# Patient Record
Sex: MALE | Race: WHITE | NOT HISPANIC OR LATINO | Employment: OTHER | URBAN - METROPOLITAN AREA
[De-identification: names, ages, dates, MRNs, and addresses within clinical notes are randomized per-mention and may not be internally consistent; named-entity substitution may affect disease eponyms.]

---

## 2018-05-07 ENCOUNTER — HOSPITAL ENCOUNTER (OUTPATIENT)
Dept: GENERAL RADIOLOGY | Facility: HOSPITAL | Age: 83
Discharge: HOME | End: 2018-05-07
Attending: INTERNAL MEDICINE | Admitting: INTERNAL MEDICINE

## 2018-05-07 ENCOUNTER — TRANSCRIBE ORDERS (OUTPATIENT)
Dept: ADMINISTRATIVE | Facility: HOSPITAL | Age: 83
End: 2018-05-07

## 2018-05-07 DIAGNOSIS — M79.605 LEFT LEG PAIN: ICD-10-CM

## 2018-05-07 DIAGNOSIS — M79.605 LEFT LEG PAIN: Primary | ICD-10-CM

## 2018-05-07 PROCEDURE — 73590 X-RAY EXAM OF LOWER LEG: CPT

## 2018-05-07 PROCEDURE — 73552 X-RAY EXAM OF FEMUR 2/>: CPT

## 2018-05-18 ENCOUNTER — TRANSCRIBE ORDERS (OUTPATIENT)
Dept: ADMINISTRATIVE | Facility: HOSPITAL | Age: 83
End: 2018-05-18

## 2018-05-18 DIAGNOSIS — M79.605 LEFT LEG PAIN: Primary | ICD-10-CM

## 2018-05-25 ENCOUNTER — HOSPITAL ENCOUNTER (OUTPATIENT)
Dept: NUCLEAR MEDICINE | Facility: HOSPITAL | Age: 83
Discharge: HOME | End: 2018-05-25
Attending: INTERNAL MEDICINE

## 2018-05-25 DIAGNOSIS — M79.605 LEFT LEG PAIN: ICD-10-CM

## 2018-05-25 PROCEDURE — 78306 BONE IMAGING WHOLE BODY: CPT

## 2018-05-25 PROCEDURE — A9503 TC99M MEDRONATE: HCPCS | Performed by: INTERNAL MEDICINE

## 2018-05-25 PROCEDURE — 0 TECHNETIUM MEDRONATE KIT: Performed by: INTERNAL MEDICINE

## 2018-05-25 RX ORDER — TC 99M MEDRONATE 20 MG/10ML
27 INJECTION, POWDER, LYOPHILIZED, FOR SOLUTION INTRAVENOUS
Status: COMPLETED | OUTPATIENT
Start: 2018-05-25 | End: 2018-05-25

## 2018-05-25 RX ADMIN — Medication 27 MILLICURIE: at 10:46

## 2018-05-29 ENCOUNTER — APPOINTMENT (OUTPATIENT)
Dept: NUCLEAR MEDICINE | Facility: HOSPITAL | Age: 83
End: 2018-05-29
Attending: INTERNAL MEDICINE

## 2018-05-31 ENCOUNTER — TRANSCRIBE ORDERS (OUTPATIENT)
Dept: ADMINISTRATIVE | Facility: HOSPITAL | Age: 83
End: 2018-05-31

## 2018-05-31 DIAGNOSIS — R94.8 ABNORMAL BONE SCAN OF LUMBAR SPINE: Primary | ICD-10-CM

## 2018-06-06 ENCOUNTER — HOSPITAL ENCOUNTER (OUTPATIENT)
Dept: CT IMAGING | Facility: HOSPITAL | Age: 83
Discharge: HOME | End: 2018-06-06
Attending: INTERNAL MEDICINE | Admitting: INTERNAL MEDICINE

## 2018-06-06 DIAGNOSIS — R94.8 ABNORMAL BONE SCAN OF LUMBAR SPINE: ICD-10-CM

## 2018-06-06 PROCEDURE — 72131 CT LUMBAR SPINE W/O DYE: CPT

## 2018-06-12 ENCOUNTER — APPOINTMENT (OUTPATIENT)
Dept: CT IMAGING | Facility: HOSPITAL | Age: 83
End: 2018-06-12

## 2018-06-12 ENCOUNTER — APPOINTMENT (OUTPATIENT)
Dept: GENERAL RADIOLOGY | Facility: HOSPITAL | Age: 83
End: 2018-06-12

## 2018-06-12 ENCOUNTER — HOSPITAL ENCOUNTER (INPATIENT)
Facility: HOSPITAL | Age: 83
LOS: 13 days | Discharge: NURSING, REHAB OR LONG-TERM CARE | End: 2018-06-25
Attending: EMERGENCY MEDICINE | Admitting: SURGERY

## 2018-06-12 DIAGNOSIS — C79.51 METASTATIC CANCER TO SPINE (HCC): ICD-10-CM

## 2018-06-12 DIAGNOSIS — Z74.09 IMPAIRED FUNCTIONAL MOBILITY, BALANCE, GAIT, AND ENDURANCE: ICD-10-CM

## 2018-06-12 DIAGNOSIS — Z85.46 HISTORY OF PROSTATE CANCER: ICD-10-CM

## 2018-06-12 DIAGNOSIS — A41.9 SEPSIS, DUE TO UNSPECIFIED ORGANISM: Primary | ICD-10-CM

## 2018-06-12 DIAGNOSIS — J18.9 PNEUMONIA OF LEFT LOWER LOBE DUE TO INFECTIOUS ORGANISM: ICD-10-CM

## 2018-06-12 DIAGNOSIS — Z74.09 IMPAIRED MOBILITY AND ADLS: ICD-10-CM

## 2018-06-12 DIAGNOSIS — K80.81 BILIARY CALCULUS OF OTHER SITE WITH OBSTRUCTION: ICD-10-CM

## 2018-06-12 DIAGNOSIS — R74.8 ELEVATED LIVER ENZYMES: ICD-10-CM

## 2018-06-12 DIAGNOSIS — R77.8 ELEVATED TROPONIN: ICD-10-CM

## 2018-06-12 DIAGNOSIS — E80.6 HYPERBILIRUBINEMIA: ICD-10-CM

## 2018-06-12 DIAGNOSIS — Z78.9 IMPAIRED MOBILITY AND ADLS: ICD-10-CM

## 2018-06-12 LAB
ALBUMIN SERPL-MCNC: 3.91 G/DL (ref 3.2–4.8)
ALBUMIN/GLOB SERPL: 1.8 G/DL (ref 1.5–2.5)
ALP SERPL-CCNC: 188 U/L (ref 25–100)
ALT SERPL W P-5'-P-CCNC: 210 U/L (ref 7–40)
ANION GAP SERPL CALCULATED.3IONS-SCNC: 11 MMOL/L (ref 3–11)
AST SERPL-CCNC: 315 U/L (ref 0–33)
BASOPHILS # BLD AUTO: 0.01 10*3/MM3 (ref 0–0.2)
BASOPHILS NFR BLD AUTO: 0.1 % (ref 0–1)
BILIRUB SERPL-MCNC: 3.2 MG/DL (ref 0.3–1.2)
BUN BLD-MCNC: 24 MG/DL (ref 9–23)
BUN/CREAT SERPL: 16.8 (ref 7–25)
CALCIUM SPEC-SCNC: 9.3 MG/DL (ref 8.7–10.4)
CHLORIDE SERPL-SCNC: 101 MMOL/L (ref 99–109)
CO2 SERPL-SCNC: 25 MMOL/L (ref 20–31)
CREAT BLD-MCNC: 1.43 MG/DL (ref 0.6–1.3)
D-LACTATE SERPL-SCNC: 3.1 MMOL/L (ref 0.5–2)
DEPRECATED RDW RBC AUTO: 46 FL (ref 37–54)
EOSINOPHIL # BLD AUTO: 0 10*3/MM3 (ref 0–0.3)
EOSINOPHIL NFR BLD AUTO: 0 % (ref 0–3)
ERYTHROCYTE [DISTWIDTH] IN BLOOD BY AUTOMATED COUNT: 13.9 % (ref 11.3–14.5)
GFR SERPL CREATININE-BSD FRML MDRD: 46 ML/MIN/1.73
GLOBULIN UR ELPH-MCNC: 2.2 GM/DL
GLUCOSE BLD-MCNC: 143 MG/DL (ref 70–100)
HCT VFR BLD AUTO: 39.5 % (ref 38.9–50.9)
HGB BLD-MCNC: 13.1 G/DL (ref 13.1–17.5)
HOLD SPECIMEN: NORMAL
HOLD SPECIMEN: NORMAL
IMM GRANULOCYTES # BLD: 0.04 10*3/MM3 (ref 0–0.03)
IMM GRANULOCYTES NFR BLD: 0.3 % (ref 0–0.6)
LYMPHOCYTES # BLD AUTO: 0.39 10*3/MM3 (ref 0.6–4.8)
LYMPHOCYTES NFR BLD AUTO: 3 % (ref 24–44)
MAGNESIUM SERPL-MCNC: 2 MG/DL (ref 1.3–2.7)
MCH RBC QN AUTO: 29.9 PG (ref 27–31)
MCHC RBC AUTO-ENTMCNC: 33.2 G/DL (ref 32–36)
MCV RBC AUTO: 90.2 FL (ref 80–99)
MONOCYTES # BLD AUTO: 0.73 10*3/MM3 (ref 0–1)
MONOCYTES NFR BLD AUTO: 5.6 % (ref 0–12)
NEUTROPHILS # BLD AUTO: 11.76 10*3/MM3 (ref 1.5–8.3)
NEUTROPHILS NFR BLD AUTO: 91 % (ref 41–71)
PLATELET # BLD AUTO: 168 10*3/MM3 (ref 150–450)
PMV BLD AUTO: 10.1 FL (ref 6–12)
POTASSIUM BLD-SCNC: 3.3 MMOL/L (ref 3.5–5.5)
PROT SERPL-MCNC: 6.1 G/DL (ref 5.7–8.2)
RBC # BLD AUTO: 4.38 10*6/MM3 (ref 4.2–5.76)
SODIUM BLD-SCNC: 137 MMOL/L (ref 132–146)
TROPONIN I SERPL-MCNC: 0.26 NG/ML (ref 0–0.07)
WBC NRBC COR # BLD: 12.93 10*3/MM3 (ref 3.5–10.8)
WHOLE BLOOD HOLD SPECIMEN: NORMAL
WHOLE BLOOD HOLD SPECIMEN: NORMAL

## 2018-06-12 PROCEDURE — 83735 ASSAY OF MAGNESIUM: CPT

## 2018-06-12 PROCEDURE — 83605 ASSAY OF LACTIC ACID: CPT | Performed by: NURSE PRACTITIONER

## 2018-06-12 PROCEDURE — 71045 X-RAY EXAM CHEST 1 VIEW: CPT

## 2018-06-12 PROCEDURE — 87040 BLOOD CULTURE FOR BACTERIA: CPT | Performed by: NURSE PRACTITIONER

## 2018-06-12 PROCEDURE — 87186 SC STD MICRODIL/AGAR DIL: CPT | Performed by: NURSE PRACTITIONER

## 2018-06-12 PROCEDURE — 80053 COMPREHEN METABOLIC PANEL: CPT

## 2018-06-12 PROCEDURE — 81003 URINALYSIS AUTO W/O SCOPE: CPT

## 2018-06-12 PROCEDURE — 93005 ELECTROCARDIOGRAM TRACING: CPT

## 2018-06-12 PROCEDURE — 85025 COMPLETE CBC W/AUTO DIFF WBC: CPT

## 2018-06-12 PROCEDURE — 74176 CT ABD & PELVIS W/O CONTRAST: CPT

## 2018-06-12 PROCEDURE — 99285 EMERGENCY DEPT VISIT HI MDM: CPT

## 2018-06-12 PROCEDURE — 83880 ASSAY OF NATRIURETIC PEPTIDE: CPT | Performed by: NURSE PRACTITIONER

## 2018-06-12 PROCEDURE — 84484 ASSAY OF TROPONIN QUANT: CPT

## 2018-06-12 PROCEDURE — 83690 ASSAY OF LIPASE: CPT | Performed by: NURSE PRACTITIONER

## 2018-06-12 PROCEDURE — 87150 DNA/RNA AMPLIFIED PROBE: CPT | Performed by: NURSE PRACTITIONER

## 2018-06-12 RX ORDER — SODIUM CHLORIDE 9 MG/ML
100 INJECTION, SOLUTION INTRAVENOUS CONTINUOUS
Status: DISCONTINUED | OUTPATIENT
Start: 2018-06-13 | End: 2018-06-15

## 2018-06-12 RX ORDER — ASPIRIN 81 MG/1
81 TABLET ORAL DAILY
COMMUNITY
End: 2018-07-16

## 2018-06-12 RX ORDER — SODIUM CHLORIDE 0.9 % (FLUSH) 0.9 %
10 SYRINGE (ML) INJECTION AS NEEDED
Status: DISCONTINUED | OUTPATIENT
Start: 2018-06-12 | End: 2018-06-25 | Stop reason: HOSPADM

## 2018-06-12 RX ORDER — TERAZOSIN 5 MG/1
5 CAPSULE ORAL NIGHTLY
COMMUNITY
End: 2018-07-16 | Stop reason: SDUPTHER

## 2018-06-12 RX ORDER — IBUPROFEN 200 MG
200 TABLET ORAL EVERY 6 HOURS PRN
COMMUNITY
End: 2018-12-29

## 2018-06-12 RX ORDER — HYDROCODONE BITARTRATE AND ACETAMINOPHEN 7.5; 325 MG/1; MG/1
TABLET ORAL
Status: DISPENSED
Start: 2018-06-12 | End: 2018-06-13

## 2018-06-12 RX ORDER — FUROSEMIDE 20 MG/1
20 TABLET ORAL DAILY
COMMUNITY
End: 2018-06-25 | Stop reason: HOSPADM

## 2018-06-12 RX ORDER — HYDROCODONE BITARTRATE AND ACETAMINOPHEN 7.5; 325 MG/1; MG/1
1 TABLET ORAL ONCE
Status: COMPLETED | OUTPATIENT
Start: 2018-06-12 | End: 2018-06-12

## 2018-06-12 RX ADMIN — SODIUM CHLORIDE 500 ML: 9 INJECTION, SOLUTION INTRAVENOUS at 22:40

## 2018-06-12 RX ADMIN — HYDROCODONE BITARTRATE AND ACETAMINOPHEN 1 TABLET: 7.5; 325 TABLET ORAL at 23:07

## 2018-06-13 ENCOUNTER — APPOINTMENT (OUTPATIENT)
Dept: CARDIOLOGY | Facility: HOSPITAL | Age: 83
End: 2018-06-13
Attending: INTERNAL MEDICINE

## 2018-06-13 ENCOUNTER — APPOINTMENT (OUTPATIENT)
Dept: ULTRASOUND IMAGING | Facility: HOSPITAL | Age: 83
End: 2018-06-13

## 2018-06-13 PROBLEM — R91.8 PULMONARY INFILTRATE: Status: ACTIVE | Noted: 2018-06-13

## 2018-06-13 PROBLEM — G47.33 OSA (OBSTRUCTIVE SLEEP APNEA): Status: ACTIVE | Noted: 2018-06-13

## 2018-06-13 PROBLEM — R74.8 ELEVATED LIVER ENZYMES: Status: ACTIVE | Noted: 2018-06-13

## 2018-06-13 PROBLEM — R77.8 ELEVATED TROPONIN: Status: ACTIVE | Noted: 2018-06-13

## 2018-06-13 PROBLEM — I21.4 NSTEMI (NON-ST ELEVATED MYOCARDIAL INFARCTION) (HCC): Status: ACTIVE | Noted: 2018-06-13

## 2018-06-13 PROBLEM — K80.20 CHOLELITHIASES: Status: ACTIVE | Noted: 2018-06-13

## 2018-06-13 PROBLEM — C79.51 METASTATIC CANCER TO SPINE (HCC): Status: ACTIVE | Noted: 2018-06-13

## 2018-06-13 PROBLEM — C61 PROSTATE CANCER (HCC): Status: ACTIVE | Noted: 2018-06-13

## 2018-06-13 PROBLEM — N28.9 ACUTE RENAL INSUFFICIENCY: Status: ACTIVE | Noted: 2018-06-13

## 2018-06-13 PROBLEM — K80.70 CALCULUS OF GALLBLADDER AND BILE DUCT: Status: ACTIVE | Noted: 2018-06-13

## 2018-06-13 PROBLEM — K81.9 CHOLECYSTITIS: Status: ACTIVE | Noted: 2018-06-13

## 2018-06-13 PROBLEM — H54.40 BLINDNESS OF RIGHT EYE: Status: ACTIVE | Noted: 2018-06-13

## 2018-06-13 LAB
ALBUMIN SERPL-MCNC: 3.65 G/DL (ref 3.2–4.8)
ALBUMIN/GLOB SERPL: 1.6 G/DL (ref 1.5–2.5)
ALP SERPL-CCNC: 180 U/L (ref 25–100)
ALT SERPL W P-5'-P-CCNC: 296 U/L (ref 7–40)
ANION GAP SERPL CALCULATED.3IONS-SCNC: 7 MMOL/L (ref 3–11)
APTT PPP: 27.4 SECONDS (ref 24–31)
AST SERPL-CCNC: 346 U/L (ref 0–33)
BACTERIA BLD CULT: ABNORMAL
BASOPHILS # BLD AUTO: 0.01 10*3/MM3 (ref 0–0.2)
BASOPHILS NFR BLD AUTO: 0 % (ref 0–1)
BH CV ECHO MEAS - AO MAX PG (FULL): 1.4 MMHG
BH CV ECHO MEAS - AO MAX PG: 5 MMHG
BH CV ECHO MEAS - AO ROOT AREA (BSA CORRECTED): 1.6
BH CV ECHO MEAS - AO ROOT AREA: 7.4 CM^2
BH CV ECHO MEAS - AO ROOT DIAM: 3.1 CM
BH CV ECHO MEAS - AO V2 MAX: 112 CM/SEC
BH CV ECHO MEAS - AVA(V,A): 2.7 CM^2
BH CV ECHO MEAS - AVA(V,D): 2.7 CM^2
BH CV ECHO MEAS - BSA(HAYCOCK): 2 M^2
BH CV ECHO MEAS - BSA: 2 M^2
BH CV ECHO MEAS - BZI_BMI: 29.8 KILOGRAMS/M^2
BH CV ECHO MEAS - BZI_METRIC_HEIGHT: 170.2 CM
BH CV ECHO MEAS - BZI_METRIC_WEIGHT: 86.2 KG
BH CV ECHO MEAS - CONTRAST EF (2CH): 53.9 ML/M^2
BH CV ECHO MEAS - CONTRAST EF 4CH: 50.5 ML/M^2
BH CV ECHO MEAS - EDV(CUBED): 70.2 ML
BH CV ECHO MEAS - EDV(MOD-SP2): 76 ML
BH CV ECHO MEAS - EDV(MOD-SP4): 93 ML
BH CV ECHO MEAS - EDV(TEICH): 75.3 ML
BH CV ECHO MEAS - EF(CUBED): 52.1 %
BH CV ECHO MEAS - EF(MOD-BP): 50 %
BH CV ECHO MEAS - EF(MOD-SP2): 53.9 %
BH CV ECHO MEAS - EF(MOD-SP4): 50.5 %
BH CV ECHO MEAS - EF(TEICH): 44.5 %
BH CV ECHO MEAS - ESV(CUBED): 33.6 ML
BH CV ECHO MEAS - ESV(MOD-SP2): 35 ML
BH CV ECHO MEAS - ESV(MOD-SP4): 46 ML
BH CV ECHO MEAS - ESV(TEICH): 41.8 ML
BH CV ECHO MEAS - FS: 21.8 %
BH CV ECHO MEAS - IVS/LVPW: 1
BH CV ECHO MEAS - IVSD: 0.98 CM
BH CV ECHO MEAS - LA DIMENSION: 3.2 CM
BH CV ECHO MEAS - LA/AO: 1
BH CV ECHO MEAS - LAT PEAK E' VEL: 6.5 CM/SEC
BH CV ECHO MEAS - LV DIASTOLIC VOL/BSA (35-75): 47 ML/M^2
BH CV ECHO MEAS - LV MASS(C)D: 127.9 GRAMS
BH CV ECHO MEAS - LV MASS(C)DI: 64.7 GRAMS/M^2
BH CV ECHO MEAS - LV MAX PG: 3.6 MMHG
BH CV ECHO MEAS - LV MEAN PG: 1.8 MMHG
BH CV ECHO MEAS - LV SYSTOLIC VOL/BSA (12-30): 23.3 ML/M^2
BH CV ECHO MEAS - LV V1 MAX: 95.3 CM/SEC
BH CV ECHO MEAS - LV V1 MEAN: 61.7 CM/SEC
BH CV ECHO MEAS - LV V1 VTI: 18.9 CM
BH CV ECHO MEAS - LVIDD: 4.1 CM
BH CV ECHO MEAS - LVIDS: 3.2 CM
BH CV ECHO MEAS - LVLD AP2: 6.8 CM
BH CV ECHO MEAS - LVLD AP4: 7.5 CM
BH CV ECHO MEAS - LVLS AP2: 6.2 CM
BH CV ECHO MEAS - LVLS AP4: 6.7 CM
BH CV ECHO MEAS - LVOT AREA (M): 3.1 CM^2
BH CV ECHO MEAS - LVOT AREA: 3.2 CM^2
BH CV ECHO MEAS - LVOT DIAM: 2 CM
BH CV ECHO MEAS - LVPWD: 0.96 CM
BH CV ECHO MEAS - MED PEAK E' VEL: 5.5 CM/SEC
BH CV ECHO MEAS - MV A MAX VEL: 79 CM/SEC
BH CV ECHO MEAS - MV DEC TIME: 0.18 SEC
BH CV ECHO MEAS - MV E MAX VEL: 84.4 CM/SEC
BH CV ECHO MEAS - MV E/A: 1.1
BH CV ECHO MEAS - MV MAX PG: 3.2 MMHG
BH CV ECHO MEAS - MV MEAN PG: 1.7 MMHG
BH CV ECHO MEAS - MV V2 MAX: 88.9 CM/SEC
BH CV ECHO MEAS - MV V2 MEAN: 61.6 CM/SEC
BH CV ECHO MEAS - MV V2 VTI: 28 CM
BH CV ECHO MEAS - MVA(VTI): 2.2 CM^2
BH CV ECHO MEAS - SI(CUBED): 18.5 ML/M^2
BH CV ECHO MEAS - SI(LVOT): 30.7 ML/M^2
BH CV ECHO MEAS - SI(MOD-SP2): 20.7 ML/M^2
BH CV ECHO MEAS - SI(MOD-SP4): 23.8 ML/M^2
BH CV ECHO MEAS - SI(TEICH): 16.9 ML/M^2
BH CV ECHO MEAS - SV(CUBED): 36.6 ML
BH CV ECHO MEAS - SV(LVOT): 60.8 ML
BH CV ECHO MEAS - SV(MOD-SP2): 41 ML
BH CV ECHO MEAS - SV(MOD-SP4): 47 ML
BH CV ECHO MEAS - SV(TEICH): 33.5 ML
BH CV ECHO MEAS - TAPSE (>1.6): 1.8 CM2
BH CV ECHO MEASUREMENTS AVERAGE E/E' RATIO: 14.07
BH CV VAS BP LEFT ARM: NORMAL MMHG
BH CV XLRA - RV BASE: 3.2 CM
BH CV XLRA - RV LENGTH: 7.5 CM
BH CV XLRA - RV MID: 2.6 CM
BH CV XLRA - TDI S': 12.7 CM/SEC
BILIRUB SERPL-MCNC: 3 MG/DL (ref 0.3–1.2)
BILIRUB UR QL STRIP: ABNORMAL
BNP SERPL-MCNC: 87 PG/ML (ref 0–100)
BUN BLD-MCNC: 23 MG/DL (ref 9–23)
BUN/CREAT SERPL: 16.8 (ref 7–25)
CA-I SERPL ISE-MCNC: 1.17 MMOL/L (ref 1.12–1.32)
CALCIUM SPEC-SCNC: 8.5 MG/DL (ref 8.7–10.4)
CHLORIDE SERPL-SCNC: 103 MMOL/L (ref 99–109)
CK MB SERPL-CCNC: 5.63 NG/ML (ref 0–5)
CK SERPL-CCNC: 67 U/L (ref 26–174)
CLARITY UR: CLEAR
CO2 SERPL-SCNC: 24 MMOL/L (ref 20–31)
COLOR UR: YELLOW
CORTIS SERPL-MCNC: 36.7 MCG/DL
CREAT BLD-MCNC: 1.37 MG/DL (ref 0.6–1.3)
CRP SERPL-MCNC: 4.29 MG/DL (ref 0–1)
D-LACTATE SERPL-SCNC: 2.3 MMOL/L (ref 0.5–2)
DEPRECATED RDW RBC AUTO: 47.8 FL (ref 37–54)
EOSINOPHIL # BLD AUTO: 0.01 10*3/MM3 (ref 0–0.3)
EOSINOPHIL NFR BLD AUTO: 0 % (ref 0–3)
ERYTHROCYTE [DISTWIDTH] IN BLOOD BY AUTOMATED COUNT: 14.2 % (ref 11.3–14.5)
GFR SERPL CREATININE-BSD FRML MDRD: 49 ML/MIN/1.73
GLOBULIN UR ELPH-MCNC: 2.4 GM/DL
GLUCOSE BLD-MCNC: 134 MG/DL (ref 70–100)
GLUCOSE BLDC GLUCOMTR-MCNC: 100 MG/DL (ref 70–130)
GLUCOSE BLDC GLUCOMTR-MCNC: 110 MG/DL (ref 70–130)
GLUCOSE BLDC GLUCOMTR-MCNC: 117 MG/DL (ref 70–130)
GLUCOSE BLDC GLUCOMTR-MCNC: 87 MG/DL (ref 70–130)
GLUCOSE UR STRIP-MCNC: NEGATIVE MG/DL
HAV IGM SERPL QL IA: NORMAL
HBV CORE IGM SERPL QL IA: NORMAL
HBV SURFACE AG SERPL QL IA: NORMAL
HCT VFR BLD AUTO: 36.5 % (ref 38.9–50.9)
HCV AB SER DONR QL: NORMAL
HGB BLD-MCNC: 12 G/DL (ref 13.1–17.5)
HGB UR QL STRIP.AUTO: NEGATIVE
HOLD SPECIMEN: NORMAL
IMM GRANULOCYTES # BLD: 0.12 10*3/MM3 (ref 0–0.03)
IMM GRANULOCYTES NFR BLD: 0.6 % (ref 0–0.6)
INR PPP: 1.11 (ref 0.91–1.09)
KETONES UR QL STRIP: ABNORMAL
LEFT ATRIUM VOLUME INDEX: 35 ML/M2
LEFT ATRIUM VOLUME: 70 CM3
LEUKOCYTE ESTERASE UR QL STRIP.AUTO: NEGATIVE
LIPASE SERPL-CCNC: 23 U/L (ref 6–51)
LV EF 2D ECHO EST: 55 %
LYMPHOCYTES # BLD AUTO: 1.02 10*3/MM3 (ref 0.6–4.8)
LYMPHOCYTES NFR BLD AUTO: 5.1 % (ref 24–44)
MAGNESIUM SERPL-MCNC: 2.1 MG/DL (ref 1.3–2.7)
MAXIMAL PREDICTED HEART RATE: 130 BPM
MCH RBC QN AUTO: 29.9 PG (ref 27–31)
MCHC RBC AUTO-ENTMCNC: 32.9 G/DL (ref 32–36)
MCV RBC AUTO: 90.8 FL (ref 80–99)
MONOCYTES # BLD AUTO: 1.4 10*3/MM3 (ref 0–1)
MONOCYTES NFR BLD AUTO: 7 % (ref 0–12)
NEUTROPHILS # BLD AUTO: 17.56 10*3/MM3 (ref 1.5–8.3)
NEUTROPHILS NFR BLD AUTO: 87.3 % (ref 41–71)
NITRITE UR QL STRIP: NEGATIVE
PH UR STRIP.AUTO: 5.5 [PH] (ref 5–8)
PHOSPHATE SERPL-MCNC: 3.6 MG/DL (ref 2.4–5.1)
PLATELET # BLD AUTO: 153 10*3/MM3 (ref 150–450)
PMV BLD AUTO: 10.1 FL (ref 6–12)
POTASSIUM BLD-SCNC: 4.2 MMOL/L (ref 3.5–5.5)
PROCALCITONIN SERPL-MCNC: 45.71 NG/ML
PROT SERPL-MCNC: 6 G/DL (ref 5.7–8.2)
PROT UR QL STRIP: NEGATIVE
PROTHROMBIN TIME: 11.7 SECONDS (ref 9.6–11.5)
RBC # BLD AUTO: 4.02 10*6/MM3 (ref 4.2–5.76)
SODIUM BLD-SCNC: 134 MMOL/L (ref 132–146)
SP GR UR STRIP: 1.01 (ref 1–1.03)
STRESS TARGET HR: 111 BPM
TROPONIN I SERPL-MCNC: 0.45 NG/ML (ref 0–0.07)
TROPONIN I SERPL-MCNC: 1.11 NG/ML
TROPONIN I SERPL-MCNC: 1.49 NG/ML
TROPONIN I SERPL-MCNC: 1.6 NG/ML
TSH SERPL DL<=0.05 MIU/L-ACNC: 0.64 MIU/ML (ref 0.35–5.35)
UROBILINOGEN UR QL STRIP: ABNORMAL
WBC NRBC COR # BLD: 20.12 10*3/MM3 (ref 3.5–10.8)

## 2018-06-13 PROCEDURE — 93005 ELECTROCARDIOGRAM TRACING: CPT | Performed by: NURSE PRACTITIONER

## 2018-06-13 PROCEDURE — 86140 C-REACTIVE PROTEIN: CPT | Performed by: NURSE PRACTITIONER

## 2018-06-13 PROCEDURE — 82330 ASSAY OF CALCIUM: CPT | Performed by: NURSE PRACTITIONER

## 2018-06-13 PROCEDURE — 99291 CRITICAL CARE FIRST HOUR: CPT | Performed by: INTERNAL MEDICINE

## 2018-06-13 PROCEDURE — 93005 ELECTROCARDIOGRAM TRACING: CPT | Performed by: EMERGENCY MEDICINE

## 2018-06-13 PROCEDURE — 63710000001 INSULIN LISPRO (HUMAN) PER 5 UNITS: Performed by: INTERNAL MEDICINE

## 2018-06-13 PROCEDURE — 84484 ASSAY OF TROPONIN QUANT: CPT | Performed by: NURSE PRACTITIONER

## 2018-06-13 PROCEDURE — 84145 PROCALCITONIN (PCT): CPT | Performed by: NURSE PRACTITIONER

## 2018-06-13 PROCEDURE — 80053 COMPREHEN METABOLIC PANEL: CPT | Performed by: NURSE PRACTITIONER

## 2018-06-13 PROCEDURE — 25010000002 PIPERACILLIN SOD-TAZOBACTAM PER 1 G: Performed by: INTERNAL MEDICINE

## 2018-06-13 PROCEDURE — 97162 PT EVAL MOD COMPLEX 30 MIN: CPT

## 2018-06-13 PROCEDURE — 83735 ASSAY OF MAGNESIUM: CPT | Performed by: NURSE PRACTITIONER

## 2018-06-13 PROCEDURE — 25010000002 CEFTRIAXONE PER 250 MG

## 2018-06-13 PROCEDURE — 84484 ASSAY OF TROPONIN QUANT: CPT | Performed by: INTERNAL MEDICINE

## 2018-06-13 PROCEDURE — 99221 1ST HOSP IP/OBS SF/LOW 40: CPT | Performed by: PHYSICIAN ASSISTANT

## 2018-06-13 PROCEDURE — 85730 THROMBOPLASTIN TIME PARTIAL: CPT | Performed by: NURSE PRACTITIONER

## 2018-06-13 PROCEDURE — 84100 ASSAY OF PHOSPHORUS: CPT | Performed by: NURSE PRACTITIONER

## 2018-06-13 PROCEDURE — 82962 GLUCOSE BLOOD TEST: CPT

## 2018-06-13 PROCEDURE — 25010000002 AZITHROMYCIN: Performed by: NURSE PRACTITIONER

## 2018-06-13 PROCEDURE — 85025 COMPLETE CBC W/AUTO DIFF WBC: CPT | Performed by: NURSE PRACTITIONER

## 2018-06-13 PROCEDURE — 93306 TTE W/DOPPLER COMPLETE: CPT | Performed by: INTERNAL MEDICINE

## 2018-06-13 PROCEDURE — 82553 CREATINE MB FRACTION: CPT | Performed by: NURSE PRACTITIONER

## 2018-06-13 PROCEDURE — 93306 TTE W/DOPPLER COMPLETE: CPT

## 2018-06-13 PROCEDURE — 93010 ELECTROCARDIOGRAM REPORT: CPT | Performed by: INTERNAL MEDICINE

## 2018-06-13 PROCEDURE — 25010000002 HEPARIN (PORCINE) PER 1000 UNITS: Performed by: NURSE PRACTITIONER

## 2018-06-13 PROCEDURE — 84484 ASSAY OF TROPONIN QUANT: CPT

## 2018-06-13 PROCEDURE — 25010000002 PIPERACILLIN SOD-TAZOBACTAM PER 1 G: Performed by: NURSE PRACTITIONER

## 2018-06-13 PROCEDURE — 82550 ASSAY OF CK (CPK): CPT | Performed by: NURSE PRACTITIONER

## 2018-06-13 PROCEDURE — 99222 1ST HOSP IP/OBS MODERATE 55: CPT | Performed by: INTERNAL MEDICINE

## 2018-06-13 PROCEDURE — 76705 ECHO EXAM OF ABDOMEN: CPT

## 2018-06-13 PROCEDURE — 83605 ASSAY OF LACTIC ACID: CPT | Performed by: NURSE PRACTITIONER

## 2018-06-13 PROCEDURE — 82533 TOTAL CORTISOL: CPT | Performed by: NURSE PRACTITIONER

## 2018-06-13 PROCEDURE — 84443 ASSAY THYROID STIM HORMONE: CPT | Performed by: NURSE PRACTITIONER

## 2018-06-13 PROCEDURE — 80074 ACUTE HEPATITIS PANEL: CPT | Performed by: PHYSICIAN ASSISTANT

## 2018-06-13 PROCEDURE — 25010000002 SULFUR HEXAFLUORIDE MICROSPH 60.7-25 MG RECONSTITUTED SUSPENSION: Performed by: INTERNAL MEDICINE

## 2018-06-13 PROCEDURE — 25010000002 VANCOMYCIN 10 G RECONSTITUTED SOLUTION

## 2018-06-13 PROCEDURE — 85610 PROTHROMBIN TIME: CPT | Performed by: NURSE PRACTITIONER

## 2018-06-13 RX ORDER — CEFTRIAXONE SODIUM 1 G/50ML
1 INJECTION, SOLUTION INTRAVENOUS ONCE
Status: COMPLETED | OUTPATIENT
Start: 2018-06-13 | End: 2018-06-13

## 2018-06-13 RX ORDER — MULTIPLE VITAMINS W/ MINERALS TAB 9MG-400MCG
1 TAB ORAL DAILY
Status: DISCONTINUED | OUTPATIENT
Start: 2018-06-14 | End: 2018-06-25 | Stop reason: HOSPADM

## 2018-06-13 RX ORDER — NICOTINE POLACRILEX 4 MG
15 LOZENGE BUCCAL
Status: DISCONTINUED | OUTPATIENT
Start: 2018-06-13 | End: 2018-06-14

## 2018-06-13 RX ORDER — CEFTRIAXONE SODIUM 1 G/50ML
INJECTION, SOLUTION INTRAVENOUS
Status: COMPLETED
Start: 2018-06-13 | End: 2018-06-13

## 2018-06-13 RX ORDER — NICOTINE POLACRILEX 4 MG
15 LOZENGE BUCCAL
Status: DISCONTINUED | OUTPATIENT
Start: 2018-06-13 | End: 2018-06-15

## 2018-06-13 RX ORDER — DEXTROSE MONOHYDRATE 25 G/50ML
25 INJECTION, SOLUTION INTRAVENOUS
Status: DISCONTINUED | OUTPATIENT
Start: 2018-06-13 | End: 2018-06-14

## 2018-06-13 RX ORDER — DEXTROSE MONOHYDRATE 25 G/50ML
25 INJECTION, SOLUTION INTRAVENOUS
Status: DISCONTINUED | OUTPATIENT
Start: 2018-06-13 | End: 2018-06-15

## 2018-06-13 RX ORDER — ASPIRIN 81 MG/1
81 TABLET ORAL DAILY
Status: DISCONTINUED | OUTPATIENT
Start: 2018-06-13 | End: 2018-06-25 | Stop reason: HOSPADM

## 2018-06-13 RX ORDER — MAGNESIUM SULFATE HEPTAHYDRATE 40 MG/ML
4 INJECTION, SOLUTION INTRAVENOUS AS NEEDED
Status: DISCONTINUED | OUTPATIENT
Start: 2018-06-13 | End: 2018-06-22

## 2018-06-13 RX ORDER — ONDANSETRON 2 MG/ML
4 INJECTION INTRAMUSCULAR; INTRAVENOUS EVERY 6 HOURS PRN
Status: DISCONTINUED | OUTPATIENT
Start: 2018-06-13 | End: 2018-06-25 | Stop reason: HOSPADM

## 2018-06-13 RX ORDER — HEPARIN SODIUM 5000 [USP'U]/ML
5000 INJECTION, SOLUTION INTRAVENOUS; SUBCUTANEOUS EVERY 12 HOURS SCHEDULED
Status: DISCONTINUED | OUTPATIENT
Start: 2018-06-13 | End: 2018-06-25 | Stop reason: HOSPADM

## 2018-06-13 RX ORDER — MAGNESIUM SULFATE 1 G/100ML
1 INJECTION INTRAVENOUS AS NEEDED
Status: DISCONTINUED | OUTPATIENT
Start: 2018-06-13 | End: 2018-06-22

## 2018-06-13 RX ORDER — MAGNESIUM SULFATE HEPTAHYDRATE 40 MG/ML
2 INJECTION, SOLUTION INTRAVENOUS AS NEEDED
Status: DISCONTINUED | OUTPATIENT
Start: 2018-06-13 | End: 2018-06-22

## 2018-06-13 RX ORDER — PANTOPRAZOLE SODIUM 40 MG/10ML
40 INJECTION, POWDER, LYOPHILIZED, FOR SOLUTION INTRAVENOUS
Status: DISCONTINUED | OUTPATIENT
Start: 2018-06-13 | End: 2018-06-13

## 2018-06-13 RX ORDER — SENNA AND DOCUSATE SODIUM 50; 8.6 MG/1; MG/1
2 TABLET, FILM COATED ORAL 2 TIMES DAILY
Status: DISCONTINUED | OUTPATIENT
Start: 2018-06-13 | End: 2018-06-25 | Stop reason: HOSPADM

## 2018-06-13 RX ORDER — PANTOPRAZOLE SODIUM 40 MG/1
40 TABLET, DELAYED RELEASE ORAL
Status: DISCONTINUED | OUTPATIENT
Start: 2018-06-14 | End: 2018-06-15

## 2018-06-13 RX ORDER — MULTIPLE VITAMINS W/ MINERALS TAB 9MG-400MCG
1 TAB ORAL DAILY
Status: DISCONTINUED | OUTPATIENT
Start: 2018-06-13 | End: 2018-06-13

## 2018-06-13 RX ORDER — SODIUM CHLORIDE 0.9 % (FLUSH) 0.9 %
1-10 SYRINGE (ML) INJECTION AS NEEDED
Status: DISCONTINUED | OUTPATIENT
Start: 2018-06-13 | End: 2018-06-25 | Stop reason: HOSPADM

## 2018-06-13 RX ORDER — OXYCODONE HYDROCHLORIDE 5 MG/1
5 TABLET ORAL EVERY 8 HOURS PRN
Status: DISCONTINUED | OUTPATIENT
Start: 2018-06-13 | End: 2018-06-18

## 2018-06-13 RX ADMIN — HEPARIN SODIUM 5000 UNITS: 5000 INJECTION, SOLUTION INTRAVENOUS; SUBCUTANEOUS at 20:38

## 2018-06-13 RX ADMIN — SODIUM CHLORIDE 100 ML/HR: 9 INJECTION, SOLUTION INTRAVENOUS at 11:21

## 2018-06-13 RX ADMIN — SODIUM CHLORIDE 1000 ML: 9 INJECTION, SOLUTION INTRAVENOUS at 03:55

## 2018-06-13 RX ADMIN — SULFUR HEXAFLUORIDE 2 ML: KIT at 11:10

## 2018-06-13 RX ADMIN — SODIUM CHLORIDE 500 ML: 9 INJECTION, SOLUTION INTRAVENOUS at 00:53

## 2018-06-13 RX ADMIN — CEFTRIAXONE SODIUM 1 G: 1 INJECTION, SOLUTION INTRAVENOUS at 00:48

## 2018-06-13 RX ADMIN — PANTOPRAZOLE SODIUM 40 MG: 40 INJECTION, POWDER, FOR SOLUTION INTRAVENOUS at 05:22

## 2018-06-13 RX ADMIN — OXYCODONE HYDROCHLORIDE 5 MG: 5 TABLET ORAL at 21:26

## 2018-06-13 RX ADMIN — TAZOBACTAM SODIUM AND PIPERACILLIN SODIUM 3.38 G: 375; 3 INJECTION, SOLUTION INTRAVENOUS at 13:17

## 2018-06-13 RX ADMIN — TAZOBACTAM SODIUM AND PIPERACILLIN SODIUM 3.38 G: 375; 3 INJECTION, SOLUTION INTRAVENOUS at 05:24

## 2018-06-13 RX ADMIN — AZITHROMYCIN MONOHYDRATE 500 MG: 500 INJECTION, POWDER, LYOPHILIZED, FOR SOLUTION INTRAVENOUS at 01:39

## 2018-06-13 RX ADMIN — VANCOMYCIN HYDROCHLORIDE 1750 MG: 10 INJECTION, POWDER, LYOPHILIZED, FOR SOLUTION INTRAVENOUS at 05:23

## 2018-06-13 RX ADMIN — SENNOSIDES AND DOCUSATE SODIUM 2 TABLET: 8.6; 5 TABLET ORAL at 20:38

## 2018-06-13 RX ADMIN — SODIUM CHLORIDE 250 ML/HR: 9 INJECTION, SOLUTION INTRAVENOUS at 01:39

## 2018-06-13 RX ADMIN — HEPARIN SODIUM 5000 UNITS: 5000 INJECTION, SOLUTION INTRAVENOUS; SUBCUTANEOUS at 08:18

## 2018-06-13 RX ADMIN — OXYCODONE HYDROCHLORIDE 5 MG: 5 TABLET ORAL at 13:17

## 2018-06-13 RX ADMIN — METRONIDAZOLE 500 MG: 500 INJECTION, SOLUTION INTRAVENOUS at 02:58

## 2018-06-13 RX ADMIN — TAZOBACTAM SODIUM AND PIPERACILLIN SODIUM 4.5 G: 500; 4 INJECTION, SOLUTION INTRAVENOUS at 20:38

## 2018-06-13 RX ADMIN — ASPIRIN 81 MG: 81 TABLET, COATED ORAL at 08:18

## 2018-06-13 NOTE — CONSULTS
[facility] [place] [facility] [place]    Referring Provider:  Charli Duran MD   PCP: Charli Mccormack MD    Reason for Consultation: Abnormal LFTs     Chief complaint: Generalized weakness     History of present illness:    Harris Shane is a 89 y.o. 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  He presented to the ED with a chief complaint of generalized weakness and fatigue.  He was hypotensive (BP 84/68).  Labs revealed leukocytosis and elevated LFTs.  He denies abdominal pain, nausea or vomiting.  He complains of chronic back pain and left lower extremity pain.  He also has chronic constipation and takes hydrocodone for back pain.    CT abdomen/pelvis shows cholelithiasis at gallbladder neck.  Ultrasound today does not reveal stones but shows wall thickening and normal common bile duct (5 mm).      Allergies:  Contrast dye    Scheduled Meds:    aspirin 81 mg Oral Daily   heparin (porcine) 5,000 Units Subcutaneous Q12H   insulin lispro 0-7 Units Subcutaneous Q6H   [START ON 6/14/2018] multivitamin with minerals 1 tablet Oral Daily   pantoprazole 40 mg Intravenous Q AM   piperacillin-tazobactam 3.375 g Intravenous Q8H        Infusions:    sodium chloride 100 mL/hr Last Rate: 100 mL/hr (06/13/18 1121)       PRN Meds:  calcium gluconate IVPB **AND** calcium gluconate IVPB **AND** Calcium, Ionized  •  dextrose  •  dextrose  •  dextrose  •  dextrose  •  glucagon (human recombinant)  •  magnesium sulfate **OR** magnesium sulfate in D5W 1g/100mL (PREMIX) **OR** magnesium sulfate  •  ondansetron  •  oxyCODONE  •  potassium chloride  •  potassium phosphate infusion greater than 15 mMoles **OR** potassium phosphate infusion greater than 15 mMoles **OR** potassium phosphate **OR** sodium phosphate IVPB **OR** sodium phosphate IVPB **OR** sodium phosphate IVPB  •  sodium chloride  •  sodium chloride    Home Meds:  Prescriptions Prior to Admission   Medication Sig Dispense  Refill Last Dose   • aspirin 81 MG EC tablet Take 81 mg by mouth Daily.      • Cholecalciferol (VITAMIN D3) 5000 units capsule capsule Take 5,000 Units by mouth Daily.      • furosemide (LASIX) 20 MG tablet Take 20 mg by mouth Daily.      • HYDROCODONE-ACETAMINOPHEN PO Take 5-325 mg by mouth 2 (Two) Times a Day As Needed (for leg pain).      • ibuprofen (ADVIL,MOTRIN) 200 MG tablet Take 200 mg by mouth Every 6 (Six) Hours As Needed for Mild Pain .      • Multiple Vitamins-Minerals (CENTRUM SILVER PO) Take  by mouth.      • terazosin (HYTRIN) 5 MG capsule Take 5 mg by mouth Every Night.          ROS: Review of Systems   Constitutional: Positive for activity change. Negative for chills, fever and unexpected weight change.   HENT: Negative for trouble swallowing and voice change.    Eyes: Negative.    Respiratory: Positive for cough and shortness of breath.    Cardiovascular: Negative.    Gastrointestinal: Positive for constipation. Negative for abdominal pain, diarrhea, nausea and vomiting.   Genitourinary: Negative.    Musculoskeletal: Positive for arthralgias and back pain.   Skin: Negative.    Neurological: Positive for weakness.   Hematological: Negative.    Psychiatric/Behavioral: Negative.        PAST MED HX:  Past Medical History:   Diagnosis Date   • Blindness of right eye 6/13/2018   • Cancer     PROSTATE   • DVT (deep venous thrombosis) 1990's   • Metastatic cancer to spine, L4-L5 6/13/2018   • Myocardial infarction 1970's   • WILLIAM (obstructive sleep apnea) 6/13/2018   • UTI (urinary tract infection)        PAST SURG HX:  Past Surgical History:   Procedure Laterality Date   • EYE SURGERY      RIGHT       FAM HX:  Family History   Problem Relation Age of Onset   • Breast cancer Mother    • Prostate cancer Father    • Coronary artery disease Father    • No Known Problems Sister    • Stroke Brother    • Coronary artery disease Brother        SOC HX:  Social History     Social History   • Marital status:   "    Spouse name: N/A   • Number of children: 3   • Years of education: N/A     Occupational History   • Not on file.     Social History Main Topics   • Smoking status: Former Smoker     Types: Pipe   • Smokeless tobacco: Never Used      Comment: QUIT IN THE 70'S   • Alcohol use No   • Drug use: No   • Sexual activity: Defer     Other Topics Concern   • Not on file     Social History Narrative    Patient is  and lives with his spouse and one of his daughters.       PHYSICAL EXAM  /78   Pulse 59   Temp 97.6 °F (36.4 °C) (Axillary)   Resp 20   Ht 170.2 cm (67.01\")   Wt 86.2 kg (190 lb)   SpO2 97%   BMI 29.75 kg/m²   Wt Readings from Last 3 Encounters:   06/13/18 86.2 kg (190 lb)   ,body mass index is 29.75 kg/m².  Physical Exam   Constitutional: He is oriented to person, place, and time. No distress.   Chronically ill appearing   HENT:   Head: Normocephalic and atraumatic.   Eyes: No scleral icterus.   Neck: Normal range of motion.   Cardiovascular: Normal rate and regular rhythm.    Pulmonary/Chest: Effort normal and breath sounds normal. No respiratory distress.   Abdominal: Soft. Bowel sounds are normal. He exhibits no distension. There is no tenderness.   Musculoskeletal: He exhibits no edema.   Neurological: He is alert and oriented to person, place, and time.   Skin: Skin is warm and dry. He is not diaphoretic.   Psychiatric: He has a normal mood and affect. His behavior is normal.       Results Review:   I reviewed the patient's new clinical results.    Lab Results   Component Value Date    WBC 20.12 (H) 06/13/2018    HGB 12.0 (L) 06/13/2018    HGB 13.1 06/12/2018    HGB 13.9 12/17/2014    HCT 36.5 (L) 06/13/2018    MCV 90.8 06/13/2018     06/13/2018       Lab Results   Component Value Date    INR 1.11 (H) 06/13/2018    INR 1.05 12/17/2014       Lab Results   Component Value Date    GLUCOSE 134 (H) 06/13/2018    BUN 23 06/13/2018    CREATININE 1.37 (H) 06/13/2018    EGFRIFNONA 49 (L) " 06/13/2018    BCR 16.8 06/13/2018    CO2 24.0 06/13/2018    CALCIUM 8.5 (L) 06/13/2018    ALBUMIN 3.65 06/13/2018    ALKPHOS 180 (H) 06/13/2018    BILITOT 3.0 (H) 06/13/2018     (H) 06/13/2018     (H) 06/13/2018     Lipase 23   Procalcitonin 45.7   CRP 4.29   CT abdomen/pelvis  - cholelithiasis at gallbladder neck. Mild Peripancreatic edema at head of gland.  Prostate enlargement.  L4/L5 osteoblastic metastases   Ultrasound- thickening of gallbladder wall.  No ductal dilation     ASSESSMENTS/PLANS    1. Sepsis  2. Elevated LFTs, possible shock liver   3. Cholelithiasis on CT   4. Prostate cancer with metastases to L4/L5     Ultrasound and CT Abdomen/pelvis reviewed.  No ductal dilation (CBD measures 5 mm).  Elevated LFTs possibly secondary to shock liver in the setting of sepsis and hypotension.  Blood cultures are pending.  Ultrasound interestingly does not reveal a stone today and patient denies any nausea, vomiting nor abdominal pain.    >>> Continue supportive care and IV antibiotics  >>> Repeat LFTs in the am.  Will follow and consider further imaging with MRCP if appropriate or surgical consultation  >>> Check hepatitis panel   >>> Will order GI soft/bland diet     I discussed the patients findings and my recommendations with patient    PAXTON Schwab   Patient does not clinically appear to have cholecystitis.  Patient denies any abdominal pain or nausea or vomting.  Daughter in room and agreeable for medical management only at this time but told her that would consider surgery consult if liver tests do not improve.  06/13/18  2:08 PM

## 2018-06-13 NOTE — PLAN OF CARE
Problem: Patient Care Overview  Goal: Plan of Care Review  Outcome: Ongoing (interventions implemented as appropriate)   06/13/18 0531   Coping/Psychosocial   Plan of Care Reviewed With patient;daughter   OTHER   Outcome Summary Pt admin 0440 with dx of sepsis. Pt stable. Received 2500 ml bolus, and on multiple antibiotics. Will continue to monitor..      Goal: Individualization and Mutuality  Outcome: Ongoing (interventions implemented as appropriate)      Problem: Fall Risk (Adult)  Goal: Identify Related Risk Factors and Signs and Symptoms  Outcome: Ongoing (interventions implemented as appropriate)   06/13/18 0531   Fall Risk (Adult)   Related Risk Factors (Fall Risk) age-related changes;fatigue/slow reaction;history of falls;gait/mobility problems;impaired vision;environment unfamiliar   Signs and Symptoms (Fall Risk) presence of risk factors     Goal: Absence of Fall  Outcome: Ongoing (interventions implemented as appropriate)   06/13/18 0531   Fall Risk (Adult)   Absence of Fall making progress toward outcome       Problem: Sepsis/Septic Shock (Adult)  Goal: Signs and Symptoms of Listed Potential Problems Will be Absent, Minimized or Managed (Sepsis/Septic Shock)  Outcome: Ongoing (interventions implemented as appropriate)   06/13/18 0531   Goal/Outcome Evaluation   Problems Assessed (Sepsis) all   Problems Present (Sepsis) none       Problem: Pneumonia (Adult)  Goal: Signs and Symptoms of Listed Potential Problems Will be Absent, Minimized or Managed (Pneumonia)  Outcome: Ongoing (interventions implemented as appropriate)   06/13/18 0531   Goal/Outcome Evaluation   Problems Assessed (Pneumonia) all   Problems Present (Pneumonia) none

## 2018-06-13 NOTE — PROGRESS NOTES
Clinical Nutrition   Reason For Visit: MDR, Identified at risk by screening criteria    Patient Name: Harris Shane  YOB: 1927  MRN: [id number]  Date of Encounter: 06/13/18 12:35 PM  Admission date: 6/12/2018      Nutrition Assessment     Hospital Problem List  Principal Problem:    Sepsis  Active Problems:    Elevated liver enzymes    Prostate cancer (Mets to L4-L5)    Pulmonary infiltrate, LLLobe    Cholelithiases of GB neck per CT A/P    Acute renal insufficiency, CRE 1.43, unknown baseline    Blindness of right eye    WILLIAM (obstructive sleep apnea) remote, intolerant of CPAP    R/O Cholecystitis    NSTEMI (non-ST elevated myocardial infarction) (R/O Type 2)          PMH: He  has a past medical history of Blindness of right eye (6/13/2018); Cancer; DVT (deep venous thrombosis) (1990's); 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   PSxH: He  has a past surgical history that includes Eye surgery.         Reported/Observed/Food/Nutrition Related History     Pt resting in bed, very pleasant, no complaints  of abdominal pain,  hungry      Anthropometrics   Height: 67in  Weight: 190lb  BMI: 29.8  BMI classification: Overweight: 25.0-29.9kg/m2            GI: gallstones evident on US    SKIN:  wnl      Labs reviewed   Labs reviewed: Yes      Results from last 7 days  Lab Units 06/13/18  0509 06/12/18  2152   SODIUM mmol/L 134 137   POTASSIUM mmol/L 4.2 3.3*   CHLORIDE mmol/L 103 101   CO2 mmol/L 24.0 25.0   BUN mg/dL 23 24*   CREATININE mg/dL 1.37* 1.43*   GLUCOSE mg/dL 134* 143*   CALCIUM mg/dL 8.5* 9.3       Results from last 7 days  Lab Units 06/13/18  0509 06/12/18  2152   MAGNESIUM mg/dL 2.1 2.0   PHOSPHORUS mg/dL 3.6  --        Results from last 7 days  Lab Units 06/13/18  0509 06/12/18  2152   WBC 10*3/mm3 20.12* 12.93*   CRP mg/dL 4.29*  --        Medications reviewed   Medications  reviewed: Yes  Pertinent:abx, insulin, MVI, protonix, NS@100ml/hr      Intake/Ouptut 24 hrs (7:00AM - 6:59 AM)     Intake & Output (last day)       06/12 0701 - 06/13 0700 06/13 0701 - 06/14 0700    IV Piggyback 1050     Total Intake(mL/kg) 1050 (12.2)     Urine (mL/kg/hr)  110 (0.2)    Total Output   110    Net +1050 -110                  Current Nutrition Prescription   PO:       Nutrition Diagnosis     Problem No Nutrition Diagnosis at Present   Etiology    Signs/Symptoms          Intervention     Goal:   General: Nutrition support treatment    Intervention: Follow treatment progress, Await begin PO      Monitoring/Evaluation:       Monitoring/Evaluation: Per protocol     Await GI consult    Lilibeth Aldana RD  Time Spent: 30min

## 2018-06-13 NOTE — PLAN OF CARE
Problem: Patient Care Overview  Goal: Plan of Care Review  Outcome: Ongoing (interventions implemented as appropriate)   06/13/18 1022   Coping/Psychosocial   Plan of Care Reviewed With patient   OTHER   Outcome Summary PT initial evaluation completed for pt presenting with BLE weakness and difficulty ambulating. Pt required Tawnya 1+1 to ambulate 50ft with RW. Pt's decreased independence warrants PT skilled care. Recommend D/C home with assistance and home health PT services.

## 2018-06-13 NOTE — PROGRESS NOTES
Discharge Planning Assessment  [facility]     Patient Name: Harris Shane  MRN: [id number]  Today's Date: 6/13/2018    Admit Date: 6/12/2018          Discharge Needs Assessment     Row Name 06/13/18 1247       Living Environment    Lives With spouse    Name(s) of Who Lives With Patient Spouse    Current Living Arrangements home/apartment/condo    Primary Care Provided by self    Provides Primary Care For no one    Family Caregiver if Needed none    Quality of Family Relationships supportive    Able to Return to Prior Arrangements yes    Living Arrangement Comments Resides in private residence with spouse.  Daughters involved       Resource/Environmental Concerns    Resource/Environmental Concerns none    Transportation Concerns car, none       Transition Planning    Patient/Family Anticipates Transition to home with family    Patient/Family Anticipated Services at Transition home health care    Transportation Anticipated family or friend will provide       Discharge Needs Assessment    Readmission Within the Last 30 Days no previous admission in last 30 days    Concerns to be Addressed discharge planning;home safety    Concerns Comments Assess for home needs prior to discharge    Equipment Currently Used at Home walker, rolling;wheelchair;shower chair    Anticipated Changes Related to Illness inability to care for self    Equipment Needed After Discharge other (see comments)    Outpatient/Agency/Support Group Needs other (see comments)    Discharge Facility/Level of Care Needs other (see comments)    Offered/Gave Vendor List no    Current Discharge Risk physical impairment    Discharge Coordination/Progress Planning to return home at discharge; will need assessment for home needs when medically appropriate            Discharge Plan     Row Name 06/13/18 1255       Plan    Plan Comments Currently in ICU.  Case Management will follow for all needs/discharge planning when medically appropriate.        Destination      No service coordination in this encounter.      Durable Medical Equipment     No service coordination in this encounter.      Dialysis/Infusion     No service coordination in this encounter.      Home Medical Care     No service coordination in this encounter.      Social Care     No service coordination in this encounter.                Demographic Summary     Row Name 06/13/18 1247       General Information    Admission Type inpatient    Arrived From home;emergency department    Referral Source interdisciplinary rounds    Reason for Consult discharge planning    Preferred Language English     Used During This Interaction no            Functional Status    No documentation.           Psychosocial    No documentation.           Abuse/Neglect    No documentation.           Legal    No documentation.           Substance Abuse    No documentation.           Patient Forms    No documentation.         MONSERRAT Delong

## 2018-06-13 NOTE — ED PROVIDER NOTES
Subjective   Mr. Harris Shane is a 89 year old male with a hx of CAD who presents to the ED with c/o lower extremity weakness for the past day. Patient's family notes patient is able to ambulate at baseline with the assistance of walker. However, for the past day, patient has been unable to get out of bed secondary to diffuse weakness, particularly in his bilateral lower extremities. He was unable to walk to supper this evening, which concerned his children and prompted them to bring him to the emergency room. The patient recalls transient chest aches this morning, which have resolved completely this evening. Also endorses mild shortness of breath with exertion today. In the emergency room, patient is able to move both extremities when prompted, however does admit he feels weak. Patient's family feels that this is related to a urinary tract infection, as they have presented in the past with similar sx. 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 He has had pain in his legs for several months secondary to his lumbar cancer, but has been ambulatory. He is slated to start new radiation therapy tomorrow. Patient's family denies a hx of septicemia.         History provided by:  Patient and relative  Weakness - Generalized   Severity:  Severe  Onset quality:  Sudden  Duration:  1 day  Timing:  Constant  Progression:  Unchanged  Chronicity:  New  Relieved by:  Nothing  Worsened by:  Nothing  Associated symptoms: chest pain, difficulty walking, myalgias and shortness of breath    Associated symptoms: no abdominal pain, no cough, no dysuria, no falls, no fever, no frequency, no nausea, no urgency and no vomiting    Chest pain:     Quality: aching      Progression:  Resolved  Shortness of breath:     Frequency: with exertion.  Risk factors: coronary artery disease     Risk factors: no congestive heart failure and no diabetes    Risk factors comment:  Hx of cancer      Review of Systems   Constitutional: Negative for chills and fever.   HENT: Negative for congestion, rhinorrhea and sore throat.    Respiratory: Positive for shortness of breath. Negative for cough.    Cardiovascular: Positive for chest pain.   Gastrointestinal: Negative for abdominal pain, nausea and vomiting.   Genitourinary: Negative.  Negative for decreased urine volume, difficulty urinating, dysuria, enuresis, frequency, hematuria and urgency.   Musculoskeletal: Positive for myalgias. Negative for falls.   Neurological: Positive for weakness.   All other systems reviewed and are negative.      Past Medical History:   Diagnosis Date   • Cancer     PROSTATE   • DVT (deep venous thrombosis)    • Myocardial infarction    • UTI (urinary tract infection)        Allergies   Allergen Reactions   • Contrast Dye Rash and Other (See Comments)     RASH AND SYNCOPE       Past Surgical History:   Procedure Laterality Date   • EYE SURGERY      RIGHT       History reviewed. No pertinent family history.    Social History     Social History   • Marital status:      Social History Main Topics   • Smoking status: Former Smoker     Types: Pipe      Comment: QUIT IN THE 70'S   • Alcohol use No   • Drug use: Unknown     Other Topics Concern   • Not on file         Objective   Physical Exam   Constitutional: He is oriented to person, place, and time. He appears well-developed and well-nourished. No distress.   HENT:   Head: Normocephalic and atraumatic.   Eyes: Conjunctivae are normal. No scleral icterus.   Neck: Normal range of motion. Neck supple.   Cardiovascular: Normal rate, regular rhythm, normal heart sounds and intact distal pulses.  Exam reveals no gallop and no friction rub.    No murmur heard.  Mild pedal edema, 1+ bilaterally.   Pulmonary/Chest: Effort normal and breath sounds normal. No respiratory distress. He  "has no wheezes. He has no rales.   Abdominal: Soft. Bowel sounds are normal. There is no tenderness. There is no guarding.   Musculoskeletal: Normal range of motion. He exhibits edema.   Neurological: He is alert and oriented to person, place, and time.   Skin: Skin is warm and dry. Bruising noted. He is not diaphoretic.   Bruising on the abdomen consistent with subcutaneous injections from new cancer medications he is receiving.    Psychiatric: He has a normal mood and affect. His behavior is normal.   Nursing note and vitals reviewed.      Procedures         ED Course  ED Course as of Jun 13 0312   Tue Jun 12, 2018   2215 WBC: (!) 12.93 [ML]   2252 Troponin I: (!) 0.26 [ML]   2252 BUN: (!) 24 [ML]   2252 Creatinine: (!) 1.43 [ML]   2252 Potassium: (!) 3.3 [ML]   2252 ALT (SGPT): (!) 210 [ML]   2252 AST (SGOT): (!) 315 [ML]   2252 Alkaline Phosphatase: (!) 188 [ML]   2252 Total Bilirubin: (!) 3.2 [ML]   2330 Lactate, Venous: (!!) 3.1 [ML]   Wed Jun 13, 2018   0239 Troponin I: (!) 0.45 [ML]   0255 First round of antibiotics ordered in response to abnormal pulmonary findings.  Later findings more suggestive of GI infectious process and thus, zosyn will be added to course of care.    [ML]   0301 I have conferred with Bobby Anders and Amisha.   In spite of fluid resuscitation, the patient's systolic blood pressures have not responded to greater than 100.  He is alert and responsive and without complaint, pleasant.  Second troponin is elevated and Dr. Duran, Intensivist concurs with ICU admission.  Patient and family understand and concur.  I have conferred with the daughter who affirms that the family wants \"everything done up to putting him on a ventilator long term.\"       [ML]      ED Course User Index  [ML] CHELITA Meade       Recent Results (from the past 24 hour(s))   Comprehensive Metabolic Panel    Collection Time: 06/12/18  9:52 PM   Result Value Ref Range    Glucose 143 (H) 70 - 100 mg/dL    BUN 24 " (H) 9 - 23 mg/dL    Creatinine 1.43 (H) 0.60 - 1.30 mg/dL    Sodium 137 132 - 146 mmol/L    Potassium 3.3 (L) 3.5 - 5.5 mmol/L    Chloride 101 99 - 109 mmol/L    CO2 25.0 20.0 - 31.0 mmol/L    Calcium 9.3 8.7 - 10.4 mg/dL    Total Protein 6.1 5.7 - 8.2 g/dL    Albumin 3.91 3.20 - 4.80 g/dL    ALT (SGPT) 210 (H) 7 - 40 U/L    AST (SGOT) 315 (H) 0 - 33 U/L    Alkaline Phosphatase 188 (H) 25 - 100 U/L    Total Bilirubin 3.2 (H) 0.3 - 1.2 mg/dL    eGFR Non African Amer 46 (L) >60 mL/min/1.73    Globulin 2.2 gm/dL    A/G Ratio 1.8 1.5 - 2.5 g/dL    BUN/Creatinine Ratio 16.8 7.0 - 25.0    Anion Gap 11.0 3.0 - 11.0 mmol/L   Magnesium    Collection Time: 06/12/18  9:52 PM   Result Value Ref Range    Magnesium 2.0 1.3 - 2.7 mg/dL   Light Blue Top    Collection Time: 06/12/18  9:52 PM   Result Value Ref Range    Extra Tube hold for add-on    Green Top (Gel)    Collection Time: 06/12/18  9:52 PM   Result Value Ref Range    Extra Tube Hold for add-ons.    Lavender Top    Collection Time: 06/12/18  9:52 PM   Result Value Ref Range    Extra Tube hold for add-on    Gold Top - SST    Collection Time: 06/12/18  9:52 PM   Result Value Ref Range    Extra Tube Hold for add-ons.    CBC Auto Differential    Collection Time: 06/12/18  9:52 PM   Result Value Ref Range    WBC 12.93 (H) 3.50 - 10.80 10*3/mm3    RBC 4.38 4.20 - 5.76 10*6/mm3    Hemoglobin 13.1 13.1 - 17.5 g/dL    Hematocrit 39.5 38.9 - 50.9 %    MCV 90.2 80.0 - 99.0 fL    MCH 29.9 27.0 - 31.0 pg    MCHC 33.2 32.0 - 36.0 g/dL    RDW 13.9 11.3 - 14.5 %    RDW-SD 46.0 37.0 - 54.0 fl    MPV 10.1 6.0 - 12.0 fL    Platelets 168 150 - 450 10*3/mm3    Neutrophil % 91.0 (H) 41.0 - 71.0 %    Lymphocyte % 3.0 (L) 24.0 - 44.0 %    Monocyte % 5.6 0.0 - 12.0 %    Eosinophil % 0.0 0.0 - 3.0 %    Basophil % 0.1 0.0 - 1.0 %    Immature Grans % 0.3 0.0 - 0.6 %    Neutrophils, Absolute 11.76 (H) 1.50 - 8.30 10*3/mm3    Lymphocytes, Absolute 0.39 (L) 0.60 - 4.80 10*3/mm3    Monocytes, Absolute  0.73 0.00 - 1.00 10*3/mm3    Eosinophils, Absolute 0.00 0.00 - 0.30 10*3/mm3    Basophils, Absolute 0.01 0.00 - 0.20 10*3/mm3    Immature Grans, Absolute 0.04 (H) 0.00 - 0.03 10*3/mm3   BNP    Collection Time: 06/12/18  9:52 PM   Result Value Ref Range    BNP 87.0 0.0 - 100.0 pg/mL   Lipase    Collection Time: 06/12/18  9:52 PM   Result Value Ref Range    Lipase 23 6 - 51 U/L   Lactic Acid, Plasma    Collection Time: 06/12/18  9:53 PM   Result Value Ref Range    Lactate 3.1 (C) 0.5 - 2.0 mmol/L   Lactic Acid, Reflex Timer (This will reflex a repeat order 3-3:15 hours after ordered.)    Collection Time: 06/12/18  9:53 PM   Result Value Ref Range    Extra Tube Hold for add-ons.    POC Troponin, Rapid    Collection Time: 06/12/18  9:59 PM   Result Value Ref Range    Troponin I 0.26 (H) 0.00 - 0.07 ng/mL   Urinalysis With / Culture If Indicated - Urine, Clean Catch    Collection Time: 06/12/18 11:36 PM   Result Value Ref Range    Color, UA Yellow Yellow, Straw    Appearance, UA Clear Clear    pH, UA 5.5 5.0 - 8.0    Specific Gravity, UA 1.015 1.005 - 1.030    Glucose, UA Negative Negative    Ketones, UA Trace (A) Negative    Bilirubin, UA Small (1+) (A) Negative    Blood, UA Negative Negative    Protein, UA Negative Negative    Leuk Esterase, UA Negative Negative    Nitrite, UA Negative Negative    Urobilinogen, UA 2.0 E.U./dL (A) 0.2 - 1.0 E.U./dL   POC Troponin, Rapid    Collection Time: 06/13/18  1:23 AM   Result Value Ref Range    Troponin I 0.45 (H) 0.00 - 0.07 ng/mL     Note: In addition to lab results from this visit, the labs listed above may include labs taken at another facility or during a different encounter within the last 24 hours. Please correlate lab times with ED admission and discharge times for further clarification of the services performed during this visit.    CT Abdomen Pelvis Without Contrast   Final Result   1.  Prostate enlargement.   2.  Cholelithiasis at GB neck.   3.  Nonspecific bowel gas  pattern without dilatation or obstruction.   4.  Colonic diverticulosis without diverticulitis.   5.  Suspicious for L4, L5 osteoblastic metastases.   6.  LLL atelectasis/infiltrate.   7.  Chronic large hiatal hernia with organorotation of stomach.   8.  CAD.   9.  Chronic right renal nodularity unchanged.   10.  Mild peripancreatic edema at head of gland, consider serum lipase.   11.  Additional findings, as above.      THIS DOCUMENT HAS BEEN ELECTRONICALLY SIGNED BY DU BAUMAN MD      XR Chest 1 View   Final Result   1.  Mild cardiomegaly.   2.  Left basilar atelectasis/infiltrate.      THIS DOCUMENT HAS BEEN ELECTRONICALLY SIGNED BY DU BAUMAN MD        Vitals:    06/13/18 0137 06/13/18 0200 06/13/18 0215 06/13/18 0230   BP:  104/63 101/64 97/61   Pulse: 76 74 74 70   Resp:       Temp:       TempSrc:       SpO2: 96% 95% 94% 95%   Weight:       Height:         Medications   sodium chloride 0.9 % flush 10 mL (not administered)   sodium chloride 0.9 % infusion (500 mL/hr Intravenous Rate/Dose Change 6/13/18 0053)   sodium chloride 0.9 % bolus 500 mL (not administered)   metroNIDAZOLE (FLAGYL) IVPB 500 mg (not administered)   sodium chloride 0.9 % bolus 2,586 mL (not administered)   sodium chloride 0.9 % bolus 500 mL (0 mL Intravenous Stopped 6/12/18 2334)   HYDROcodone-acetaminophen (NORCO) 7.5-325 MG per tablet 1 tablet (1 tablet Oral Given 6/12/18 2307)   AZITHROMYCIN 500 MG/250 ML 0.9% NS IVPB (MBP) (500 mg Intravenous New Bag 6/13/18 0139)   cefTRIAXone (ROCEPHIN) IVPB 1 g (0 g Intravenous Stopped 6/13/18 0132)     ECG/EMG Results (last 24 hours)     Procedure Component Value Units Date/Time    ECG 12 Lead [[id number]] Collected:  06/12/18 2059     Updated:  06/12/18 2057                      MDM    Final diagnoses:   Sepsis, due to unspecified organism   Elevated troponin   Elevated liver enzymes   Hyperbilirubinemia   History of prostate cancer   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  Information recorded by the scribe was done at my direction and has been verified and validated by me.     Nguyễn Villarreal  06/12/18 [id number]       Nguyễn Villarreal  06/12/18 0662       Blaire Narayan, CHELITA  06/13/18 1169

## 2018-06-13 NOTE — PLAN OF CARE
Problem: Patient Care Overview  Goal: Plan of Care Review  Outcome: Ongoing (interventions implemented as appropriate)   06/13/18 1738   Coping/Psychosocial   Plan of Care Reviewed With patient;family   OTHER   Outcome Summary PT will remain in ICU until 6/14 when intensivist will transfer to [facility]. Gastroenterology consulted today and seen by Dr Villareal. PT had an Echo and abdominal utlrasound today. Elevated troponins and positive blood culture; Intensivist aware. Gi soft/bland diet ordered per Dr Villareal. PT/OT also consulted.    Plan of Care Review   Progress improving       Problem: Fall Risk (Adult)  Goal: Identify Related Risk Factors and Signs and Symptoms   06/13/18 1738   Fall Risk (Adult)   Related Risk Factors (Fall Risk) age-related changes;bladder function altered;fatigue/slow reaction;gait/mobility problems;history of falls;homeostatic imbalance;impaired vision;environment unfamiliar   Signs and Symptoms (Fall Risk) presence of risk factors     Goal: Absence of Fall  Outcome: Ongoing (interventions implemented as appropriate)      Problem: Sepsis/Septic Shock (Adult)  Goal: Signs and Symptoms of Listed Potential Problems Will be Absent, Minimized or Managed (Sepsis/Septic Shock)  Outcome: Ongoing (interventions implemented as appropriate)   06/13/18 1738   Goal/Outcome Evaluation   Problems Assessed (Sepsis) all   Problems Present (Sepsis) none       Problem: Pneumonia (Adult)  Goal: Signs and Symptoms of Listed Potential Problems Will be Absent, Minimized or Managed (Pneumonia)   06/13/18 1738   Goal/Outcome Evaluation   Problems Assessed (Pneumonia) all   Problems Present (Pneumonia) none

## 2018-06-13 NOTE — PROGRESS NOTES
Pharmacokinetic Consult - Vancomycin Dosing  Harris Shane is a 89 y.o. male who has been consulted for vancomycin dosing for intra-abdominal infection and possible sepsis  (goal trough 15-20 mcg/mL).    Current Antimicrobial Therapy  Zosyn 4.5 grams IV q8h (extended infusion)  Vancomycin - pharmacy dosing per levels    Allergies  Contrast dye    Relevant clinical data and objective history reviewed:  Creatinine   Date Value Ref Range Status   06/12/2018 1.43 (H) 0.60 - 1.30 mg/dL Final     Estimated Creatinine Clearance: 36 mL/min (A) (by C-G formula based on SCr of 1.43 mg/dL (H)).  No intake/output data recorded.  Patient weight: 86.2 kg (190 lb)    Asessment/Plan  Will initiate dose at 1750 mg IV once       followed by  Vancomycin Random level 6/14 with morning labs    Pharmacy will order additional vancomycin doses based on vancomycin random level result.    Hernandez Ricketts [facility]  6/13/2018  5:10 AM

## 2018-06-13 NOTE — THERAPY EVALUATION
Acute Care - Physical Therapy Initial Evaluation  [facility]     Patient Name: Harris Shane  : 9/15/1927  MRN: [id number]  Today's Date: 2018   Onset of Illness/Injury or Date of Surgery: 18  Date of Referral to PT: 18  Referring Physician: DO Luiz      Admit Date: 2018    Visit Dx:     ICD-10-CM ICD-9-CM   1. Sepsis, due to unspecified organism A41.9 038.9     995.91   2. Elevated troponin R74.8 790.6   3. Elevated liver enzymes R74.8 790.5   4. Hyperbilirubinemia E80.6 782.4   5. History of prostate cancer Z85.46 V10.46   6. Metastatic cancer to spine C79.51 198.5   7. Pneumonia of left lower lobe due to infectious organism J18.1 486   8. Biliary calculus of other site with obstruction K80.81 [id number]   9. Impaired functional mobility, balance, gait, and endurance Z74.09 V49.89     Patient Active Problem List   Diagnosis   • Sepsis   • Elevated liver enzymes   • Prostate cancer (Mets to L4-L5)   • Pulmonary infiltrate, LLLobe   • Cholelithiases of GB neck per CT A/P   • Acute renal insufficiency, CRE 1.43, unknown baseline   • Blindness of right eye   • WILLIAM (obstructive sleep apnea) remote, intolerant of CPAP   • R/O Cholecystitis   • NSTEMI (non-ST elevated myocardial infarction) (R/O Type 2)     Past Medical History:   Diagnosis Date   • Blindness of right eye 2018   • Cancer     PROSTATE   • DVT (deep venous thrombosis) 's   • Metastatic cancer to spine, L4-L5 2018   • Myocardial infarction 's   • WILLIAM (obstructive sleep apnea) 2018   • UTI (urinary tract infection)      Past Surgical History:   Procedure Laterality Date   • EYE SURGERY      RIGHT        PT ASSESSMENT (last 12 hours)      Physical Therapy Evaluation     Row Name 18 1022          PT Evaluation Time/Intention    Subjective Information no complaints  -KR     Document Type evaluation  -KR     Mode of Treatment physical therapy  -KR     Patient Effort good  -KR     Symptoms Noted  During/After Treatment fatigue  -KR     Row Name 06/13/18 1022          General Information    Patient Profile Reviewed? yes  -KR     Onset of Illness/Injury or Date of Surgery 06/12/18  -KR     Referring Physician Case, DO  -KR     Patient Observations alert;cooperative;agree to therapy  -KR     Prior Level of Function independent:;all household mobility;gait;transfer;ADL's;dressing;bathing  -KR     Equipment Currently Used at Home walker, rolling;wheelchair;shower chair  -KR     Pertinent History of Current Functional Problem Pt presented to [facility] ED with c/o bilateral LE weakness. At baseline pt is able to ambulate with walker, but for past day has been unable to get out of bed. Pt recently diagnosed with spine malignancy at L5. CT of abdomen and pelvis revealed cholelithiasis, prostate enlargement, and colonic diverticulosis. Radiology also noted L4/L5 osteoblastic metastases and a chronic large hiatal hernia.  -KR     Existing Precautions/Restrictions fall;oxygen therapy device and L/min  -KR     Risks Reviewed patient and family:;LOB;dizziness;increased discomfort;change in vital signs  -KR     Benefits Reviewed patient and family:;improve function;increase independence;increase strength;increase balance  -KR     Barriers to Rehab medically complex  -KR     Row Name 06/13/18 1022          Relationship/Environment    Lives With spouse  -KR     Row Name 06/13/18 1022          Resource/Environmental Concerns    Current Living Arrangements home/apartment/condo  -KR     Resource/Environmental Concerns none  -KR     Row Name 06/13/18 1022          Home Main Entrance    Number of Stairs, Main Entrance three  -KR     Stair Railings, Main Entrance railings on both sides of stairs  -KR     Row Name 06/13/18 1022          Cognitive Assessment/Interventions    Additional Documentation Cognitive Assessment/Intervention (Group)  -KR     Row Name 06/13/18 1022          Cognitive Assessment/Intervention- PT/OT    Affect/Mental  Status (Cognitive) WFL  -KR     Orientation Status (Cognition) oriented x 4  -KR     Follows Commands (Cognition) follows one step commands;over 90% accuracy;repetition of directions required;verbal cues/prompting required  -KR     Cognitive Function (Cognitive) safety deficit  -KR     Safety Deficit (Cognitive) mild deficit;ability to follow commands;awareness of need for assistance;insight into deficits/self awareness;safety precautions awareness  -KR     Personal Safety Interventions fall prevention program maintained;gait belt;nonskid shoes/slippers when out of bed  -KR     Row Name 06/13/18 1022          Safety Issues, Functional Mobility    Impairments Affecting Function (Mobility) balance;coordination;endurance/activity tolerance;strength  -KR     Row Name 06/13/18 1022          Bed Mobility Assessment/Treatment    Bed Mobility Assessment/Treatment supine-sit;sit-supine  -KR     Supine-Sit [place] (Bed Mobility) maximum assist (25% patient effort);2 person assist;verbal cues  -KR     Sit-Supine [place] (Bed Mobility) maximum assist (25% patient effort);2 person assist;verbal cues  -KR     Assistive Device (Bed Mobility) head of bed elevated  -KR     Comment (Bed Mobility) VC's for sequencing.   -KR     Row Name 06/13/18 1022          Transfer Assessment/Treatment    Transfer Assessment/Treatment sit-stand transfer;stand-sit transfer  -KR     Comment (Transfers) VC's for sequencing and hand placement.   -KR     Sit-Stand [place] (Transfers) minimum assist (75% patient effort);2 person assist;verbal cues  -KR     Stand-Sit [place] (Transfers) minimum assist (75% patient effort);2 person assist;verbal cues  -KR     Row Name 06/13/18 1022          Sit-Stand Transfer    Assistive Device (Sit-Stand Transfers) walker, front-wheeled  -KR     Row Name 06/13/18 1022          Stand-Sit Transfer    Assistive Device (Stand-Sit Transfers) walker, front-wheeled  -KR     Row Name 06/13/18 1022           Gait/Stairs Assessment/Training    Gait/Stairs Assessment/Training gait/ambulation assistive device  -KR     Bland Level (Gait) minimum assist (75% patient effort);1 person assist;1 person to manage equipment;verbal cues  -KR     Assistive Device (Gait) walker, front-wheeled  -KR     Distance in Feet (Gait) 50  -KR     Pattern (Gait) step-through  -KR     Deviations/Abnormal Patterns (Gait) veronique decreased;other (see comments)   decreased step length  -KR     Bilateral Gait Deviations forward flexed posture;heel strike decreased  -KR     Comment (Gait/Stairs) Pt demonstrated slow veronique with decreased step length. Pt required increased cueing for upright posture and proper management of RW. Pt had tendency to keep RW too far in front. Pt's mobility limited by fatigue.   -KR     Row Name 06/13/18 1022          General ROM    GENERAL ROM COMMENTS BLE WFL   -KR     Row Name 06/13/18 1022          MMT (Manual Muscle Testing)    Additional Documentation General Assessment (Manual Muscle Testing) (Group)  -KR     Row Name 06/13/18 1022          General Assessment (Manual Muscle Testing)    General Manual Muscle Testing (MMT) Assessment lower extremity strength deficits identified  -KR     Comment, General Manual Muscle Testing (MMT) Assessment BLE grossly 4/5  -KR     Row Name 06/13/18 1022          Motor Assessment/Intervention    Additional Documentation Balance (Group)  -KR     Row Name 06/13/18 1022          Balance    Balance static sitting balance;static standing balance  -KR     Row Name 06/13/18 1022          Static Sitting Balance    Level of Bland (Unsupported Sitting, Static Balance) minimal assist, 75% patient effort   progressed to CGA  -KR     Sitting Position (Unsupported Sitting, Static Balance) sitting on edge of bed  -KR     Row Name 06/13/18 1022          Static Standing Balance    Level of Bland (Supported Standing, Static Balance) minimal assist, 75% patient effort  -KR      Assistive Device Utilized (Supported Standing, Static Balance) rolling walker  -KR     Row Name 06/13/18 1022          Sensory Assessment/Intervention    Sensory General Assessment light touch sensation deficits identified  -VIN     Row Name 06/13/18 1022          Light Touch Sensation Assessment    Left Lower Extremity: Light Touch Sensation Assessment mild impairment, 75% or more correct responses  -KR     Right Lower Extremity: Light Touch Sensation Assessment mild impairment, 75% or more correct responses  -KR     Row Name 06/13/18 1022          Pain Assessment    Additional Documentation Pain Scale: Numbers Pre/Post-Treatment (Group)  -KR     Row Name 06/13/18 1022          Pain Scale: Numbers Pre/Post-Treatment    Pain Scale: Numbers, Pretreatment 0/10 - no pain  -KR     Pain Scale: Numbers, Post-Treatment 0/10 - no pain  -KR     Row Name 06/13/18 1022          Physical Therapy Clinical Impression    Date of Referral to PT 06/13/18  -KR     PT Diagnosis (PT Clinical Impression) impaired functional mobility  -KR     Patient/Family Goals Statement (PT Clinical Impression) return to PLOF  -KR     Criteria for Skilled Interventions Met (PT Clinical Impression) yes;treatment indicated  -KR     Rehab Potential (PT Clinical Summary) good, to achieve stated therapy goals  -KR     Care Plan Review (PT) evaluation/treatment results reviewed;risks/benefits reviewed;patient/other agree to care plan  -KR     Care Plan Review, Other Participant (PT Clinical Impression) son  -VIN     Row Name 06/13/18 1022          Vital Signs    Pre Systolic BP Rehab 122  -KR     Pre Treatment Diastolic BP 70  -KR     Post Systolic BP Rehab 120  -KR     Post Treatment Diastolic BP 74  -KR     Pretreatment Heart Rate (beats/min) 71  -KR     Posttreatment Heart Rate (beats/min) 70  -KR     Pre SpO2 (%) 95  -KR     O2 Delivery Pre Treatment supplemental O2  -KR     Post SpO2 (%) 95  -KR     O2 Delivery Post Treatment supplemental O2  -KR     Pre  Patient Position Supine  -KR     Intra Patient Position Standing  -KR     Post Patient Position Supine  -KR     Row Name 06/13/18 1022          Physical Therapy Goals    Bed Mobility Goal Selection (PT) bed mobility, PT goal 1  -KR     Transfer Goal Selection (PT) transfer, PT goal 1  -KR     Gait Training Goal Selection (PT) gait training, PT goal 1  -KR     Stairs Goal Selection (PT) stairs, PT goal 1  -KR     Additional Documentation Stairs Goal Selection (PT) (Row)  -KR     Row Name 06/13/18 1022          Bed Mobility Goal 1 (PT)    Activity/Assistive Device (Bed Mobility Goal 1, PT) sit to supine;supine to sit  -KR     Bollinger Level/Cues Needed (Bed Mobility Goal 1, PT) independent  -KR     Time Frame (Bed Mobility Goal 1, PT) 2 weeks  -KR     Progress/Outcomes (Bed Mobility Goal 1, PT) goal ongoing  -KR     Row Name 06/13/18 1022          Transfer Goal 1 (PT)    Activity/Assistive Device (Transfer Goal 1, PT) sit-to-stand/stand-to-sit;bed-to-chair/chair-to-bed;walker, rolling  -KR     Bollinger Level/Cues Needed (Transfer Goal 1, PT) conditional independence  -KR     Time Frame (Transfer Goal 1, PT) 2 weeks  -KR     Progress/Outcome (Transfer Goal 1, PT) goal ongoing  -KR     Row Name 06/13/18 1022          Gait Training Goal 1 (PT)    Activity/Assistive Device (Gait Training Goal 1, PT) gait (walking locomotion);assistive device use;walker, rolling  -KR     Bollinger Level (Gait Training Goal 1, PT) supervision required  -KR     Distance (Gait Goal 1, PT) 150 feet  -KR     Time Frame (Gait Training Goal 1, PT) 2 weeks  -KR     Progress/Outcome (Gait Training Goal 1, PT) goal ongoing  -KR     Row Name 06/13/18 1022          Stairs Goal 1 (PT)    Activity/Assistive Device (Stairs Goal 1, PT) ascending stairs;descending stairs;using handrail, right;using handrail, left;step-to-step  -KR     Bollinger Level/Cues Needed (Stairs Goal 1, PT) contact guard assist  -KR     Number of Stairs (Stairs Goal  1, PT) 3  -KR     Time Frame (Stairs Goal 1, PT) 2 weeks  -KR     Progress/Outcome (Stairs Goal 1, PT) goal ongoing  -KR     Row Name 06/13/18 1022          Positioning and Restraints    Pre-Treatment Position in bed  -KR     Post Treatment Position bed  -KR     In Bed notified nsg;supine;call light within reach;encouraged to call for assist;with family/caregiver;with other staff;side rails up x2  -KR     Row Name 06/13/18 1022          Living Environment    Home Accessibility stairs to enter home  -KR       User Key  (r) = Recorded By, (t) = Taken By, (c) = Cosigned By    Initials Name Provider Type    VIN Molina, PT Physical Therapist          Physical Therapy Education     Title: PT OT SLP Therapies (Active)     Topic: Physical Therapy (Active)     Point: Mobility training (Active)    Learning Progress Summary     Learner Status Readiness Method Response Comment Documented by    Patient Active Acceptance E NR  KR 06/13/18 1200    Family Active Acceptance E NR  KR 06/13/18 1200          Point: Body mechanics (Active)    Learning Progress Summary     Learner Status Readiness Method Response Comment Documented by    Patient Active Acceptance E NR  KR 06/13/18 1200    Family Active Acceptance E NR  KR 06/13/18 1200          Point: Precautions (Active)    Learning Progress Summary     Learner Status Readiness Method Response Comment Documented by    Patient Active Acceptance E NR  KR 06/13/18 1200    Family Active Acceptance E NR  KR 06/13/18 1200                      User Key     Initials Effective Dates Name Provider Type Valerie    KR 04/03/18 -  Leanna Molina, PT Physical Therapist PT                PT Recommendation and Plan  Anticipated Discharge Disposition (PT): home with assist, home with home health  Planned Therapy Interventions (PT Eval): balance training, bed mobility training, gait training, stair training, strengthening, transfer training  Therapy Frequency (PT Clinical Impression):  daily  Outcome Summary/Treatment Plan (PT)  Anticipated Discharge Disposition (PT): home with assist, home with home health  Plan of Care Reviewed With: patient  Outcome Summary: PT initial evaluation completed for pt presenting with BLE weakness and difficulty ambulating. Pt required Tawnya 1+1 to ambulate 50ft with RW. Pt's decreased independence warrants PT skilled care. Recommend D/C home with assistance and home health PT services.           Outcome Measures     Row Name 06/13/18 1022             How much help from another person do you currently need...    Turning from your back to your side while in flat bed without using bedrails? 2  -KR      Moving from lying on back to sitting on the side of a flat bed without bedrails? 2  -KR      Moving to and from a bed to a chair (including a wheelchair)? 3  -KR      Standing up from a chair using your arms (e.g., wheelchair, bedside chair)? 3  -KR      Climbing 3-5 steps with a railing? 2  -KR      To walk in hospital room? 3  -KR      AM-PAC 6 Clicks Score 15  -KR         Functional Assessment    Outcome Measure Options AM-PAC 6 Clicks Basic Mobility (PT)  -KR        User Key  (r) = Recorded By, (t) = Taken By, (c) = Cosigned By    Initials Name Provider Type    VIN Molina PT Physical Therapist           Time Calculation:         PT Charges     Row Name 06/13/18 1022             Time Calculation    Start Time 1022  -KR      PT Received On 06/13/18  -KR      PT Goal Re-Cert Due Date 06/23/18  -KR        User Key  (r) = Recorded By, (t) = Taken By, (c) = Cosigned By    Initials Name Provider Type    VIN Molina PT Physical Therapist        Therapy Suggested Charges     Code   Minutes Charges    None           Therapy Charges for Today     Code Description Service Date Service Provider Modifiers Qty    [id number] HC PT EVAL MOD COMPLEXITY 4 6/13/2018 Leanna Molina PT GP 1          PT G-Codes  Outcome Measure Options: AM-PAC 6 Clicks Basic Mobility  (PT)      Hanny Molina, PT  6/13/2018

## 2018-06-13 NOTE — CONSULTS
[facility] Medicine Services  CONSULT NOTE      Patient Name: Harris Shane  : 9/15/1927  MRN: [id number]    Primary Care Physician: Charli Mccormack MD  Referring Provider: No ref. provider found    Subjective   Subjective     Reason for Consultation:  LAB ABNORMALITIES    HPI:  Harris Shane is a 89 y.o. male who presented to er w/ c/o weakness following breakfast; was at baseline yesterday.   Poor po intake today w/ abd pain this a.m.  Family presented w/ pt regarding concern for uti.  Pt has hx of prostate ca currently receiving tx w/ scheduled xrt tomorrow.  Was asked by er to admit pt for elevated troponin.        Review of Systems   Otherwise 10-system ROS reviewed and is negative except as mentioned in the HPI.      Past Medical History:   Diagnosis Date   • Blindness of right eye 2018   • Cancer     PROSTATE   • DVT (deep venous thrombosis)    • Metastatic cancer to spine, L4-L5 2018   • Myocardial infarction 's   • UTI (urinary tract infection)        Past Surgical History:   Procedure Laterality Date   • EYE SURGERY      RIGHT       Family History: family history includes Breast cancer in his mother; Coronary artery disease in his brother and father; No Known Problems in his sister; Prostate cancer in his father; Stroke in his brother.     Social History:  reports that he has quit smoking. His smoking use included Pipe. He has never used smokeless tobacco. He reports that he does not drink alcohol or use drugs.    Medications:    (Not in a hospital admission)    Allergies   Allergen Reactions   • Contrast Dye Rash and Other (See Comments)     RASH AND SYNCOPE       Objective   Objective     Vital Signs:   Temp:  [98.2 °F (36.8 °C)-99.7 °F (37.6 °C)] 98.2 °F (36.8 °C)  Heart Rate:  [] 71  Resp:  [24] 24  BP: ()/(47-84) 91/58     Physical Exam   not performed by me as this was phone consultation w/ review of chart to determine  disposition    Results Reviewed:  I have personally reviewed current lab, radiology, and data and agree.      Results from last 7 days  Lab Units 06/12/18  2152   WBC 10*3/mm3 12.93*   HEMOGLOBIN g/dL 13.1   HEMATOCRIT % 39.5   PLATELETS 10*3/mm3 168       Results from last 7 days  Lab Units 06/12/18  2152   SODIUM mmol/L 137   POTASSIUM mmol/L 3.3*   CHLORIDE mmol/L 101   CO2 mmol/L 25.0   BUN mg/dL 24*   CREATININE mg/dL 1.43*   GLUCOSE mg/dL 143*   CALCIUM mg/dL 9.3   ALT (SGPT) U/L 210*   AST (SGOT) U/L 315*     Estimated Creatinine Clearance: 36 mL/min (A) (by C-G formula based on SCr of 1.43 mg/dL (H)).  Brief Urine Lab Results  (Last result in the past 365 days)      Color   Clarity   Blood   Leuk Est   Nitrite   Protein   CREAT   Urine HCG        06/12/18 2336 Yellow Clear Negative Negative Negative Negative             BNP   Date Value Ref Range Status   06/12/2018 87.0 0.0 - 100.0 pg/mL Final     Comment:     Results may be falsely decreased if patient taking Biotin.     No results found for: PHART    Microbiology Results Abnormal     None          Imaging Results (last 24 hours)     Procedure Component Value Units Date/Time    CT Abdomen Pelvis Without Contrast [[id number]] Collected:  06/12/18 2331     Updated:  06/13/18 0039    Narrative:       EXAM:    CT Abdomen and Pelvis Without Intravenous Contrast    CLINICAL HISTORY:    89 years old, male; Sepsis, unspecified organism; Secondary malignant   neoplasm of bone; Other disorders of bilirubin metabolism; Heart failure,   unspecified; Abnormal levels of other serum enzymes; Abnormal levels of other   serum enzymes; Personal history of malignant neoplasm of prostate; Abnormal   findings; Abnormal lab test; Elevated liver enzymes; Patient HX: Mid/lower   chest included to attempt to evaluate entire hiatal hernia; Additional info:   Eelvated efts lactic acidosis    TECHNIQUE:    Axial computed tomography images of the abdomen and pelvis without    intravenous contrast.  All CT scans at this facility use one or more dose   reduction techniques, viz.: automated exposure control; ma/kV adjustment per   patient size (including targeted exams where dose is matched to indication;   i.e. head); or iterative reconstruction technique.    Coronal reformatted images were created and reviewed.    COMPARISON:    CT ABD PELVIS WO CONTRAST 2014-12-17 13:24    FINDINGS:    Lung bases:  LLL atelectasis/infiltrate.  Mild right basilar subsegmental   atelectasis and/or fibrosis.    Heart:  Minimal pericardial effusion.  Normal heart size.  Minimal   pericardial effusion.  CAD.     ABDOMEN:    Liver:  Evidence of old granulomatous disease of the liver.    Gallbladder and bile ducts:  Cholelithiasis at GB neck.  No ductal dilation.    Pancreas:  Minimal edema bordering head of the pancreas, consider correlation   with serum lipase to rule out nonspecific edema versus early pancreatitis.    Pancreatic atrophy.  No ductal dilation.    Spleen:  Evidence of old granulomatous disease of the spleen.  No   splenomegaly.    Adrenals:  Unremarkable.  No mass.    Kidneys and ureters:  No hydronephrosis or nephrolithiasis.  No obstructive   uropathy.  Chronic hypodense right renal nodules, one of which at its rim   calcifications, possibly cysts, similar to prior study, most likely benign.    Stomach and bowel:  Nonspecific bowel gas pattern without dilatation or   obstruction.  Colonic diverticulosis without overt diverticulitis.  Chronic   large hiatal hernia with organorotation of stomach, accounting for some of the   abnormal density seen in lower left chest on CXR today.  The contracted   segments of bowel restrict wall evaluation to rule out any abnormal thickening.     PELVIS:    Appendix:  Normal appendix.  No appendicitis.    Bladder: No calculi.  Enlarged prostate causes an indentation on the bladder   base.    Reproductive:  Prostate enlargement and lobulation, 6.3 cm  transverse   diameter indenting the bladder base.  Minimal periprostatic edema or   infiltrative changes into the fat.     ABDOMEN and PELVIS:    Intraperitoneal space:  No pneumoperitoneum.  No significant ascites.    Bones/joints:  Sclerosis within L4 and especially L5 vertebrae, suspicious   for potential osteoblastic metastases.  Thoracolumbar mild scoliosis.    Degenerative changes in the spine.  No acute fracture.  No dislocation.    Soft tissues:  Minimal umbilical hernia present, containing only Fat.    Vasculature:  Scattered atherosclerotic placques are seen along some vessels.    No aortic aneurysm.    Lymph nodes:  Possibly small perivesical lymph nodes.  No enlarged lymph   nodes visualized.      Impression:       1.  Prostate enlargement.  2.  Cholelithiasis at GB neck.  3.  Nonspecific bowel gas pattern without dilatation or obstruction.  4.  Colonic diverticulosis without diverticulitis.  5.  Suspicious for L4, L5 osteoblastic metastases.  6.  LLL atelectasis/infiltrate.  7.  Chronic large hiatal hernia with organorotation of stomach.  8.  CAD.  9.  Chronic right renal nodularity unchanged.  10.  Mild peripancreatic edema at head of gland, consider serum lipase.  11.  Additional findings, as above.    THIS DOCUMENT HAS BEEN ELECTRONICALLY SIGNED BY DU BAUMAN MD    XR Chest 1 View [[id number]] Collected:  06/12/18 2052     Updated:  06/13/18 0010    Narrative:       EXAM:    XR Chest, 1 View    CLINICAL HISTORY:    89 years old, male; Signs and symptoms; Other: Fatigue; Additional info:   Weak/dizzy/ams triage protocol    TECHNIQUE:    Frontal view of the chest.    COMPARISON:    CR - XR CHEST 1 VIEW PORTABLE 2014-12-17 10:51    FINDINGS:    Lungs:  Left basilar/lower lobe atelectasis/infiltrate, increased compared to   prior study.  A few scattered areas of suspected mild hyperinflation in the   lungs.    Pleural space:  Chronic mild bilateral lateral pleural thickening.  No   pneumothorax or  pleural effusion.    Heart:  Mild cardiomegaly.    Mediastinum:  Unremarkable.  No acute abnormality compared to prior study.    Bones/joints:  Scoliosis.  Degenerative changes of the spine.  Old healed   fracture mid left clavicle.  DJD of bilateral shoulders.  Narrowed interval   between humeral head and acromion bilaterally, suspect shoulder impingement   phenomenon.    Soft tissues:  Probable streak of air caught between left upper arm and the   lateral left chest wall, creating the streak-like lucent suspected artifact.      Impression:       1.  Mild cardiomegaly.  2.  Left basilar atelectasis/infiltrate.    THIS DOCUMENT HAS BEEN ELECTRONICALLY SIGNED BY DU BAUMAN MD             Assessment/Plan   Assessment / Plan     Hospital Problem List     * (Principal)Sepsis    Mildly elevated troponin    Elevated liver enzymes    Prostate cancer    Metastatic cancer to spine, L4-L5    Pulmonary infiltrate, LLLobe    Cholelithiases of GB neck per CT A/P    Acute renal insufficiency, CRE 1.43, unknown baseline    Blindness of right eye            Plan:  INITIALLY CALLED BY ER TO ADMIT PT FOR ELEVATED TROPONIN.  D/W PA IN ER MY CONCERN REGARDING ELEVATED LFTS/ALK PHOS/BILI W/ ELEV LACTIC ACID AND RECOMMENDED ABD CT.  CT W/ THE ABOVE FINDINGS W/ CONCERN FOR GB ETIOLOGY.  DURING ER COURSE, PT BECAME HYPOTENSIVE DESPITE NS INFUSION AND THE DECISION WAS MADE TO ADMIT PT TO ICU.      GREATER THAN 60 MINUTES SPENT BY ME IN THE CHART REVIEW AND DISCUSSION OF PLAN W/ ER PA AS WELL AS ER MD TO DISPOSITION PT.        Thank you for allowing  Medicine Service to provide consultative care for your patient, we will continue to follow while clinically appropriate.    Electronically signed by Lo Lion MD, 06/13/18, 4:13 AM.

## 2018-06-14 LAB
ALBUMIN SERPL-MCNC: 3.69 G/DL (ref 3.2–4.8)
ALBUMIN/GLOB SERPL: 1.5 G/DL (ref 1.5–2.5)
ALP SERPL-CCNC: 193 U/L (ref 25–100)
ALT SERPL W P-5'-P-CCNC: 225 U/L (ref 7–40)
ANION GAP SERPL CALCULATED.3IONS-SCNC: 10 MMOL/L (ref 3–11)
AST SERPL-CCNC: 152 U/L (ref 0–33)
BILIRUB SERPL-MCNC: 1.9 MG/DL (ref 0.3–1.2)
BUN BLD-MCNC: 23 MG/DL (ref 9–23)
BUN/CREAT SERPL: 19.3 (ref 7–25)
CALCIUM SPEC-SCNC: 8.7 MG/DL (ref 8.7–10.4)
CHLORIDE SERPL-SCNC: 104 MMOL/L (ref 99–109)
CO2 SERPL-SCNC: 22 MMOL/L (ref 20–31)
CREAT BLD-MCNC: 1.19 MG/DL (ref 0.6–1.3)
D-LACTATE SERPL-SCNC: 1.1 MMOL/L (ref 0.5–2)
DEPRECATED RDW RBC AUTO: 50.3 FL (ref 37–54)
ERYTHROCYTE [DISTWIDTH] IN BLOOD BY AUTOMATED COUNT: 14.8 % (ref 11.3–14.5)
GFR SERPL CREATININE-BSD FRML MDRD: 57 ML/MIN/1.73
GLOBULIN UR ELPH-MCNC: 2.4 GM/DL
GLUCOSE BLD-MCNC: 90 MG/DL (ref 70–100)
GLUCOSE BLDC GLUCOMTR-MCNC: 101 MG/DL (ref 70–130)
GLUCOSE BLDC GLUCOMTR-MCNC: 109 MG/DL (ref 70–130)
GLUCOSE BLDC GLUCOMTR-MCNC: 124 MG/DL (ref 70–130)
GLUCOSE BLDC GLUCOMTR-MCNC: 99 MG/DL (ref 70–130)
HCT VFR BLD AUTO: 37.7 % (ref 38.9–50.9)
HGB BLD-MCNC: 12.2 G/DL (ref 13.1–17.5)
MAGNESIUM SERPL-MCNC: 2.2 MG/DL (ref 1.3–2.7)
MCH RBC QN AUTO: 29.8 PG (ref 27–31)
MCHC RBC AUTO-ENTMCNC: 32.4 G/DL (ref 32–36)
MCV RBC AUTO: 92.2 FL (ref 80–99)
PHOSPHATE SERPL-MCNC: 2.8 MG/DL (ref 2.4–5.1)
PLATELET # BLD AUTO: 134 10*3/MM3 (ref 150–450)
PMV BLD AUTO: 11.3 FL (ref 6–12)
POTASSIUM BLD-SCNC: 4.1 MMOL/L (ref 3.5–5.5)
PROT SERPL-MCNC: 6.1 G/DL (ref 5.7–8.2)
RBC # BLD AUTO: 4.09 10*6/MM3 (ref 4.2–5.76)
SODIUM BLD-SCNC: 136 MMOL/L (ref 132–146)
WBC NRBC COR # BLD: 15.38 10*3/MM3 (ref 3.5–10.8)

## 2018-06-14 PROCEDURE — 99233 SBSQ HOSP IP/OBS HIGH 50: CPT | Performed by: INTERNAL MEDICINE

## 2018-06-14 PROCEDURE — 83735 ASSAY OF MAGNESIUM: CPT | Performed by: INTERNAL MEDICINE

## 2018-06-14 PROCEDURE — 80053 COMPREHEN METABOLIC PANEL: CPT | Performed by: INTERNAL MEDICINE

## 2018-06-14 PROCEDURE — 82962 GLUCOSE BLOOD TEST: CPT

## 2018-06-14 PROCEDURE — 97110 THERAPEUTIC EXERCISES: CPT

## 2018-06-14 PROCEDURE — 25010000002 PIPERACILLIN SOD-TAZOBACTAM PER 1 G: Performed by: INTERNAL MEDICINE

## 2018-06-14 PROCEDURE — 83605 ASSAY OF LACTIC ACID: CPT | Performed by: INTERNAL MEDICINE

## 2018-06-14 PROCEDURE — 84100 ASSAY OF PHOSPHORUS: CPT | Performed by: INTERNAL MEDICINE

## 2018-06-14 PROCEDURE — 85027 COMPLETE CBC AUTOMATED: CPT | Performed by: INTERNAL MEDICINE

## 2018-06-14 PROCEDURE — 25010000002 HEPARIN (PORCINE) PER 1000 UNITS: Performed by: NURSE PRACTITIONER

## 2018-06-14 PROCEDURE — 97116 GAIT TRAINING THERAPY: CPT

## 2018-06-14 PROCEDURE — 99232 SBSQ HOSP IP/OBS MODERATE 35: CPT | Performed by: INTERNAL MEDICINE

## 2018-06-14 RX ADMIN — SENNOSIDES AND DOCUSATE SODIUM 2 TABLET: 8.6; 5 TABLET ORAL at 08:57

## 2018-06-14 RX ADMIN — TAZOBACTAM SODIUM AND PIPERACILLIN SODIUM 4.5 G: 500; 4 INJECTION, SOLUTION INTRAVENOUS at 20:49

## 2018-06-14 RX ADMIN — MULTIPLE VITAMINS W/ MINERALS TAB 1 TABLET: TAB ORAL at 08:57

## 2018-06-14 RX ADMIN — HEPARIN SODIUM 5000 UNITS: 5000 INJECTION, SOLUTION INTRAVENOUS; SUBCUTANEOUS at 20:49

## 2018-06-14 RX ADMIN — TAZOBACTAM SODIUM AND PIPERACILLIN SODIUM 4.5 G: 500; 4 INJECTION, SOLUTION INTRAVENOUS at 04:20

## 2018-06-14 RX ADMIN — TAZOBACTAM SODIUM AND PIPERACILLIN SODIUM 4.5 G: 500; 4 INJECTION, SOLUTION INTRAVENOUS at 12:01

## 2018-06-14 RX ADMIN — HEPARIN SODIUM 5000 UNITS: 5000 INJECTION, SOLUTION INTRAVENOUS; SUBCUTANEOUS at 08:57

## 2018-06-14 RX ADMIN — PANTOPRAZOLE SODIUM 40 MG: 40 TABLET, DELAYED RELEASE ORAL at 06:50

## 2018-06-14 RX ADMIN — SENNOSIDES AND DOCUSATE SODIUM 2 TABLET: 8.6; 5 TABLET ORAL at 20:50

## 2018-06-14 RX ADMIN — ASPIRIN 81 MG: 81 TABLET, COATED ORAL at 08:57

## 2018-06-14 NOTE — PROGRESS NOTES
Clinical Nutrition     Multidisciplinary Rounds    Time: 20min  Patient Name: Harris Shane  Date of Encounter: 06/14/18 1:38 PM  MRN: [id number]  Admission date: 6/12/2018    Reason for visit: SHEILA. RD to continue to follow per protocol.     Pt resting in bed, reports tolerating solid food, no complaints    Per tech pt  Ate ~25% of breakfast, unsure of lunch    Current diet: Diet Regular; GI Soft/[employer]  Boost+ 3x/day    Intervention:  Follow treatment plan  Care plan reviewed  Menu adjusted  Supplement provided    Follow up:   Per protocol      Lilibeth Aldana RD  1:38 PM

## 2018-06-14 NOTE — PROGRESS NOTES
"[facility]     Chief Complaint:  Abnormal liver tests      Interval History: Patient tolerated po.  Has not had any abdominal pain.  NO nausea or vomiting;      Objective     Vital Signs Blood pressure 120/96, pulse 78, temperature 98.4 °F (36.9 °C), temperature source Axillary, resp. rate 22, height 170.2 cm (67.01\"), weight 86.2 kg (190 lb), SpO2 96 %.    Physical Exam:   General Appearance alert, appears stated age and cooperative  Head normocephalic, without obvious abnormality and atraumatic  Lungs clear to auscultation, respirations regular, respirations even and respirations unlabored  Heart regular rhythm & normal rate, normal S1, S2, no murmur, no sidra, no rub and no click  Chest Wall no abnormalities ovserved  Extremities moves extremities well, no edema, no cyanosis and no redness     Results Review:   I reviewed the patient's new clinical results.    LABS:      Lab Results (last 24 hours)     Procedure Component Value Units Date/Time    Blood Culture - Blood, [[id number]]  (Abnormal) Collected:  06/12/18 2152    Specimen:  Blood from Arm, Right Updated:  06/14/18 1004     Blood Culture Gram Negative Bacilli (A)     Isolated from Aerobic Bottle     Gram Stain Result Aerobic Bottle Gram negative bacilli    POC Glucose Once [[id number]]  (Normal) Collected:  06/14/18 0718    Specimen:  Blood Updated:  06/14/18 0720     Glucose 101 mg/dL     Narrative:       M[facility] [id number] : [id number] [facility]    Comprehensive Metabolic Panel [[id number]]  (Abnormal) Collected:  06/14/18 0351    Specimen:  Blood Updated:  06/14/18 0440     Glucose 90 mg/dL      BUN 23 mg/dL      Creatinine 1.19 mg/dL      Sodium 136 mmol/L      Potassium 4.1 mmol/L      Chloride 104 mmol/L      CO2 22.0 mmol/L      Calcium 8.7 mg/dL      Total Protein 6.1 g/dL      Albumin 3.69 g/dL      ALT (SGPT) 225 (H) U/L      AST (SGOT) 152 (H) U/L      Alkaline Phosphatase 193 (H) U/L      Total Bilirubin 1.9 (H) mg/dL      " eGFR Non African Amer 57 (L) mL/min/1.73      Globulin 2.4 gm/dL      A/G Ratio 1.5 g/dL      BUN/Creatinine Ratio 19.3     Anion Gap 10.0 mmol/L     Narrative:       [employer] Guidelines    Stage     Description        GFR  1         Normal or High     90+  2         Mild decrease      60-89  3         Moderate decrease  30-59  4         Severe decrease    15-29  5         Kidney failure     <15    Magnesium [[id number]]  (Normal) Collected:  06/14/18 0351    Specimen:  Blood Updated:  06/14/18 0440     Magnesium 2.2 mg/dL     Phosphorus [[id number]]  (Normal) Collected:  06/14/18 0351    Specimen:  Blood Updated:  06/14/18 0440     Phosphorus 2.8 mg/dL     Lactic Acid, Plasma [[id number]]  (Normal) Collected:  06/14/18 0351    Specimen:  Blood Updated:  06/14/18 0422     Lactate 1.1 mmol/L      Comment: Falsely depressed results may occur on samples drawn from patients receiving N-Acetylcysteine (NAC) or Metamizole.       CBC (No Diff) [[id number]]  (Abnormal) Collected:  06/14/18 0351    Specimen:  Blood Updated:  06/14/18 0414     WBC 15.38 (H) 10*3/mm3      RBC 4.09 (L) 10*6/mm3      Hemoglobin 12.2 (L) g/dL      Hematocrit 37.7 (L) %      MCV 92.2 fL      MCH 29.8 pg      MCHC 32.4 g/dL      RDW 14.8 (H) %      RDW-SD 50.3 fl      MPV 11.3 fL      Platelets 134 (L) 10*3/mm3     Blood Culture - Blood, [[id number]]  (Normal) Collected:  06/12/18 2152    Specimen:  Blood from Arm, Left Updated:  06/13/18 2208     Blood Culture No growth at 24 hours    POC Glucose Once [[id number]]  (Normal) Collected:  06/13/18 2024    Specimen:  Blood Updated:  06/13/18 2031     Glucose 110 mg/dL     Narrative:       Meter: [id number] : [id number] Lui Wisdom    Troponin [[id number]]  (Abnormal) Collected:  06/13/18 1740    Specimen:  Blood Updated:  06/13/18 1824     Troponin I 1.489 (C) ng/mL      Comment: Results may be falsely decreased if patient taking Biotin.       POC Glucose Once [[id number]]  (Normal)  Collected:  06/13/18 1712    Specimen:  Blood Updated:  06/13/18 1732     Glucose 87 mg/dL     Narrative:       Meter: [id number] : [id number] Kieran Grady    Hepatitis Panel, Acute [[id number]]  (Normal) Collected:  06/13/18 0509    Specimen:  Blood Updated:  06/13/18 1653     Hepatitis B Surface Ag Non-Reactive     Hep A IgM Non-Reactive     Comment: Results may be falsely decreased if patient taking Biotin.        Hep B C IgM Non-Reactive     Comment: Results may be falsely decreased if patient taking Biotin.        Hepatitis C Ab Non-Reactive    Blood Culture ID, PCR - Blood, [[id number]]  (Abnormal) Collected:  06/12/18 2152    Specimen:  Blood from Arm, Right Updated:  06/13/18 1421     BCID, PCR Escherichia coli. Identification by BCID PCR. (C)    POC Glucose Once [[id number]]  (Normal) Collected:  06/13/18 1245    Specimen:  Blood Updated:  06/13/18 1258     Glucose 100 mg/dL     Narrative:       Meter: [id number] : [id number] Kieran Grady    Troponin [[id number]]  (Abnormal) Collected:  06/13/18 1105    Specimen:  Blood Updated:  06/13/18 1153     Troponin I 1.596 (C) ng/mL      Comment: Results may be falsely decreased if patient taking Biotin.           RADIOLOGY:  Imaging Results (last 24 hours)     Procedure Component Value Units Date/Time    US Abdomen Limited [[id number]] Collected:  06/13/18 1202     Updated:  06/14/18 0951    Narrative:       EXAMINATION: US ABDOMEN, LIMITED-06/13/2018:     INDICATION: Evaluate ductal dilation/obstruction; A41.9-Sepsis,  unspecified organism; R74.8-Abnormal levels of other serum enzymes;  R74.8-Abnormal levels of other serum enzymes; E80.6-Other disorders of  bilirubin metabolism; Z85.46-Personal history of malignant neoplasm of  prostate; C79.51-Secondary malignant neoplasm of bone; J18.1-Lobar  pneumonia, unspecified organism; K80.81-Other cholelithiasis with  obstruction, evaluate right upper quadrant pain.     TECHNIQUE: Sonographic imaging was obtained  of the right upper quadrant  in both the sagittal and transverse planes.     COMPARISON: NONE.     FINDINGS: The pancreas is not well seen. The left lobe of the liver is  somewhat limited in its visualization due to overlying bowel gas. The  right lobe of the liver is grossly unremarkable. No underlying mass or  intrahepatic biliary ductal dilatation. There are echogenic stones seen  within the neck of the gallbladder on CT scan are not well seen on this  examination. There is a fold seen at the neck of the gallbladder. There  is a small amount of pericholecystic fluid. There is thickening  identified of the gallbladder wall at 7 mm. The common bile duct is  within normal limits and measuring 5 mm. The right kidney is normal in  size, configuration, and texture measuring in length from pole to pole  12.7 cm. Several renal cortical cysts, the largest measuring 2.6 x 4.5 x  4.3 cm. No solid mass identified. No hydronephrosis or nephrolithiasis.       Impression:       Gallstones seen on CT scan are not well identified on this  examination. There is thickening identified of the gallbladder wall with  no evidence of biliary ductal dilatation. Small amount of  pericholecystic fluid identified. Several right renal cortical cysts.     D:  06/13/2018  E:  06/13/2018     This report was finalized on 6/14/2018 9:48 AM by Dr. Chelo Francisco MD.               Current Facility-Administered Medications:   •  aspirin EC tablet 81 mg, 81 mg, Oral, Daily, Elsa Nettles, APRN, 81 mg at 06/14/18 0857  •  calcium gluconate 1 g in sodium chloride 0.9 % 100 mL IVPB, 1 g, Intravenous, PRN **AND** calcium gluconate 6 g in sodium chloride 0.9 % 500 mL IVPB, 6 g, Intravenous, PRN **AND** Calcium, Ionized, , , PRN, Charli Duran MD  •  dextrose (D50W) solution 25 g, 25 g, Intravenous, Q15 Min PRN, Charli Duran MD  •  dextrose (GLUTOSE) oral gel 15 g, 15 g, Oral, Q15 Min PRN, Charli Duran MD  •  glucagon  (human recombinant) (GLUCAGEN DIAGNOSTIC) injection 1 mg, 1 mg, Subcutaneous, PRN, Charli Duran MD  •  heparin (porcine) 5000 UNIT/ML injection 5,000 Units, 5,000 Units, Subcutaneous, Q12H, CHELITA Anthony, 5,000 Units at 06/14/18 0857  •  insulin lispro (humaLOG) injection 0-7 Units, 0-7 Units, Subcutaneous, 4x Daily With Meals & Nightly, Charli Duran MD  •  magnesium sulfate 4 gram infusion - Mg less than or equal to 1mg/dL, 4 g, Intravenous, PRN **OR** magnesium sulfate 3 gram infusion (1gm x 3) - Mg 1.1 - 1.5 mg/dL, 1 g, Intravenous, PRN **OR** Magnesium Sulfate 2 gram infusion- Mg 1.6 - 1.9 mg/dL, 2 g, Intravenous, PRN, Charli Duran MD  •  multivitamin with minerals 1 tablet, 1 tablet, Oral, Daily, CHELITA Anthony, 1 tablet at 06/14/18 0857  •  ondansetron (ZOFRAN) injection 4 mg, 4 mg, Intravenous, Q6H PRN, CHELITA Anthony  •  oxyCODONE (ROXICODONE) immediate release tablet 5 mg, 5 mg, Oral, Q8H PRN, CHELITA Anthony, 5 mg at 06/13/18 2126  •  pantoprazole (PROTONIX) EC tablet 40 mg, 40 mg, Oral, Q AM, Tamika V. Case, DO, 40 mg at 06/14/18 0650  •  piperacillin-tazobactam (ZOSYN) 4.5 g in iso-osmotic dextrose 100 mL IVPB (premix), 4.5 g, Intravenous, Q8H, Tamika V. Case, DO, 4.5 g at 06/14/18 0420  •  potassium chloride in NS 20 mEq/250 mL IVPB, 20 mEq, Intravenous, Q2H PRN, Charli Duran MD  •  potassium phosphate 45 mmol in sodium chloride 0.9 % 500 mL infusion, 45 mmol, Intravenous, PRN **OR** potassium phosphate 30 mmol in sodium chloride 0.9 % 250 mL infusion, 30 mmol, Intravenous, PRN **OR** potassium phosphate 15 mmol in sodium chloride 0.9 % 100 mL infusion, 15 mmol, Intravenous, PRN **OR** sodium phosphates 45 mmol in sodium chloride 0.9 % 500 mL IVPB, 45 mmol, Intravenous, PRN **OR** sodium phosphates 30 mmol in sodium chloride 0.9 % 250 mL IVPB, 30 mmol, Intravenous, PRN **OR** sodium phosphates 15 mmol in sodium chloride 0.9 % 250 mL IVPB, 15  "mmol, Intravenous, PRN, Charli Duran MD  •  sennosides-docusate sodium (SENOKOT-S) 8.6-50 MG tablet 2 tablet, 2 tablet, Oral, BID, Charli Duran MD, 2 tablet at 06/14/18 0857  •  sodium chloride 0.9 % flush 1-10 mL, 1-10 mL, Intravenous, PRN, Elsa Nettles APRN  •  sodium chloride 0.9 % flush 10 mL, 10 mL, Intravenous, PRN, Triage Protocol Emergency, MD  •  sodium chloride 0.9 % infusion, 100 mL/hr, Intravenous, Continuous, CHELITA Anthony, Last Rate: 100 mL/hr at 06/13/18 1121, 100 mL/hr at 06/13/18 1121    Assessment/Plan      1.  Abnormal liver tests but ultrasound did not reveal stones and normal CBDl; some \"thickening\"  2.  E.coli positive    Patient may have passed stone and may still have cholecystitis as source.  Discussed with Dr. Dee and agree that patient will need surgical referral.  Discussed with patient who is somewhat hesitant to pursue surgery and wants to speak with daughters.  Daughter and patient aware that he is high risk and has metastatic prostate cancer.  No need for emergency ERCP;  Could consider MRCP but again doubt choledocholiathisis.  Liver tests are slowly trending downward with supportive care but I am still concerned about biliary source for sepsis.    Dr. Dee will be consulting surgery.      Naa Villareal MD  06/14/18  10:17 AM                  "

## 2018-06-14 NOTE — PROGRESS NOTES
Continued Stay Note  [facility]     Patient Name: Harris Shane  MRN: [id number]  Today's Date: 6/14/2018    Admit Date: 6/12/2018          Discharge Plan     Row Name 06/14/18 1052       Plan    Plan Home    Plan Comments Remains in ICU with plan to transfer to floor bed today.  Case Management will continue to follow for all needs-patient hopeful to return home, may benefit home health for continued physical therapy.  Fely Pool, Ext. [phone]              Discharge Codes    No documentation.           MONSERRAT Delong

## 2018-06-14 NOTE — PROGRESS NOTES
INTENSIVIST   PROGRESS NOTE     Hospital:  LOS: 2 days     Mr. Harris Shane, 89 y.o. male is followed for a Chief Complaint of: Sepsis      Subjective   S   Mr. Shane is a 89yo M who presented to the [facility] ED with complaints of bilateral lower extremity weakness. 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 He was supposed to start radiation therapy on 6/13/18. In the ED, he was found to be hypotensive with an elevated lactate. His LFTs were elevated along with elevated bilirubin. He received 2L of normal saline but was felt to be hypotensive. He had 2 low blood pressure readings but unfortunately, his BP cuff was on his elbow at the time. Medicine did not feel comfortable admitting him to the floor and he was admitted to the ICU.     Interval History:  He has not required any pressor support and his SBP has not been below 115. He was found on imaging to have possible cholelithiasis at the gallbladder neck. A RUQ ultrasound was performed and did not reveal stones but showed wall thickening and a normal common bile duct. He is growing E. Coli in his blood cultures. He has been maintained on Zosyn. GI continues to follow.     No events overnight. He was able to work with physical therapy yesterday. Abdominal pain has resolved.        The patient's relevant past medical, surgical and social history were reviewed and updated in Epic as appropriate.      ROS:   Constitutional: Negative for fever.   Respiratory: Negative for dyspnea.   Cardiovascular: Negative for chest pain.   Gastrointestinal: Negative for  nausea, vomiting and diarrhea.     Objective   O     Vitals:  Temp  Min: 97.9 °F (36.6 °C)  Max: 98.7 °F (37.1 °C)  BP  Min: 99/70  Max: 143/76  Pulse  Min: 59  Max: 87  Resp  Min: 16  Max: 24  SpO2  Min: 89 %  Max: 98 % Flow (L/min)  Min: 2  Max: 2    Intake/Ouptut 24 hrs (7:00AM - 6:59 AM)  Intake & Output (last 3 days)       06/11 0701 - 06/12 0700 06/12 0701 - 06/13 0700  06/13 0701 - 06/14 0700 06/14 0701 - 06/15 0700    I.V. (mL/kg)   2114 (24.5)     IV Piggyback  1050 250     Total Intake(mL/kg)  1050 (12.2) 2364 (27.4)     Urine (mL/kg/hr)   895 (0.4)     Stool   0 (0)     Total Output     895      Net   +1050 +1469              Unmeasured Urine Occurrence   3 x     Unmeasured Stool Occurrence   2 x             Physical Examination  Telemetry:  Normal Sinus Rhythm.    Constitutional:  No acute distress.  Sitting up in a chair.    Cardiovascular: Normal rate, regular and rhythm. Normal heart sounds.  No murmurs, gallop or rub.   Respiratory: No respiratory distress. Normal respiratory effort.  Normal breath sounds  Clear to ascultation.    Abdominal:  Soft. No masses. Non-tender. No distension. No HSM.   Extremities: No digital cyanosis. No clubbing.  No peripheral edema.   Neurological:   Alert and Oriented to person, place, and time.             Results from last 7 days  Lab Units 06/14/18  0351 06/13/18  0509 06/12/18  2152   WBC 10*3/mm3 15.38* 20.12* 12.93*   HEMOGLOBIN g/dL 12.2* 12.0* 13.1   MCV fL 92.2 90.8 90.2   PLATELETS 10*3/mm3 134* 153 168       Results from last 7 days  Lab Units 06/14/18  0351 06/13/18  0509 06/12/18  2152   SODIUM mmol/L 136 134 137   POTASSIUM mmol/L 4.1 4.2 3.3*   CO2 mmol/L 22.0 24.0 25.0   CREATININE mg/dL 1.19 1.37* 1.43*   GLUCOSE mg/dL 90 134* 143*   MAGNESIUM mg/dL 2.2 2.1 2.0   PHOSPHORUS mg/dL 2.8 3.6  --      Estimated Creatinine Clearance: 43.2 mL/min (by C-G formula based on SCr of 1.19 mg/dL).    Results from last 7 days  Lab Units 06/14/18  0351 06/13/18  0509 06/12/18  2152   ALK PHOS U/L 193* 180* 188*   BILIRUBIN mg/dL 1.9* 3.0* 3.2*   ALT (SGPT) U/L 225* 296* 210*   AST (SGOT) U/L 152* 346* 315*             Images:  Imaging Results (last 24 hours)     Procedure Component Value Units Date/Time    US Abdomen Limited [[id number]] Collected:  06/13/18 1202     Updated:  06/13/18 1203    Narrative:       EXAMINATION: US ABDOMEN,  LIMITED-06/13/2018:     INDICATION: Evaluate ductal dilation/obstruction; A41.9-Sepsis,  unspecified organism; R74.8-Abnormal levels of other serum enzymes;  R74.8-Abnormal levels of other serum enzymes; E80.6-Other disorders of  bilirubin metabolism; Z85.46-Personal history of malignant neoplasm of  prostate; C79.51-Secondary malignant neoplasm of bone; J18.1-Lobar  pneumonia, unspecified organism; K80.81-Other cholelithiasis with  obstruction, evaluate right upper quadrant pain.     TECHNIQUE: Sonographic imaging was obtained of the right upper quadrant  in both the sagittal and transverse planes.     COMPARISON: NONE.     FINDINGS: The pancreas is not well seen. The left lobe of the liver is  somewhat limited in its visualization due to overlying bowel gas. The  right lobe of the liver is grossly unremarkable. No underlying mass or  intrahepatic biliary ductal dilatation. There are echogenic stones seen  within the neck of the gallbladder on CT scan are not well seen on this  examination. There is a fold seen at the neck of the gallbladder. There  is a small amount of pericholecystic fluid. There is thickening  identified of the gallbladder wall at 7 mm. The common bile duct is  within normal limits and measuring 5 mm. The right kidney is normal in  size, configuration, and texture measuring in length from pole to pole  12.7 cm. Several renal cortical cysts, the largest measuring 2.6 x 4.5 x  4.3 cm. No solid mass identified. No hydronephrosis or nephrolithiasis.       Impression:       Gallstones seen on CT scan are not well identified on this  examination. There is thickening identified of the gallbladder wall with  no evidence of biliary ductal dilatation. Small amount of  pericholecystic fluid identified. Several right renal cortical cysts.     D:  06/13/2018  E:  06/13/2018                  Results: Reviewed.  I reviewed the patient's new laboratory and imaging results.  I independently reviewed the  patient's new images.    Medications: Reviewed.    Assessment/Plan   A / P     Mr. Shane is a 89yo M who presented to the [facility] ED with complaints of bilateral lower extremity weakness. 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 He was supposed to start radiation therapy on 6/13/18. In the ED, he was found to be hypotensive with an elevated lactate. His LFTs were elevated along with elevated bilirubin. He received 2L of normal saline but was felt to be hypotensive.     He has not required any pressor support and his SBP has not been below 115. He was found on imaging to have possible cholelithiasis at the gallbladder neck. A RUQ ultrasound was performed and did not reveal stones but showed wall thickening and a normal common bile duct. He is growing E. Coli in his blood cultures. He has been maintained on Zosyn. GI continues to follow. He had elevated troponins with no wall motion abnormalities on Echo. His elevated troponin likely represented a type II NSTEMI in the setting of sepsis.       Nutrition:   Diet Regular; GI Soft/Cass  Advance Directives: Full Code    Hospital Problem List     * (Principal)Sepsis    Elevated liver enzymes    Prostate cancer (Mets to L4-L5)    Pulmonary infiltrate, LLLobe    Cholelithiases of GB neck per CT A/P    Acute renal insufficiency, CRE 1.43, unknown baseline    Blindness of right eye    WILLIAM (obstructive sleep apnea) remote, intolerant of CPAP    R/O Cholecystitis    NSTEMI (non-ST elevated myocardial infarction) (R/O Type 2)          Assessment / Plan:    1. Continue Zosyn while sensitivities pending.   2. GI following  3. LFTs improving. Continue to trend  4. D/c IVF now that he is eating.   5. Continue PT/OT  6. Okay to transfer to the floor when a bed is available.   7. AM labs    Plan of care and goals reviewed during interdisciplinary rounds.  I discussed the patient's findings and my recommendations with patient and nursing  staff    Time: was greater than 35 minutes.      Tamika Dee, DO    Intensive Care Medicine and Pulmonary Medicine

## 2018-06-14 NOTE — PLAN OF CARE
Problem: Patient Care Overview  Goal: Plan of Care Review  Outcome: Ongoing (interventions implemented as appropriate)   06/14/18 1757   Coping/Psychosocial   Plan of Care Reviewed With patient;daughter;spouse   OTHER   Outcome Summary PT has orders to tele. Despite RN's suggestion to talk to case management today about discharge status and patient's functional decline, family would now like to address this.        Problem: Fall Risk (Adult)  Goal: Identify Related Risk Factors and Signs and Symptoms  Outcome: Ongoing (interventions implemented as appropriate)   06/14/18 1757   Fall Risk (Adult)   Related Risk Factors (Fall Risk) age-related changes;bladder function altered;fatigue/slow reaction;gait/mobility problems;history of falls;impaired vision;sensory deficits;sleep pattern alteration;environment unfamiliar   Signs and Symptoms (Fall Risk) presence of risk factors     Goal: Absence of Fall  Outcome: Ongoing (interventions implemented as appropriate)      Problem: Sepsis/Septic Shock (Adult)  Goal: Signs and Symptoms of Listed Potential Problems Will be Absent, Minimized or Managed (Sepsis/Septic Shock)  Outcome: Ongoing (interventions implemented as appropriate)   06/14/18 1757   Goal/Outcome Evaluation   Problems Assessed (Sepsis) all   Problems Present (Sepsis) none       Problem: Pneumonia (Adult)  Goal: Signs and Symptoms of Listed Potential Problems Will be Absent, Minimized or Managed (Pneumonia)  Outcome: Ongoing (interventions implemented as appropriate)   06/14/18 1757   Goal/Outcome Evaluation   Problems Assessed (Pneumonia) all   Problems Present (Pneumonia) none       Problem: Skin Injury Risk (Adult)  Goal: Identify Related Risk Factors and Signs and Symptoms  Outcome: Ongoing (interventions implemented as appropriate)   06/14/18 1757   Skin Injury Risk (Adult)   Related Risk Factors (Skin Injury Risk) advanced age;critical care admission;edema;hospitalization prolonged;mechanical forces;mobility  impaired;moisture     Goal: Skin Health and Integrity  Outcome: Ongoing (interventions implemented as appropriate)

## 2018-06-14 NOTE — PLAN OF CARE
Problem: Patient Care Overview  Goal: Plan of Care Review  Outcome: Ongoing (interventions implemented as appropriate)   06/14/18 7476   Coping/Psychosocial   Plan of Care Reviewed With patient   OTHER   Outcome Summary Pt req'd more assistance to ambulate today; He req'd freq VC's for upright posture and management of RW; ambulating 40ft. Pt limited from generalized weakness, SOA, and mod safety deficit.    Plan of Care Review   Progress no change

## 2018-06-14 NOTE — THERAPY TREATMENT NOTE
Acute Care - Physical Therapy Treatment Note  [facility]     Patient Name: Harris Shane  : 9/15/1927  MRN: [id number]  Today's Date: 2018  Onset of Illness/Injury or Date of Surgery: 18  Date of Referral to PT: 18  Referring Physician: Luiz     Admit Date: 2018    Visit Dx:    ICD-10-CM ICD-9-CM   1. Sepsis, due to unspecified organism A41.9 038.9     995.91   2. Elevated troponin R74.8 790.6   3. Elevated liver enzymes R74.8 790.5   4. Hyperbilirubinemia E80.6 782.4   5. History of prostate cancer Z85.46 V10.46   6. Metastatic cancer to spine C79.51 198.5   7. Pneumonia of left lower lobe due to infectious organism J18.1 486   8. Biliary calculus of other site with obstruction K80.81 [id number]   9. Impaired functional mobility, balance, gait, and endurance Z74.09 V49.89     Patient Active Problem List   Diagnosis   • Sepsis   • Elevated liver enzymes   • Prostate cancer (Mets to L4-L5)   • Pulmonary infiltrate, LLLobe   • Cholelithiases of GB neck per CT A/P   • Acute renal insufficiency, CRE 1.43, unknown baseline   • Blindness of right eye   • WILLIAM (obstructive sleep apnea) remote, intolerant of CPAP   • R/O Cholecystitis   • NSTEMI (non-ST elevated myocardial infarction) (R/O Type 2)       Therapy Treatment          Rehabilitation Treatment Summary     Row Name 18 0922             Treatment Time/Intention    Discipline (P)  physical therapist  -CM      Document Type (P)  therapy note (daily note)  -CM      Subjective Information (P)  complains of;pain  -CM      Mode of Treatment (P)  physical therapy  -CM      Care Plan Review (P)  evaluation/treatment results reviewed;care plan/treatment goals reviewed;current/potential barriers reviewed;patient/other agree to care plan  -CM      Patient Effort (P)  good  -CM      Existing Precautions/Restrictions (P)  fall;oxygen therapy device and L/min  -CM      Recorded by [CM] Barbara Martinez, PT Student 18 1006      Row Name  06/14/18 0922             Vital Signs    Pre Systolic BP Rehab (P)  120  -CM      Pre Treatment Diastolic BP (P)  96  -CM      Post Systolic BP Rehab (P)  104  -CM      Post Treatment Diastolic BP (P)  85  -CM      Pretreatment Heart Rate (beats/min) (P)  76  -CM      Posttreatment Heart Rate (beats/min) (P)  79  -CM      Pre SpO2 (%) (P)  97  -CM      O2 Delivery Pre Treatment (P)  supplemental O2  -CM      O2 Delivery Intra Treatment (P)  supplemental O2  -CM      Post SpO2 (%) (P)  97  -CM      O2 Delivery Post Treatment (P)  supplemental O2  -CM      Pre Patient Position (P)  Supine  -CM      Intra Patient Position (P)  Standing  -CM      Post Patient Position (P)  Sitting  -CM      Rest Breaks  (P)  4   3 standing; 1 sitting   -CM      Recorded by [CM] Barbara Martinez, PT Student 06/14/18 1006      Row Name 06/14/18 0922             Cognitive Assessment/Intervention    Additional Documentation (P)  Cognitive Assessment/Intervention (Group)  -CM      Recorded by [CM] Barbara Martinez PT Student 06/14/18 1006      Row Name 06/14/18 0922             Cognitive Assessment/Intervention- PT/OT    Affect/Mental Status (Cognitive) (P)  WFL  -CM      Orientation Status (Cognition) (P)  oriented x 4  -CM      Follows Commands (Cognition) (P)  follows one step commands;75-90% accuracy;verbal cues/prompting required;physical/tactile prompts required  -CM      Cognitive Function (Cognitive) (P)  safety deficit  -CM      Safety Deficit (Cognitive) (P)  moderate deficit;safety precautions follow-through/compliance  -CM      Personal Safety Interventions (P)  fall prevention program maintained;gait belt;nonskid shoes/slippers when out of bed  -CM      Recorded by [CM] Barbara Martinez, PT Student 06/14/18 1012      Row Name 06/14/18 0922             Safety Issues, Functional Mobility    Safety Issues Affecting Function (Mobility) (P)  safety precautions follow-through/compliance  -CM      Impairments Affecting Function  (Mobility) (P)  balance;coordination;endurance/activity tolerance;strength  -CM      Recorded by [CM] Barbara Martinez, PT Student 06/14/18 1012      Row Name 06/14/18 0922             Bed Mobility Assessment/Treatment    Bed Mobility Assessment/Treatment (P)  supine-sit  -CM      Supine-Sit [place] (Bed Mobility) (P)  1 person assist;maximum assist (25% patient effort);verbal cues  -CM      Bed Mobility, Safety Issues (P)  decreased use of arms for pushing/pulling;impaired trunk control for bed mobility  -CM      Recorded by [CM] Barbara Martinez, PT Student 06/14/18 1012      Row Name 06/14/18 0922             Transfer Assessment/Treatment    Transfer Assessment/Treatment (P)  sit-stand transfer;stand-sit transfer  -CM      Sit-Stand [place] (Transfers) (P)  minimum assist (75% patient effort);1 person assist;verbal cues  -CM      Stand-Sit [place] (Transfers) (P)  minimum assist (75% patient effort);1 person assist;verbal cues  -CM      Recorded by [CM] Barbara Martinez, PT Student 06/14/18 1012      Row Name 06/14/18 0922             Sit-Stand Transfer    Assistive Device (Sit-Stand Transfers) (P)  walker, front-wheeled  -CM      Recorded by [CM] Barbara Martinez, PT Student 06/14/18 1012      Row Name 06/14/18 0922             Stand-Sit Transfer    Assistive Device (Stand-Sit Transfers) (P)  walker, front-wheeled  -CM      Recorded by [CM] Barbara Martinez, PT Student 06/14/18 1012      Row Name 06/14/18 0922             Gait/Stairs Assessment/Training    90692 - Gait Training Minutes  (P)  18  -CM      Gait/Stairs Assessment/Training (P)  gait/ambulation assistive device  -CM2      [place] Level (Gait) (P)  moderate assist (50% patient effort);2 person assist;1 person to manage equipment;verbal cues;other (see comments)   TC's for upright posture; manual pacing of walker   -CM2      Assistive Device (Gait) (P)  walker, front-wheeled  -CM2      Distance in Feet (Gait) (P)  40  -CM2       Pattern (Gait) (P)  step-through  -CM2      Deviations/Abnormal Patterns (Gait) (P)  gait speed decreased;veronique decreased;stride length decreased  -CM2      Comment (Gait/Stairs) (P)  Pt req's freq VC's to hold head upright, stand upright, and maintain proper management of RW  -CM2      Recorded by [CM] Barbara Martinez, PT Student 06/14/18 1014  [CM2] Barbara Martinez, PT Student 06/14/18 1012      Row Name 06/14/18 0922             Motor Skills Assessment/Interventions    Additional Documentation (P)  Therapeutic Exercise (Group);Therapeutic Exercise Interventions (Group)  -CM      Recorded by [CM] Barbara Martinez, PT Student 06/14/18 1013      Row Name 06/14/18 0922             Therapeutic Exercise    Therapeutic Exercise (P)  seated, lower extremities  -CM      Additional Documentation (P)  Therapeutic Exercise (Row)  -CM      89005 - PT Therapeutic Exercise Minutes (P)  8  -CM2      Recorded by [CM] Barbara Martinez, PT Student 06/14/18 1013  [CM2] Barbara Martinez, PT Student 06/14/18 1014      Row Name 06/14/18 0922             Lower Extremity Seated Therapeutic Exercise    Performed, Seated Lower Extremity (Therapeutic Exercise) (P)  hip flexion/extension;LAQ (long arc quad), knee extension;ankle dorsiflexion/plantarflexion  -CM      Exercise Type, Seated Lower Extremity (Therapeutic Exercise) (P)  AROM (active range of motion)  -CM      Sets/Reps Detail, Seated Lower Extremity (Therapeutic Exercise) (P)  1/10  -CM      Recorded by [CM] Barbara Martinez, PT Student 06/14/18 1014      Row Name 06/14/18 0922             Static Sitting Balance    Level of [place] (Unsupported Sitting, Static Balance) (P)  minimal assist, 75% patient effort  -CM      Sitting Position (Unsupported Sitting, Static Balance) (P)  sitting on edge of bed  -CM      Recorded by [CM] Barbara Martinez, PT Student 06/14/18 1014      Row Name 06/14/18 0922             Static Standing Balance    Level of [place] (Supported  Standing, Static Balance) (P)  minimal assist, 75% patient effort  -CM      Assistive Device Utilized (Supported Standing, Static Balance) (P)  rolling walker  -CM      Recorded by [CM] Barbara Martinez PT Student 06/14/18 1014      Row Name 06/14/18 0922             Positioning and Restraints    Pre-Treatment Position (P)  in bed  -CM      Post Treatment Position (P)  chair  -CM      Recorded by [CM] Barbara Martinez PT Student 06/14/18 1014      Row Name 06/14/18 0922             Pain Assessment    Additional Documentation (P)  Pain Scale: Numbers Pre/Post-Treatment (Group)  -CM      Recorded by [CM] aBrbara Martinez PT Student 06/14/18 1014      Row Name 06/14/18 0922             Pain Scale: Numbers Pre/Post-Treatment    Pain Scale: Numbers, Pretreatment (P)  6/10  -CM      Pain Scale: Numbers, Post-Treatment (P)  5/10  -CM      Pain Location - Side (P)  Left  -CM      Pain Location - Orientation (P)  lower  -CM      Pain Location (P)  extremity  -CM      Recorded by [CM] Barbara Martinez PT Student 06/14/18 1015      Row Name 06/14/18 0922             Coping    Observed Emotional State (P)  calm;quiet  -CM      Verbalized Emotional State (P)  acceptance  -CM      Recorded by [CM] Barbara Martinez PT Student 06/14/18 1015      Row Name 06/14/18 0922             Plan of Care Review    Plan of Care Reviewed With (P)  patient  -CM      Recorded by [CM] Barbara Martinez PT Student 06/14/18 1018      Row Name 06/14/18 0922             Outcome Summary/Treatment Plan (PT)    Daily Summary of Progress (PT) (P)  progress toward functional goals is good  -CM      Recorded by [CM] Barbara Martinez PT Student 06/14/18 1015        User Key  (r) = Recorded By, (t) = Taken By, (c) = Cosigned By    Initials Name Effective Dates Discipline    CM Barbara Martinez PT Student 06/07/18 -  PT                     Physical Therapy Education     Title: PT OT SLP Therapies (Active)     Topic: Physical Therapy (Active)     Point:  Mobility training (Active)    Learning Progress Summary     Learner Status Readiness Method Response Comment Documented by    Patient Active Acceptance E,D NR  CM 06/14/18 1016     Active Acceptance E NR  KR 06/13/18 1200    Family Active Acceptance E NR  KR 06/13/18 1200          Point: Home exercise program (Active)    Learning Progress Summary     Learner Status Readiness Method Response Comment Documented by    Patient Active Acceptance E,D NR  CM 06/14/18 1016          Point: Body mechanics (Active)    Learning Progress Summary     Learner Status Readiness Method Response Comment Documented by    Patient Active Acceptance E,D NR  CM 06/14/18 1016     Active Acceptance E NR  KR 06/13/18 1200    Family Active Acceptance E NR  KR 06/13/18 1200          Point: Precautions (Active)    Learning Progress Summary     Learner Status Readiness Method Response Comment Documented by    Patient Active Acceptance E,D NR   06/14/18 1016     Active Acceptance E NR  KR 06/13/18 1200    Family Active Acceptance E NR  KR 06/13/18 1200                      User Key     Initials Effective Dates Name Provider Type Discipline     04/03/18 -  Leanna Molina, PT Physical Therapist PT     06/07/18 -  Barbara Martinez, PT Student PT Student PT                    PT Recommendation and Plan     Outcome Summary/Treatment Plan (PT)  Daily Summary of Progress (PT): (P) progress toward functional goals is good  Plan of Care Reviewed With: (P) patient  Progress: (P) no change  Outcome Summary: (P) Pt req'd more assistance to ambulate today; He req'd freq VC's for upright posture and management of RW; ambulating 40ft. Pt limited from generalized weakness, SOA, and mod safety deficit.           Outcome Measures     Row Name 06/14/18 0922 06/13/18 1022          How much help from another person do you currently need...    Turning from your back to your side while in flat bed without using bedrails? (P)  2  -CM 2  -KR     Moving from lying on  back to sitting on the side of a flat bed without bedrails? (P)  2  -CM 2  -KR     Moving to and from a bed to a chair (including a wheelchair)? (P)  3  -CM 3  -KR     Standing up from a chair using your arms (e.g., wheelchair, bedside chair)? (P)  3  -CM 3  -KR     Climbing 3-5 steps with a railing? (P)  1  -CM 2  -KR     To walk in hospital room? (P)  2  -CM 3  -KR     AM-PAC 6 Clicks Score (P)  13  -CM 15  -KR        Functional Assessment    Outcome Measure Options (P)  AM-PAC 6 Clicks Basic Mobility (PT)  -CM AM-PAC 6 Clicks Basic Mobility (PT)  -KR       User Key  (r) = Recorded By, (t) = Taken By, (c) = Cosigned By    Initials Name Provider Type    VIN Molina, PT Physical Therapist    CM Barbara Martinez, PT Student PT Student           Time Calculation:         PT Charges     Row Name 06/14/18 0922             Time Calculation    Start Time (P)  0922  -CM      PT Received On (P)  06/14/18  -CM      PT Goal Re-Cert Due Date (P)  06/23/18  -CM         Time Calculation- PT    Total Timed Code Minutes- PT (P)  26 minute(s)  -CM         Timed Charges    02979 - PT Therapeutic Exercise Minutes (P)  8  -CM      90990 - Gait Training Minutes  (P)  18  -CM        User Key  (r) = Recorded By, (t) = Taken By, (c) = Cosigned By    Initials Name Provider Type    JOSE A Martinez, PT Student PT Student        Therapy Suggested Charges     Code   Minutes Charges    27420 (CPT®) Hc Pt Neuromusc Re Education Ea 15 Min      74722 (CPT®) Hc Pt Ther Proc Ea 15 Min 8 1    20931 (CPT®) Hc Gait Training Ea 15 Min 18 1    90882 (CPT®) Hc Pt Therapeutic Act Ea 15 Min      47873 (CPT®) Hc Pt Manual Therapy Ea 15 Min      80449 (CPT®) Hc Pt Iontophoresis Ea 15 Min      88424 (CPT®) Hc Pt Elec Stim Ea-Per 15 Min      45856 (CPT®) Hc Pt Ultrasound Ea 15 Min      42610 (CPT®) Hc Pt Self Care/Mgmt/Train Ea 15 Min      Total  26 2        Therapy Charges for Today     Code Description Service Date Service Provider Modifiers Qty     [id number]  PT THER PROC EA 15 MIN 6/14/2018 Barbara Martinez, PT Student GP 1    [id number] HC GAIT TRAINING EA 15 MIN 6/14/2018 Barbara Martinez, PT Student GP 1          PT G-Codes  Outcome Measure Options: (P) AM-PAC 6 Clicks Basic Mobility (PT)    Barbara Martinez, PT Student  6/14/2018

## 2018-06-14 NOTE — PLAN OF CARE
Problem: Patient Care Overview  Goal: Plan of Care Review  Outcome: Ongoing (interventions implemented as appropriate)   06/13/18 2023 06/14/18 0525   Coping/Psychosocial   Plan of Care Reviewed With patient;daughter --    OTHER   Outcome Summary --  Pt stable overnight on 2L/nc, no gtts. Zosyn continues. Pt incontinent at times, refuses to use call light and tries to get up independently, but otherwise cooperative and pleasant. Awaiting transfer orders.   Plan of Care Review   Progress --  improving     Goal: Individualization and Mutuality  Outcome: Ongoing (interventions implemented as appropriate)   06/14/18 0525   Individualization   Patient Specific Preferences Transfer out of ICU   Patient Specific Interventions Up to [facility] for urination       Problem: Fall Risk (Adult)  Goal: Identify Related Risk Factors and Signs and Symptoms  Outcome: Ongoing (interventions implemented as appropriate)   06/14/18 0525   Fall Risk (Adult)   Related Risk Factors (Fall Risk) age-related changes;bladder function altered;fatigue/slow reaction;gait/mobility problems;history of falls;impaired vision;environment unfamiliar   Signs and Symptoms (Fall Risk) presence of risk factors     Goal: Absence of Fall  Outcome: Ongoing (interventions implemented as appropriate)   06/14/18 0525   Fall Risk (Adult)   Absence of Fall making progress toward outcome       Problem: Sepsis/Septic Shock (Adult)  Goal: Signs and Symptoms of Listed Potential Problems Will be Absent, Minimized or Managed (Sepsis/Septic Shock)  Outcome: Ongoing (interventions implemented as appropriate)   06/14/18 0525   Goal/Outcome Evaluation   Problems Assessed (Sepsis) all   Problems Present (Sepsis) none       Problem: Pneumonia (Adult)  Goal: Signs and Symptoms of Listed Potential Problems Will be Absent, Minimized or Managed (Pneumonia)  Outcome: Ongoing (interventions implemented as appropriate)   06/14/18 0525   Goal/Outcome Evaluation   Problems Assessed  (Pneumonia) all   Problems Present (Pneumonia) none       Problem: Skin Injury Risk (Adult)  Goal: Identify Related Risk Factors and Signs and Symptoms  Outcome: Ongoing (interventions implemented as appropriate)   06/14/18 0525   Skin Injury Risk (Adult)   Related Risk Factors (Skin Injury Risk) advanced age;critical care admission;infection;mobility impaired;moisture;nutritional deficiencies     Goal: Skin Health and Integrity  Outcome: Ongoing (interventions implemented as appropriate)   06/14/18 0525   Skin Injury Risk (Adult)   Skin Health and Integrity making progress toward outcome

## 2018-06-15 PROBLEM — R78.81 BACTEREMIA DUE TO ESCHERICHIA COLI: Status: ACTIVE | Noted: 2018-06-15

## 2018-06-15 PROBLEM — B96.20 BACTEREMIA DUE TO ESCHERICHIA COLI: Status: ACTIVE | Noted: 2018-06-15

## 2018-06-15 LAB
ALBUMIN SERPL-MCNC: 3.29 G/DL (ref 3.2–4.8)
ALBUMIN/GLOB SERPL: 1.6 G/DL (ref 1.5–2.5)
ALP SERPL-CCNC: 232 U/L (ref 25–100)
ALT SERPL W P-5'-P-CCNC: 136 U/L (ref 7–40)
ANION GAP SERPL CALCULATED.3IONS-SCNC: 5 MMOL/L (ref 3–11)
AST SERPL-CCNC: 72 U/L (ref 0–33)
BACTERIA SPEC AEROBE CULT: ABNORMAL
BILIRUB SERPL-MCNC: 1.6 MG/DL (ref 0.3–1.2)
BUN BLD-MCNC: 24 MG/DL (ref 9–23)
BUN/CREAT SERPL: 23.3 (ref 7–25)
CALCIUM SPEC-SCNC: 8.1 MG/DL (ref 8.7–10.4)
CHLORIDE SERPL-SCNC: 104 MMOL/L (ref 99–109)
CO2 SERPL-SCNC: 26 MMOL/L (ref 20–31)
CREAT BLD-MCNC: 1.03 MG/DL (ref 0.6–1.3)
DEPRECATED RDW RBC AUTO: 48.2 FL (ref 37–54)
ERYTHROCYTE [DISTWIDTH] IN BLOOD BY AUTOMATED COUNT: 14.5 % (ref 11.3–14.5)
GFR SERPL CREATININE-BSD FRML MDRD: 68 ML/MIN/1.73
GLOBULIN UR ELPH-MCNC: 2 GM/DL
GLUCOSE BLD-MCNC: 99 MG/DL (ref 70–100)
GLUCOSE BLDC GLUCOMTR-MCNC: 101 MG/DL (ref 70–130)
GRAM STN SPEC: ABNORMAL
HCT VFR BLD AUTO: 35.5 % (ref 38.9–50.9)
HGB BLD-MCNC: 11.6 G/DL (ref 13.1–17.5)
ISOLATED FROM: ABNORMAL
MAGNESIUM SERPL-MCNC: 2.2 MG/DL (ref 1.3–2.7)
MCH RBC QN AUTO: 29.5 PG (ref 27–31)
MCHC RBC AUTO-ENTMCNC: 32.7 G/DL (ref 32–36)
MCV RBC AUTO: 90.3 FL (ref 80–99)
PHOSPHATE SERPL-MCNC: 2 MG/DL (ref 2.4–5.1)
PLATELET # BLD AUTO: 123 10*3/MM3 (ref 150–450)
PMV BLD AUTO: 11.4 FL (ref 6–12)
POTASSIUM BLD-SCNC: 3.8 MMOL/L (ref 3.5–5.5)
PROT SERPL-MCNC: 5.3 G/DL (ref 5.7–8.2)
RBC # BLD AUTO: 3.93 10*6/MM3 (ref 4.2–5.76)
SODIUM BLD-SCNC: 135 MMOL/L (ref 132–146)
WBC NRBC COR # BLD: 10.21 10*3/MM3 (ref 3.5–10.8)

## 2018-06-15 PROCEDURE — 99233 SBSQ HOSP IP/OBS HIGH 50: CPT | Performed by: INTERNAL MEDICINE

## 2018-06-15 PROCEDURE — 84100 ASSAY OF PHOSPHORUS: CPT | Performed by: INTERNAL MEDICINE

## 2018-06-15 PROCEDURE — 82962 GLUCOSE BLOOD TEST: CPT

## 2018-06-15 PROCEDURE — 83735 ASSAY OF MAGNESIUM: CPT | Performed by: INTERNAL MEDICINE

## 2018-06-15 PROCEDURE — 25010000002 FUROSEMIDE PER 20 MG: Performed by: INTERNAL MEDICINE

## 2018-06-15 PROCEDURE — 80053 COMPREHEN METABOLIC PANEL: CPT | Performed by: INTERNAL MEDICINE

## 2018-06-15 PROCEDURE — 25010000002 HEPARIN (PORCINE) PER 1000 UNITS: Performed by: NURSE PRACTITIONER

## 2018-06-15 PROCEDURE — 97165 OT EVAL LOW COMPLEX 30 MIN: CPT

## 2018-06-15 PROCEDURE — 25010000002 PIPERACILLIN SOD-TAZOBACTAM PER 1 G: Performed by: INTERNAL MEDICINE

## 2018-06-15 PROCEDURE — 99231 SBSQ HOSP IP/OBS SF/LOW 25: CPT | Performed by: PHYSICIAN ASSISTANT

## 2018-06-15 PROCEDURE — 85027 COMPLETE CBC AUTOMATED: CPT | Performed by: INTERNAL MEDICINE

## 2018-06-15 PROCEDURE — 97116 GAIT TRAINING THERAPY: CPT

## 2018-06-15 PROCEDURE — 25010000003 CEFTRIAXONE PER 250 MG: Performed by: INTERNAL MEDICINE

## 2018-06-15 RX ORDER — FUROSEMIDE 10 MG/ML
20 INJECTION INTRAMUSCULAR; INTRAVENOUS ONCE
Status: COMPLETED | OUTPATIENT
Start: 2018-06-15 | End: 2018-06-15

## 2018-06-15 RX ORDER — CEFTRIAXONE SODIUM 2 G/50ML
2 INJECTION, SOLUTION INTRAVENOUS EVERY 24 HOURS
Status: DISCONTINUED | OUTPATIENT
Start: 2018-06-15 | End: 2018-06-25 | Stop reason: HOSPADM

## 2018-06-15 RX ADMIN — FUROSEMIDE 20 MG: 10 INJECTION, SOLUTION INTRAMUSCULAR; INTRAVENOUS at 09:23

## 2018-06-15 RX ADMIN — HEPARIN SODIUM 5000 UNITS: 5000 INJECTION, SOLUTION INTRAVENOUS; SUBCUTANEOUS at 08:35

## 2018-06-15 RX ADMIN — SENNOSIDES AND DOCUSATE SODIUM 2 TABLET: 8.6; 5 TABLET ORAL at 08:35

## 2018-06-15 RX ADMIN — MULTIPLE VITAMINS W/ MINERALS TAB 1 TABLET: TAB ORAL at 08:36

## 2018-06-15 RX ADMIN — POTASSIUM & SODIUM PHOSPHATES POWDER PACK 280-160-250 MG 2 PACKET: 280-160-250 PACK at 11:55

## 2018-06-15 RX ADMIN — TAZOBACTAM SODIUM AND PIPERACILLIN SODIUM 4.5 G: 500; 4 INJECTION, SOLUTION INTRAVENOUS at 04:01

## 2018-06-15 RX ADMIN — HEPARIN SODIUM 5000 UNITS: 5000 INJECTION, SOLUTION INTRAVENOUS; SUBCUTANEOUS at 20:22

## 2018-06-15 RX ADMIN — CEFTRIAXONE SODIUM 2 G: 2 INJECTION, SOLUTION INTRAVENOUS at 11:55

## 2018-06-15 RX ADMIN — SENNOSIDES AND DOCUSATE SODIUM 2 TABLET: 8.6; 5 TABLET ORAL at 20:22

## 2018-06-15 RX ADMIN — PANTOPRAZOLE SODIUM 40 MG: 40 TABLET, DELAYED RELEASE ORAL at 06:11

## 2018-06-15 RX ADMIN — ASPIRIN 81 MG: 81 TABLET, COATED ORAL at 08:35

## 2018-06-15 NOTE — CONSULTS
General Surgery Consultation Note    Date of Service: 6/14/2018  Harris Shane  [id number]  9/15/1927      Referring Provider: Charli Duran MD    Location of Consult: ICU     Reason for Consultation: I was asked to evaluate this elderly gentleman who presented with a ascending cholangitis to consider the appropriate management of his cholelithiasis.      History of Present Illness:  I am seeing, Harris Shane, in consultation for Charli Duran MD regarding his cholelithiasis presenting in the setting of a ascending cholangitis.his patient presented earlier with sepsis and was found to have gram-negative bacteremia.  His laboratory values were consistent with cholangitis.  However he rapidly responded to resuscitation and broad-spectrum antibiotics and his imaging studies suggested that his common bile duct stone likely had passed.   Currently he is pain-free.  He is eating without significant difficulty.  He is been hemodynamically stable without fevers.  His liver function studies continue to improve.    Past Medical History:   Diagnosis Date   • Blindness of right eye 6/13/2018   • Cancer     PROSTATE   • DVT (deep venous thrombosis) 1990's   • Metastatic cancer to spine, L4-L5 6/13/2018   • Myocardial infarction 1970's   • WILLIAM (obstructive sleep apnea) 6/13/2018   • UTI (urinary tract infection)        Allergies   Allergen Reactions   • Contrast Dye Rash and Other (See Comments)     RASH AND SYNCOPE       No current facility-administered medications on file prior to encounter.      No current outpatient prescriptions on file prior to encounter.         Current Facility-Administered Medications:   •  aspirin EC tablet 81 mg, 81 mg, Oral, Daily, Elsa Nettles, APRN, 81 mg at 06/14/18 0857  •  calcium gluconate 1 g in sodium chloride 0.9 % 100 mL IVPB, 1 g, Intravenous, PRN **AND** calcium gluconate 6 g in sodium chloride 0.9 % 500 mL IVPB, 6 g, Intravenous, PRN **AND** Calcium, Ionized, ,  , PRN, Charli Duran MD  •  dextrose (D50W) solution 25 g, 25 g, Intravenous, Q15 Min PRN, Charli Duran MD  •  dextrose (GLUTOSE) oral gel 15 g, 15 g, Oral, Q15 Min PRN, Charli Duran MD  •  glucagon (human recombinant) (GLUCAGEN DIAGNOSTIC) injection 1 mg, 1 mg, Subcutaneous, PRN, Charli Duran MD  •  heparin (porcine) 5000 UNIT/ML injection 5,000 Units, 5,000 Units, Subcutaneous, Q12H, CHELITA Anthony, 5,000 Units at 06/14/18 2049  •  insulin lispro (humaLOG) injection 0-7 Units, 0-7 Units, Subcutaneous, 4x Daily With Meals & Nightly, Charli Duran MD  •  magnesium sulfate 4 gram infusion - Mg less than or equal to 1mg/dL, 4 g, Intravenous, PRN **OR** magnesium sulfate 3 gram infusion (1gm x 3) - Mg 1.1 - 1.5 mg/dL, 1 g, Intravenous, PRN **OR** Magnesium Sulfate 2 gram infusion- Mg 1.6 - 1.9 mg/dL, 2 g, Intravenous, PRN, Charli Duran MD  •  multivitamin with minerals 1 tablet, 1 tablet, Oral, Daily, CHELITA Anthony, 1 tablet at 06/14/18 0857  •  ondansetron (ZOFRAN) injection 4 mg, 4 mg, Intravenous, Q6H PRN, CHELITA Anthony  •  oxyCODONE (ROXICODONE) immediate release tablet 5 mg, 5 mg, Oral, Q8H PRN, CHELITA Anthony, 5 mg at 06/13/18 2126  •  pantoprazole (PROTONIX) EC tablet 40 mg, 40 mg, Oral, Q AM, Tamika V. Case, DO, 40 mg at 06/14/18 0650  •  piperacillin-tazobactam (ZOSYN) 4.5 g in iso-osmotic dextrose 100 mL IVPB (premix), 4.5 g, Intravenous, Q8H, Tamika V. Case, DO, 4.5 g at 06/14/18 2049  •  potassium chloride in NS 20 mEq/250 mL IVPB, 20 mEq, Intravenous, Q2H PRN, Charli Duran MD  •  potassium phosphate 45 mmol in sodium chloride 0.9 % 500 mL infusion, 45 mmol, Intravenous, PRN **OR** potassium phosphate 30 mmol in sodium chloride 0.9 % 250 mL infusion, 30 mmol, Intravenous, PRN **OR** potassium phosphate 15 mmol in sodium chloride 0.9 % 100 mL infusion, 15 mmol, Intravenous, PRN **OR** sodium phosphates 45 mmol in  "sodium chloride 0.9 % 500 mL IVPB, 45 mmol, Intravenous, PRN **OR** sodium phosphates 30 mmol in sodium chloride 0.9 % 250 mL IVPB, 30 mmol, Intravenous, PRN **OR** sodium phosphates 15 mmol in sodium chloride 0.9 % 250 mL IVPB, 15 mmol, Intravenous, PRN, Charli Duran MD  •  sennosides-docusate sodium (SENOKOT-S) 8.6-50 MG tablet 2 tablet, 2 tablet, Oral, BID, Charli Duran MD, 2 tablet at 06/14/18 2050  •  sodium chloride 0.9 % flush 1-10 mL, 1-10 mL, Intravenous, PRN, CHELITA Anthony  •  sodium chloride 0.9 % flush 10 mL, 10 mL, Intravenous, PRN, Triage Protocol Emergency, MD  •  sodium chloride 0.9 % infusion, 100 mL/hr, Intravenous, Continuous, CHELITA Anthony, Last Rate: 100 mL/hr at 06/13/18 1121, 100 mL/hr at 06/13/18 1121    Past Surgical History:   • EYE SURGERY    RIGHT       Family History   Problem Relation Age of Onset   • Breast cancer Mother    • Prostate cancer Father    • Coronary artery disease Father    • No Known Problems Sister    • Stroke Brother    • Coronary artery disease Brother      Social History     Social History   • Marital status:      Spouse name: N/A   • Number of children: 3   • Years of education: N/A     Occupational History   • Not on file.     Social History Main Topics   • Smoking status: Former Smoker     Types: Pipe   • Smokeless tobacco: Never Used      Comment: QUIT IN THE 70'S   • Alcohol use No   • Drug use: No   • Sexual activity: Defer     Other Topics Concern   • Not on file     Social History Narrative    Patient is  and lives with his spouse and one of his daughters.       Review of Systems:  Review of Systems  Otherwise the 12 point review of systems is negative.    /78   Pulse 76   Temp 97.8 °F (36.6 °C) (Axillary)   Resp 22   Ht 170.2 cm (67.01\")   Wt 86.2 kg (190 lb)   SpO2 95%   BMI 29.75 kg/m²   Body mass index is 29.75 kg/m².    General: Elderly gentleman in no acute distress  HEENT: PER, no icterus, " normal sclera  Cardiac: regular rhythm,  no audible rubs  Pulmonary:Breathing comfortably without retractions  Abdominal: He has a protuberant abdomen but is otherwise soft with no tenderness to palpation   Neurologic: awake, alert, no obvious focal deficits  Extremities: warm,mild edema   Skin:Normal color   CBC    Results from last 7 days  Lab Units 06/14/18  0351   WBC 10*3/mm3 15.38*   HEMOGLOBIN g/dL 12.2*   HEMATOCRIT % 37.7*   PLATELETS 10*3/mm3 134*       CMP    Results from last 7 days  Lab Units 06/14/18  0351   SODIUM mmol/L 136   POTASSIUM mmol/L 4.1   CHLORIDE mmol/L 104   CO2 mmol/L 22.0   BUN mg/dL 23   CREATININE mg/dL 1.19   CALCIUM mg/dL 8.7   BILIRUBIN mg/dL 1.9*   ALK PHOS U/L 193*   ALT (SGPT) U/L 225*   AST (SGOT) U/L 152*   GLUCOSE mg/dL 90       Radiology  Imaging Results (last 72 hours)     Procedure Component Value Units Date/Time    US Abdomen Limited [[id number]] Collected:  06/13/18 1202     Updated:  06/14/18 0951    Narrative:       EXAMINATION: US ABDOMEN, LIMITED-06/13/2018:     INDICATION: Evaluate ductal dilation/obstruction; A41.9-Sepsis,  unspecified organism; R74.8-Abnormal levels of other serum enzymes;  R74.8-Abnormal levels of other serum enzymes; E80.6-Other disorders of  bilirubin metabolism; Z85.46-Personal history of malignant neoplasm of  prostate; C79.51-Secondary malignant neoplasm of bone; J18.1-Lobar  pneumonia, unspecified organism; K80.81-Other cholelithiasis with  obstruction, evaluate right upper quadrant pain.     TECHNIQUE: Sonographic imaging was obtained of the right upper quadrant  in both the sagittal and transverse planes.     COMPARISON: NONE.     FINDINGS: The pancreas is not well seen. The left lobe of the liver is  somewhat limited in its visualization due to overlying bowel gas. The  right lobe of the liver is grossly unremarkable. No underlying mass or  intrahepatic biliary ductal dilatation. There are echogenic stones seen  within the neck of the  gallbladder on CT scan are not well seen on this  examination. There is a fold seen at the neck of the gallbladder. There  is a small amount of pericholecystic fluid. There is thickening  identified of the gallbladder wall at 7 mm. The common bile duct is  within normal limits and measuring 5 mm. The right kidney is normal in  size, configuration, and texture measuring in length from pole to pole  12.7 cm. Several renal cortical cysts, the largest measuring 2.6 x 4.5 x  4.3 cm. No solid mass identified. No hydronephrosis or nephrolithiasis.       Impression:       Gallstones seen on CT scan are not well identified on this  examination. There is thickening identified of the gallbladder wall with  no evidence of biliary ductal dilatation. Small amount of  pericholecystic fluid identified. Several right renal cortical cysts.     D:  06/13/2018  E:  06/13/2018     This report was finalized on 6/14/2018 9:48 AM by Dr. Chelo Francisco MD.       CT Abdomen Pelvis Without Contrast [[id number]] Collected:  06/12/18 2331     Updated:  06/13/18 0039    Narrative:       EXAM:    CT Abdomen and Pelvis Without Intravenous Contrast    CLINICAL HISTORY:    89 years old, male; Sepsis, unspecified organism; Secondary malignant   neoplasm of bone; Other disorders of bilirubin metabolism; Heart failure,   unspecified; Abnormal levels of other serum enzymes; Abnormal levels of other   serum enzymes; Personal history of malignant neoplasm of prostate; Abnormal   findings; Abnormal lab test; Elevated liver enzymes; Patient HX: Mid/lower   chest included to attempt to evaluate entire hiatal hernia; Additional info:   Eelvated efts lactic acidosis    TECHNIQUE:    Axial computed tomography images of the abdomen and pelvis without   intravenous contrast.  All CT scans at this facility use one or more dose   reduction techniques, viz.: automated exposure control; ma/kV adjustment per   patient size (including targeted exams where dose is  matched to indication;   i.e. head); or iterative reconstruction technique.    Coronal reformatted images were created and reviewed.    COMPARISON:    CT ABD PELVIS WO CONTRAST 2014-12-17 13:24    FINDINGS:    Lung bases:  LLL atelectasis/infiltrate.  Mild right basilar subsegmental   atelectasis and/or fibrosis.    Heart:  Minimal pericardial effusion.  Normal heart size.  Minimal   pericardial effusion.  CAD.     ABDOMEN:    Liver:  Evidence of old granulomatous disease of the liver.    Gallbladder and bile ducts:  Cholelithiasis at GB neck.  No ductal dilation.    Pancreas:  Minimal edema bordering head of the pancreas, consider correlation   with serum lipase to rule out nonspecific edema versus early pancreatitis.    Pancreatic atrophy.  No ductal dilation.    Spleen:  Evidence of old granulomatous disease of the spleen.  No   splenomegaly.    Adrenals:  Unremarkable.  No mass.    Kidneys and ureters:  No hydronephrosis or nephrolithiasis.  No obstructive   uropathy.  Chronic hypodense right renal nodules, one of which at its rim   calcifications, possibly cysts, similar to prior study, most likely benign.    Stomach and bowel:  Nonspecific bowel gas pattern without dilatation or   obstruction.  Colonic diverticulosis without overt diverticulitis.  Chronic   large hiatal hernia with organorotation of stomach, accounting for some of the   abnormal density seen in lower left chest on CXR today.  The contracted   segments of bowel restrict wall evaluation to rule out any abnormal thickening.     PELVIS:    Appendix:  Normal appendix.  No appendicitis.    Bladder: No calculi.  Enlarged prostate causes an indentation on the bladder   base.    Reproductive:  Prostate enlargement and lobulation, 6.3 cm transverse   diameter indenting the bladder base.  Minimal periprostatic edema or   infiltrative changes into the fat.     ABDOMEN and PELVIS:    Intraperitoneal space:  No pneumoperitoneum.  No significant ascites.     Bones/joints:  Sclerosis within L4 and especially L5 vertebrae, suspicious   for potential osteoblastic metastases.  Thoracolumbar mild scoliosis.    Degenerative changes in the spine.  No acute fracture.  No dislocation.    Soft tissues:  Minimal umbilical hernia present, containing only Fat.    Vasculature:  Scattered atherosclerotic placques are seen along some vessels.    No aortic aneurysm.    Lymph nodes:  Possibly small perivesical lymph nodes.  No enlarged lymph   nodes visualized.      Impression:       1.  Prostate enlargement.  2.  Cholelithiasis at GB neck.  3.  Nonspecific bowel gas pattern without dilatation or obstruction.  4.  Colonic diverticulosis without diverticulitis.  5.  Suspicious for L4, L5 osteoblastic metastases.  6.  LLL atelectasis/infiltrate.  7.  Chronic large hiatal hernia with organorotation of stomach.  8.  CAD.  9.  Chronic right renal nodularity unchanged.  10.  Mild peripancreatic edema at head of gland, consider serum lipase.  11.  Additional findings, as above.    THIS DOCUMENT HAS BEEN ELECTRONICALLY SIGNED BY DU BAUMAN MD    XR Chest 1 View [[id number]] Collected:  06/12/18 2052     Updated:  06/13/18 0010    Narrative:       EXAM:    XR Chest, 1 View    CLINICAL HISTORY:    89 years old, male; Signs and symptoms; Other: Fatigue; Additional info:   Weak/dizzy/ams triage protocol    TECHNIQUE:    Frontal view of the chest.    COMPARISON:    CR - XR CHEST 1 VIEW PORTABLE 2014-12-17 10:51    FINDINGS:    Lungs:  Left basilar/lower lobe atelectasis/infiltrate, increased compared to   prior study.  A few scattered areas of suspected mild hyperinflation in the   lungs.    Pleural space:  Chronic mild bilateral lateral pleural thickening.  No   pneumothorax or pleural effusion.    Heart:  Mild cardiomegaly.    Mediastinum:  Unremarkable.  No acute abnormality compared to prior study.    Bones/joints:  Scoliosis.  Degenerative changes of the spine.  Old healed   fracture mid  left clavicle.  DJD of bilateral shoulders.  Narrowed interval   between humeral head and acromion bilaterally, suspect shoulder impingement   phenomenon.    Soft tissues:  Probable streak of air caught between left upper arm and the   lateral left chest wall, creating the streak-like lucent suspected artifact.      Impression:       1.  Mild cardiomegaly.  2.  Left basilar atelectasis/infiltrate.    THIS DOCUMENT HAS BEEN ELECTRONICALLY SIGNED BY DU BAUMAN [facility] Problem List     * (Principal)Sepsis    Elevated liver enzymes    Prostate cancer (Mets to L4-L5)    Pulmonary infiltrate, LLLobe    Cholelithiases of GB neck per CT A/P    Acute renal insufficiency, CRE 1.43, unknown baseline    Blindness of right eye    WILLIAM (obstructive sleep apnea) remote, intolerant of CPAP    R/O Cholecystitis    NSTEMI (non-ST elevated myocardial infarction) (R/O Type 2)    Coronary artery disease is unchanged.  Continue current treatment regimen.  Cardiac status will be reassessed in 3 months.            Assessment:  Cholelithiasis with an initial presentation of ascending cholangitis now improved.  While there is no question that the standard of care in this situation would be laparoscopic cholecystectomy, this patient's age and extensive comorbidities make perioperative morbidity and mortality significant.  Other more currently he is relatively asymptomatic and appears to be clinically significantly improved.  An alternative in patients who are elderly and not otherwise good surgical candidates for this presentation is ERCP with sphincterotomy and observation.  This could be considered if it appears that he is going to have continued issues with common bile duct stones.  He currently wants to avoid surgery as well as all possible which I think is highly reasonable.    Plan:  Advance diet as tolerated  Low-fat diet recommendations  Continue conservative management for now  I will continue to follow until we are  comfortable his situation is completely resolved    Jones Mcintyre MD  06/14/18  9:05 PM

## 2018-06-15 NOTE — PLAN OF CARE
Problem: Patient Care Overview  Goal: Plan of Care Review  Outcome: Ongoing (interventions implemented as appropriate)   06/15/18 1342   Coping/Psychosocial   Plan of Care Reviewed With patient   OTHER   Outcome Summary Pt declined mobilty this pm. Completed bed assessment, pt needs assist with ADL's. Pt would benefit froom SNF upon discharge.

## 2018-06-15 NOTE — THERAPY TREATMENT NOTE
Acute Care - Physical Therapy Treatment Note  [facility]     Patient Name: Harris Shane  : 9/15/1927  MRN: [id number]  Today's Date: 6/15/2018  Onset of Illness/Injury or Date of Surgery: 18  Date of Referral to PT: 18  Referring Physician: Luiz,     Admit Date: 2018    Visit Dx:    ICD-10-CM ICD-9-CM   1. Sepsis, due to unspecified organism A41.9 038.9     995.91   2. Elevated troponin R74.8 790.6   3. Elevated liver enzymes R74.8 790.5   4. Hyperbilirubinemia E80.6 782.4   5. History of prostate cancer Z85.46 V10.46   6. Metastatic cancer to spine C79.51 198.5   7. Pneumonia of left lower lobe due to infectious organism J18.1 486   8. Biliary calculus of other site with obstruction K80.81 [id number]   9. Impaired functional mobility, balance, gait, and endurance Z74.09 V49.89     Patient Active Problem List   Diagnosis   • Sepsis   • Elevated liver enzymes   • Prostate cancer (Mets to L4-L5)   • Pulmonary infiltrate, LLLobe   • Cholelithiases of GB neck per CT A/P   • Acute renal insufficiency, CRE 1.43, unknown baseline   • Blindness of right eye   • WILLIAM (obstructive sleep apnea) remote, intolerant of CPAP   • R/O Cholecystitis   • NSTEMI (non-ST elevated myocardial infarction) (R/O Type 2)   • Bacteremia due to Escherichia coli       Therapy Treatment          Rehabilitation Treatment Summary     Row Name 06/15/18 0855             Treatment Time/Intention    Discipline (P)  physical therapist  -CM      Document Type (P)  therapy note (daily note)  -CM      Subjective Information (P)  no complaints  -CM      Mode of Treatment (P)  physical therapy  -CM      Care Plan Review (P)  evaluation/treatment results reviewed;care plan/treatment goals reviewed;current/potential barriers reviewed;patient/other agree to care plan  -CM      Patient Effort (P)  good  -CM      Existing Precautions/Restrictions (P)  fall;oxygen therapy device and L/min  -CM      Recorded by [CM] Barbara Martinez, PT  Student 06/15/18 1058      Row Name 06/15/18 0855             Vital Signs    Pre Systolic BP Rehab (P)  139  -CM      Pre Treatment Diastolic BP (P)  78  -CM      Post Systolic BP Rehab (P)  132  -CM      Post Treatment Diastolic BP (P)  91  -CM      Pretreatment Heart Rate (beats/min) (P)  77  -CM      Posttreatment Heart Rate (beats/min) (P)  77  -CM      Pre SpO2 (%) (P)  94  -CM      O2 Delivery Pre Treatment (P)  supplemental O2  -CM      Post SpO2 (%) (P)  96  -CM      O2 Delivery Post Treatment (P)  supplemental O2  -CM      Pre Patient Position (P)  Sitting  -CM      Intra Patient Position (P)  Standing  -CM      Post Patient Position (P)  Sitting  -CM      Rest Breaks  (P)  3   1 standing; 2 sitting   -CM      Recorded by [CM] Barbara Martinez PT Student 06/15/18 1058      Row Name 06/15/18 0855             Cognitive Assessment/Intervention    Additional Documentation (P)  Cognitive Assessment/Intervention (Group)  -CM      Recorded by [CM] Barbara Martinez PT Student 06/15/18 1058      Row Name 06/15/18 0855             Cognitive Assessment/Intervention- PT/OT    Affect/Mental Status (Cognitive) (P)  WFL  -CM      Orientation Status (Cognition) (P)  oriented x 4  -CM      Follows Commands (Cognition) (P)  follows one step commands;75-90% accuracy;verbal cues/prompting required;physical/tactile prompts required  -CM      Cognitive Function (Cognitive) (P)  safety deficit  -CM      Safety Deficit (Cognitive) (P)  mild deficit;ability to follow commands;safety precautions follow-through/compliance  -CM      Personal Safety Interventions (P)  fall prevention program maintained;gait belt;nonskid shoes/slippers when out of bed  -CM      Recorded by [CM] Barbara Martinez PT Student 06/15/18 1058      Row Name 06/15/18 0855             Safety Issues, Functional Mobility    Safety Issues Affecting Function (Mobility) (P)  insight into deficits/self awareness;safety precautions follow-through/compliance  -CM       Impairments Affecting Function (Mobility) (P)  balance;coordination;endurance/activity tolerance;strength  -CM      Recorded by [CM] Barbara Martinez, PT Student 06/15/18 1058      Row Name 06/15/18 0855             Bed Mobility Assessment/Treatment    Comment (Bed Mobility) (P)  UIC   -CM      Recorded by [CM] Barbara Martinez, PT Student 06/15/18 1058      Row Name 06/15/18 0855             Transfer Assessment/Treatment    Transfer Assessment/Treatment (P)  sit-stand transfer;stand-sit transfer  -CM      Sit-Stand [place] (Transfers) (P)  minimum assist (75% patient effort);1 person assist;verbal cues  -CM      Stand-Sit [place] (Transfers) (P)  minimum assist (75% patient effort);1 person assist;verbal cues  -CM      Recorded by [CM] Barbara Martinez, PT Student 06/15/18 1058      Row Name 06/15/18 0855             Sit-Stand Transfer    Assistive Device (Sit-Stand Transfers) (P)  walker, front-wheeled   VC's req'd for proper hand placement when standing  -CM      Recorded by [CM] Barbara Martinez, PT Student 06/15/18 1058      Row Name 06/15/18 0855             Stand-Sit Transfer    Assistive Device (Stand-Sit Transfers) (P)  walker, front-wheeled  -CM      Recorded by [CM] Barbara Martinez, PT Student 06/15/18 1058      Row Name 06/15/18 0855             Gait/Stairs Assessment/Training    68890 - Gait Training Minutes  (P)  25  -CM      Gait/Stairs Assessment/Training (P)  gait/ambulation assistive device  -CM      [place] Level (Gait) (P)  moderate assist (50% patient effort);2 person assist;1 person to manage equipment;verbal cues;other (see comments)  -CM2      Assistive Device (Gait) (P)  walker, front-wheeled  -CM      Distance in Feet (Gait) (P)  60  -CM      Pattern (Gait) (P)  step-to  -CM      Deviations/Abnormal Patterns (Gait) (P)  gait speed decreased;veronique decreased;stride length decreased  -CM      Bilateral Gait Deviations (P)  forward flexed posture  -CM      Comment  (Gait/Stairs) (P)  Pt req'd freq VC's for proper management of RW and upright posture;    Chair following behind g  -CM2      Recorded by [CM] Barbara Martinez PT Student 06/15/18 1058  [CM2] Barbara Martinez PT Student 06/15/18 1103      Row Name 06/15/18 0855             Motor Skills Assessment/Interventions    Additional Documentation (P)  Balance (Group);Therapeutic Exercise (Group)  -CM      Recorded by [CM] Barbara Martinez PT Student 06/15/18 1058      Row Name 06/15/18 0855             Therapeutic Exercise    12317 - PT Therapeutic Exercise Minutes (P)  5  -CM      Recorded by [CM] Barbara Martinez PT Student 06/15/18 1058      Row Name 06/15/18 0855             Lower Extremity Seated Therapeutic Exercise    Performed, Seated Lower Extremity (Therapeutic Exercise) (P)  ankle dorsiflexion/plantarflexion;LAQ (long arc quad), knee extension;hip abduction/adduction  -CM      Exercise Type, Seated Lower Extremity (Therapeutic Exercise) (P)  AROM (active range of motion)  -CM      Sets/Reps Detail, Seated Lower Extremity (Therapeutic Exercise) (P)  1/10  -CM      Comment, Seated Lower Extremity (Therapeutic Exercise) (P)  Reviee HEP w/ pt and insttuced him to perform them 1/10 3x per day   -CM      Recorded by [CM] Barbara Martinez, PT Student 06/15/18 1058      Row Name 06/15/18 0855             Balance    Balance (P)  static sitting balance;static standing balance  -CM      Recorded by [CM] Barbara Martinez PT Student 06/15/18 1058      Row Name 06/15/18 0855             Static Sitting Balance    Level of [place] (Unsupported Sitting, Static Balance) (P)  minimal assist, 75% patient effort  -CM      Sitting Position (Unsupported Sitting, Static Balance) (P)  sitting in chair  -CM      Time Able to Maintain Position (Unsupported Sitting, Static Balance) (P)  30 to 45 seconds  -CM      Recorded by [CM] Barbara Martinez PT Student 06/15/18 1101      Row Name 06/15/18 0855             Static Standing  Balance    Level of [place] (Supported Standing, Static Balance) (P)  minimal assist, 75% patient effort  -CM      Time Able to Maintain Position (Supported Standing, Static Balance) (P)  30 to 45 seconds  -CM      Assistive Device Utilized (Supported Standing, Static Balance) (P)  rolling walker  -CM      Recorded by [CM] Barbara Martinez, PT Student 06/15/18 1101      Row Name 06/15/18 0855             Positioning and Restraints    Pre-Treatment Position (P)  sitting in chair/recliner  -CM      Post Treatment Position (P)  chair  -CM      In Chair (P)  reclined;call light within reach;exit alarm on;with nsg;waffle cushion;legs elevated  -CM      Recorded by [CM] Barbara Martinez, PT Student 06/15/18 1101      Row Name 06/15/18 0855             Pain Assessment    Additional Documentation (P)  Pain Scale: Numbers Pre/Post-Treatment (Group)  -CM      Recorded by [CM] Barbara Martinez PT Student 06/15/18 1101      Row Name 06/15/18 0855             Pain Scale: Numbers Pre/Post-Treatment    Pain Scale: Numbers, Pretreatment (P)  0/10 - no pain  -CM      Pain Scale: Numbers, Post-Treatment (P)  0/10 - no pain  -CM      Recorded by [CM] Barbara Martinez, PT Student 06/15/18 1101      Row Name 06/15/18 0855             Coping    Observed Emotional State (P)  calm;quiet;cooperative  -CM      Verbalized Emotional State (P)  acceptance  -CM      Recorded by [CM] Barbara Martinez PT Student 06/15/18 1101      Row Name 06/15/18 0855             Outcome Summary/Treatment Plan (PT)    Daily Summary of Progress (PT) (P)  progress toward functional goals is good  -CM      Plan for Continued Treatment (PT) (P)  cont w/ POC   -CM      Anticipated Discharge Disposition (PT) (P)  skilled nursing facility  -CM      Recorded by [CM] Barbara Martinez PT Student 06/15/18 1101        User Key  (r) = Recorded By, (t) = Taken By, (c) = Cosigned By    Initials Name Effective Dates Discipline    CM Barbara Martinez, PT Student 06/07/18  -  PT                     Physical Therapy Education     Title: PT OT SLP Therapies (Active)     Topic: Physical Therapy (Active)     Point: Mobility training (Active)    Learning Progress Summary     Learner Status Readiness Method Response Comment Documented by    Patient Active Acceptance E,D NR   06/15/18 1101     Active Acceptance E,D NR  CM 06/14/18 1016     Active Acceptance E NR  KR 06/13/18 1200    Family Active Acceptance E NR  KR 06/13/18 1200          Point: Home exercise program (Active)    Learning Progress Summary     Learner Status Readiness Method Response Comment Documented by    Patient Active Acceptance E,D NR  CM 06/15/18 1101     Active Acceptance E,D NR  CM 06/14/18 1016          Point: Body mechanics (Active)    Learning Progress Summary     Learner Status Readiness Method Response Comment Documented by    Patient Active Acceptance E,D NR   06/15/18 1101     Active Acceptance E,D NR   06/14/18 1016     Active Acceptance E NR  KR 06/13/18 1200    Family Active Acceptance E NR  KR 06/13/18 1200          Point: Precautions (Active)    Learning Progress Summary     Learner Status Readiness Method Response Comment Documented by    Patient Active Acceptance E,D NR   06/15/18 1101     Active Acceptance E,D NR   06/14/18 1016     Active Acceptance E NR  KR 06/13/18 1200    Family Active Acceptance E NR  KR 06/13/18 1200                      User Key     Initials Effective Dates Name Provider Type Discipline     04/03/18 -  Leanna Molina, PT Physical Therapist PT     06/07/18 -  Barbara Martinez, PT Student PT Student PT                    PT Recommendation and Plan  Anticipated Discharge Disposition (PT): (P) skilled nursing facility  Outcome Summary/Treatment Plan (PT)  Daily Summary of Progress (PT): (P) progress toward functional goals is good  Plan for Continued Treatment (PT): (P) cont w/ POC   Anticipated Discharge Disposition (PT): (P) skilled nursing facility  Plan of Care  Reviewed With: patient  Progress: (P) improving  Outcome Summary: (P) Pt progressed to ambulating 60 ft using FWW, mod A, and 3 rest breaks; Pt still limited from fatigue, SOA, generalized weakness, safety awareness deficits; Pt woukld con't to benefit from skilled PT services; expected d/c disposition SNF due to current level of function          Outcome Measures     Row Name 06/15/18 0855 06/14/18 0922 06/13/18 1022       How much help from another person do you currently need...    Turning from your back to your side while in flat bed without using bedrails? (P)  2  -CM 2  -JETT (r) CM (t) JETT (c) 2  -KR    Moving from lying on back to sitting on the side of a flat bed without bedrails? (P)  2  -CM 2  -JETT (r) CM (t) JETT (c) 2  -KR    Moving to and from a bed to a chair (including a wheelchair)? (P)  3  -CM 3  -JETT (r) CM (t) JETT (c) 3  -KR    Standing up from a chair using your arms (e.g., wheelchair, bedside chair)? (P)  3  -CM 3  -JETT (r) CM (t) JETT (c) 3  -KR    Climbing 3-5 steps with a railing? (P)  1  -CM 1  -JETT (r) CM (t) JETT (c) 2  -KR    To walk in hospital room? (P)  2  -CM 2  -JETT (r) CM (t) JETT (c) 3  -KR    AM-PAC 6 Clicks Score (P)  13  -CM 13  -JETT (r) CM (t) 15  -KR       Functional Assessment    Outcome Measure Options (P)  AM-PAC 6 Clicks Basic Mobility (PT)  -CM AM-PAC 6 Clicks Basic Mobility (PT)  -JETT (r) CM (t) JETT (c) AM-PAC 6 Clicks Basic Mobility (PT)  -KR      User Key  (r) = Recorded By, (t) = Taken By, (c) = Cosigned By    Initials Name Provider Type    JETT Almanzar, PT Physical Therapist    KR Leanna Molina, PT Physical Therapist    CM Barbara Martinez, PT Student PT Student           Time Calculation:         PT Charges     Row Name 06/15/18 0855             Time Calculation    Start Time (P)  0855  -CM      PT Received On (P)  06/15/18  -CM      PT Goal Re-Cert Due Date (P)  06/23/18  -CM         Time Calculation- PT    Total Timed Code Minutes- PT (P)  30 minute(s)  -CM         Timed  Charges    86877 - PT Therapeutic Exercise Minutes (P)  5  -CM      72757 - Gait Training Minutes  (P)  25  -CM        User Key  (r) = Recorded By, (t) = Taken By, (c) = Cosigned By    Initials Name Provider Type    JOSE A Martinez, PT Student PT Student        Therapy Suggested Charges     Code   Minutes Charges    01205 (CPT®) Hc Pt Neuromusc Re Education Ea 15 Min      50357 (CPT®) Hc Pt Ther Proc Ea 15 Min 5     80082 (CPT®) Hc Gait Training Ea 15 Min 25 2    61299 (CPT®) Hc Pt Therapeutic Act Ea 15 Min      69719 (CPT®) Hc Pt Manual Therapy Ea 15 Min      00504 (CPT®) Hc Pt Iontophoresis Ea 15 Min      25976 (CPT®) Hc Pt Elec Stim Ea-Per 15 Min      35880 (CPT®) Hc Pt Ultrasound Ea 15 Min      71339 (CPT®) Hc Pt Self Care/Mgmt/Train Ea 15 Min      Total  30 2        Therapy Charges for Today     Code Description Service Date Service Provider Modifiers Qty    00626445648 HC PT THER PROC EA 15 MIN 6/14/2018 Barbara Martinez, PT Student GP 1    74660469333 HC GAIT TRAINING EA 15 MIN 6/14/2018 Barbara Martinez, PT Student GP 1    25966104304 HC GAIT TRAINING EA 15 MIN 6/15/2018 Barbara Martinez, PT Student GP 2          PT G-Codes  Outcome Measure Options: (P) AM-PAC 6 Clicks Basic Mobility (PT)    Barbara Martinez, PT Student  6/15/2018

## 2018-06-15 NOTE — PLAN OF CARE
Problem: Patient Care Overview  Goal: Plan of Care Review  Outcome: Ongoing (interventions implemented as appropriate)   06/15/18 7392   OTHER   Outcome Summary Pt progressed to ambulating 60 ft using FWW, mod A, and 3 rest breaks; Pt still limited from fatigue, SOA, generalized weakness, safety awareness deficits; Pt woukld con't to benefit from skilled PT services; expected d/c disposition SNF due to current level of function   Plan of Care Review   Progress improving

## 2018-06-15 NOTE — PROGRESS NOTES
"General Surgery Daily Progress Note    06/15/18    Harris Shane  [id number]  9/15/1927    * No surgery found *    Reason for Evaluation: cholangitis  Subjective:  No complaints of pain or nausea    Objective:  /81 (BP Location: Left arm, Patient Position: Lying)   Pulse 79   Temp 97.6 °F (36.4 °C) (Oral)   Resp 17   Ht 170.2 cm (67.01\")   Wt 86.2 kg (190 lb)   SpO2 95%   BMI 29.75 kg/m²       INTAKE/OUTPUT      Intake/Output Summary (Last 24 hours) at 06/15/18 1918  Last data filed at 06/15/18 1215   Gross per 24 hour   Intake                0 ml   Output             1375 ml   Net            -1375 ml       General Appearance: nad  Eyes: Anicteric  Neck: Trachea midline   Cardiovascular:  RRR  Lungs:  Bilateral respirations unlabored   Abdomen:  Soft, mild distention, no ttp  Extremities:  No cyanosis or edema   Skin:  Nl color  Neurologic: awake and conversant         Imaging Results (last 24 hours)     ** No results found for the last 24 hours. **          Labs:  @[facility]@      Assessment:  Cholangitis resolved.  Prohibitive operative risk, but asymptomatic    Plan:   Conservative management.  Please call for change in status    Jones Mcintyre MD - 6/15/2018, 7:18 PM        "

## 2018-06-15 NOTE — THERAPY TREATMENT NOTE
Acute Care - Occupational Therapy Treatment Note  [facility]     Patient Name: Harris Shane  : 9/15/1927  MRN: [id number]  Today's Date: 6/15/2018  Onset of Illness/Injury or Date of Surgery: 18  Date of Referral to OT: 18  Referring Physician: Luiz, DO    Admit Date: 2018       ICD-10-CM ICD-9-CM   1. Sepsis, due to unspecified organism A41.9 038.9     995.91   2. Elevated troponin R74.8 790.6   3. Elevated liver enzymes R74.8 790.5   4. Hyperbilirubinemia E80.6 782.4   5. History of prostate cancer Z85.46 V10.46   6. Metastatic cancer to spine C79.51 198.5   7. Pneumonia of left lower lobe due to infectious organism J18.1 486   8. Biliary calculus of other site with obstruction K80.81 [id number]   9. Impaired functional mobility, balance, gait, and endurance Z74.09 V49.89   10. Impaired mobility and ADLs Z74.09 799.89     Patient Active Problem List   Diagnosis   • Sepsis   • Elevated liver enzymes   • Prostate cancer (Mets to L4-L5)   • Pulmonary infiltrate, LLLobe   • Cholelithiases of GB neck per CT A/P   • Acute renal insufficiency, CRE 1.43, unknown baseline   • Blindness of right eye   • WILLIAM (obstructive sleep apnea) remote, intolerant of CPAP   • R/O Cholecystitis   • NSTEMI (non-ST elevated myocardial infarction) (R/O Type 2)   • Bacteremia due to Escherichia coli     Past Medical History:   Diagnosis Date   • Blindness of right eye 2018   • Cancer     PROSTATE   • DVT (deep venous thrombosis) 's   • Metastatic cancer to spine, L4-L5 2018   • Myocardial infarction 's   • WILLIAM (obstructive sleep apnea) 2018   • UTI (urinary tract infection)      Past Surgical History:   Procedure Laterality Date   • EYE SURGERY      RIGHT       Therapy Treatment          Rehabilitation Treatment Summary     Row Name 06/15/18 0855             Treatment Time/Intention    Discipline physical therapist  -JOSE A FRAUSTO,JETT2      Document Type therapy note (daily note)  -JOSE A FRAUSTO,JETT2       Subjective Information no complaints  -JETT,CM,JB2      Mode of Treatment physical therapy  -JETT,CM,JB2      Care Plan Review evaluation/treatment results reviewed;care plan/treatment goals reviewed;current/potential barriers reviewed;patient/other agree to care plan  -JETT,CM,JB2      Patient Effort good  -JETT,CM,JB2      Existing Precautions/Restrictions fall;oxygen therapy device and L/min  -JETT,CM,JB2      Recorded by [JETT,CM,JB2] Sol Almanzar, PT (r) Barbara Martinez, PT Student (t) Sol Almanzar, PT (c) 06/15/18 1123      Row Name 06/15/18 0855             Vital Signs    Pre Systolic BP Rehab 139  -JETT,CM,JB2      Pre Treatment Diastolic BP 78  -JETT,CM,JB2      Post Systolic BP Rehab 132  -JETT,CM,JB2      Post Treatment Diastolic BP 91  -JETT,CM,JB2      Pretreatment Heart Rate (beats/min) 77  -JETT,CM,JB2      Posttreatment Heart Rate (beats/min) 77  -JETT,CM,JB2      Pre SpO2 (%) 94  -JETT,CM,JB2      O2 Delivery Pre Treatment supplemental O2  -JETT,CM,JB2      Post SpO2 (%) 96  -JETT,CM,JB2      O2 Delivery Post Treatment supplemental O2  -JETT,CM,JB2      Pre Patient Position Sitting  -JETT,CM,JB2      Intra Patient Position Standing  -JETT,CM,JB2      Post Patient Position Sitting  -JETT,CM,JB2      Rest Breaks  3   1 standing; 2 sitting   -JETT,CM,JB2      Recorded by [JETT,CM,JB2] Sol Almanzar, PT (r) Barbara Martinez, PT Student (t) Sol Almanzar, PT (c) 06/15/18 1123      Row Name 06/15/18 0855             Cognitive Assessment/Intervention    Additional Documentation Cognitive Assessment/Intervention (Group)  -JETT,CM,JB2      Recorded by [JETT,CM,JB2] Sol Almanzar PT (r) Barbara Martinez, PT Student (t) Sol Almanzar PT (c) 06/15/18 1123      Row Name 06/15/18 0855             Cognitive Assessment/Intervention- PT/OT    Affect/Mental Status (Cognitive) WFL  -JETT,CM,JB2      Orientation Status (Cognition) oriented x 4  -JETT,CM,JB2      Follows Commands (Cognition) follows one step commands;75-90% accuracy;verbal  cues/prompting required;physical/tactile prompts required  -JETT,CM,JB2      Cognitive Function (Cognitive) safety deficit  -JETT,CM,JB2      Safety Deficit (Cognitive) mild deficit;ability to follow commands;safety precautions follow-through/compliance  -JETT,CM,JB2      Personal Safety Interventions fall prevention program maintained;gait belt;nonskid shoes/slippers when out of bed  -JETT,CM,JB2      Recorded by [JETT,CM,JB2] Sol Almanzar PT (r) Barbara Martinez, PT Student (t) Sol Almanzar, PT (c) 06/15/18 1123      Row Name 06/15/18 0855             Safety Issues, Functional Mobility    Safety Issues Affecting Function (Mobility) insight into deficits/self awareness;safety precautions follow-through/compliance  -JETT,CM,JB2      Impairments Affecting Function (Mobility) balance;coordination;endurance/activity tolerance;strength  -JETT,CM,JB2      Recorded by [JETT,CM,JB2] Sol Almanzar PT (r) Barbara Martinez, PT Student (t) Sol Almanzar PT (c) 06/15/18 1123      Row Name 06/15/18 0855             Bed Mobility Assessment/Treatment    Comment (Bed Mobility) UIC   -JETT,CM,JB2      Recorded by [JETT,CM,JB2] Sol Almanzar PT (r) Barbara Martinez, PT Student (t) Sol Almanzar PT (c) 06/15/18 1123      Row Name 06/15/18 0855             Transfer Assessment/Treatment    Transfer Assessment/Treatment sit-stand transfer;stand-sit transfer  -JETT,CM,JB2      Sit-Stand [place] (Transfers) minimum assist (75% patient effort);1 person assist;verbal cues  -JETT,CM,JB2      Stand-Sit [place] (Transfers) minimum assist (75% patient effort);1 person assist;verbal cues  -JETT,CM,JB2      Recorded by [JETT,CM,JB2] Sol Almanzar PT (r) Barbara Martinez, PT Student (t) Sol Almanzar PT (c) 06/15/18 1123      Row Name 06/15/18 0855             Sit-Stand Transfer    Assistive Device (Sit-Stand Transfers) walker, front-wheeled   VC's req'd for proper hand placement when standing  -JETT,JOSE A,JB2      Recorded by  [JETT,CM,JB2] Sol Almanzar PT (r) Barbara Martinez PT Student (t) Sol Almanzar PT (c) 06/15/18 1123      Row Name 06/15/18 0855             Stand-Sit Transfer    Assistive Device (Stand-Sit Transfers) walker, front-wheeled  -JETT,CM,JB2      Recorded by [JETT,CM,JB2] Sol Almanzar PT (r) Barbara Martinez PT Student (t) Sol Almanzar PT (c) 06/15/18 1123      Row Name 06/15/18 0855             Gait/Stairs Assessment/Training    64191 - Gait Training Minutes  25  -JETT,CM,JB2      Gait/Stairs Assessment/Training gait/ambulation assistive device  -JETT,CM,JB2      [place] Level (Gait) moderate assist (50% patient effort);2 person assist;1 person to manage equipment;verbal cues;other (see comments)  -JETT,CM,JB2      Assistive Device (Gait) walker, front-wheeled  -JETT,CM,JB2      Distance in Feet (Gait) 60  -JETT,CM,JB2      Pattern (Gait) step-to  -JETT,CM,JB2      Deviations/Abnormal Patterns (Gait) gait speed decreased;veronique decreased;stride length decreased  -JETT,CM,JB2      Bilateral Gait Deviations forward flexed posture  -JETT,CM,JB2      Comment (Gait/Stairs) Pt req'd freq VC's for proper management of RW and upright posture;    Chair following behind g  -JETT,CM,JB2      Recorded by [JETT,CM,JB2] Sol Almanzar PT (r) Barbara Martinez PT Student (t) Sol Almanzar PT (c) 06/15/18 1123      Row Name 06/15/18 0855             Motor Skills Assessment/Interventions    Additional Documentation Balance (Group);Therapeutic Exercise (Group)  -JETT,CM,JB2      Recorded by [JETT,CM,JB2] Sol Almanzar PT (r) Barbara Martinez PT Student (t) Sol Almanzar PT (c) 06/15/18 1123      Row Name 06/15/18 0855             Therapeutic Exercise    64977 - PT Therapeutic Exercise Minutes 5  -JETT,CM,JB2      Recorded by [JETT,JOSE A,JB2] Sol Almanzar PT (r) Barbara Martinez PT Student (t) Sol Almanzar PT (c) 06/15/18 1123      Row Name 06/15/18 0855             Lower Extremity Seated Therapeutic Exercise     Performed, Seated Lower Extremity (Therapeutic Exercise) ankle dorsiflexion/plantarflexion;LAQ (long arc quad), knee extension;hip abduction/adduction  -JETT,CM,JB2      Exercise Type, Seated Lower Extremity (Therapeutic Exercise) AROM (active range of motion)  -JETT,CM,JB2      Sets/Reps Detail, Seated Lower Extremity (Therapeutic Exercise) 1/10  -JETT,CM,JB2      Comment, Seated Lower Extremity (Therapeutic Exercise) Reviee HEP w/ pt and insttuced him to perform them 1/10 3x per day   -JETT,CM,JB2      Recorded by [JETT,CM,JB2] Sol Almanzar PT (r) Barbara Martinez, PT Student (t) Sol Almanzar PT (c) 06/15/18 1123      Row Name 06/15/18 0855             Balance    Balance static sitting balance;static standing balance  -JETT,CM,JB2      Recorded by [JETT,CM,JB2] Sol Almanzar PT (r) Barbara Martinez, PT Student (t) Sol Almanzar PT (c) 06/15/18 1123      Row Name 06/15/18 0855             Static Sitting Balance    Level of [place] (Unsupported Sitting, Static Balance) minimal assist, 75% patient effort  -JETT,CM,JB2      Sitting Position (Unsupported Sitting, Static Balance) sitting in chair  -JETT,CM,JB2      Time Able to Maintain Position (Unsupported Sitting, Static Balance) 30 to 45 seconds  -JETT,CM,JB2      Recorded by [JETT,CM,JB2] Sol Almanzar PT (r) Barbara Martinez, PT Student (t) Sol Almanzar PT (c) 06/15/18 1123      Row Name 06/15/18 0855             Static Standing Balance    Level of [place] (Supported Standing, Static Balance) minimal assist, 75% patient effort  -JETT,CM,JB2      Time Able to Maintain Position (Supported Standing, Static Balance) 30 to 45 seconds  -JETT,CM,JB2      Assistive Device Utilized (Supported Standing, Static Balance) rolling walker  -JETT,CM,JB2      Recorded by [JETT,CM,JB2] Sol Almanzar PT (r) Barbara Martinez, PT Student (t) Sol Almanzar PT (c) 06/15/18 1123      Row Name 06/15/18 0855             Positioning and Restraints    Pre-Treatment  Position sitting in chair/recliner  -JETT,CM,JB2      Post Treatment Position chair  -JETT,CM,JB2      In Chair reclined;call light within reach;exit alarm on;with nsg;waffle cushion;legs elevated  -JETT,CM,JB2      Recorded by [JETT,CM,JB2] Sol Almanzar, PT (r) Barbara Martinez PT Student (t) Sol Almanzar PT (c) 06/15/18 1123      Row Name 06/15/18 0855             Pain Assessment    Additional Documentation Pain Scale: Numbers Pre/Post-Treatment (Group)  -JETT,CM,JB2      Recorded by [JETT,CM,JB2] Sol Almanzar PT (r) Barbara Martinez PT Student (t) Sol Almanzar PT (c) 06/15/18 1123      Row Name 06/15/18 0855             Pain Scale: Numbers Pre/Post-Treatment    Pain Scale: Numbers, Pretreatment 0/10 - no pain  -JETT,CM,JB2      Pain Scale: Numbers, Post-Treatment 0/10 - no pain  -JETT,CM,JB2      Recorded by [JETT,CM,JB2] Sol Almanzar, PT (r) Barbara Martinez, PT Student (t) Sol Almanzar PT (c) 06/15/18 1123      Row Name 06/15/18 0855             Coping    Observed Emotional State calm;quiet;cooperative  -JETT,CM,JB2      Verbalized Emotional State acceptance  -JETT,CM,JB2      Recorded by [JETT,CM,JB2] Sol Almanzar, PT (r) Barbara Martinez PT Student (t) Sol Almanzar PT (c) 06/15/18 1123      Row Name 06/15/18 0855             Outcome Summary/Treatment Plan (PT)    Daily Summary of Progress (PT) progress toward functional goals is good  -JETT,CM,JB2      Plan for Continued Treatment (PT) cont w/ POC   -JETT,CM,JB2      Anticipated Discharge Disposition (PT) skilled nursing facility  -JETT,CM,JB2      Recorded by [JETT,CM,JB2] Sol Almanzar PT (r) Barbara Martinez PT Student (t) Sol Almanzar PT (c) 06/15/18 1123        User Key  (r) = Recorded By, (t) = Taken By, (c) = Cosigned By    Initials Name Effective Dates Discipline    JETT Sol Almanzar, PT 06/19/15 -  PT    CM Barbara Martinez, PT Student 06/07/18 -  PT                   OT Rehab Goals     Row Name 06/15/18 7009              Transfer Goal 1 (OT)    Activity/Assistive Device (Transfer Goal 1, OT) toilet;commode, bedside without drop arms;walker, rolling  -AN      [place] Level/Cues Needed (Transfer Goal 1, OT) verbal cues required;contact guard assist  -AN      Time Frame (Transfer Goal 1, OT) 10 days  -AN         Dressing Goal 1 (OT)    Activity/Assistive Device (Dressing Goal 1, OT) lower body dressing  -AN      [place]/Cues Needed (Dressing Goal 1, OT) minimum assist (75% or more patient effort)  -AN      Time Frame (Dressing Goal 1, OT) 10 days  -AN        User Key  (r) = Recorded By, (t) = Taken By, (c) = Cosigned By    Initials Name Provider Type Discipline    AN Crista Zimmerman OT Occupational Therapist OT        Occupational Therapy Education     Title: PT OT SLP Therapies (Active)     Topic: Occupational Therapy (Active)     Point: ADL training (Done)     Description: Instruct learner(s) on proper safety adaptation and remediation techniques during self care or transfers.   Instruct in proper use of assistive devices.   Learning Progress Summary     Learner Status Readiness Method Response Comment Documented by    Patient Done Acceptance E GENIE,NR Educated pt on OT role and benefits of tx. AN 06/15/18 1341                      User Key     Initials Effective Dates Name Provider Type Discipline    AN 06/22/15 -  Crista Zimmerman OT Occupational Therapist OT                OT Recommendation and Plan  Outcome Summary/Treatment Plan (OT)  Anticipated Discharge Disposition (OT): skilled nursing facility  Planned Therapy Interventions (OT Eval): activity tolerance training, adaptive equipment training, BADL retraining, functional balance retraining, occupation/activity based interventions, transfer/mobility retraining, ROM/therapeutic exercise  Therapy Frequency (OT Eval): daily  Plan of Care Review  Plan of Care Reviewed With: patient  Plan of Care Reviewed With: patient  Outcome Summary: Pt declined mobilty this pm.  Completed bed assessment, pt needs assist with ADL's. Pt would benefit [facility] SN[facility] upon discharge.         Outcome Measures     Row Name 06/15/18 0855 06/14/18 0922 06/13/18 1022       How much help from another person do you currently need...    Turning from your back to your side while in flat bed without using bedrails? 2  -JETT (r) CM (t) JETT (c) 2  -JETT (r) CM (t) JETT (c) 2  -KR    Moving from lying on back to sitting on the side of a flat bed without bedrails? 2  -JETT (r) CM (t) JETT (c) 2  -JETT (r) CM (t) JETT (c) 2  -KR    Moving to and from a bed to a chair (including a wheelchair)? 3  -JETT (r) CM (t) JETT (c) 3  -JETT (r) CM (t) JETT (c) 3  -KR    Standing up from a chair using your arms (e.g., wheelchair, bedside chair)? 3  -JETT (r) CM (t) JETT (c) 3  -JETT (r) CM (t) JETT (c) 3  -KR    Climbing 3-5 steps with a railing? 1  -JETT (r) CM (t) JETT (c) 1  -JETT (r) CM (t) JETT (c) 2  -KR    To walk in hospital room? 2  -JETT (r) CM (t) JETT (c) 2  -JETT (r) CM (t) JETT (c) 3  -KR    AM-PAC 6 Clicks Score 13  -JETT (r) CM (t) 13  -JETT (r) CM (t) 15  -KR       Functional Assessment    Outcome Measure Options AM-PAC 6 Clicks Basic Mobility (PT)  -JETT (r) CM (t) JETT (c) AM-PAC 6 Clicks Basic Mobility (PT)  -JETT (r) CM (t) JETT (c) AM-PAC 6 Clicks Basic Mobility (PT)  -KR      User Key  (r) = Recorded By, (t) = Taken By, (c) = Cosigned By    Initials Name Provider Type    JETT Sol Almanzar, PT Physical Therapist    KR Leanna Molina, PT Physical Therapist    CM Barbara Martinez, PT Student PT Student           Time Calculation:         Time Calculation- OT     Row Name 06/15/18 1343 06/15/18 0855          Time Calculation- OT    OT Start Time 1315  -AN  --     Total Timed Code Minutes- OT 0 minute(s)  -AN  --     OT Received On 06/15/18  -AN  --     OT Goal Re-Cert Due Date 06/25/18  -AN  --        Timed Charges    76832 - Gait Training Minutes   -- 25  -JETT (r) CM (t) JETT (c)       User Key  (r) = Recorded By, (t) = Taken By, (c) = Cosigned By    Initials Name  Provider Type    JETT Almanzar, PT Physical Therapist    RACHELL Zimmerman OT Occupational Therapist    JOSE A Martinez, PT Student PT Student           Therapy Suggested Charges     Code   Minutes Charges    None           Therapy Charges for Today     Code Description Service Date Service Provider Modifiers Qty    17044808618 HC OT EVAL LOW COMPLEXITY 4 6/15/2018 Crista Zimmerman OT GO 1               Crista Zimmerman OT  6/15/2018

## 2018-06-15 NOTE — PLAN OF CARE
Problem: Patient Care Overview  Goal: Plan of Care Review  Outcome: Ongoing (interventions implemented as appropriate)   06/14/18 2000 06/15/18 0600   Coping/Psychosocial   Plan of Care Reviewed With patient;daughter --    OTHER   Outcome Summary --  Pt stable overnight on no gtts. Zosyn continues. Up multiple times for BSC. Very, very unsteady on feet. Up with 2-3 assist. Consider case management consult for discharge concerns.   Plan of Care Review   Progress --  no change       Problem: Fall Risk (Adult)  Goal: Identify Related Risk Factors and Signs and Symptoms  Outcome: Outcome(s) achieved Date Met: 06/15/18   06/15/18 0732   Fall Risk (Adult)   Related Risk Factors (Fall Risk) age-related changes;bladder function altered;fatigue/slow reaction;fear of falling;gait/mobility problems;impaired vision;environment unfamiliar   Signs and Symptoms (Fall Risk) presence of risk factors     Goal: Absence of Fall  Outcome: Ongoing (interventions implemented as appropriate)   06/15/18 0732   Fall Risk (Adult)   Absence of Fall making progress toward outcome       Problem: Sepsis/Septic Shock (Adult)  Goal: Signs and Symptoms of Listed Potential Problems Will be Absent, Minimized or Managed (Sepsis/Septic Shock)  Outcome: Ongoing (interventions implemented as appropriate)   06/15/18 0732   Goal/Outcome Evaluation   Problems Assessed (Sepsis) all   Problems Present (Sepsis) none       Problem: Pneumonia (Adult)  Goal: Signs and Symptoms of Listed Potential Problems Will be Absent, Minimized or Managed (Pneumonia)  Outcome: Ongoing (interventions implemented as appropriate)   06/15/18 0732   Goal/Outcome Evaluation   Problems Assessed (Pneumonia) all   Problems Present (Pneumonia) none       Problem: Skin Injury Risk (Adult)  Goal: Identify Related Risk Factors and Signs and Symptoms  Outcome: Outcome(s) achieved Date Met: 06/15/18   06/15/18 0732   Skin Injury Risk (Adult)   Related Risk Factors (Skin Injury Risk) advanced  age;body weight extremes;critical care admission;infection;medical devices;mobility impaired;moisture;nutritional deficiencies     Goal: Skin Health and Integrity  Outcome: Ongoing (interventions implemented as appropriate)   06/15/18 0782   Skin Injury Risk (Adult)   Skin Health and Integrity making progress toward outcome

## 2018-06-15 NOTE — PROGRESS NOTES
INTENSIVIST   PROGRESS NOTE     Hospital:  LOS: 3 days     Mr. Harris Shane, 89 y.o. male is followed for a Chief Complaint of: Sepsis      Subjective   S   Mr. Shane is a 89yo M who presented to the [facility] ED with complaints of bilateral lower extremity weakness. 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 He was supposed to start radiation therapy on 6/13/18. In the ED, he was found to be hypotensive with an elevated lactate. His LFTs were elevated along with elevated bilirubin. He received 2L of normal saline but was felt to be hypotensive. He had 2 low blood pressure readings but unfortunately, his BP cuff was on his elbow at the time. Medicine did not feel comfortable admitting him to the floor and he was admitted to the ICU.     He has not required any pressor support and his SBP has not been below 115. He was found on imaging to have possible cholelithiasis at the gallbladder neck. A RUQ ultrasound was performed and did not reveal stones but showed wall thickening and a normal common bile duct. He is growing E. Coli in his blood cultures. He has been maintained on Zosyn. GI continues to follow.     Interval History:  No events overnight. He continues on 2L nasal cannula. He denies any shortness of breath. No abdominal pain. Eating breakfast.        The patient's relevant past medical, surgical and social history were reviewed and updated in Epic as appropriate.      ROS:   Constitutional: Negative for fever.   Respiratory: Negative for dyspnea.   Cardiovascular: Negative for chest pain.   Gastrointestinal: Negative for  nausea, vomiting and diarrhea.     Objective   O     Vitals:  Temp  Min: 97.8 °F (36.6 °C)  Max: 99.5 °F (37.5 °C)  BP  Min: 100/65  Max: 145/84  Pulse  Min: 70  Max: 85  Resp  Min: 20  Max: 24  SpO2  Min: 90 %  Max: 98 % Flow (L/min)  Min: 2  Max: 2    Intake/Ouptut 24 hrs (7:00AM - 6:59 AM)  Intake & Output (last 3 days)       06/12 0701 - 06/13 0700  06/13 0701 - 06/14 0700 06/14 0701 - 06/15 0700 06/15 0701 - 06/16 0700    I.V. (mL/kg)  2114 (24.5)      IV Piggyback 1050 250      Total Intake(mL/kg) 1050 (12.2) 2364 (27.4)      Urine (mL/kg/hr)  895 (0.4) 1685 (0.8) 150 (0.6)    Stool  0 (0) 0 (0)     Total Output   895 1685 150    Net +1050 +1469 -1685 -150            Unmeasured Urine Occurrence  3 x 2 x     Unmeasured Stool Occurrence  2 x 2 x             Physical Examination  Telemetry:  Normal Sinus Rhythm.    Constitutional:  No acute distress.  Sitting up eating breakfast.     Cardiovascular: Normal rate, regular and rhythm. Normal heart sounds.  No murmurs, gallop or rub.   Respiratory: No respiratory distress. Normal respiratory effort.  Normal breath sounds  Clear to ascultation.    Abdominal:  Soft. No masses. Non-tender. No distension. No HSM.   Extremities: No digital cyanosis. No clubbing.  Trace peripheral edema.   Neurological:   Alert and Oriented to person, place, and time.             Results from last 7 days  Lab Units 06/15/18  0337 06/14/18  0351 06/13/18  0509   WBC 10*3/mm3 10.21 15.38* 20.12*   HEMOGLOBIN g/dL 11.6* 12.2* 12.0*   MCV fL 90.3 92.2 90.8   PLATELETS 10*3/mm3 123* 134* 153       Results from last 7 days  Lab Units 06/15/18  0337 06/14/18  0351 06/13/18  0509   SODIUM mmol/L 135 136 134   POTASSIUM mmol/L 3.8 4.1 4.2   CO2 mmol/L 26.0 22.0 24.0   CREATININE mg/dL 1.03 1.19 1.37*   GLUCOSE mg/dL 99 90 134*   MAGNESIUM mg/dL 2.2 2.2 2.1   PHOSPHORUS mg/dL 2.0* 2.8 3.6     Estimated Creatinine Clearance: 50 mL/min (by C-G formula based on SCr of 1.03 mg/dL).    Results from last 7 days  Lab Units 06/15/18  0337 06/14/18  0351 06/13/18  0509   ALK PHOS U/L 232* 193* 180*   BILIRUBIN mg/dL 1.6* 1.9* 3.0*   ALT (SGPT) U/L 136* 225* 296*   AST (SGOT) U/L 72* 152* 346*             Images:  Imaging Results (last 24 hours)     Procedure Component Value Units Date/Time    US Abdomen Limited [[id number]] Collected:  06/13/18 1200      Updated:  06/14/18 0951    Narrative:       EXAMINATION: US ABDOMEN, LIMITED-06/13/2018:     INDICATION: Evaluate ductal dilation/obstruction; A41.9-Sepsis,  unspecified organism; R74.8-Abnormal levels of other serum enzymes;  R74.8-Abnormal levels of other serum enzymes; E80.6-Other disorders of  bilirubin metabolism; Z85.46-Personal history of malignant neoplasm of  prostate; C79.51-Secondary malignant neoplasm of bone; J18.1-Lobar  pneumonia, unspecified organism; K80.81-Other cholelithiasis with  obstruction, evaluate right upper quadrant pain.     TECHNIQUE: Sonographic imaging was obtained of the right upper quadrant  in both the sagittal and transverse planes.     COMPARISON: NONE.     FINDINGS: The pancreas is not well seen. The left lobe of the liver is  somewhat limited in its visualization due to overlying bowel gas. The  right lobe of the liver is grossly unremarkable. No underlying mass or  intrahepatic biliary ductal dilatation. There are echogenic stones seen  within the neck of the gallbladder on CT scan are not well seen on this  examination. There is a fold seen at the neck of the gallbladder. There  is a small amount of pericholecystic fluid. There is thickening  identified of the gallbladder wall at 7 mm. The common bile duct is  within normal limits and measuring 5 mm. The right kidney is normal in  size, configuration, and texture measuring in length from pole to pole  12.7 cm. Several renal cortical cysts, the largest measuring 2.6 x 4.5 x  4.3 cm. No solid mass identified. No hydronephrosis or nephrolithiasis.       Impression:       Gallstones seen on CT scan are not well identified on this  examination. There is thickening identified of the gallbladder wall with  no evidence of biliary ductal dilatation. Small amount of  pericholecystic fluid identified. Several right renal cortical cysts.     D:  06/13/2018  E:  06/13/2018     This report was finalized on 6/14/2018 9:48 AM by Dr. Whitney  MD Nolan.               Results: Reviewed.  I reviewed the patient's new laboratory and imaging results.  I independently reviewed the patient's new images.    Medications: Reviewed.    Assessment/Plan   A / P     Mr. Shane is a 89yo M who presented to the [facility] ED with complaints of bilateral lower extremity weakness. 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 He was supposed to start radiation therapy on 6/13/18. In the ED, he was found to be hypotensive with an elevated lactate. His LFTs were elevated along with elevated bilirubin. He received 2L of normal saline but was felt to be hypotensive.     He has not required any pressor support and his SBP has not been below 115. He was found on imaging to have possible cholelithiasis at the gallbladder neck. A RUQ ultrasound was performed and did not reveal stones but showed wall thickening and a normal common bile duct. He is growing E. Coli in his blood cultures. He has been maintained on Zosyn. GI continues to follow. He had elevated troponins with no wall motion abnormalities on Echo. His elevated troponin likely represented a type II NSTEMI in the setting of sepsis.     Surgery has evaluated the patient. Due to his age, multiple comorbidities and patient reluctance to undergo surgery, they are following a watchful waiting approach. If he has further episodes, ERCP w/ sphincterotomy was recommended.       Nutrition:   Diet Regular; GI Soft/Index  Advance Directives: Full Code    Hospital Problem List     * (Principal)Sepsis    Elevated liver enzymes    Prostate cancer (Mets to L4-L5)    Pulmonary infiltrate, LLLobe    Cholelithiases of GB neck per CT A/P    Acute renal insufficiency, CRE 1.43, unknown baseline    Blindness of right eye    WILLIAM (obstructive sleep apnea) remote, intolerant of CPAP    R/O Cholecystitis    NSTEMI (non-ST elevated myocardial infarction) (R/O Type 2)    Bacteremia due to Escherichia coli           Assessment / Plan:    1. Change Zosyn to Rocephin. Plan for 14 day course.   2. GI following  3. LFTs improving. Continue to trend  4. Lasix 20mg IV x 1   5. Continue PT/OT  6. D/c PPI  7. Okay to transfer to the floor when a bed is available.   8. AM labs    Plan of care and goals reviewed during interdisciplinary rounds.  I discussed the patient's findings and my recommendations with patient and nursing staff    Time: was greater than 35 minutes.      Tamika Dee, DO    Intensive Care Medicine and Pulmonary Medicine

## 2018-06-15 NOTE — PROGRESS NOTES
"GI Daily Progress Note  Subjective:    Chief Complaint:  Follow up sepsis     Patient resting and complains of weakness.  Ambulated 60ft with PT today.  Denies abdominal pain, nausea or vomiting.      Objective:    /81 (BP Location: Left arm, Patient Position: Lying)   Pulse 79   Temp 97.6 °F (36.4 °C) (Oral)   Resp 17   Ht 170.2 cm (67.01\")   Wt 86.2 kg (190 lb)   SpO2 95%   BMI 29.75 kg/m²     Physical Exam   Constitutional: He appears well-developed. No distress.   Cardiovascular: Normal rate and regular rhythm.    Pulmonary/Chest: Effort normal. No respiratory distress.   Abdominal: Soft. Bowel sounds are normal. He exhibits no distension. There is no tenderness.   Skin: Skin is warm and dry.       Lab  Lab Results   Component Value Date    WBC 10.21 06/15/2018    HGB 11.6 (L) 06/15/2018    HGB 12.2 (L) 06/14/2018    HGB 12.0 (L) 06/13/2018    MCV 90.3 06/15/2018     (L) 06/15/2018    INR 1.11 (H) 06/13/2018    INR 1.05 12/17/2014       Lab Results   Component Value Date    GLUCOSE 99 06/15/2018    BUN 24 (H) 06/15/2018    CREATININE 1.03 06/15/2018    EGFRIFNONA 68 06/15/2018    BCR 23.3 06/15/2018    CO2 26.0 06/15/2018    CALCIUM 8.1 (L) 06/15/2018    ALBUMIN 3.29 06/15/2018    ALKPHOS 232 (H) 06/15/2018    BILITOT 1.6 (H) 06/15/2018     (H) 06/15/2018    AST 72 (H) 06/15/2018       Assessment:    E.Coli bacteremia, cholangitis  Cholelithiasis  Cholestatic hepatitis, improving, possibly passed CBD stone  Sepsis    Plan:    >>> LFTs continue to trend down   >>> Continue medical management.  Continue current antibiotics     PAXTON Schwab  06/15/18  1:58 PM    "

## 2018-06-16 LAB
ALBUMIN SERPL-MCNC: 3.19 G/DL (ref 3.2–4.8)
ALBUMIN/GLOB SERPL: 1.6 G/DL (ref 1.5–2.5)
ALP SERPL-CCNC: 258 U/L (ref 25–100)
ALT SERPL W P-5'-P-CCNC: 94 U/L (ref 7–40)
ANION GAP SERPL CALCULATED.3IONS-SCNC: 7 MMOL/L (ref 3–11)
AST SERPL-CCNC: 46 U/L (ref 0–33)
BILIRUB SERPL-MCNC: 1 MG/DL (ref 0.3–1.2)
BUN BLD-MCNC: 21 MG/DL (ref 9–23)
BUN/CREAT SERPL: 23.9 (ref 7–25)
CALCIUM SPEC-SCNC: 8.2 MG/DL (ref 8.7–10.4)
CHLORIDE SERPL-SCNC: 102 MMOL/L (ref 99–109)
CO2 SERPL-SCNC: 26 MMOL/L (ref 20–31)
CREAT BLD-MCNC: 0.88 MG/DL (ref 0.6–1.3)
DEPRECATED RDW RBC AUTO: 47.5 FL (ref 37–54)
ERYTHROCYTE [DISTWIDTH] IN BLOOD BY AUTOMATED COUNT: 14.4 % (ref 11.3–14.5)
GFR SERPL CREATININE-BSD FRML MDRD: 81 ML/MIN/1.73
GLOBULIN UR ELPH-MCNC: 2 GM/DL
GLUCOSE BLD-MCNC: 84 MG/DL (ref 70–100)
HCT VFR BLD AUTO: 35 % (ref 38.9–50.9)
HGB BLD-MCNC: 11.7 G/DL (ref 13.1–17.5)
MAGNESIUM SERPL-MCNC: 2.1 MG/DL (ref 1.3–2.7)
MCH RBC QN AUTO: 29.8 PG (ref 27–31)
MCHC RBC AUTO-ENTMCNC: 33.4 G/DL (ref 32–36)
MCV RBC AUTO: 89.1 FL (ref 80–99)
PHOSPHATE SERPL-MCNC: 1.8 MG/DL (ref 2.4–5.1)
PLATELET # BLD AUTO: 143 10*3/MM3 (ref 150–450)
PMV BLD AUTO: 12 FL (ref 6–12)
POTASSIUM BLD-SCNC: 3.4 MMOL/L (ref 3.5–5.5)
PROT SERPL-MCNC: 5.2 G/DL (ref 5.7–8.2)
RBC # BLD AUTO: 3.93 10*6/MM3 (ref 4.2–5.76)
SODIUM BLD-SCNC: 135 MMOL/L (ref 132–146)
WBC NRBC COR # BLD: 9.35 10*3/MM3 (ref 3.5–10.8)

## 2018-06-16 PROCEDURE — 99232 SBSQ HOSP IP/OBS MODERATE 35: CPT | Performed by: INTERNAL MEDICINE

## 2018-06-16 PROCEDURE — 83735 ASSAY OF MAGNESIUM: CPT | Performed by: INTERNAL MEDICINE

## 2018-06-16 PROCEDURE — 85027 COMPLETE CBC AUTOMATED: CPT | Performed by: INTERNAL MEDICINE

## 2018-06-16 PROCEDURE — 84100 ASSAY OF PHOSPHORUS: CPT | Performed by: INTERNAL MEDICINE

## 2018-06-16 PROCEDURE — 80053 COMPREHEN METABOLIC PANEL: CPT | Performed by: INTERNAL MEDICINE

## 2018-06-16 PROCEDURE — 25010000003 CEFTRIAXONE PER 250 MG: Performed by: INTERNAL MEDICINE

## 2018-06-16 PROCEDURE — 25010000002 HEPARIN (PORCINE) PER 1000 UNITS: Performed by: NURSE PRACTITIONER

## 2018-06-16 PROCEDURE — 99233 SBSQ HOSP IP/OBS HIGH 50: CPT | Performed by: INTERNAL MEDICINE

## 2018-06-16 RX ADMIN — HEPARIN SODIUM 5000 UNITS: 5000 INJECTION, SOLUTION INTRAVENOUS; SUBCUTANEOUS at 20:03

## 2018-06-16 RX ADMIN — MULTIPLE VITAMINS W/ MINERALS TAB 1 TABLET: TAB ORAL at 08:56

## 2018-06-16 RX ADMIN — ASPIRIN 81 MG: 81 TABLET, COATED ORAL at 08:57

## 2018-06-16 RX ADMIN — OXYCODONE HYDROCHLORIDE 5 MG: 5 TABLET ORAL at 21:35

## 2018-06-16 RX ADMIN — CEFTRIAXONE SODIUM 2 G: 2 INJECTION, SOLUTION INTRAVENOUS at 11:46

## 2018-06-16 RX ADMIN — HEPARIN SODIUM 5000 UNITS: 5000 INJECTION, SOLUTION INTRAVENOUS; SUBCUTANEOUS at 08:57

## 2018-06-16 NOTE — PROGRESS NOTES
"GI Daily Progress Note  Subjective:    Chief Complaint:  F/U ascending cholangitis and E. Coli bacteremia.    The patient did not sleep well last night. He denies abdominal pain or nausea. Eating well.     Objective:    /76 (BP Location: Left arm, Patient Position: Lying)   Pulse 72   Temp 98.6 °F (37 °C) (Oral)   Resp 20   Ht 170.2 cm (67.01\")   Wt 84.8 kg (187 lb)   SpO2 95%   BMI 29.28 kg/m²     Physical Exam   Constitutional: He is oriented to person, place, and time. He appears well-developed and well-nourished. No distress.   HENT:   Head: Normocephalic.   Mouth/Throat: No oropharyngeal exudate.   Eyes: Conjunctivae are normal. No scleral icterus.   Neck: Normal range of motion.   Cardiovascular: Normal rate and regular rhythm.    Pulmonary/Chest: Effort normal and breath sounds normal. No respiratory distress. He has no wheezes. He has no rales.   Abdominal: Soft. Bowel sounds are normal. He exhibits no distension and no mass. There is no tenderness.   Genitourinary:   Genitourinary Comments: Deferred   Musculoskeletal: Normal range of motion. He exhibits no edema.   Neurological: He is alert and oriented to person, place, and time.   Skin: Skin is warm and dry. Capillary refill takes less than 2 seconds. No rash noted.   Psychiatric: He has a normal mood and affect. His behavior is normal.       Lab  Lab Results   Component Value Date    WBC 9.35 06/16/2018    HGB 11.7 (L) 06/16/2018    HGB 11.6 (L) 06/15/2018    HGB 12.2 (L) 06/14/2018    MCV 89.1 06/16/2018     (L) 06/16/2018    INR 1.11 (H) 06/13/2018    INR 1.05 12/17/2014       Lab Results   Component Value Date    GLUCOSE 84 06/16/2018    BUN 21 06/16/2018    CREATININE 0.88 06/16/2018    EGFRIFNONA 81 06/16/2018    BCR 23.9 06/16/2018    CO2 26.0 06/16/2018    CALCIUM 8.2 (L) 06/16/2018    ALBUMIN 3.19 (L) 06/16/2018    ALKPHOS 258 (H) 06/16/2018    BILITOT 1.0 06/16/2018    ALT 94 (H) 06/16/2018    AST 46 (H) 06/16/2018 "       Assessment:    Ascending cholangitis.  Bacteremia due to Escherichia coli.    LFT's improving and no evidence of biliary obstruction or stone on imaging studies. Suspect passed a stone at time of abdominal pain    Plan:    >> Continue antibiotics for 10 days. The E. coli is pan-sensitive. Switch to PO antibiotics at discharge.  >> Recommend probiotics at time of discharge to help prevent future episodes of cholangitis.    Mark I. Brunner, MD  06/16/18  11:31 AM

## 2018-06-16 NOTE — PLAN OF CARE
Problem: Patient Care Overview  Goal: Plan of Care Review  Outcome: Ongoing (interventions implemented as appropriate)    Goal: Individualization and Mutuality  Outcome: Ongoing (interventions implemented as appropriate)    Goal: Discharge Needs Assessment  Outcome: Ongoing (interventions implemented as appropriate)    Goal: Interprofessional Rounds/Family Conf  Outcome: Ongoing (interventions implemented as appropriate)      Problem: Fall Risk (Adult)  Goal: Absence of Fall  Outcome: Ongoing (interventions implemented as appropriate)      Problem: Sepsis/Septic Shock (Adult)  Goal: Signs and Symptoms of Listed Potential Problems Will be Absent, Minimized or Managed (Sepsis/Septic Shock)  Outcome: Ongoing (interventions implemented as appropriate)      Problem: Pneumonia (Adult)  Goal: Signs and Symptoms of Listed Potential Problems Will be Absent, Minimized or Managed (Pneumonia)  Outcome: Ongoing (interventions implemented as appropriate)      Problem: Skin Injury Risk (Adult)  Goal: Skin Health and Integrity  Outcome: Ongoing (interventions implemented as appropriate)

## 2018-06-16 NOTE — PROGRESS NOTES
[facility] Medicine Services  PROGRESS NOTE    Patient Name: Harris Shane  : 9/15/1927  MRN: [id number]    Date of Admission: 2018  Length of Stay: 4  Primary Care Physician: Charli Mccormack MD    Subjective   Subjective     CC:  Severe weakness    Subjective:  Resting in a chair in no acute distress.  Complains of some difficulty breathing but overall he tells me that his breathing is more or less at baseline.  He also complains of severe weakness particularly in the lower extremities that prevents him from ambulating independently.  Denies any fever or chills.  No chest pain or palpitation.    Review of Systems      Otherwise ROS is negative except as mentioned in the HPI.    Objective   Objective     Vital Signs:   Temp:  [97.6 °F (36.4 °C)-98.6 °F (37 °C)] 98.6 °F (37 °C)  Heart Rate:  [72-82] 72  Resp:  [18-20] 20  BP: (123-143)/(68-76) 143/76        Physical Exam:  Constitutional: No acute distress, awake, alert  Eyes: PERRLA, sclerae anicteric, no conjunctival injection  HENT: NCAT, mucous membranes moist  Neck: Supple, no thyromegaly, no lymphadenopathy, trachea midline  Respiratory: Clear to auscultation bilaterally, nonlabored respirations   Cardiovascular: RRR, no murmurs, rubs, or gallops, palpable pedal pulses bilaterally  Gastrointestinal: Positive bowel sounds, soft, nontender, nondistended  Musculoskeletal: No bilateral ankle edema, no clubbing or cyanosis to extremities  Psychiatric: Appropriate affect, cooperative  Neurologic: Awake, alert, follows commands.  Patient does have some weakness of lower extremities however he can lift her legs against gravity.  Patient does have difficulty with ambulating independently.  Skin: No rashes    Results Reviewed:  I have personally reviewed current lab, radiology, and data and agree.      Results from last 7 days  Lab Units 18  0508 06/15/18  0337 18  0351 18  0509   WBC 10*3/mm3 9.35 10.21 15.38*  20.12*   HEMOGLOBIN g/dL 11.7* 11.6* 12.2* 12.0*   HEMATOCRIT % 35.0* 35.5* 37.7* 36.5*   PLATELETS 10*3/mm3 143* 123* 134* 153   INR   --   --   --  1.11*       Results from last 7 days  Lab Units 06/16/18  0508 06/15/18  0337 06/14/18  0351 06/13/18  1740 06/13/18  1105 06/13/18  0509   SODIUM mmol/L 135 135 136  --   --  134   POTASSIUM mmol/L 3.4* 3.8 4.1  --   --  4.2   CHLORIDE mmol/L 102 104 104  --   --  103   CO2 mmol/L 26.0 26.0 22.0  --   --  24.0   BUN mg/dL 21 24* 23  --   --  23   CREATININE mg/dL 0.88 1.03 1.19  --   --  1.37*   GLUCOSE mg/dL 84 99 90  --   --  134*   CALCIUM mg/dL 8.2* 8.1* 8.7  --   --  8.5*   ALT (SGPT) U/L 94* 136* 225*  --   --  296*   AST (SGOT) U/L 46* 72* 152*  --   --  346*   TROPONIN I ng/mL  --   --   --  1.489* 1.596* 1.109*     Estimated Creatinine Clearance: 58.1 mL/min (by C-G formula based on SCr of 0.88 mg/dL).  No results found for: BNP  No results found for: PHART    Microbiology Results Abnormal     Procedure Component Value - Date/Time    Blood Culture - Blood, [[id number]]  (Normal) Collected:  06/12/18 2152    Lab Status:  Preliminary result Specimen:  Blood from Arm, Left Updated:  06/15/18 2200     Blood Culture No growth at 3 days    Blood Culture - Blood, [[id number]]  (Abnormal)  (Susceptibility) Collected:  06/12/18 2152    Lab Status:  Final result Specimen:  Blood from Arm, Right Updated:  06/15/18 0728     Blood Culture Escherichia coli (A)     Isolated from Aerobic Bottle     Gram Stain Result Aerobic Bottle Gram negative bacilli    Susceptibility      Escherichia coli     SISSY     Ampicillin <=8 ug/ml Susceptible     Ampicillin + Sulbactam <=8/4 ug/ml Susceptible     Aztreonam <=8 ug/ml Susceptible     Cefepime <=8 ug/ml Susceptible     Cefotaxime <=2 ug/ml Susceptible     Ceftriaxone <=8 ug/ml Susceptible     Cefuroxime sodium <=4 ug/ml Susceptible     Ertapenem <=1 ug/ml Susceptible     Gentamicin <=4 ug/ml Susceptible     Levofloxacin <=2 ug/ml  Susceptible     Meropenem <=1 ug/ml Susceptible     Piperacillin + Tazobactam <=16 ug/ml Susceptible     Tetracycline <=4 ug/ml Susceptible     Tobramycin <=4 ug/ml Susceptible     Trimethoprim + Sulfamethoxazole <=2/38 ug/ml Susceptible                    Blood Culture ID, PCR - Blood, [[id number]]  (Abnormal) Collected:  06/12/18 2152    Lab Status:  Final result Specimen:  Blood from Arm, Right Updated:  06/13/18 1421     BCID, PCR Escherichia coli. Identification by BCID PCR. (C)          Imaging Results (last 24 hours)     ** No results found for the last 24 hours. **        Results for orders placed during the hospital encounter of 06/12/18   Adult Transthoracic Echo Complete W/ Cont if Necessary Per Protocol    Narrative · Left ventricular systolic function is normal. Estimated EF = 55%.  · Left ventricular diastolic dysfunction (grade I) consistent with   impaired relaxation.          I have reviewed the medications.    Assessment/Plan   Assessment / Plan     Hospital Problem List     * (Principal)Sepsis    Elevated liver enzymes    Prostate cancer (Mets to L4-L5)    Pulmonary infiltrate, LLLobe    Cholelithiases of GB neck per CT A/P    Acute renal insufficiency, CRE 1.43, unknown baseline    Blindness of right eye    WILLIAM (obstructive sleep apnea) remote, intolerant of CPAP    R/O Cholecystitis    NSTEMI (non-ST elevated myocardial infarction) (R/O Type 2)    Bacteremia due to Escherichia coli             Brief Hospital Course to date:  Harris Shane is a 89 y.o. male with past medical history significant for prostate cancer with metastases  to L4 on L5, blindness of right eye, weakness.  Patient was admitted to ICU for sepsis.      Assessment & Plan:  *Sepsis markedly improved.    * Escherichia coli bacteremia.    * Elevated liver enzymes.    * Severe generalized weakness particularly lower extremities.  Patient has significant difficulty with ambulating independently.    * Metastatic prostate cancer.   Metastases to L4 on L5.  Patient was supposed to start radiation therapy for that.  We need to coordinate with radiation oncology for this.    * Blindness and ptosis of the right eye.    PLAN:  - cont current care  - need to coordinate radiation therapy with physical therapy.  - labs in am.    DVT Prophylaxis:      CODE STATUS: Full Code    Disposition: TBD      Electronically signed by Sukhwinder Howell MD, 06/16/18, 5:17 PM.

## 2018-06-16 NOTE — PLAN OF CARE
Problem: Patient Care Overview  Goal: Plan of Care Review   06/15/18 0855 06/15/18 1342 06/15/18 2200   Coping/Psychosocial   Plan of Care Reviewed With --  --  patient   OTHER   Outcome Summary --  Pt declined mobilty this pm. Completed bed assessment, pt needs assist with ADL's. Pt would benefit froom SNF upon discharge.  --    Plan of Care Review   Progress improving --  --      Goal: Individualization and Mutuality  Outcome: Ongoing (interventions implemented as appropriate)   06/14/18 0525   Individualization   Patient Specific Preferences Transfer out of ICU   Patient Specific Interventions Up to [facility] for urination     Goal: Discharge Needs Assessment  Outcome: Ongoing (interventions implemented as appropriate)   06/13/18 1247   Discharge Needs Assessment   Readmission Within the Last 30 Days no previous admission in last 30 days   Concerns to be Addressed discharge planning;home safety   Concerns Comments Assess for home needs prior to discharge   Patient/Family Anticipates Transition to home with family   Patient/Family Anticipated Services at Transition home health care   Transportation Concerns car, none   Transportation Anticipated family or friend will provide   Anticipated Changes Related to Illness inability to care for self   Equipment Needed After Discharge other (see comments)   Outpatient/Agency/Support Group Needs other (see comments)   Discharge Facility/Level of Care Needs other (see comments)   Offered/Gave Vendor List no   Current Discharge Risk physical impairment   Discharge Coordination/Progress Planning to return home at discharge; will need assessment for home needs when medically appropriate   Disability   Equipment Currently Used at Home walker, rolling;wheelchair;shower chair     Goal: Interprofessional Rounds/Family Conf  Outcome: Ongoing (interventions implemented as appropriate)   06/16/18 0426   Interdisciplinary Rounds/Family Conf   Participants nursing;physical therapy        Problem: Fall Risk (Adult)  Goal: Absence of Fall  Outcome: Ongoing (interventions implemented as appropriate)   06/16/18 0429   Fall Risk (Adult)   Absence of Fall making progress toward outcome       Problem: Sepsis/Septic Shock (Adult)  Goal: Signs and Symptoms of Listed Potential Problems Will be Absent, Minimized or Managed (Sepsis/Septic Shock)  Outcome: Ongoing (interventions implemented as appropriate)   06/16/18 0429   Goal/Outcome Evaluation   Problems Assessed (Sepsis) all   Problems Present (Sepsis) none       Problem: Pneumonia (Adult)  Goal: Signs and Symptoms of Listed Potential Problems Will be Absent, Minimized or Managed (Pneumonia)  Outcome: Ongoing (interventions implemented as appropriate)   06/16/18 0429   Goal/Outcome Evaluation   Problems Assessed (Pneumonia) all   Problems Present (Pneumonia) none       Problem: Skin Injury Risk (Adult)  Goal: Skin Health and Integrity  Outcome: Ongoing (interventions implemented as appropriate)   06/16/18 0429   Skin Injury Risk (Adult)   Skin Health and Integrity making progress toward outcome

## 2018-06-17 LAB — BACTERIA SPEC AEROBE CULT: NORMAL

## 2018-06-17 PROCEDURE — 99233 SBSQ HOSP IP/OBS HIGH 50: CPT | Performed by: INTERNAL MEDICINE

## 2018-06-17 PROCEDURE — 25010000003 CEFTRIAXONE PER 250 MG: Performed by: INTERNAL MEDICINE

## 2018-06-17 PROCEDURE — 25010000003 POTASSIUM CHLORIDE 20 MEQ/250ML SOLUTION: Performed by: INTERNAL MEDICINE

## 2018-06-17 PROCEDURE — 25010000002 HEPARIN (PORCINE) PER 1000 UNITS: Performed by: NURSE PRACTITIONER

## 2018-06-17 RX ORDER — OXYCODONE HYDROCHLORIDE 5 MG/1
5 TABLET ORAL ONCE
Status: COMPLETED | OUTPATIENT
Start: 2018-06-17 | End: 2018-06-17

## 2018-06-17 RX ORDER — POTASSIUM CHLORIDE 7.45 MG/ML
10 INJECTION INTRAVENOUS
Status: DISCONTINUED | OUTPATIENT
Start: 2018-06-17 | End: 2018-06-25 | Stop reason: HOSPADM

## 2018-06-17 RX ORDER — POTASSIUM CHLORIDE 1.5 G/1.77G
40 POWDER, FOR SOLUTION ORAL AS NEEDED
Status: DISCONTINUED | OUTPATIENT
Start: 2018-06-17 | End: 2018-06-25 | Stop reason: HOSPADM

## 2018-06-17 RX ORDER — POTASSIUM CHLORIDE 750 MG/1
40 CAPSULE, EXTENDED RELEASE ORAL AS NEEDED
Status: DISCONTINUED | OUTPATIENT
Start: 2018-06-17 | End: 2018-06-25 | Stop reason: HOSPADM

## 2018-06-17 RX ADMIN — OXYCODONE HYDROCHLORIDE 5 MG: 5 TABLET ORAL at 08:56

## 2018-06-17 RX ADMIN — ASPIRIN 81 MG: 81 TABLET, COATED ORAL at 08:33

## 2018-06-17 RX ADMIN — POTASSIUM CHLORIDE 40 MEQ: 750 CAPSULE, EXTENDED RELEASE ORAL at 11:44

## 2018-06-17 RX ADMIN — HEPARIN SODIUM 5000 UNITS: 5000 INJECTION, SOLUTION INTRAVENOUS; SUBCUTANEOUS at 08:32

## 2018-06-17 RX ADMIN — OXYCODONE HYDROCHLORIDE 5 MG: 5 TABLET ORAL at 20:47

## 2018-06-17 RX ADMIN — POTASSIUM CHLORIDE 40 MEQ: 750 CAPSULE, EXTENDED RELEASE ORAL at 17:06

## 2018-06-17 RX ADMIN — OXYCODONE HYDROCHLORIDE 5 MG: 5 TABLET ORAL at 22:37

## 2018-06-17 RX ADMIN — POTASSIUM CHLORIDE 20 MEQ: 149 INJECTION, SOLUTION, CONCENTRATE INTRAVENOUS at 10:55

## 2018-06-17 RX ADMIN — CEFTRIAXONE SODIUM 2 G: 2 INJECTION, SOLUTION INTRAVENOUS at 11:44

## 2018-06-17 RX ADMIN — MULTIPLE VITAMINS W/ MINERALS TAB 1 TABLET: TAB ORAL at 08:33

## 2018-06-17 RX ADMIN — HEPARIN SODIUM 5000 UNITS: 5000 INJECTION, SOLUTION INTRAVENOUS; SUBCUTANEOUS at 20:40

## 2018-06-17 NOTE — PLAN OF CARE
Problem: Patient Care Overview  Goal: Plan of Care Review  Outcome: Ongoing (interventions implemented as appropriate)   06/15/18 0855 06/15/18 1342 06/16/18 2000   Coping/Psychosocial   Plan of Care Reviewed With --  --  patient   OTHER   Outcome Summary --  Pt declined mobilty this pm. Completed bed assessment, pt needs assist with ADL's. Pt would benefit froom SNF upon discharge.  --    Plan of Care Review   Progress improving --  --      Goal: Individualization and Mutuality  Outcome: Ongoing (interventions implemented as appropriate)   06/14/18 0525   Individualization   Patient Specific Preferences Transfer out of ICU   Patient Specific Interventions Up to [facility] [place]     Goal: Discharge Needs Assessment  Outcome: Ongoing (interventions implemented as appropriate)   06/13/18 1247   Discharge Needs Assessment   Readmission Within the Last 30 Days no previous admission in last 30 days   Concerns to be Addressed discharge planning;home safety   Concerns Comments Assess for home needs prior to discharge   Patient/Family Anticipates Transition to home with family   Patient/Family Anticipated Services at Transition home health care   Transportation Concerns car, none   Transportation Anticipated family or friend will provide   Anticipated Changes Related to Illness inability to care for self   Equipment Needed After Discharge other (see comments)   Outpatient/Agency/Support Group Needs other (see comments)   Discharge Facility/Level of Care Needs other (see comments)   Offered/Gave Vendor List no   Current Discharge Risk physical impairment   Discharge Coordination/Progress Planning to return home at discharge; will need assessment for home needs when medically appropriate   Disability   Equipment Currently Used at Home walker, rolling;wheelchair;shower chair     Goal: Interprofessional Rounds/Family Conf  Outcome: Ongoing (interventions implemented as appropriate)   06/16/18 0429   Interdisciplinary  Rounds/Family Conf   Participants nursing;physical therapy       Problem: Fall Risk (Adult)  Goal: Absence of Fall  Outcome: Ongoing (interventions implemented as appropriate)   06/17/18 0313   Fall Risk (Adult)   Absence of Fall making progress toward outcome       Problem: Sepsis/Septic Shock (Adult)  Goal: Signs and Symptoms of Listed Potential Problems Will be Absent, Minimized or Managed (Sepsis/Septic Shock)  Outcome: Ongoing (interventions implemented as appropriate)   06/17/18 0313   Goal/Outcome Evaluation   Problems Assessed (Sepsis) all   Problems Present (Sepsis) none       Problem: Pneumonia (Adult)  Goal: Signs and Symptoms of Listed Potential Problems Will be Absent, Minimized or Managed (Pneumonia)  Outcome: Ongoing (interventions implemented as appropriate)   06/17/18 0313   Goal/Outcome Evaluation   Problems Assessed (Pneumonia) all   Problems Present (Pneumonia) none       Problem: Skin Injury Risk (Adult)  Goal: Skin Health and Integrity  Outcome: Ongoing (interventions implemented as appropriate)   06/17/18 0313   Skin Injury Risk (Adult)   Skin Health and Integrity making progress toward outcome

## 2018-06-17 NOTE — PROGRESS NOTES
[facility]  PROGRESS NOTE    Patient Name: Harris Shane  : 9/15/1927  MRN: [id number]    Date of Admission: 2018  Length of Stay: 5  Primary Care Physician: Charli Mccormack MD    Subjective   Subjective     CC:  Severe weakness    Subjective:  Resting in a chair in no acute distress.still is very weak. Also had a lose stool today. No F/C, no N/V or abdominal pain.  Review of Systems      Otherwise ROS is negative except as mentioned in the HPI.    Objective   Objective     Vital Signs:   Temp:  [98.1 °F (36.7 °C)] 98.1 °F (36.7 °C)  Heart Rate:  [74] 74  Resp:  [18] 18  BP: (147-151)/(87-92) 151/92        Physical Exam:  Constitutional: No acute distress, awake, alert  Eyes: PERRLA, sclerae anicteric, no conjunctival injection. Right eye ptosis and blindness.  HENT: NCAT, mucous membranes moist  Neck: Supple, no thyromegaly, no lymphadenopathy, trachea midline  Respiratory: Clear to auscultation bilaterally, nonlabored respirations   Cardiovascular: RRR, no murmurs, rubs, or gallops, palpable pedal pulses bilaterally  Gastrointestinal: Positive bowel sounds, soft, nontender, mildly distended  Musculoskeletal: No bilateral ankle edema, no clubbing or cyanosis to extremities  Psychiatric: Appropriate affect, cooperative  Neurologic: Awake, alert, follows commands.  Patient does have some weakness of lower extremities however he can lift her legs against gravity.  Patient does have difficulty with ambulating independently.  Skin: No rashes    Results Reviewed:  I have personally reviewed current lab, radiology, and data and agree.      Results from last 7 days  Lab Units 18  0508 06/15/18  0337 18  0351 18  0509   WBC 10*3/mm3 9.35 10.21 15.38* 20.12*   HEMOGLOBIN g/dL 11.7* 11.6* 12.2* 12.0*   HEMATOCRIT % 35.0* 35.5* 37.7* 36.5*   PLATELETS 10*3/mm3 143* 123* 134* 153   INR   --   --   --  1.11*       Results from last 7 days  Lab Units  06/16/18  0508 06/15/18  0337 06/14/18  0351 06/13/18  1740 06/13/18  1105 06/13/18  0509   SODIUM mmol/L 135 135 136  --   --  134   POTASSIUM mmol/L 3.4* 3.8 4.1  --   --  4.2   CHLORIDE mmol/L 102 104 104  --   --  103   CO2 mmol/L 26.0 26.0 22.0  --   --  24.0   BUN mg/dL 21 24* 23  --   --  23   CREATININE mg/dL 0.88 1.03 1.19  --   --  1.37*   GLUCOSE mg/dL 84 99 90  --   --  134*   CALCIUM mg/dL 8.2* 8.1* 8.7  --   --  8.5*   ALT (SGPT) U/L 94* 136* 225*  --   --  296*   AST (SGOT) U/L 46* 72* 152*  --   --  346*   TROPONIN I ng/mL  --   --   --  1.489* 1.596* 1.109*     Estimated Creatinine Clearance: 60.3 mL/min (by C-G formula based on SCr of 0.88 mg/dL).  No results found for: BNP  No results found for: PHART    Microbiology Results Abnormal     Procedure Component Value - Date/Time    Blood Culture - Blood, [[id number]]  (Normal) Collected:  06/12/18 2152    Lab Status:  Preliminary result Specimen:  Blood from Arm, Left Updated:  06/16/18 2200     Blood Culture No growth at 4 days    Blood Culture - Blood, [[id number]]  (Abnormal)  (Susceptibility) Collected:  06/12/18 2152    Lab Status:  Final result Specimen:  Blood from Arm, Right Updated:  06/15/18 0728     Blood Culture Escherichia coli (A)     Isolated from Aerobic Bottle     Gram Stain Result Aerobic Bottle Gram negative bacilli    Susceptibility      Escherichia coli     SISSY     Ampicillin <=8 ug/ml Susceptible     Ampicillin + Sulbactam <=8/4 ug/ml Susceptible     Aztreonam <=8 ug/ml Susceptible     Cefepime <=8 ug/ml Susceptible     Cefotaxime <=2 ug/ml Susceptible     Ceftriaxone <=8 ug/ml Susceptible     Cefuroxime sodium <=4 ug/ml Susceptible     Ertapenem <=1 ug/ml Susceptible     Gentamicin <=4 ug/ml Susceptible     Levofloxacin <=2 ug/ml Susceptible     Meropenem <=1 ug/ml Susceptible     Piperacillin + Tazobactam <=16 ug/ml Susceptible     Tetracycline <=4 ug/ml Susceptible     Tobramycin <=4 ug/ml Susceptible     Trimethoprim +  Sulfamethoxazole <=2/38 ug/ml Susceptible                    Blood Culture ID, PCR - Blood, [[id number]]  (Abnormal) Collected:  06/12/18 2152    Lab Status:  Final result Specimen:  Blood from Arm, Right Updated:  06/13/18 1421     BCID, PCR Escherichia coli. Identification by BCID PCR. (C)          Imaging Results (last 24 hours)     ** No results found for the last 24 hours. **        Results for orders placed during the hospital encounter of 06/12/18   Adult Transthoracic Echo Complete W/ Cont if Necessary Per Protocol    Narrative · Left ventricular systolic function is normal. Estimated EF = 55%.  · Left ventricular diastolic dysfunction (grade I) consistent with   impaired relaxation.          I have reviewed the medications.    Assessment/Plan   Assessment / Plan     Hospital Problem List     * (Principal)Sepsis    Elevated liver enzymes    Prostate cancer (Mets to L4-L5)    Pulmonary infiltrate, LLLobe    Cholelithiases of GB neck per CT A/P    Acute renal insufficiency, CRE 1.43, unknown baseline    Blindness of right eye    WILLIAM (obstructive sleep apnea) remote, intolerant of CPAP    R/O Cholecystitis    NSTEMI (non-ST elevated myocardial infarction) (R/O Type 2)    Bacteremia due to Escherichia coli             Brief Hospital Course to date:  Harris Shane is a 89 y.o. male with past medical history significant for prostate cancer with metastases  to L4 on L5, blindness of right eye, weakness.  Patient was admitted to ICU for sepsis.      Assessment & Plan:  *Sepsis markedly improved.    * Escherichia coli bacteremia.    * Elevated liver enzymes.    * Severe generalized weakness particularly lower extremities.  Patient has significant difficulty with ambulating independently.    * Metastatic prostate cancer.  Metastases to L4 on L5.  Patient was supposed to start radiation therapy for that.  We need to coordinate with radiation oncology for this.    * Blindness and ptosis of the right eye.    PLAN:  -  cont current care  - KUB  - need to coordinate radiation therapy with physical therapy.  - labs in am.    NOTE: Had a long conversation with the patient and her family at the bedside.  Explained the plan of care and answered all their questions.  Total face-to-face time spent 40 minutes.    DVT Prophylaxis:      CODE STATUS: Full Code    Disposition: TBD      Electronically signed by Sukhwinder Howell MD, 06/17/18, 5:47 PM.

## 2018-06-17 NOTE — PLAN OF CARE
Problem: Patient Care Overview  Goal: Plan of Care Review  Outcome: Ongoing (interventions implemented as appropriate)   06/15/18 0855 06/17/18 0856 06/17/18 1458   Coping/Psychosocial   Plan of Care Reviewed With --  patient --    OTHER   Outcome Summary --  --  Pt up to BSC with max 2 assist, unstable, c/o pain to left leg raditing from back, VSS   Plan of Care Review   Progress improving --  --      Goal: Individualization and Mutuality  Outcome: Ongoing (interventions implemented as appropriate)   06/14/18 0525   Individualization   Patient Specific Preferences Transfer out of ICU   Patient Specific Interventions Up to [facility] for urination     Goal: Discharge Needs Assessment  Outcome: Ongoing (interventions implemented as appropriate)   06/13/18 1247 06/17/18 1458   Discharge Needs Assessment   Readmission Within the Last 30 Days no previous admission in last 30 days --    Concerns to be Addressed discharge planning;home safety --    Concerns Comments Assess for home needs prior to discharge --    Patient/Family Anticipates Transition to home with family --    Patient/Family Anticipated Services at Transition home health care --    Transportation Concerns car, none --    Transportation Anticipated family or friend will provide --    Anticipated Changes Related to Illness inability to care for self --    Equipment Needed After Discharge other (see comments) --    Outpatient/Agency/Support Group Needs other (see comments) --    Discharge Facility/Level of Care Needs other (see comments) --    Offered/Gave Vendor List no --    Current Discharge Risk physical impairment --    Discharge Coordination/Progress --  daughter request CM to follow up for rehab   Disability   Equipment Currently Used at Home walker, rolling;wheelchair;shower chair --      Goal: Interprofessional Rounds/Family Conf  Outcome: Ongoing (interventions implemented as appropriate)   06/17/18 1458   Interdisciplinary Rounds/Family Conf   Participants  ;nursing;physician;physical therapy       Problem: Fall Risk (Adult)  Goal: Absence of Fall  Outcome: Ongoing (interventions implemented as appropriate)   06/17/18 1458   Fall Risk (Adult)   Absence of Fall making progress toward outcome       Problem: Sepsis/Septic Shock (Adult)  Goal: Signs and Symptoms of Listed Potential Problems Will be Absent, Minimized or Managed (Sepsis/Septic Shock)  Outcome: Ongoing (interventions implemented as appropriate)   06/17/18 0313   Goal/Outcome Evaluation   Problems Assessed (Sepsis) all   Problems Present (Sepsis) none       Problem: Pneumonia (Adult)  Goal: Signs and Symptoms of Listed Potential Problems Will be Absent, Minimized or Managed (Pneumonia)  Outcome: Ongoing (interventions implemented as appropriate)   06/17/18 0313   Goal/Outcome Evaluation   Problems Assessed (Pneumonia) all   Problems Present (Pneumonia) none       Problem: Skin Injury Risk (Adult)  Goal: Skin Health and Integrity  Outcome: Ongoing (interventions implemented as appropriate)   06/17/18 1458   Skin Injury Risk (Adult)   Skin Health and Integrity making progress toward outcome

## 2018-06-18 ENCOUNTER — APPOINTMENT (OUTPATIENT)
Dept: GENERAL RADIOLOGY | Facility: HOSPITAL | Age: 83
End: 2018-06-18

## 2018-06-18 ENCOUNTER — TELEPHONE (OUTPATIENT)
Dept: RADIATION ONCOLOGY | Facility: HOSPITAL | Age: 83
End: 2018-06-18

## 2018-06-18 LAB
ALBUMIN SERPL-MCNC: 3.52 G/DL (ref 3.2–4.8)
ALBUMIN/GLOB SERPL: 1.5 G/DL (ref 1.5–2.5)
ALP SERPL-CCNC: 263 U/L (ref 25–100)
ALT SERPL W P-5'-P-CCNC: 74 U/L (ref 7–40)
ANION GAP SERPL CALCULATED.3IONS-SCNC: 5 MMOL/L (ref 3–11)
AST SERPL-CCNC: 43 U/L (ref 0–33)
BASOPHILS # BLD AUTO: 0.07 10*3/MM3 (ref 0–0.2)
BASOPHILS NFR BLD AUTO: 0.6 % (ref 0–1)
BILIRUB SERPL-MCNC: 0.7 MG/DL (ref 0.3–1.2)
BUN BLD-MCNC: 16 MG/DL (ref 9–23)
BUN/CREAT SERPL: 19.5 (ref 7–25)
CALCIUM SPEC-SCNC: 9.1 MG/DL (ref 8.7–10.4)
CHLORIDE SERPL-SCNC: 101 MMOL/L (ref 99–109)
CO2 SERPL-SCNC: 28 MMOL/L (ref 20–31)
CREAT BLD-MCNC: 0.82 MG/DL (ref 0.6–1.3)
D-LACTATE SERPL-SCNC: 1 MMOL/L (ref 0.5–2)
DEPRECATED RDW RBC AUTO: 47.9 FL (ref 37–54)
EOSINOPHIL # BLD AUTO: 0.08 10*3/MM3 (ref 0–0.3)
EOSINOPHIL NFR BLD AUTO: 0.7 % (ref 0–3)
ERYTHROCYTE [DISTWIDTH] IN BLOOD BY AUTOMATED COUNT: 14.5 % (ref 11.3–14.5)
GFR SERPL CREATININE-BSD FRML MDRD: 88 ML/MIN/1.73
GLOBULIN UR ELPH-MCNC: 2.4 GM/DL
GLUCOSE BLD-MCNC: 92 MG/DL (ref 70–100)
HCT VFR BLD AUTO: 37.4 % (ref 38.9–50.9)
HGB BLD-MCNC: 12.4 G/DL (ref 13.1–17.5)
IMM GRANULOCYTES # BLD: 0.3 10*3/MM3 (ref 0–0.03)
IMM GRANULOCYTES NFR BLD: 2.8 % (ref 0–0.6)
LYMPHOCYTES # BLD AUTO: 1.31 10*3/MM3 (ref 0.6–4.8)
LYMPHOCYTES NFR BLD AUTO: 12.1 % (ref 24–44)
MAGNESIUM SERPL-MCNC: 2.3 MG/DL (ref 1.3–2.7)
MCH RBC QN AUTO: 29.9 PG (ref 27–31)
MCHC RBC AUTO-ENTMCNC: 33.2 G/DL (ref 32–36)
MCV RBC AUTO: 90.1 FL (ref 80–99)
MONOCYTES # BLD AUTO: 1.86 10*3/MM3 (ref 0–1)
MONOCYTES NFR BLD AUTO: 17.2 % (ref 0–12)
NEUTROPHILS # BLD AUTO: 7.19 10*3/MM3 (ref 1.5–8.3)
NEUTROPHILS NFR BLD AUTO: 66.6 % (ref 41–71)
PHOSPHATE SERPL-MCNC: 2.5 MG/DL (ref 2.4–5.1)
PLATELET # BLD AUTO: 184 10*3/MM3 (ref 150–450)
PMV BLD AUTO: 11.4 FL (ref 6–12)
POTASSIUM BLD-SCNC: 4.3 MMOL/L (ref 3.5–5.5)
PROT SERPL-MCNC: 5.9 G/DL (ref 5.7–8.2)
RBC # BLD AUTO: 4.15 10*6/MM3 (ref 4.2–5.76)
SODIUM BLD-SCNC: 134 MMOL/L (ref 132–146)
WBC NRBC COR # BLD: 10.81 10*3/MM3 (ref 3.5–10.8)

## 2018-06-18 PROCEDURE — 25010000002 HEPARIN (PORCINE) PER 1000 UNITS: Performed by: NURSE PRACTITIONER

## 2018-06-18 PROCEDURE — 74018 RADEX ABDOMEN 1 VIEW: CPT

## 2018-06-18 PROCEDURE — 97530 THERAPEUTIC ACTIVITIES: CPT

## 2018-06-18 PROCEDURE — 97110 THERAPEUTIC EXERCISES: CPT

## 2018-06-18 PROCEDURE — 97535 SELF CARE MNGMENT TRAINING: CPT

## 2018-06-18 PROCEDURE — 99233 SBSQ HOSP IP/OBS HIGH 50: CPT | Performed by: INTERNAL MEDICINE

## 2018-06-18 PROCEDURE — 80053 COMPREHEN METABOLIC PANEL: CPT | Performed by: INTERNAL MEDICINE

## 2018-06-18 PROCEDURE — 25010000003 CEFTRIAXONE PER 250 MG: Performed by: INTERNAL MEDICINE

## 2018-06-18 PROCEDURE — 83605 ASSAY OF LACTIC ACID: CPT | Performed by: INTERNAL MEDICINE

## 2018-06-18 PROCEDURE — 99231 SBSQ HOSP IP/OBS SF/LOW 25: CPT | Performed by: PHYSICIAN ASSISTANT

## 2018-06-18 PROCEDURE — 85025 COMPLETE CBC W/AUTO DIFF WBC: CPT | Performed by: INTERNAL MEDICINE

## 2018-06-18 PROCEDURE — 84100 ASSAY OF PHOSPHORUS: CPT | Performed by: INTERNAL MEDICINE

## 2018-06-18 PROCEDURE — 83735 ASSAY OF MAGNESIUM: CPT | Performed by: INTERNAL MEDICINE

## 2018-06-18 RX ORDER — OXYCODONE HYDROCHLORIDE 5 MG/1
5 TABLET ORAL EVERY 6 HOURS PRN
Status: DISCONTINUED | OUTPATIENT
Start: 2018-06-18 | End: 2018-06-25 | Stop reason: HOSPADM

## 2018-06-18 RX ORDER — FENTANYL 25 UG/H
1 PATCH TRANSDERMAL
Status: DISCONTINUED | OUTPATIENT
Start: 2018-06-18 | End: 2018-06-19

## 2018-06-18 RX ADMIN — HEPARIN SODIUM 5000 UNITS: 5000 INJECTION, SOLUTION INTRAVENOUS; SUBCUTANEOUS at 20:08

## 2018-06-18 RX ADMIN — ASPIRIN 81 MG: 81 TABLET, COATED ORAL at 08:46

## 2018-06-18 RX ADMIN — SENNOSIDES AND DOCUSATE SODIUM 2 TABLET: 8.6; 5 TABLET ORAL at 08:46

## 2018-06-18 RX ADMIN — FENTANYL 1 PATCH: 25 PATCH, EXTENDED RELEASE TRANSDERMAL at 09:11

## 2018-06-18 RX ADMIN — CEFTRIAXONE SODIUM 2 G: 2 INJECTION, SOLUTION INTRAVENOUS at 12:11

## 2018-06-18 RX ADMIN — OXYCODONE HYDROCHLORIDE 5 MG: 5 TABLET ORAL at 12:45

## 2018-06-18 RX ADMIN — MULTIPLE VITAMINS W/ MINERALS TAB 1 TABLET: TAB ORAL at 08:46

## 2018-06-18 RX ADMIN — OXYCODONE HYDROCHLORIDE 5 MG: 5 TABLET ORAL at 20:08

## 2018-06-18 RX ADMIN — HEPARIN SODIUM 5000 UNITS: 5000 INJECTION, SOLUTION INTRAVENOUS; SUBCUTANEOUS at 08:47

## 2018-06-18 NOTE — PLAN OF CARE
Problem: Patient Care Overview  Goal: Plan of Care Review  Outcome: Ongoing (interventions implemented as appropriate)   06/18/18 2094   Coping/Psychosocial   Plan of Care Reviewed With patient;daughter   OTHER   Outcome Summary Able to perform STS x 3 w/max 2A,w/o AD (Req.close guarding d/t knees buckling, back pain), commode transf, & ther exer x 10 w/freq rests d/t signif wheezing, elev BP & INCR. back pain(despite incr. pain meds & fentanyl patch);noted decr HGB (12.4); Will now have Rad Rx to shrink spine tumor    Plan of Care Review   Progress improving

## 2018-06-18 NOTE — PLAN OF CARE
Problem: Patient Care Overview  Goal: Plan of Care Review  Outcome: Ongoing (interventions implemented as appropriate)   06/18/18 0135   Coping/Psychosocial   Plan of Care Reviewed With patient   OTHER   Outcome Summary Pt dependent for transfers this date and unable to reach full standing position or assist with SPT. Pt would benefit from lift room. Pt requires maxAx2 for lyle care. Pt combed hair and brushed teeth with set up. Continue IP OT per POC.   Plan of Care Review   Progress improving

## 2018-06-18 NOTE — PROGRESS NOTES
"                  Clinical Nutrition     Nutrition Assessment  Reason for Visit:   Follow-up protocol      Patient Name: Harris Shane  YOB: 1927  MRN: [id number]  Date of Encounter: 06/18/18 9:49 AM  Admission date: 6/12/2018        Nutrition Assessment   Assessment       Hospital Problem List  Principal Problem:    Sepsis  Active Problems:    Elevated liver enzymes    Prostate cancer (Mets to L4-L5)    Pulmonary infiltrate, LLLobe    Cholelithiases of GB neck per CT A/P    Acute renal insufficiency, CRE 1.43, unknown baseline    Blindness of right eye    WILLIAM (obstructive sleep apnea) remote, intolerant of CPAP    R/O Cholecystitis    NSTEMI (non-ST elevated myocardial infarction) (R/O Type 2)    Bacteremia due to Escherichia coli      PMH: He  has a past medical history of Blindness of right eye (6/13/2018); Cancer; DVT (deep venous thrombosis) (1990's); 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   PSxH: He  has a past surgical history that includes Eye surgery.     Anthropometrics     Height: 170.2 cm (67.01\")  Last filed wt: Weight: 75.5 kg (166 lb 8 oz) (06/18/18 0500)  Weight Method: Bed scale    BMI: BMI (Calculated): 29.8  Overweight: 25.0-29.9kg/m2     Ideal Body Weight (IBW) (kg): 68.12      Labs reviewed       Results from last 7 days  Lab Units 06/18/18  0625 06/16/18  0508 06/15/18  0337   GLUCOSE mg/dL 92 84 99   BUN mg/dL 16 21 24*   CREATININE mg/dL 0.82 0.88 1.03   SODIUM mmol/L 134 135 135   CHLORIDE mmol/L 101 102 104   POTASSIUM mmol/L 4.3 3.4* 3.8   PHOSPHORUS mg/dL 2.5 1.8* 2.0*   MAGNESIUM mg/dL 2.3 2.1 2.2   ALT (SGPT) U/L 74* 94* 136*       Results from last 7 days  Lab Units 06/18/18  0625 06/16/18  0508 06/15/18  0337  06/13/18  0509   ALBUMIN g/dL 3.52 3.19* 3.29  < > 3.65   CRP mg/dL  --   --   --   --  4.29*   IONIZED CALCIUM mmol/L  --   --   --   --  " 1.17   < > = values in this interval not displayed.        Results from last 7 days  Lab Units 06/15/18  0716 06/14/18  2051 06/14/18  1546 06/14/18  1046 06/14/18  0718 06/13/18 2024   GLUCOSE mg/dL 101 124 99 109 101 110     No results found for: HGBA1C      Intake/Ouptut 24 hrs (7:00AM - 6:59 AM)     Intake & Output (last day)       06/17 0701 - 06/18 0700 06/18 0701 - 06/19 0700    P.O. 480 120    IV Piggyback 300     Total Intake(mL/kg) 780 (10.3) 120 (1.6)    Urine (mL/kg/hr) 1050 (0.6)     Stool 0 (0)     Total Output 1050      Net -270 +120          Unmeasured Urine Occurrence 7 x 2 x    Unmeasured Stool Occurrence 3 x             Current Nutrition Prescription     PO: Diet Regular; GI Soft/Rosebud    Active Supplement Orders      Dietary Nutrition Supplements Boost Plus TID    Intake:  Per charting pt consuming 75% of 6 meals (6/16/18-6/18/18)         Nutrition Diagnosis       Problem Nutrition appropriate for condition at this time   Etiology    Signs/Symptoms PO intake 75% of 6 meals       Monitoring/Evaluation:   Per protocol, PO intake, Supplement intake      Will Continue to follow per protocol      Gerda Porter  Time Spent: 15 minutes

## 2018-06-18 NOTE — THERAPY TREATMENT NOTE
Acute Care - Occupational Therapy Treatment Note  [facility]     Patient Name: Harris Shane  : 9/15/1927  MRN: [id number]  Today's Date: 2018  Onset of Illness/Injury or Date of Surgery: 18  Date of Referral to OT: 18  Referring Physician: Case, DO    Admit Date: 2018       ICD-10-CM ICD-9-CM   1. Sepsis, due to unspecified organism A41.9 038.9     995.91   2. Elevated troponin R74.8 790.6   3. Elevated liver enzymes R74.8 790.5   4. Hyperbilirubinemia E80.6 782.4   5. History of prostate cancer Z85.46 V10.46   6. Metastatic cancer to spine C79.51 198.5   7. Pneumonia of left lower lobe due to infectious organism J18.1 486   8. Biliary calculus of other site with obstruction K80.81 [id number]   9. Impaired functional mobility, balance, gait, and endurance Z74.09 V49.89   10. Impaired mobility and ADLs Z74.09 799.89     Patient Active Problem List   Diagnosis   • Sepsis   • Elevated liver enzymes   • Prostate cancer (Mets to L4-L5)   • Pulmonary infiltrate, LLLobe   • Cholelithiases of GB neck per CT A/P   • Acute renal insufficiency, CRE 1.43, unknown baseline   • Blindness of right eye   • WILLIAM (obstructive sleep apnea) remote, intolerant of CPAP   • R/O Cholecystitis   • NSTEMI (non-ST elevated myocardial infarction) (R/O Type 2)   • Bacteremia due to Escherichia coli     Past Medical History:   Diagnosis Date   • Blindness of right eye 2018   • Cancer     PROSTATE   • DVT (deep venous thrombosis) 's   • Metastatic cancer to spine, L4-L5 2018   • Myocardial infarction 's   • WILLIAM (obstructive sleep apnea) 2018   • UTI (urinary tract infection)      Past Surgical History:   Procedure Laterality Date   • EYE SURGERY      RIGHT       Therapy Treatment          Rehabilitation Treatment Summary     Row Name 18 1410             Treatment Time/Intention    Discipline occupational therapist  -      Document Type therapy note (daily note)  -HK      Subjective  Information complains of;weakness;fatigue  -HK      Mode of Treatment individual therapy;occupational therapy  -HK      Patient/Family Observations Pt received upright in chair.   -HK      Care Plan Review care plan/treatment goals reviewed;risks/benefits reviewed;patient/other agree to care plan  -HK      Patient Effort adequate  -HK      Existing Precautions/Restrictions fall  -HK      Recorded by [HK] Holli Oneal, OT 06/18/18 1551      Row Name 06/18/18 1410             Vital Signs    Pre Systolic BP Rehab --   RN cleared for tx  -HK      Pre Patient Position Sitting  -HK      Intra Patient Position Standing  -HK      Post Patient Position Sitting  -HK      Recorded by [HK] Holli Oneal OT 06/18/18 1551      Row Name 06/18/18 1410             Cognitive Assessment/Intervention    Additional Documentation Cognitive Assessment/Intervention (Group)  -HK      Recorded by [HK] Holli Oneal OT 06/18/18 1551      Row Name 06/18/18 1410             Cognitive Assessment/Intervention- PT/OT    Affect/Mental Status (Cognitive) WFL  -HK      Orientation Status (Cognition) oriented x 4  -HK      Follows Commands (Cognition) follows one step commands;50-74% accuracy  -HK      Cognitive Function (Cognitive) safety deficit  -HK      Safety Deficit (Cognitive) safety precautions follow-through/compliance;safety precautions awareness;ability to follow commands;awareness of need for assistance;insight into deficits/self awareness  -HK      Personal Safety Interventions fall prevention program maintained;gait belt;nonskid shoes/slippers when out of bed  -HK      Recorded by [HK] Holli Oneal, OT 06/18/18 1551      Row Name 06/18/18 1410             Bed Mobility Assessment/Treatment    Comment (Bed Mobility) pt received and left UIC  -HK      Recorded by [HK] Holli Oneal, OT 06/18/18 1551      Row Name 06/18/18 1410             Functional Mobility    Functional Mobility- Ind. Level not appropriate to assess  -HK      Recorded by  [HK] Holli Oneal, OT 06/18/18 1551      Row Name 06/18/18 1410             Transfer Assessment/Treatment    Transfer Assessment/Treatment toilet transfer;squat pivot transfer  -HK      Comment (Transfers) verbal cues for hand placement and sequencing. Pt unable to fully extend legs to reach full standing position.   -HK      [place] Level (Toilet Transfer) dependent (less than 25% patient effort);2 person assist;verbal cues  -HK      Assistive Device (Toilet Transfer) commode, bedside without drop arms  -HK      Recorded by [HK] Holli Oneal, OT 06/18/18 1551      Row Name 06/18/18 1410             Squat Pivot Transfer    Assistive Device (Squat Pivot Transfers) other (see comments)  -HK      Squat Pivot [place] (Transfers) dependent (less than 25% patient effort);2 person assist;verbal cues  -HK      Recorded by [HK] Holli Oneal, OT 06/18/18 1551      Row Name 06/18/18 1410             Toilet Transfer    Type (Toilet Transfer) squat pivot  -HK      Recorded by [HK] Holli Oneal, OT 06/18/18 1551      Row Name 06/18/18 1410             ADL Assessment/Intervention    54376 - OT Self Care/Mgmt Minutes 35  -HK      BADL Assessment/Intervention grooming;toileting  -HK      Recorded by [HK] Holli Oneal, OT 06/18/18 1551      Row Name 06/18/18 1410             Grooming Assessment/Training    [place] Level (Grooming) hair care, combing/brushing;oral care regimen;verbal cues;set up  -HK      Grooming Position unsupported sitting  -HK      Comment (Grooming) frequent cues to sit upright to engage core muscles. pt continually attempting to lean back.   -HK      Recorded by [HK] Holli Oneal, OT 06/18/18 1551      Row Name 06/18/18 1410             Toileting Assessment/Training    [place] Level (Toileting) toileting skills;maximum assist (25% patient effort);two person assist required;verbal cues  -HK      Assistive Devices (Toileting) commode, bedside without drop arms  -HK      Toileting Position  unsupported sitting  -HK      Comment (Toileting) Pt requires maxAx2 for lyle care and standing for pericare.   -HK      Recorded by [HK] Holli Oneal, OT 06/18/18 1551      Row Name 06/18/18 1410             BADL Safety/Performance    Impairments, BADL Safety/Performance balance;strength;motor control;endurance/activity tolerance  -HK      Recorded by [HK] Holli Oneal OT 06/18/18 1551      Row Name 06/18/18 1410             Therapeutic Exercise    25690 - OT Therapeutic Activity Minutes 10  -HK      Recorded by [HK] Holli Oneal, OT 06/18/18 1551      Row Name 06/18/18 1410             Balance    Balance static sitting balance  -HK      Recorded by [HK] Holli Oneal, OT 06/18/18 1551      Row Name 06/18/18 1410             Static Sitting Balance    Level of [place] (Unsupported Sitting, Static Balance) contact guard assist  -HK      Sitting Position (Unsupported Sitting, Static Balance) sitting in chair  -HK      Comment (Unsupported Sitting, Static Balance) frequent cues to maintain sitting balance  -HK      Recorded by [HK] Holli Oneal, OT 06/18/18 1551      Row Name 06/18/18 1410             Static Standing Balance    Level of [place] (Supported Standing, Static Balance) dependent  -HK      Time Able to Maintain Position (Supported Standing, Static Balance) less than 15 seconds  -HK      Recorded by [HK] Holli Oneal, OT 06/18/18 1551      Row Name 06/18/18 1410             Positioning and Restraints    Pre-Treatment Position sitting in chair/recliner  -HK      Post Treatment Position chair  -HK      In Chair notified nsg;sitting;call light within reach;encouraged to call for assist;with nsg  -HK      Recorded by [HK] Holli Oneal, OT 06/18/18 1551      Row Name 06/18/18 1410             Pain Assessment    Additional Documentation Pain Scale: Numbers Pre/Post-Treatment (Group)  -HK      Recorded by [HK] Holli Oneal OT 06/18/18 1551      Row Name 06/18/18 1410             Pain Scale: Numbers  Pre/Post-Treatment    Pain Scale: Numbers, Pretreatment 2/10  -HK      Pain Scale: Numbers, Post-Treatment 2/10  -HK      Recorded by [HK] Holli Oneal OT 06/18/18 1551      Row Name 06/18/18 1410             Plan of Care Review    Plan of Care Reviewed With patient  -HK      Recorded by [HK] Holli Oneal OT 06/18/18 1551      Row Name 06/18/18 1410             Outcome Summary/Treatment Plan (OT)    Daily Summary of Progress (OT) progress toward functional goals is gradual  -HK      Recorded by [HK] Holli Oneal OT 06/18/18 1551        User Key  (r) = Recorded By, (t) = Taken By, (c) = Cosigned By    Initials Name Effective Dates Discipline     Holli Oneal OT 03/07/18 -  OT               Occupational Therapy Education     Title: PT OT SLP Therapies (Active)     Topic: Occupational Therapy (Active)     Point: ADL training (Done)     Description: Instruct learner(s) on proper safety adaptation and remediation techniques during self care or transfers.   Instruct in proper use of assistive devices.   Learning Progress Summary     Learner Status Readiness Method Response Comment Documented by    Patient Done Acceptance E VU Pt educated on ADL retraining with toileting, safety precautions, and appropriate body mechanics  06/18/18 1551     Done Acceptance E GENIE,NR Educated pt on OT role and benefits of tx. AN 06/15/18 1341          Point: Precautions (Done)     Description: Instruct learner(s) on prescribed precautions during self-care and functional transfers.   Learning Progress Summary     Learner Status Readiness Method Response Comment Documented by    Patient Done Acceptance E VU Pt educated on ADL retraining with toileting, safety precautions, and appropriate body mechanics  06/18/18 1551          Point: Body mechanics (Done)     Description: Instruct learner(s) on proper positioning and spine alignment during self-care, functional mobility activities and/or exercises.   Learning Progress Summary      Learner Status Readiness Method Response Comment Documented by    Patient Done Acceptance E VU Pt educated on ADL retraining with toileting, safety precautions, and appropriate body mechanics  06/18/18 1551                      User Key     Initials Effective Dates Name Provider Type Discipline    AN 06/22/15 -  Crista Zimmerman, OT Occupational Therapist OT     03/07/18 -  Holli Oneal OT Occupational Therapist OT                OT Recommendation and Plan  Outcome Summary/Treatment Plan (OT)  Daily Summary of Progress (OT): progress toward functional goals is gradual  Daily Summary of Progress (OT): progress toward functional goals is gradual  Plan of Care Review  Plan of Care Reviewed With: patient  Plan of Care Reviewed With: patient  Outcome Summary: Pt dependent for transfers this date and unable to reach full standing position or assist with SPT. Pt would benefit from lift room. Pt requires maxAx2 for lyle care. Pt combed hair and brushed teeth with set up. Continue IP OT per POC.        Outcome Measures     Row Name 06/18/18 1410             How much help from another is currently needed...    Putting on and taking off regular lower body clothing? 2  -HK      Bathing (including washing, rinsing, and drying) 2  -HK      Toileting (which includes using toilet bed pan or urinal) 2  -HK      Putting on and taking off regular upper body clothing 2  -HK      Taking care of personal grooming (such as brushing teeth) 3  -HK      Eating meals 3  -HK      Score 14  -HK         Functional Assessment    Outcome Measure Options AM-PAC 6 Clicks Daily Activity (OT)  -HK        User Key  (r) = Recorded By, (t) = Taken By, (c) = Cosigned By    Initials Name Provider Type    HK Holli Oneal, OT Occupational Therapist           Time Calculation:         Time Calculation- OT     Row Name 06/18/18 1410             Time Calculation- OT    OT Received On 06/18/18  -      OT Goal Re-Cert Due Date 06/25/18  -         Timed  Charges    72433 - OT Therapeutic Activity Minutes 10  -HK      61523 - OT Self Care/Mgmt Minutes 35  -HK        User Key  (r) = Recorded By, (t) = Taken By, (c) = Cosigned By    Initials Name Provider Type     Holli Oneal OT Occupational Therapist           Therapy Suggested Charges     Code   Minutes Charges    61063 (CPT®) Hc Ot Neuromusc Re Education Ea 15 Min      58255 (CPT®) Hc Ot Ther Proc Ea 15 Min      42355 (CPT®) Hc Gait Training Ea 15 Min      82521 (CPT®) Hc Ot Therapeutic Act Ea 15 Min 10 1    88537 (CPT®) Hc Ot Manual Therapy Ea 15 Min      36494 (CPT®) Hc Ot Iontophoresis Ea 15 Min      48568 (CPT®) Hc Ot Elec Stim Ea-Per 15 Min      93293 (CPT®) Hc Ot Ultrasound Ea 15 Min      78586 (CPT®) Hc Ot Self Care/Mgmt/Train Ea 15 Min 35 2    Total  45 3        Therapy Charges for Today     Code Description Service Date Service Provider Modifiers Qty    62884751233 HC OT SELF CARE/MGMT/TRAIN EA 15 MIN 6/18/2018 Holli Oneal OT GO 2    91378248403 HC OT THERAPEUTIC ACT EA 15 MIN 6/18/2018 Holli Oneal OT GO 1               Holli Oneal OT  6/18/2018

## 2018-06-18 NOTE — PROGRESS NOTES
Continued Stay Note  [facility]     Patient Name: Harris Shane  MRN: [id number]  Today's Date: 6/18/2018    Admit Date: 6/12/2018          Discharge Plan     Row Name 06/18/18 1155       Plan    Plan SNF    Patient/Family in Agreement with Plan yes    Plan Comments I spoke with the pt and his daughter Loida in the room. They are interested in short term SNF rehab at [place]. They are aware radiation treatments as an outpt may make finding an accepting facility more difficult but they want him started on the rad tx asap. He was supposed to have them started at [facility] before he ended up in the hospital. States he was to have 5 treatments. They wish to start this while he is here if possible. [facility], [facility] or [facility]. If TX is completed while inpt here then [facility] is the preferred location. If he has to stay in [facility] then the other facilites are requested. [facility] are aware [facility].  [facility] and have called a referral to [place] at [facility] to renetta.              Discharge Codes    No documentation.       Expected Discharge Date and Time     Expected Discharge Date Expected Discharge Time    Jun 20, 2018             Stacia Graham RN

## 2018-06-18 NOTE — PROGRESS NOTES
Continued Stay Note  [facility]     Patient Name: Harris Shane  MRN: [id number]  Today's Date: 6/18/2018    Admit Date: 6/12/2018          Discharge Plan     Row Name 06/18/18 1528       Plan    Plan Comments The daughter has asked me to check [facility] as an option. I called the referal to Barbara at [facility] and they could not accept the pt if he goes to [facility] [place]. Will keep working on PLT.    Final Discharge Disposition Code 03 - skilled nursing facility (SNF)              Discharge Codes    No documentation.       Expected Discharge Date and Time     Expected Discharge Date Expected Discharge Time    Jun 20, 2018             Stacia Graham RN

## 2018-06-18 NOTE — PLAN OF CARE
Problem: Patient Care Overview  Goal: Plan of Care Review  Outcome: Ongoing (interventions implemented as appropriate)   06/15/18 0855 06/17/18 1458 06/17/18 2200   Coping/Psychosocial   Plan of Care Reviewed With --  --  patient   OTHER   Outcome Summary --  Pt up to BSC with max 2 assist, unstable, c/o pain to left leg raditing from back, VSS --    Plan of Care Review   Progress improving --  --      Goal: Individualization and Mutuality  Outcome: Ongoing (interventions implemented as appropriate)   06/14/18 0525   Individualization   Patient Specific Preferences Transfer out of ICU   Patient Specific Interventions Up to BSC for urination     Goal: Discharge Needs Assessment  Outcome: Ongoing (interventions implemented as appropriate)   06/13/18 1247 06/17/18 1458   Discharge Needs Assessment   Readmission Within the Last 30 Days no previous admission in last 30 days --    Concerns to be Addressed discharge planning;home safety --    Concerns Comments Assess for home needs prior to discharge --    Patient/Family Anticipates Transition to home with family --    Patient/Family Anticipated Services at Transition home health care --    Transportation Concerns car, none --    Transportation Anticipated family or friend will provide --    Anticipated Changes Related to Illness inability to care for self --    Equipment Needed After Discharge other (see comments) --    Outpatient/Agency/Support Group Needs other (see comments) --    Discharge Facility/Level of Care Needs other (see comments) --    Offered/Gave Vendor List no --    Current Discharge Risk physical impairment --    Discharge Coordination/Progress --  daughter request CM to follow up for rehab   Disability   Equipment Currently Used at Home walker, rolling;wheelchair;shower chair --      Goal: Interprofessional Rounds/Family Conf  Outcome: Ongoing (interventions implemented as appropriate)   06/17/18 1458   Interdisciplinary Rounds/Family Conf   Participants  ;nursing;physician;physical therapy       Problem: Fall Risk (Adult)  Goal: Absence of Fall  Outcome: Ongoing (interventions implemented as appropriate)   06/17/18 1458   Fall Risk (Adult)   Absence of Fall making progress toward outcome       Problem: Sepsis/Septic Shock (Adult)  Goal: Signs and Symptoms of Listed Potential Problems Will be Absent, Minimized or Managed (Sepsis/Septic Shock)  Outcome: Ongoing (interventions implemented as appropriate)   06/17/18 0313   Goal/Outcome Evaluation   Problems Assessed (Sepsis) all   Problems Present (Sepsis) none       Problem: Pneumonia (Adult)  Goal: Signs and Symptoms of Listed Potential Problems Will be Absent, Minimized or Managed (Pneumonia)  Outcome: Ongoing (interventions implemented as appropriate)   06/17/18 0313   Goal/Outcome Evaluation   Problems Assessed (Pneumonia) all   Problems Present (Pneumonia) none       Problem: Skin Injury Risk (Adult)  Goal: Skin Health and Integrity  Outcome: Ongoing (interventions implemented as appropriate)   06/17/18 1458   Skin Injury Risk (Adult)   Skin Health and Integrity making progress toward outcome

## 2018-06-18 NOTE — H&P
CONSULTATION NOTE      :                                                          9/15/1927  DATE OF CONSULTATION:                       2018   REQUESTING PHYSICIAN:                   No ref. provider found  REASON FOR CONSULTATION:            Cancer Staging  No matching staging information was found for the patient.    I am seeing Mr. Shane is an inpatient today at the request of the primary medicine team for evaluation of metastatic prostate cancer.     BRIEF HISTORY:  The patient is a very pleasant 89 y.o. 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  He is had a prolonged hospital stay as a result of this requiring intensive care unit admission, intervention, and IV antibiotics, and now that he has recovered, he is obviously significantly weak and somewhat debilitated due to this hospital stay, and he is being prepared for short-term rehabilitation had a skilled nursing facility.  His recovery has since been complicated by the fact that he has metastatic prostate cancer in his lumbar spine, specifically at L4-5, causing him low back pain as well as weakness in his right lower extremity.  Prior to admission, he had been evaluated by Nemo Pacheco, who is planning to perform 5 external beam radiation therapy treatments to his lumbar spine as an outpatient.  Considering that he is currently admitted and awaiting placement at rehabilitation facility, I am being asked to evaluate him today in the context of performing this radiation therapy so that he may recover and be placed at a skilled nursing facility.  At this time he continues to complain of low back pain as well as right lower extremity weakness which was present earlier, that he also complains of generalized fatigue and tiredness.  He denies having any new sites of bone pain.    His oncologic history includes a diagnosis of  metastatic prostate cancer to bone only in 2015 he has been managed with hormonal blockade since that time.    Allergies   Allergen Reactions   • Contrast Dye Rash and Other (See Comments)     RASH AND SYNCOPE       Social History     Social History   • Marital status:    • Number of children: 3     Social History Main Topics   • Smoking status: Former Smoker     Types: Pipe   • Smokeless tobacco: Never Used      Comment: QUIT IN THE 70'S   • Alcohol use No   • Drug use: No   • Sexual activity: Defer     Other Topics Concern   • Not on file     Social History Narrative    Patient is  and lives with his spouse and one of his daughters.       Past Medical History:   Diagnosis Date   • Blindness of right eye 6/13/2018   • Cancer     PROSTATE   • DVT (deep venous thrombosis) 1990's   • Metastatic cancer to spine, L4-L5 6/13/2018   • Myocardial infarction 1970's   • WILLIAM (obstructive sleep apnea) 6/13/2018   • UTI (urinary tract infection)        family history includes Breast cancer in his mother; Coronary artery disease in his brother and father; No Known Problems in his sister; Prostate cancer in his father; Stroke in his brother.     Past Surgical History:   Procedure Laterality Date   • EYE SURGERY      RIGHT        Review of Systems - Oncology        Objective   VITAL SIGNS:   Vitals:    06/17/18 0801 06/17/18 2000 06/18/18 0500 06/18/18 0705   BP: 151/92 158/92     BP Location: Left arm      Patient Position: Lying      Pulse: 74      Resp: 18 18 18   Temp: 98.1 °F (36.7 °C) 97.9 °F (36.6 °C)  98.5 °F (36.9 °C)   TempSrc: Oral Oral  Oral   SpO2: 97%      Weight:   75.5 kg (166 lb 8 oz)    Height:            No Data Recorded      Physical Exam   Constitutional: He is oriented to person, place, and time. He appears well-developed and well-nourished. No distress.   He appears younger than his stated age.   HENT:   Head: Normocephalic and atraumatic.   Mouth/Throat: Oropharynx is clear and moist.   Eyes:    He has an atrophic right globe, with right eye ptosis and tethering.  He is blind in the right eye, left eye tracks and responds to light normally.   Neck: Normal range of motion. Neck supple.   Cardiovascular: Normal rate and regular rhythm.  Exam reveals no friction rub.    No murmur heard.  Pulmonary/Chest: Effort normal and breath sounds normal. He has no wheezes.   Abdominal: Soft. Bowel sounds are normal. He exhibits no distension and no mass. There is no tenderness.   Musculoskeletal: Normal range of motion. He exhibits no edema.        Right shoulder: He exhibits decreased strength.   His strength is diffusely 3+, and in the right lower extremity he has 2+ strength to dorsiflexion and plantarflexion.   Lymphadenopathy:     He has no cervical adenopathy.   Neurological: He is alert and oriented to person, place, and time. No cranial nerve deficit. He displays a negative Romberg sign. He displays no Babinski's sign on the right side. He displays no Babinski's sign on the left side.   Reflex Scores:       Patellar reflexes are 3+ on the right side.       Achilles reflexes are 3+ on the right side.  Skin: Skin is warm and dry.   Psychiatric: He has a normal mood and affect. His behavior is normal. Judgment and thought content normal.   Nursing note and vitals reviewed.    IMAGING  I have personally reviewed her relevant imaging studies, as follows:  Ct Abdomen Pelvis Without Contrast    Result Date: 6/13/2018  EXAM:   CT Abdomen and Pelvis Without Intravenous Contrast  Bones/joints:  Sclerosis within L4 and especially L5 vertebrae, suspicious for potential osteoblastic metastases.  Thoracolumbar mild scoliosis.  Degenerative changes in the spine.  No acute fracture.  No dislocation.   Soft tissues:  Minimal umbilical hernia present, containing only Fat.   Vasculature:  Scattered atherosclerotic placques are seen along some vessels.  No aortic aneurysm.   Lymph nodes:  Possibly small perivesical lymph nodes.   No enlarged lymph nodes visualized.     Ct Lumbar Spine Wo Contrast    Result Date: 6/6/2018  EXAMINATION: CT LUMBAR SPINE WO CONTRAST-06/06/2018: markedly abnormal diffuse sclerotic change in the L5 vertebral body. This corresponds precisely with the abnormality noted on the bone scan of 05/25/2018.      The trabecular pattern is markedly abnormal at L5 with diffuse sclerosis. There are otherwise diffuse osteoarthritic changes. The L5 abnormality corresponds to the bone scan abnormality on 05/25/2018. The most likely diagnosis with this finding in a patient of this age would be metastatic carcinoma of the prostate gland.  D:  06/06/2018 E:  06/06/2018    This report was finalized on 6/6/2018 1:07 PM by Dr. Reginald Anguiano MD.      Nm Bone Scan Whole Body    Result Date: 5/29/2018  EXAMINATION: NM BONE SCAN WHOLE-BODY - 05/25/2018  INDICATION:  M79.605-Pain in left leg.  TECHNIQUE: Patient was injected with 27.0 mCi of Technetium MDP and scanned after a four-hour delay.  COMPARISON: None.  FINDINGS: There is thoracolumbar scoliosis with convexity to the right. There are small areas of somewhat eccentric activity in the thoracic spine consistent with arthritic change. Arthritic changes are also noted in the wrists, hands and knees. There is intensely increased activity in the region of the L5 vertebral body.      There is intensely increased activity involving the L5 vertebral body, greater on the left than on the right. This should be correlated with plain radiographs or CT imaging. Other findings are arthritic in nature.   DICTATED:     05/25/2018 EDITED/ls :     05/25/2018  This report was finalized on 5/29/2018 9:21 AM by Dr. Reginald Anguiano MD.       PATHOLOGY  There currently is no pathology available in the system.  We have contacted [facility] to receive these records.    The following portions of the patient's history were reviewed and updated as appropriate: allergies, current medications, past family  history, past medical history, past social history, past surgical history and problem list.    Assessment  Mr. Shane is a 89-year-old gentleman with a known diagnosis of metastatic prostate cancer most significant weight affecting his lumbar spine.  On CT scans as well as bone nuclear imaging he has evidence of a large lesion involving the L5 vertebral body which I think is contributing to both of the symptoms of pain and weakness.   He is certainly likely to benefit from a course of radiation therapy to the lumbar spine, and given his current situation where he will need to be place in the subacute rehabilitation facility, I agree that I think he would be in his best interest to move up these treatments and get them accomplished while he is in inpatient prior to transfer.  I have met and examined the patient as well as contacted his daughter, Loida Shane, who is his power of .  I discussed the logistics and rationale for treating him now and I think he would benefit from a single fraction of 8 Gray treating the lumbar spine.  She was agreeable, but requested that I contact his medical oncologist Dr. Maco Mejia to discuss treatment with him.  I attempted to call Dr. hobson afternoon at 4 PM, but he is unfortunately out-of-town on vacation.  I have not been able to obtain consent as of yet as there is no power of  on file here at [facility].  Mrs. Shane states that she is coming tomorrow morning to see her father again, so I will meet her at that time.  If we are able to complete the consents tomorrow, then I will likely have his treatment completed before Wednesday, June 20.    Thank you for allowing me to participate in the care of this individual.    Sincerely,       Donte Dolan MD

## 2018-06-18 NOTE — PROGRESS NOTES
[facility] Medicine Services  PROGRESS NOTE    Patient Name: Harris Shane  : 9/15/1927  MRN: [id number]    Date of Admission: 2018  Length of Stay: 6  Primary Care Physician: Charli Mccormack MD    Subjective   Subjective     CC:  Severe weakness    Subjective:  Resting in a chair in no acute distress.Tells me that he had a rough night last night because his pain was not controlled.  This morning his pain was okay.  still is very weak.  No F/C, no N/V or abdominal pain.  Review of Systems      Otherwise ROS is negative except as mentioned in the HPI.    Objective   Objective     Vital Signs:   Temp:  [97.9 °F (36.6 °C)-98.5 °F (36.9 °C)] 98.5 °F (36.9 °C)  Resp:  [18] 18  BP: (158)/(92) 158/92        Physical Exam:  Constitutional: No acute distress, awake, alert  Eyes: PERRLA, sclerae anicteric, no conjunctival injection. Right eye ptosis and blindness.  HENT: NCAT, mucous membranes moist  Neck: Supple, no thyromegaly, no lymphadenopathy, trachea midline  Respiratory: Clear to auscultation bilaterally, nonlabored respirations   Cardiovascular: RRR, no murmurs, rubs, or gallops, palpable pedal pulses bilaterally  Gastrointestinal: Positive bowel sounds, soft, nontender, mildly distended  Musculoskeletal: No bilateral ankle edema, no clubbing or cyanosis to extremities  Psychiatric: Appropriate affect, cooperative  Neurologic: Awake, alert, follows commands.  Patient does have some weakness of lower extremities however he can lift her legs against gravity.  Patient does have difficulty with ambulating independently.  Skin: No rashes    Results Reviewed:  I have personally reviewed current lab, radiology, and data and agree.      Results from last 7 days  Lab Units 18  0627 18  0508 06/15/18  0337  18  0509   WBC 10*3/mm3 10.81* 9.35 10.21  < > 20.12*   HEMOGLOBIN g/dL 12.4* 11.7* 11.6*  < > 12.0*   HEMATOCRIT % 37.4* 35.0* 35.5*  < > 36.5*   PLATELETS 10*3/mm3  184 143* 123*  < > 153   INR   --   --   --   --  1.11*   < > = values in this interval not displayed.    Results from last 7 days  Lab Units 06/18/18  0625 06/16/18  0508 06/15/18  0337  06/13/18  1740 06/13/18  1105 06/13/18  0509   SODIUM mmol/L 134 135 135  < >  --   --  134   POTASSIUM mmol/L 4.3 3.4* 3.8  < >  --   --  4.2   CHLORIDE mmol/L 101 102 104  < >  --   --  103   CO2 mmol/L 28.0 26.0 26.0  < >  --   --  24.0   BUN mg/dL 16 21 24*  < >  --   --  23   CREATININE mg/dL 0.82 0.88 1.03  < >  --   --  1.37*   GLUCOSE mg/dL 92 84 99  < >  --   --  134*   CALCIUM mg/dL 9.1 8.2* 8.1*  < >  --   --  8.5*   ALT (SGPT) U/L 74* 94* 136*  < >  --   --  296*   AST (SGOT) U/L 43* 46* 72*  < >  --   --  346*   TROPONIN I ng/mL  --   --   --   --  1.489* 1.596* 1.109*   < > = values in this interval not displayed.  Estimated Creatinine Clearance: 63.9 mL/min (by C-G formula based on SCr of 0.82 mg/dL).  No results found for: BNP  No results found for: PHART    Microbiology Results Abnormal     Procedure Component Value - Date/Time    Blood Culture - Blood, [[id number]]  (Normal) Collected:  06/12/18 2152    Lab Status:  Final result Specimen:  Blood from Arm, Left Updated:  06/17/18 2200     Blood Culture No growth at 5 days    Blood Culture - Blood, [[id number]]  (Abnormal)  (Susceptibility) Collected:  06/12/18 2152    Lab Status:  Final result Specimen:  Blood from Arm, Right Updated:  06/15/18 0728     Blood Culture Escherichia coli (A)     Isolated from Aerobic Bottle     Gram Stain Result Aerobic Bottle Gram negative bacilli    Susceptibility      Escherichia coli     SISSY     Ampicillin <=8 ug/ml Susceptible     Ampicillin + Sulbactam <=8/4 ug/ml Susceptible     Aztreonam <=8 ug/ml Susceptible     Cefepime <=8 ug/ml Susceptible     Cefotaxime <=2 ug/ml Susceptible     Ceftriaxone <=8 ug/ml Susceptible     Cefuroxime sodium <=4 ug/ml Susceptible     Ertapenem <=1 ug/ml Susceptible     Gentamicin <=4 ug/ml  Susceptible     Levofloxacin <=2 ug/ml Susceptible     Meropenem <=1 ug/ml Susceptible     Piperacillin + Tazobactam <=16 ug/ml Susceptible     Tetracycline <=4 ug/ml Susceptible     Tobramycin <=4 ug/ml Susceptible     Trimethoprim + Sulfamethoxazole <=2/38 ug/ml Susceptible                    Blood Culture ID, PCR - Blood, [[id number]]  (Abnormal) Collected:  06/12/18 2152    Lab Status:  Final result Specimen:  Blood from Arm, Right Updated:  06/13/18 1421     BCID, PCR Escherichia coli. Identification by BCID PCR. (C)          Imaging Results (last 24 hours)     Procedure Component Value Units Date/Time    XR Abdomen KUB [[id number]] Collected:  06/18/18 0845     Updated:  06/18/18 0927    Narrative:       EXAMINATION: XR ABDOMEN KUB-      INDICATION: Abdominal distention; A41.9-Sepsis, unspecified organism;  R74.8-Abnormal levels of other serum enzymes; E80.6-Other disorders of  bilirubin metabolism; Z85.46-Personal history of malignant neoplasm of  prostate; C79.51-Secondary malignant neoplasm of bone; J18.1-Lobar  pneumonia, unspecified organism; K80.81-Other cholelithiasis with  obstruction.     COMPARISON: 02/06/2015.     FINDINGS: Portable KUB reveals the bowel gas pattern that is  unremarkable. No evidence of obvious obstruction or gross free  intraperitoneal air.  No abnormal mass or calcification is seen within  the abdomen. The bony structures reveal extensive degenerative change is  seen within the spine. Minimal degenerative change is seen within both  hips. No air-fluid level to suggest  an obstruction.       Impression:       Nonspecific bowel gas pattern with no evidence of obvious  obstruction or gross free peritoneal air.     D:  06/18/2018  E:  06/18/2018     This report was finalized on 6/18/2018 9:25 AM by Dr. Chelo Francisco MD.           Results for orders placed during the hospital encounter of 06/12/18   Adult Transthoracic Echo Complete W/ Cont if Necessary Per Protocol    Narrative ·  Left ventricular systolic function is normal. Estimated EF = 55%.  · Left ventricular diastolic dysfunction (grade I) consistent with   impaired relaxation.          I have reviewed the medications.    Assessment/Plan   Assessment / Plan     Hospital Problem List     * (Principal)Sepsis    Elevated liver enzymes    Prostate cancer (Mets to L4-L5)    Pulmonary infiltrate, LLLobe    Cholelithiases of GB neck per CT A/P    Acute renal insufficiency, CRE 1.43, unknown baseline    Blindness of right eye    WILLIAM (obstructive sleep apnea) remote, intolerant of CPAP    R/O Cholecystitis    NSTEMI (non-ST elevated myocardial infarction) (R/O Type 2)    Bacteremia due to Escherichia coli             Brief Hospital Course to date:  Harris Shane is a 89 y.o. male with past medical history significant for prostate cancer with metastases  to L4 on L5, blindness of right eye, weakness.  Patient was admitted to ICU for sepsis.      Assessment & Plan:  *Sepsis markedly improved.    * Escherichia coli bacteremia.    * Elevated liver enzymes, improving.    * Back pain that radiates to lower extremities particularly on the left side.  This is mainly secondary to metastatic disease in L4 and L5.    * Severe generalized weakness particularly lower extremities.  Patient has significant difficulty with ambulating independently.  This weakness is partially secondary to metastatic disease to the spine.    * Metastatic prostate cancer.  Metastases to L4 on L5.  Patient was supposed to start radiation therapy for that.  We need to coordinate with radiation oncology for this.    * Blindness and ptosis of the right eye.    PLAN:  - increase pain medication for better pain control.  - Consult radiation oncology.    NOTE: Had a long conversation with the patient and his family.  The decision was made to consult radiation oncology at [facility].  I did put the order for the oncologist to be consulted.    DVT Prophylaxis:      CODE  STATUS: Full Code    Disposition: TBD      Electronically signed by Sukhwinder Howell MD, 06/18/18, 4:39 PM.

## 2018-06-18 NOTE — PROGRESS NOTES
"GI Daily Progress Note  Subjective:    Chief Complaint:  Weakness     Patient is eating well.  Denies abdominal pain, nausea or vomiting.  He complains of generalized fatigue and weakness.     Objective:    /92   Pulse 74   Temp 98.5 °F (36.9 °C) (Oral)   Resp 18   Ht 170.2 cm (67.01\")   Wt 75.5 kg (166 lb 8 oz)   SpO2 97%   BMI 26.07 kg/m²     Physical Exam   Constitutional: He is oriented to person, place, and time. He appears well-developed. No distress.   HENT:   Head: Normocephalic and atraumatic.   Cardiovascular: Normal rate and regular rhythm.    Pulmonary/Chest: Effort normal. No respiratory distress.   Abdominal: Soft. Bowel sounds are normal. He exhibits no distension. There is no tenderness.   Neurological: He is alert and oriented to person, place, and time.   Skin: Skin is warm and dry.   Psychiatric: His behavior is normal.       Lab  Lab Results   Component Value Date    WBC 10.81 (H) 06/18/2018    HGB 12.4 (L) 06/18/2018    HGB 11.7 (L) 06/16/2018    HGB 11.6 (L) 06/15/2018    MCV 90.1 06/18/2018     06/18/2018    INR 1.11 (H) 06/13/2018    INR 1.05 12/17/2014       Lab Results   Component Value Date    GLUCOSE 92 06/18/2018    BUN 16 06/18/2018    CREATININE 0.82 06/18/2018    EGFRIFNONA 88 06/18/2018    BCR 19.5 06/18/2018    CO2 28.0 06/18/2018    CALCIUM 9.1 06/18/2018    ALBUMIN 3.52 06/18/2018    ALKPHOS 263 (H) 06/18/2018    BILITOT 0.7 06/18/2018    ALT 74 (H) 06/18/2018    AST 43 (H) 06/18/2018       Assessment:    Ascending cholangitis.  Bacteremia due to Escherichia coli.     LFT's improving and no evidence of biliary obstruction or stone on imaging studies. Suspect passed a stone    Plan:    >>> Continue antibiotics.  Recommend 10 days total.  E. Coli is pansensitive and can switch to PO antibiotics at time of discharge  >>> Recommend daily [facility] at discharge to prevent future episodes of cholangitis     Will sign off. Please call for questions or " concerns.      PAXTON Schwab  06/18/18  10:07 AM

## 2018-06-18 NOTE — DISCHARGE PLACEMENT REQUEST
"Helena Shane (89 y.o. Male)   [facility]  From Stacia Graham [phone]    Date of Birth Social Security Number Address Home Phone MRN    09/15/1927  [address] [place] [id number] [phone] [id number]    Zoroastrian Marital Status          Pres[employer]        Admission Date Admission Type Admitting Provider Attending Provider Department, Room/Bed    6/12/18 Emergency Sukhwinder Howell MD [place], Masoud, MD [facility] 5G, S556/1    Discharge Date Discharge Disposition Discharge Destination                       Attending Provider:  Sukhwinder Howell MD    Allergies:  Contrast Dye    Isolation:  None   Infection:  None   Code Status:  FULL    Ht:  170.2 cm (67.01\")   Wt:  75.5 kg (166 lb 8 oz)    Admission Cmt:  None   Principal Problem:  Sepsis [A41.9]                 Active Insurance as of 6/12/2018     Primary Coverage     Payor Plan Insurance Group Employer/Plan Group    MEDICARE MEDICARE A & B      Payor Plan Address Payor Plan Phone Number Effective From Effective To    [address] [phone] 9/1/1992     [facility] [address]       Subscriber Name Subscriber Birth Date Member ID       HELENA SHANE 9/15/1927 [id number]           Secondary Coverage     Payor Plan Insurance Group Employer/Plan Group    [place] [id number]     Payor Plan Address Payor Plan Phone Number Effective From Effective To    [address]  1/1/1989     [facility] [id number]       Subscriber Name Subscriber Birth Date Member ID       HELENA SHANE 9/15/1927 [id number]                 Emergency Contacts      (Rel.) Home Phone Work Phone Mobile Phone    Loida Shane (Daughter) -- -- [phone]    Danyell Shane (Daughter) -- -- [phone]               History & Physical      Charli Duran MD at 6/13/2018  4:08 AM            CRITICAL CARE ADMISSION NOTE    Chief Complaint     Sepsis    History of Present Illness     Helena Shane is a 89 yo " "male who presented to [facility] ED last night at 2042 c/o bilateral lower extremity weakness.   At baseline the patient is able to ambulate with the assistance of walker, however, for the past day, patient has been unable to get out of bed secondary to diffuse weakness, particularly in his bilateral lower extremities. He was unable to walk to supper this evening, which concerned his children and prompted them to bring him to the emergency room. He does admit to some mild chest \"ache\" earlier in the morning but they resolved on their own.  He has also had SOB and some abdominal discomfort when he was struggling to get up and was unable.      He has a history of prostate cancer diagnoses three years ago and was treated with injections and radiation per Dr Mejia, oncologist at [facility]). He is currently on Bicalutamide. 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  He has had BLE pain related to this, R>L, for several months now.    In the ED he was found to be hypotensive with lactate 3.1 and WBC 12.93. Procalcitonin pending. His liver enzymes were mildly elevated ([id number], ) as well as his his creatine 1.43 and troponin 0.26> 0.45. CT abdomen and pelvis revealed cholelithiasis at GB neck, prostate enlargement, and colonic diverticulosis without diverticulitis.  Radiology report also noted L4, L5 osteoblastic metastases,  LLL atelectasis/infiltrate, and a chronic large hiatal hernia with organorotation of stomach. Mild peripancreatic edema at head of gland was also seen.    The patient received 2 liters of NS with marginal improvement in BP initially. Azithromycin, Rocephin, and Flagyl was given in the ED for probable sepsis related to pneumonia and intra abdominal infection.  Th e patient was initially going to be admitted per Hospitalist group, however there was concern for marginal BP and it was decided that the patient would need closer " "observation in the ICU.    Currently the patient is not requiring vasopressor support. Indicates the abdominal pain he has yesterday has improved. Denies chest pain.    Problem List, Surgical History, Family, Social History, and ROS     Patient Active Problem List   Diagnosis   • Sepsis   • Elevated liver enzymes   • Prostate cancer (Mets to L4-L5)   • Pulmonary infiltrate, LLLobe   • Cholelithiases of GB neck per CT A/P   • Acute renal insufficiency, CRE 1.43, unknown baseline   • Blindness of right eye   • WILLIAM (obstructive sleep apnea) remote, intolerant of CPAP   • R/O Cholecystitis   • NSTEMI (non-ST elevated myocardial infarction) (R/O Type 2)     Past Surgical History:   Procedure Laterality Date   • EYE SURGERY      RIGHT       Allergies   Allergen Reactions   • Contrast Dye Rash and Other (See Comments)     RASH AND SYNCOPE       MEDICATION LIST AND ALLERGIES REVIEWED.    Family History   Problem Relation Age of Onset   • Breast cancer Mother    • Prostate cancer Father    • Coronary artery disease Father    • No Known Problems Sister    • Stroke Brother    • Coronary artery disease Brother      Social History   Substance Use Topics   • Smoking status: Former Smoker     Types: Pipe   • Smokeless tobacco: Never Used      Comment: QUIT IN THE 70'S   • Alcohol use No     Social History     Social History Narrative    Patient is  and lives with his spouse and one of his daughters.     FAMILY AND SOCIAL HISTORY REVIEWED.    Review of Systems   Constitutional: Positive for fatigue. Negative for chills and fever.   Eyes:        Right eye blindness, chronic   Respiratory: Positive for shortness of breath and wheezing (upper airway, chronic). Negative for cough.    Cardiovascular: Positive for chest pain (chest \"aches\", now resolved) and leg swelling.   Gastrointestinal: Positive for abdominal pain and constipation. Negative for diarrhea, nausea and vomiting.   Endocrine: Negative.    Genitourinary: Positive " "for dysuria and frequency.   Musculoskeletal: Positive for arthralgias, back pain and gait problem.   Skin: Negative.    Allergic/Immunologic: Negative.    Neurological: Positive for weakness (generalized). Negative for speech difficulty and headaches.   Hematological: Negative.    Psychiatric/Behavioral: Negative.      ALL OTHER SYSTEMS REVIEWED AND ARE NEGATIVE.    Physical Exam and Clinical Information   /87   Pulse 64   Temp 97.7 °F (36.5 °C) (Oral)   Resp 18   Ht 170.2 cm (67\")   Wt 86.2 kg (190 lb)   SpO2 96%   BMI 29.76 kg/m²    Physical Exam:   GENERAL: Awake, no distress   HEENT: No adednopathy or thyromegaly   LUNGS: Basilar crackles, no wheezes   HEART: HRR without murmurs   ABDOMEN: Soft, non-tender. Few BS noted.   EXTREMITIES: Trace edema, no cyanosis   NEURO/PSYCH: Awake, alert, conversant, no focal motor defecit      Results from last 7 days  Lab Units 06/13/18  0509 06/12/18  2152   WBC 10*3/mm3 20.12* 12.93*   HEMOGLOBIN g/dL 12.0* 13.1   PLATELETS 10*3/mm3 153 168       Results from last 7 days  Lab Units 06/13/18  0509 06/12/18  2152   SODIUM mmol/L 134 137   POTASSIUM mmol/L 4.2 3.3*   CO2 mmol/L 24.0 25.0   BUN mg/dL 23 24*   CREATININE mg/dL 1.37* 1.43*   MAGNESIUM mg/dL 2.1 2.0   PHOSPHORUS mg/dL 3.6  --    GLUCOSE mg/dL 134* 143*     Estimated Creatinine Clearance: 37.6 mL/min (A) (by C-G formula based on SCr of 1.37 mg/dL (H)).          Lab Results   Component Value Date    LACTATE 2.3 (C) 06/13/2018        IMAGES:     CT ABDOMEN AND PELVIS:  IMPRESSION:  1.  Prostate enlargement.  2.  Cholelithiasis at GB neck.  3.  Nonspecific bowel gas pattern without dilatation or obstruction.  4.  Colonic diverticulosis without diverticulitis.  5.  Suspicious for L4, L5 osteoblastic metastases.  6.  LLL atelectasis/infiltrate.  7.  Chronic large hiatal hernia with organorotation of stomach.  8.  CAD.  9.  Chronic right renal nodularity unchanged.  10.  Mild peripancreatic edema at head of " gland, consider serum lipase.  11.  Additional findings, as above.     I reviewed the patient's results and images.     [facility] Problem List     * (Principal)Sepsis    Cholelithiases of GB neck per CT A/P    R/O Cholecystitis    Pulmonary infiltrate, LLLobe    Acute renal insufficiency, CRE 1.43, unknown baseline    NSTEMI (non-ST elevated myocardial infarction) (R/O Type 2)    Elevated liver enzymes    Prostate cancer (Mets to L4-L5)    Blindness of right eye    WILLIAM (obstructive sleep apnea) remote, intolerant of CPAP        Plan/Recommendations     ICU admission  Monitor BP closely  Continue gentle hydration  Pan cultures  ABX coverage  Heparin for DVT and PPI for GI prophylaxis  Follow labs in am, including serial troponin  ECHO in am  GI consultation  Abdominal ultrasound  Consider HIDA scan  Consider Cardiology consultation if troponin continues to rise; currently no evidence of STEMI    Critical Care time spent in direct patient care: 40 minutes (excluding procedure time, if applicable) including high complexity decision making to assess, manipulate, and support vital organ system failure in this individual who has impairment of one or more vital organ systems such that there is a high probability of imminent or life threatening deterioration in the patient’s condition.    CHELITA Coy  Pulmonary and Critical Care Medicine  06/13/18 6:21 AM     I have seen and examined patient, performing a face-to-face diagnostic evaluation with plan of care reviewed and developed with APRN and nursing staff. I have addended and modified the above history of present illness, physical examination, and assessment and plan to reflect my findings and impressions.    SYLVIA Duran MD  Pulmonary and Critical Care Medicine     CC: Charli Mccormack MD        Electronically signed by Charli Duran MD at 6/13/2018  6:25 AM       Hospital Medications (active)       Dose Frequency Start End    aspirin EC  "tablet 81 mg 81 mg Daily 6/13/2018     Sig - Route: Take 1 tablet by mouth Daily. - Oral    cefTRIAXone (ROCEPHIN) IVPB 2 g 2 g Every 24 Hours 6/15/2018 6/27/2018    Sig - Route: Infuse 50 mL into a venous catheter Daily. - Intravenous    fentaNYL (DURAGESIC) 25 MCG/HR patch 1 patch 1 patch Every 72 Hours 6/18/2018 6/30/2018    Sig - Route: Place 1 patch on the skin Every 72 (Seventy-Two) Hours. - Transdermal    heparin (porcine) 5000 UNIT/ML injection 5,000 Units 5,000 Units Every 12 Hours Scheduled 6/13/2018     Sig - Route: Inject 1 mL under the skin Every 12 (Twelve) Hours. - Subcutaneous    Magnesium Sulfate 2 gram infusion- Mg 1.6 - 1.9 mg/dL 2 g As Needed 6/13/2018     Sig - Route: Infuse 50 mL into a venous catheter As Needed (Mg 1.6 - 1.9 mg/dL). - Intravenous    Linked Group 1:  \"Or\" Linked Group Details        magnesium sulfate 3 gram infusion (1gm x 3) - Mg 1.1 - 1.5 mg/dL 1 g As Needed 6/13/2018     Sig - Route: Infuse 100 mL into a venous catheter As Needed (Mg 1.1 - 1.5 mg/dL). - Intravenous    Linked Group 1:  \"Or\" Linked Group Details        magnesium sulfate 4 gram infusion - Mg less than or equal to 1mg/dL 4 g As Needed 6/13/2018     Sig - Route: Infuse 100 mL into a venous catheter As Needed (Mg less than or equal to 1mg/dL). - Intravenous    Linked Group 1:  \"Or\" Linked Group Details        multivitamin with minerals 1 tablet 1 tablet Daily 6/14/2018     Sig - Route: Take 1 tablet by mouth Daily. - Oral    ondansetron (ZOFRAN) injection 4 mg 4 mg Every 6 Hours PRN 6/13/2018     Sig - Route: Infuse 2 mL into a venous catheter Every 6 (Six) Hours As Needed for Nausea or Vomiting. - Intravenous    oxyCODONE (ROXICODONE) immediate release tablet 5 mg 5 mg Once 6/17/2018 6/17/2018    Sig - Route: Take 1 tablet by mouth 1 (One) Time. - Oral    oxyCODONE (ROXICODONE) immediate release tablet 5 mg 5 mg Every 6 Hours PRN 6/18/2018     Sig - Route: Take 1 tablet by mouth Every 6 (Six) Hours As Needed for " "Moderate Pain . - Oral    potassium chloride (KLOR-CON) packet 40 mEq 40 mEq As Needed 6/17/2018     Sig - Route: Take 40 mEq by mouth As Needed (potassium replacement, see admin instructions). - Oral    Linked Group 2:  \"Or\" Linked Group Details        potassium chloride (MICRO-K) CR capsule 40 mEq 40 mEq As Needed 6/17/2018     Sig - Route: Take 4 capsules by mouth As Needed (potassium replacement.  see admin instructions). - Oral    Linked Group 2:  \"Or\" Linked Group Details        potassium chloride 10 mEq in 100 mL IVPB 10 mEq Every 1 Hour PRN 6/17/2018     Sig - Route: Infuse 100 mL into a venous catheter Every 1 (One) Hour As Needed (potassium protocol PERIPHERAL - see admin instructions). - Intravenous    Linked Group 2:  \"Or\" Linked Group Details        potassium chloride in NS 20 mEq/250 mL IVPB 20 mEq Every 2 Hours PRN 6/13/2018     Sig - Route: Infuse 250 mL into a venous catheter Every 2 (Two) Hours As Needed (See admin Instructions.). - Intravenous    sennosides-docusate sodium (SENOKOT-S) 8.6-50 MG tablet 2 tablet 2 tablet 2 Times Daily 6/13/2018     Sig - Route: Take 2 tablets by mouth 2 (Two) Times a Day. - Oral    sodium chloride 0.9 % flush 1-10 mL 1-10 mL As Needed 6/13/2018     Sig - Route: Infuse 1-10 mL into a venous catheter As Needed for Line Care. - Intravenous    sodium chloride 0.9 % flush 10 mL 10 mL As Needed 6/12/2018     Sig - Route: Infuse 10 mL into a venous catheter As Needed for Line Care. - Intravenous    Cosign for Ordering: Accepted by Tai Anders MD on 6/13/2018  2:09 AM    oxyCODONE (ROXICODONE) immediate release tablet 5 mg (Discontinued) 5 mg Every 8 Hours PRN 6/13/2018 6/18/2018    Sig - Route: Take 1 tablet by mouth Every 8 (Eight) Hours As Needed for Moderate Pain . - Oral             Physician Progress Notes (last 24 hours) (Notes from 6/17/2018 11:50 AM through 6/18/2018 11:50 AM)      PAXTON Schwab at 6/18/2018 10:07 AM          GI Daily Progress " "Note  Subjective:    Chief Complaint:  Weakness     Patient is eating well.  Denies abdominal pain, nausea or vomiting.  He complains of generalized fatigue and weakness.     Objective:    /92   Pulse 74   Temp 98.5 °F (36.9 °C) (Oral)   Resp 18   Ht 170.2 cm (67.01\")   Wt 75.5 kg (166 lb 8 oz)   SpO2 97%   BMI 26.07 kg/m²      Physical Exam   Constitutional: He is oriented to person, place, and time. He appears well-developed. No distress.   HENT:   Head: Normocephalic and atraumatic.   Cardiovascular: Normal rate and regular rhythm.    Pulmonary/Chest: Effort normal. No respiratory distress.   Abdominal: Soft. Bowel sounds are normal. He exhibits no distension. There is no tenderness.   Neurological: He is alert and oriented to person, place, and time.   Skin: Skin is warm and dry.   Psychiatric: His behavior is normal.       Lab  Lab Results   Component Value Date    WBC 10.81 (H) 06/18/2018    HGB 12.4 (L) 06/18/2018    HGB 11.7 (L) 06/16/2018    HGB 11.6 (L) 06/15/2018    MCV 90.1 06/18/2018     06/18/2018    INR 1.11 (H) 06/13/2018    INR 1.05 12/17/2014       Lab Results   Component Value Date    GLUCOSE 92 06/18/2018    BUN 16 06/18/2018    CREATININE 0.82 06/18/2018    EGFRIFNONA 88 06/18/2018    BCR 19.5 06/18/2018    CO2 28.0 06/18/2018    CALCIUM 9.1 06/18/2018    ALBUMIN 3.52 06/18/2018    ALKPHOS 263 (H) 06/18/2018    BILITOT 0.7 06/18/2018    ALT 74 (H) 06/18/2018    AST 43 (H) 06/18/2018       Assessment:    Ascending cholangitis.  Bacteremia due to Escherichia coli.     LFT's improving and no evidence of biliary obstruction or stone on imaging studies. Suspect passed a stone    Plan:    >>> Continue antibiotics.  Recommend 10 days total.  E. Coli is pansensitive and can switch to PO antibiotics at time of discharge  >>> Recommend daily [facility] at discharge to prevent future episodes of cholangitis     Will sign off. Please call for questions or concerns.  "     PAXTON Schwab  18  10:07 AM      Electronically signed by PAXTON Schwab at 2018 10:09 AM     Sukhwinder Howell MD at 2018  5:47 PM              [facility]  PROGRESS NOTE    Patient Name: Harris Shane  : 9/15/1927  MRN: [id number]    Date of Admission: 2018  Length of Stay: 5  Primary Care Physician: Charli Mccormack MD    Subjective   Subjective     CC:  Severe weakness    Subjective:  Resting in a chair in no acute distress.still is very weak. Also had a lose stool today. No F/C, no N/V or abdominal pain.  Review of Systems      Otherwise ROS is negative except as mentioned in the HPI.    Objective   Objective     Vital Signs:   Temp:  [98.1 °F (36.7 °C)] 98.1 °F (36.7 °C)  Heart Rate:  [74] 74  Resp:  [18] 18  BP: (147-151)/(87-92) 151/92        Physical Exam:  Constitutional: No acute distress, awake, alert  Eyes: PERRLA, sclerae anicteric, no conjunctival injection. Right eye ptosis and blindness.  HENT: NCAT, mucous membranes moist  Neck: Supple, no thyromegaly, no lymphadenopathy, trachea midline  Respiratory: Clear to auscultation bilaterally, nonlabored respirations   Cardiovascular: RRR, no murmurs, rubs, or gallops, palpable pedal pulses bilaterally  Gastrointestinal: Positive bowel sounds, soft, nontender, mildly distended  Musculoskeletal: No bilateral ankle edema, no clubbing or cyanosis to extremities  Psychiatric: Appropriate affect, cooperative  Neurologic: Awake, alert, follows commands.  Patient does have some weakness of lower extremities however he can lift her legs against gravity.  Patient does have difficulty with ambulating independently.  Skin: No rashes    Results Reviewed:  I have personally reviewed current lab, radiology, and data and agree.      Results from last 7 days  Lab Units 18  0508 06/15/18  0337 18  0351 18  0509   WBC 10*3/mm3 9.35 10.21 15.38* 20.12*   HEMOGLOBIN g/dL 11.7* 11.6*  12.2* 12.0*   HEMATOCRIT % 35.0* 35.5* 37.7* 36.5*   PLATELETS 10*3/mm3 143* 123* 134* 153   INR   --   --   --  1.11*       Results from last 7 days  Lab Units 06/16/18  0508 06/15/18  0337 06/14/18  0351 06/13/18  1740 06/13/18  1105 06/13/18  0509   SODIUM mmol/L 135 135 136  --   --  134   POTASSIUM mmol/L 3.4* 3.8 4.1  --   --  4.2   CHLORIDE mmol/L 102 104 104  --   --  103   CO2 mmol/L 26.0 26.0 22.0  --   --  24.0   BUN mg/dL 21 24* 23  --   --  23   CREATININE mg/dL 0.88 1.03 1.19  --   --  1.37*   GLUCOSE mg/dL 84 99 90  --   --  134*   CALCIUM mg/dL 8.2* 8.1* 8.7  --   --  8.5*   ALT (SGPT) U/L 94* 136* 225*  --   --  296*   AST (SGOT) U/L 46* 72* 152*  --   --  346*   TROPONIN I ng/mL  --   --   --  1.489* 1.596* 1.109*     Estimated Creatinine Clearance: 60.3 mL/min (by C-G formula based on SCr of 0.88 mg/dL).  No results found for: BNP  No results found for: PHART    Microbiology Results Abnormal     Procedure Component Value - Date/Time    Blood Culture - Blood, [[id number]]  (Normal) Collected:  06/12/18 2152    Lab Status:  Preliminary result Specimen:  Blood from Arm, Left Updated:  06/16/18 2200     Blood Culture No growth at 4 days    Blood Culture - Blood, [[id number]]  (Abnormal)  (Susceptibility) Collected:  06/12/18 2152    Lab Status:  Final result Specimen:  Blood from Arm, Right Updated:  06/15/18 0728     Blood Culture Escherichia coli (A)     Isolated from Aerobic Bottle     Gram Stain Result Aerobic Bottle Gram negative bacilli    Susceptibility      Escherichia coli     SISSY     Ampicillin <=8 ug/ml Susceptible     Ampicillin + Sulbactam <=8/4 ug/ml Susceptible     Aztreonam <=8 ug/ml Susceptible     Cefepime <=8 ug/ml Susceptible     Cefotaxime <=2 ug/ml Susceptible     Ceftriaxone <=8 ug/ml Susceptible     Cefuroxime sodium <=4 ug/ml Susceptible     Ertapenem <=1 ug/ml Susceptible     Gentamicin <=4 ug/ml Susceptible     Levofloxacin <=2 ug/ml Susceptible     Meropenem <=1 ug/ml  Susceptible     Piperacillin + Tazobactam <=16 ug/ml Susceptible     Tetracycline <=4 ug/ml Susceptible     Tobramycin <=4 ug/ml Susceptible     Trimethoprim + Sulfamethoxazole <=2/38 ug/ml Susceptible                    Blood Culture ID, PCR - Blood, [[id number]]  (Abnormal) Collected:  06/12/18 2152    Lab Status:  Final result Specimen:  Blood from Arm, Right Updated:  06/13/18 1421     BCID, PCR Escherichia coli. Identification by BCID PCR. (C)          Imaging Results (last 24 hours)     ** No results found for the last 24 hours. **        Results for orders placed during the hospital encounter of 06/12/18   Adult Transthoracic Echo Complete W/ Cont if Necessary Per Protocol    Narrative · Left ventricular systolic function is normal. Estimated EF = 55%.  · Left ventricular diastolic dysfunction (grade I) consistent with   impaired relaxation.          I have reviewed the medications.    Assessment/Plan   Assessment / Plan     Hospital Problem List     * (Principal)Sepsis    Elevated liver enzymes    Prostate cancer (Mets to L4-L5)    Pulmonary infiltrate, LLLobe    Cholelithiases of GB neck per CT A/P    Acute renal insufficiency, CRE 1.43, unknown baseline    Blindness of right eye    WILLIAM (obstructive sleep apnea) remote, intolerant of CPAP    R/O Cholecystitis    NSTEMI (non-ST elevated myocardial infarction) (R/O Type 2)    Bacteremia due to Escherichia coli             Brief Hospital Course to date:  Harris Shane is a 89 y.o. male with past medical history significant for prostate cancer with metastases  to L4 on L5, blindness of right eye, weakness.  Patient was admitted to ICU for sepsis.      Assessment & Plan:  *Sepsis markedly improved.    * Escherichia coli bacteremia.    * Elevated liver enzymes.    * Severe generalized weakness particularly lower extremities.  Patient has significant difficulty with ambulating independently.    * Metastatic prostate cancer.  Metastases to L4 on L5.  Patient was  supposed to start radiation therapy for that.  We need to coordinate with radiation oncology for this.    * Blindness and ptosis of the right eye.    PLAN:  - cont current care  - KUB  - need to coordinate radiation therapy with physical therapy.  - labs in am.    NOTE: Had a long conversation with the patient and her family at the bedside.  Explained the plan of care and answered all their questions.  Total face-to-face time spent 40 minutes.    DVT Prophylaxis:      CODE STATUS: Full Code    Disposition: TBD      Electronically signed by Sukhwinder Howell MD, 18, 5:47 PM.      Electronically signed by Sukhwinder Howell MD at 2018  5:51 PM       Physical Therapy Notes (last 72 hours) (Notes from 6/15/2018 11:51 AM through 2018 11:51 AM)     No notes of this type exist for this encounter.           Occupational Therapy Notes (last 72 hours) (Notes from 6/15/2018 11:51 AM through 2018 11:51 AM)      Crista Zimmerman OT at 6/15/2018  1:43 PM          Acute Care - Occupational Therapy Treatment Note  [facility]     Patient Name: Harris Shane  : 9/15/1927  MRN: [id number]  Today's Date: 6/15/2018  Onset of Illness/Injury or Date of Surgery: 18  Date of Referral to OT: 18  Referring Physician: DO Luiz    Admit Date: 2018       ICD-10-CM ICD-9-CM   1. Sepsis, due to unspecified organism A41.9 038.9     995.91   2. Elevated troponin R74.8 790.6   3. Elevated liver enzymes R74.8 790.5   4. Hyperbilirubinemia E80.6 782.4   5. History of prostate cancer Z85.46 V10.46   6. Metastatic cancer to spine C79.51 198.5   7. Pneumonia of left lower lobe due to infectious organism J18.1 486   8. Biliary calculus of other site with obstruction K80.81 [id number]   9. Impaired functional mobility, balance, gait, and endurance Z74.09 V49.89   10. Impaired mobility and ADLs Z74.09 799.89     Patient Active Problem List   Diagnosis   • Sepsis   • Elevated liver enzymes   • Prostate cancer (Mets to  L4-L5)   • Pulmonary infiltrate, LLLobe   • Cholelithiases of GB neck per CT A/P   • Acute renal insufficiency, CRE 1.43, unknown baseline   • Blindness of right eye   • WILLIAM (obstructive sleep apnea) remote, intolerant of CPAP   • R/O Cholecystitis   • NSTEMI (non-ST elevated myocardial infarction) (R/O Type 2)   • Bacteremia due to Escherichia coli     Past Medical History:   Diagnosis Date   • Blindness of right eye 6/13/2018   • Cancer     PROSTATE   • DVT (deep venous thrombosis) 1990's   • Metastatic cancer to spine, L4-L5 6/13/2018   • Myocardial infarction 1970's   • WILLIAM (obstructive sleep apnea) 6/13/2018   • UTI (urinary tract infection)      Past Surgical History:   Procedure Laterality Date   • EYE SURGERY      RIGHT       Therapy Treatment          Rehabilitation Treatment Summary     Row Name 06/15/18 0855             Treatment Time/Intention    Discipline physical therapist  -JETT,CM,JB2      Document Type therapy note (daily note)  -JETT,CM,JB2      Subjective Information no complaints  -JETT,CM,JB2      Mode of Treatment physical therapy  -JETT,CM,JB2      Care Plan Review evaluation/treatment results reviewed;care plan/treatment goals reviewed;current/potential barriers reviewed;patient/other agree to care plan  -JETT,CM,JB2      Patient Effort good  -JETT,CM,JB2      Existing Precautions/Restrictions fall;oxygen therapy device and L/min  -JETT,CM,JB2      Recorded by [JETT,CM,JB2] Sol Almanzar, PT (r) Barbara Martinez, PT Student (t) Sol Almanzar, PT (c) 06/15/18 1123      Row Name 06/15/18 0855             Vital Signs    Pre Systolic BP Rehab 139  -JETT,CM,JB2      Pre Treatment Diastolic BP 78  -JETT,CM,JB2      Post Systolic BP Rehab 132  -JETT,CM,JB2      Post Treatment Diastolic BP 91  -JETT,CM,JB2      Pretreatment Heart Rate (beats/min) 77  -JETT,CM,JB2      Posttreatment Heart Rate (beats/min) 77  -JETT,CM,JB2      Pre SpO2 (%) 94  -JETT,CM,JB2      O2 Delivery Pre Treatment supplemental O2  -JETT,CM,JB2       Post SpO2 (%) 96  -JETT,CM,JB2      O2 Delivery Post Treatment supplemental O2  -JETT,CM,JB2      Pre Patient Position Sitting  -JETT,CM,JB2      Intra Patient Position Standing  -JETT,CM,JB2      Post Patient Position Sitting  -JETT,CM,JB2      Rest Breaks  3   1 standing; 2 sitting   -JETT,CM,JB2      Recorded by [JETT,CM,JB2] Sol Almanzar, PT (r) Barbara Martinez, PT Student (t) Sol Almanzar PT (c) 06/15/18 1123      Row Name 06/15/18 0855             Cognitive Assessment/Intervention    Additional Documentation Cognitive Assessment/Intervention (Group)  -JETT,CM,JB2      Recorded by [JETT,CM,JB2] Sol Almanzar PT (r) Barbara Martinez, PT Student (t) Sol Almanzar PT (c) 06/15/18 1123      Row Name 06/15/18 0855             Cognitive Assessment/Intervention- PT/OT    Affect/Mental Status (Cognitive) WFL  -JETT,CM,JB2      Orientation Status (Cognition) oriented x 4  -JETT,CM,JB2      Follows Commands (Cognition) follows one step commands;75-90% accuracy;verbal cues/prompting required;physical/tactile prompts required  -JETT,CM,JB2      Cognitive Function (Cognitive) safety deficit  -JETT,CM,JB2      Safety Deficit (Cognitive) mild deficit;ability to follow commands;safety precautions follow-through/compliance  -JETT,CM,JB2      Personal Safety Interventions fall prevention program maintained;gait belt;nonskid shoes/slippers when out of bed  -JETT,CM,JB2      Recorded by [JETT,CM,JB2] Sol Almanzar PT (r) Barbara Martinez, PT Student (t) Sol Almanzar PT (c) 06/15/18 1123      Row Name 06/15/18 0855             Safety Issues, Functional Mobility    Safety Issues Affecting Function (Mobility) insight into deficits/self awareness;safety precautions follow-through/compliance  -JETT,CM,JB2      Impairments Affecting Function (Mobility) balance;coordination;endurance/activity tolerance;strength  -JETT,CM,JB2      Recorded by [JETT,CM,JB2] Sol Almanzar PT (r) Barbara Martinez PT Student (t) Sol Almanzar, PT (c)  06/15/18 1123      Row Name 06/15/18 0855             Bed Mobility Assessment/Treatment    Comment (Bed Mobility) [facility]   -JETT,CM,JB2      Recorded by [JETT,CM,JB2] Sol Almanzar PT (r) Barbara Martinez, PT Student (t) Sol Almanzar PT (c) 06/15/18 1123      Row Name 06/15/18 0855             Transfer Assessment/Treatment    Transfer Assessment/Treatment sit-stand transfer;stand-sit transfer  -JETT,CM,JB2      Sit-Stand [place] (Transfers) minimum assist (75% patient effort);1 person assist;verbal cues  -JETT,CM,JB2      Stand-Sit [place] (Transfers) minimum assist (75% patient effort);1 person assist;verbal cues  -JETT,CM,JB2      Recorded by [JETT,CM,JB2] Sol Almanzar, PT (r) Barbara Martinez, PT Student (t) Sol Almanzar, PT (c) 06/15/18 1123      Row Name 06/15/18 0855             Sit-Stand Transfer    Assistive Device (Sit-Stand Transfers) walker, front-wheeled   VC's req'd for proper hand placement when standing  -JETT,CM,JB2      Recorded by [JETT,CM,JB2] Sol Almanzar PT (r) Barbara Martinez, PT Student (t) Sol Almanzar PT (c) 06/15/18 1123      Row Name 06/15/18 0855             Stand-Sit Transfer    Assistive Device (Stand-Sit Transfers) walker, front-wheeled  -JETT,CM,JB2      Recorded by [JETT,CM,JB2] Sol Almanzar PT (r) Barbara Martinez, PT Student (t) Sol Almanzar PT (c) 06/15/18 1123      Row Name 06/15/18 0855             Gait/Stairs Assessment/Training    32551 - Gait Training Minutes  25  -JETT,CM,JB2      Gait/Stairs Assessment/Training gait/ambulation assistive device  -JETT,CM,JB2      [place] Level (Gait) moderate assist (50% patient effort);2 person assist;1 person to manage equipment;verbal cues;other (see comments)  -JETT,CM,JB2      Assistive Device (Gait) walker, front-wheeled  -JETT,CM,JB2      Distance in Feet (Gait) 60  -JETT,CM,JB2      Pattern (Gait) step-to  -JETT,CM,JB2      Deviations/Abnormal Patterns (Gait) gait speed decreased;veronique decreased;stride  length decreased  -JETT,CM,JB2      Bilateral Gait Deviations forward flexed posture  -JETT,CM,JB2      Comment (Gait/Stairs) Pt req'd freq VC's for proper management of RW and upright posture;    Chair following behind g  -JETT,CM,JB2      Recorded by [JETT,CM,JB2] Sol Almanzar PT (r) Barbara Martinez PT Student (t) Sol Almanzar PT (c) 06/15/18 1123      Row Name 06/15/18 0855             Motor Skills Assessment/Interventions    Additional Documentation Balance (Group);Therapeutic Exercise (Group)  -JETT,CM,JB2      Recorded by [JETT,CM,JB2] Sol Almanzar PT (r) Barbara Martinez PT Student (t) Sol Almanzar PT (c) 06/15/18 1123      Row Name 06/15/18 0855             Therapeutic Exercise    07454 - PT Therapeutic Exercise Minutes 5  -JETT,CM,JB2      Recorded by [JETT,CM,JB2] Sol Almanzar PT (r) Barbara Martinez PT Student (t) Sol Almanzar PT (c) 06/15/18 1123      Row Name 06/15/18 0855             Lower Extremity Seated Therapeutic Exercise    Performed, Seated Lower Extremity (Therapeutic Exercise) ankle dorsiflexion/plantarflexion;LAQ (long arc quad), knee extension;hip abduction/adduction  -JETT,CM,JB2      Exercise Type, Seated Lower Extremity (Therapeutic Exercise) AROM (active range of motion)  -JETT,CM,JB2      Sets/Reps Detail, Seated Lower Extremity (Therapeutic Exercise) 1/10  -JETT,CM,JB2      Comment, Seated Lower Extremity (Therapeutic Exercise) Reviee HEP w/ pt and insttuced him to perform them 1/10 3x per day   -JETT,CM,JB2      Recorded by [JETT,CM,JB2] Sol Almanzar PT (r) Barbara Martinez PT Student (t) Sol Almanzar PT (c) 06/15/18 1123      Row Name 06/15/18 0855             Balance    Balance static sitting balance;static standing balance  -JETT,CM,JB2      Recorded by [JETT,CM,JB2] Sol lAmanzar PT (r) Barbara Martinez, PT Student (t) Sol Almanzar, PT (c) 06/15/18 1123      Row Name 06/15/18 0855             Static Sitting Balance    Level of [place]  (Unsupported Sitting, Static Balance) minimal assist, 75% patient effort  -JETT,CM,JB2      Sitting Position (Unsupported Sitting, Static Balance) sitting in chair  -JETT,CM,JB2      Time Able to Maintain Position (Unsupported Sitting, Static Balance) 30 to 45 seconds  -JETT,CM,JB2      Recorded by [JETT,CM,JB2] Sol Almanzar PT (r) Barbara Martinez, PT Student (t) Sol Almanzar PT (c) 06/15/18 1123      Row Name 06/15/18 0855             Static Standing Balance    Level of [place] (Supported Standing, Static Balance) minimal assist, 75% patient effort  -JETT,CM,JB2      Time Able to Maintain Position (Supported Standing, Static Balance) 30 to 45 seconds  -JETT,CM,JB2      Assistive Device Utilized (Supported Standing, Static Balance) rolling walker  -JETT,CM,JB2      Recorded by [JETT,CM,JB2] Sol Almanzar PT (r) Barbara Martinez, PT Student (t) Sol Almanzar PT (c) 06/15/18 1123      Row Name 06/15/18 0855             Positioning and Restraints    Pre-Treatment Position sitting in chair/recliner  -JETT,CM,JB2      Post Treatment Position chair  -JETT,CM,JB2      In Chair reclined;call light within reach;exit alarm on;with nsg;waffle cushion;legs elevated  -JETT,CM,JB2      Recorded by [JETT,CM,JB2] Sol Almanzar PT (r) Barbara Martinez, PT Student (t) Sol Almanzar PT (c) 06/15/18 1123      Row Name 06/15/18 0855             Pain Assessment    Additional Documentation Pain Scale: Numbers Pre/Post-Treatment (Group)  -JETT,CM,JB2      Recorded by [JETT,CM,JB2] Sol Almanzar PT (r) Barbara Martinez PT Student (t) Sol Almanzar PT (c) 06/15/18 1123      Row Name 06/15/18 0855             Pain Scale: Numbers Pre/Post-Treatment    Pain Scale: Numbers, Pretreatment 0/10 - no pain  -JETT,CM,JB2      Pain Scale: Numbers, Post-Treatment 0/10 - no pain  -JETT,JOSE A,JB2      Recorded by [JETT,JOSE A,JB2] Sol Almanzar, DONN (r) Barbara Martinez PT Student (t) Sol Almanzar PT (c) 06/15/18 1123      Row Name  06/15/18 0855             Coping    Observed Emotional State calm;quiet;cooperative  -EJTT,CM,JB2      Verbalized Emotional State acceptance  -JETT,CM,JB2      Recorded by [JETT,CM,JB2] Sol Almanzar, PT (r) Barbara Martinez, PT Student (t) Sol Almanzar, PT (c) 06/15/18 1123      Row Name 06/15/18 0855             Outcome Summary/Treatment Plan (PT)    Daily Summary of Progress (PT) progress toward functional goals is good  -JETTCM,JB2      Plan for Continued Treatment (PT) cont w/ POC   -JETT,CM,JB2      Anticipated Discharge Disposition (PT) skilled nursing facility  -JETT,CM,JB2      Recorded by [JETT,CM,JB2] Sol Almanzar, PT (r) Barbara Martinez, PT Student (t) Sol Almanzar, PT (c) 06/15/18 1123        User Key  (r) = Recorded By, (t) = Taken By, (c) = Cosigned By    Initials Name Effective Dates Discipline    JETT Almanzar, PT 06/19/15 -  PT    JOSE A Martinez, PT Student 06/07/18 -  PT                   OT Rehab Goals     Row Name 06/15/18 1315             Transfer Goal 1 (OT)    Activity/Assistive Device (Transfer Goal 1, OT) toilet;commode, bedside without drop arms;walker, rolling  -AN      [place] Level/Cues Needed (Transfer Goal 1, OT) verbal cues required;contact guard assist  -AN      Time Frame (Transfer Goal 1, OT) 10 days  -AN         Dressing Goal 1 (OT)    Activity/Assistive Device (Dressing Goal 1, OT) lower body dressing  -AN      [place]/Cues Needed (Dressing Goal 1, OT) minimum assist (75% or more patient effort)  -AN      Time Frame (Dressing Goal 1, OT) 10 days  -AN        User Key  (r) = Recorded By, (t) = Taken By, (c) = Cosigned By    Initials Name Provider Type Discipline    AN Crista Zimmerman, OT Occupational Therapist OT        Occupational Therapy Education     Title: PT OT SLP Therapies (Active)     Topic: Occupational Therapy (Active)     Point: ADL training (Done)     Description: Instruct learner(s) on proper safety adaptation and remediation techniques  during self care or transfers.   Instruct in proper use of assistive devices.   Learning Progress Summary     Learner Status Readiness Method Response Comment Documented by    Patient Done Acceptance E GENIE,NR Educated pt on OT role and benefits of tx. AN 06/15/18 1341                      User Key     Initials Effective Dates Name Provider Type Discipline    AN 06/22/15 -  Crista Zimmerman, OT Occupational Therapist OT                OT Recommendation and Plan  Outcome Summary/Treatment Plan (OT)  Anticipated Discharge Disposition (OT): skilled nursing facility  Planned Therapy Interventions (OT Eval): activity tolerance training, adaptive equipment training, BADL retraining, functional balance retraining, occupation/activity based interventions, transfer/mobility retraining, ROM/therapeutic exercise  Therapy Frequency (OT Eval): daily  Plan of Care Review  Plan of Care Reviewed With: patient  Plan of Care Reviewed With: patient  Outcome Summary: Pt declined mobilty this pm. Completed bed assessment, pt needs assist with ADL's. Pt [facility].         Outcome Measures     Row Name 06/15/18 0855 06/14/18 0922 06/13/18 1022       How much help from another person do you currently need...    Turning from your back to your side while in flat bed without using bedrails? 2  -JETT (r) CM (t) JETT (c) 2  -JETT (r) CM (t) JETT (c) 2  -KR    Moving from lying on back to sitting on the side of a flat bed without bedrails? 2  -JETT (r) CM (t) JETT (c) 2  -JETT (r) CM (t) JETT (c) 2  -KR    Moving to and from a bed to a chair (including a wheelchair)? 3  -JETT (r) CM (t) JETT (c) 3  -JETT (r) CM (t) JETT (c) 3  -KR    Standing up from a chair using your arms (e.g., wheelchair, bedside chair)? 3  -JETT (r) CM (t) JETT (c) 3  -JETT (r) CM (t) JETT (c) 3  -KR    Climbing 3-5 steps with a railing? 1  -JETT (r) CM (t) JETT (c) 1  -JETT (r) CM (t) JETT (c) 2  -KR    To walk in hospital room? 2  -JETT (r) CM (t) JETT (c) 2  -JETT (r) CM (t) JETT (c) 3  -KR     AM-PAC 6 Clicks Score 13  -JETT (r) CM (t) 13  -JETT (r) CM (t) 15  -KR       Functional Assessment    Outcome Measure Options AM-PAC 6 Clicks Basic Mobility (PT)  -JETT (r) CM (t) JETT (c) AM-PAC 6 Clicks Basic Mobility (PT)  -JETT (r) CM (t) JETT (c) AM-PAC 6 Clicks Basic Mobility (PT)  -KR      User Key  (r) = Recorded By, (t) = Taken By, (c) = Cosigned By    Initials Name Provider Type    JETT Almanzar, PT Physical Therapist    VIN Molina, PT Physical Therapist    JOSE A Martinez, PT Student PT Student           Time Calculation:         Time Calculation- OT     Row Name 06/15/18 1343 06/15/18 0855          Time Calculation- OT    OT Start Time 1315  -AN  --     Total Timed Code Minutes- OT 0 minute(s)  -AN  --     OT Received On 06/15/18  -AN  --     OT Goal Re-Cert Due Date 06/25/18  -AN  --        Timed Charges    78536 - Gait Training Minutes   -- 25  -JETT (r) CM (t) JETT (c)       User Key  (r) = Recorded By, (t) = Taken By, (c) = Cosigned By    Initials Name Provider Type    JETT Almanzar, PT Physical Therapist    RACHELL Zimmerman, OT Occupational Therapist    JOSE A Martinez, PT Student PT Student           Therapy Suggested Charges     Code   Minutes Charges    None           Therapy Charges for Today     Code Description Service Date Service Provider Modifiers Qty    73243579203 HC OT EVAL LOW COMPLEXITY 4 6/15/2018 Crista Zimmerman OT GO 1               Crista Zimmerman OT  6/15/2018    Electronically signed by Crista Zimmerman OT at 6/15/2018  1:44 PM     Crista Zimmerman OT at 6/15/2018  1:42 PM          Problem: Patient Care Overview  Goal: Plan of Care Review  Outcome: Ongoing (interventions implemented as appropriate)   06/15/18 1342   Coping/Psychosocial   Plan of Care Reviewed With patient   OTHER   Outcome Summary Pt declined mobilty this pm. Completed bed assessment, pt needs assist with ADL's. Pt would [facility].            Electronically signed by Crista Zimmerman  OT at 6/15/2018  1:43 PM

## 2018-06-19 LAB
ALBUMIN SERPL-MCNC: 3.91 G/DL (ref 3.2–4.8)
ALP SERPL-CCNC: 262 U/L (ref 25–100)
ALT SERPL W P-5'-P-CCNC: 72 U/L (ref 7–40)
AST SERPL-CCNC: 48 U/L (ref 0–33)
BASOPHILS # BLD AUTO: 0.07 10*3/MM3 (ref 0–0.2)
BASOPHILS NFR BLD AUTO: 0.6 % (ref 0–1)
BILIRUB CONJ SERPL-MCNC: 0.3 MG/DL (ref 0–0.2)
BILIRUB INDIRECT SERPL-MCNC: 0.5 MG/DL (ref 0.1–1.1)
BILIRUB SERPL-MCNC: 0.8 MG/DL (ref 0.3–1.2)
DEPRECATED RDW RBC AUTO: 47.9 FL (ref 37–54)
EOSINOPHIL # BLD AUTO: 0.12 10*3/MM3 (ref 0–0.3)
EOSINOPHIL NFR BLD AUTO: 1.1 % (ref 0–3)
ERYTHROCYTE [DISTWIDTH] IN BLOOD BY AUTOMATED COUNT: 14.6 % (ref 11.3–14.5)
HCT VFR BLD AUTO: 40.4 % (ref 38.9–50.9)
HGB BLD-MCNC: 13.4 G/DL (ref 13.1–17.5)
IMM GRANULOCYTES # BLD: 0.44 10*3/MM3 (ref 0–0.03)
IMM GRANULOCYTES NFR BLD: 3.9 % (ref 0–0.6)
LYMPHOCYTES # BLD AUTO: 1.37 10*3/MM3 (ref 0.6–4.8)
LYMPHOCYTES NFR BLD AUTO: 12.1 % (ref 24–44)
MCH RBC QN AUTO: 29.8 PG (ref 27–31)
MCHC RBC AUTO-ENTMCNC: 33.2 G/DL (ref 32–36)
MCV RBC AUTO: 89.8 FL (ref 80–99)
MONOCYTES # BLD AUTO: 1.77 10*3/MM3 (ref 0–1)
MONOCYTES NFR BLD AUTO: 15.6 % (ref 0–12)
NEUTROPHILS # BLD AUTO: 8.01 10*3/MM3 (ref 1.5–8.3)
NEUTROPHILS NFR BLD AUTO: 70.6 % (ref 41–71)
PLATELET # BLD AUTO: 222 10*3/MM3 (ref 150–450)
PMV BLD AUTO: 11.3 FL (ref 6–12)
PROT SERPL-MCNC: 6.7 G/DL (ref 5.7–8.2)
RBC # BLD AUTO: 4.5 10*6/MM3 (ref 4.2–5.76)
WBC NRBC COR # BLD: 11.34 10*3/MM3 (ref 3.5–10.8)

## 2018-06-19 PROCEDURE — 99232 SBSQ HOSP IP/OBS MODERATE 35: CPT | Performed by: INTERNAL MEDICINE

## 2018-06-19 PROCEDURE — 25010000002 HEPARIN (PORCINE) PER 1000 UNITS: Performed by: NURSE PRACTITIONER

## 2018-06-19 PROCEDURE — 77290 THER RAD SIMULAJ FIELD CPLX: CPT | Performed by: RADIOLOGY

## 2018-06-19 PROCEDURE — 25010000002 MORPHINE SULFATE (PF) 2 MG/ML SOLUTION: Performed by: INTERNAL MEDICINE

## 2018-06-19 PROCEDURE — 80076 HEPATIC FUNCTION PANEL: CPT | Performed by: INTERNAL MEDICINE

## 2018-06-19 PROCEDURE — 97110 THERAPEUTIC EXERCISES: CPT

## 2018-06-19 PROCEDURE — 77334 RADIATION TREATMENT AID(S): CPT | Performed by: RADIOLOGY

## 2018-06-19 PROCEDURE — 25010000003 CEFTRIAXONE PER 250 MG: Performed by: INTERNAL MEDICINE

## 2018-06-19 PROCEDURE — 77307 TELETHX ISODOSE PLAN CPLX: CPT | Performed by: RADIOLOGY

## 2018-06-19 PROCEDURE — 85025 COMPLETE CBC W/AUTO DIFF WBC: CPT | Performed by: INTERNAL MEDICINE

## 2018-06-19 RX ORDER — FENTANYL 50 UG/H
1 PATCH TRANSDERMAL
Status: DISCONTINUED | OUTPATIENT
Start: 2018-06-19 | End: 2018-06-25 | Stop reason: HOSPADM

## 2018-06-19 RX ORDER — MORPHINE SULFATE 2 MG/ML
1 INJECTION, SOLUTION INTRAMUSCULAR; INTRAVENOUS
Status: DISCONTINUED | OUTPATIENT
Start: 2018-06-19 | End: 2018-06-24

## 2018-06-19 RX ORDER — MORPHINE SULFATE 2 MG/ML
1 INJECTION, SOLUTION INTRAMUSCULAR; INTRAVENOUS ONCE
Status: COMPLETED | OUTPATIENT
Start: 2018-06-19 | End: 2018-06-19

## 2018-06-19 RX ADMIN — MORPHINE SULFATE 1 MG: 2 INJECTION, SOLUTION INTRAMUSCULAR; INTRAVENOUS at 20:28

## 2018-06-19 RX ADMIN — SENNOSIDES AND DOCUSATE SODIUM 2 TABLET: 8.6; 5 TABLET ORAL at 09:33

## 2018-06-19 RX ADMIN — HEPARIN SODIUM 5000 UNITS: 5000 INJECTION, SOLUTION INTRAVENOUS; SUBCUTANEOUS at 09:33

## 2018-06-19 RX ADMIN — SENNOSIDES AND DOCUSATE SODIUM 2 TABLET: 8.6; 5 TABLET ORAL at 20:28

## 2018-06-19 RX ADMIN — FENTANYL 1 PATCH: 50 PATCH TRANSDERMAL at 11:47

## 2018-06-19 RX ADMIN — HEPARIN SODIUM 5000 UNITS: 5000 INJECTION, SOLUTION INTRAVENOUS; SUBCUTANEOUS at 20:28

## 2018-06-19 RX ADMIN — CEFTRIAXONE SODIUM 2 G: 2 INJECTION, SOLUTION INTRAVENOUS at 11:47

## 2018-06-19 RX ADMIN — MULTIPLE VITAMINS W/ MINERALS TAB 1 TABLET: TAB ORAL at 09:33

## 2018-06-19 RX ADMIN — OXYCODONE HYDROCHLORIDE 5 MG: 5 TABLET ORAL at 14:56

## 2018-06-19 RX ADMIN — MORPHINE SULFATE 1 MG: 2 INJECTION, SOLUTION INTRAMUSCULAR; INTRAVENOUS at 09:56

## 2018-06-19 RX ADMIN — OXYCODONE HYDROCHLORIDE 5 MG: 5 TABLET ORAL at 03:03

## 2018-06-19 RX ADMIN — ASPIRIN 81 MG: 81 TABLET, COATED ORAL at 09:33

## 2018-06-19 NOTE — THERAPY TREATMENT NOTE
Acute Care - Physical Therapy Treatment Note  [facility]     Patient Name: Harris Shane  : 9/15/1927  MRN: [id number]  Today's Date: 2018  Onset of Illness/Injury or Date of Surgery: 18  Date of Referral to PT: 18  Referring Physician: DO Luiz    Admit Date: 2018    Visit Dx:    ICD-10-CM ICD-9-CM   1. Sepsis, due to unspecified organism A41.9 038.9     995.91   2. Elevated troponin R74.8 790.6   3. Elevated liver enzymes R74.8 790.5   4. Hyperbilirubinemia E80.6 782.4   5. History of prostate cancer Z85.46 V10.46   6. Metastatic cancer to spine C79.51 198.5   7. Pneumonia of left lower lobe due to infectious organism J18.1 486   8. Biliary calculus of other site with obstruction K80.81 [id number]   9. Impaired functional mobility, balance, gait, and endurance Z74.09 V49.89   10. Impaired mobility and ADLs Z74.09 799.89     Patient Active Problem List   Diagnosis   • Sepsis   • Elevated liver enzymes   • Prostate cancer (Mets to L4-L5)   • Pulmonary infiltrate, LLLobe   • Cholelithiases of GB neck per CT A/P   • Acute renal insufficiency, CRE 1.43, unknown baseline   • Blindness of right eye   • WILLIAM (obstructive sleep apnea) remote, intolerant of CPAP   • R/O Cholecystitis   • NSTEMI (non-ST elevated myocardial infarction) (R/O Type 2)   • Bacteremia due to Escherichia coli       Therapy Treatment          Rehabilitation Treatment Summary     Row Name 18 2045             Treatment Time/Intention    Discipline physical therapy assistant  -AS      Document Type therapy note (daily note)  -AS      Subjective Information complains of;weakness;fatigue;pain  -AS      Mode of Treatment physical therapy  -AS      Patient/Family Observations no family present  -AS      Patient Effort adequate  -AS      Comment patient agreed to exercises in bed, declined up to chair, nursing aware.  -AS      Recorded by [AS] Gerda Childress, PTA 18 1144      Row Name 18 7113              Cognitive Assessment/Intervention- PT/OT    Affect/Mental Status (Cognitive) WFL  -AS      Orientation Status (Cognition) oriented to;person;place  -AS      Follows Commands (Cognition) follows one step commands;75-90% accuracy  -AS      Cognitive Function (Cognitive) safety deficit  -AS      Safety Deficit (Cognitive) mild deficit  -AS      Personal Safety Interventions fall prevention program maintained;gait belt;nonskid shoes/slippers when out of bed;other (see comments)   exit alarm  -AS      Recorded by [AS] Gerda Childress, MARIA D 06/19/18 1144      Row Name 06/19/18 1105             Bed Mobility Assessment/Treatment    Comment (Bed Mobility) declined OOB or sitting EOB activity  -AS      Recorded by [AS] Gerda Childress, MARIA D 06/19/18 1144      Row Name 06/19/18 1105             Therapeutic Exercise    16258 - PT Therapeutic Exercise Minutes 23  -AS      Recorded by [AS] Gerda Childress, MARIA D 06/19/18 1144      Row Name 06/19/18 1105             Therapeutic Exercise    Upper Extremity Range of Motion (Therapeutic Exercise) shoulder flexion/extension, bilateral;elbow flexion/extension, bilateral;forearm supination/pronation, bilateral  -AS      Lower Extremity (Therapeutic Exercise) heel slides, bilateral;SLR (straight leg raise), bilateral  -AS      Lower Extremity Range of Motion (Therapeutic Exercise) hip abduction/adduction, bilateral;ankle dorsiflexion/plantar flexion, bilateral  -AS      Exercise Type (Therapeutic Exercise) AROM (active range of motion)  -AS      Position (Therapeutic Exercise) supine  -AS      Sets/Reps (Therapeutic Exercise) 1/10   with rest breaks  -AS      Recorded by [AS] Gerda Childress, MARIA D 06/19/18 1144      Row Name 06/19/18 1105             Positioning and Restraints    Pre-Treatment Position in bed  -AS      Post Treatment Position bed  -AS      In Bed supine;call light within reach;encouraged to call for assist;exit alarm on   pillow under knees  -AS      Recorded by  [AS] Gerda Childress, [facility] [place] 06/19/18 1144      Row Name 06/19/18 1105             Pain Scale: Numbers Pre/Post-Treatment    Pain Scale: Numbers, Pretreatment 2/10  -AS      Pain Scale: Numbers, Post-Treatment 2/10  -AS      Pain Location - Side Left  -AS      Pain Location --   L LE  -AS      Recorded by [AS] Gerda Childress, [facility] [place] 06/19/18 1144        User Key  (r) = Recorded By, (t) = Taken By, (c) = Cosigned By    Initials Name Effective Dates Discipline    AS Gerda Childress, [facility] [place] 06/22/15 -  PT                     Physical Therapy Education     Title: PT OT SLP Therapies (Active)     Topic: Physical Therapy (Active)     Point: Mobility training (Active)    Learning Progress Summary     Learner Status Readiness Method Response Comment Documented by    Patient Active Acceptance E NR  AS 06/19/18 1144     Active Eager E,D NR   06/18/18 1745     Active Acceptance E,D NR   06/15/18 1101     Active Acceptance E,D NR   06/14/18 1016     Active Acceptance E NR  KR 06/13/18 1200    Family Active Eager E,D NR   06/18/18 1745     Active Acceptance E NR  KR 06/13/18 1200          Point: Home exercise program (Active)    Learning Progress Summary     Learner Status Readiness Method Response Comment Documented by    Patient Active Acceptance E NR  AS 06/19/18 1144     Active Eager E,D NR   06/18/18 1745     Active Acceptance E,D NR   06/15/18 1101     Active Acceptance E,D NR   06/14/18 1016    Family Active Eager E,D NR   06/18/18 1745          Point: Body mechanics (Active)    Learning Progress Summary     Learner Status Readiness Method Response Comment Documented by    Patient Active Acceptance E NR  AS 06/19/18 1144     Active Eager E,D NR   06/18/18 1745     Active Acceptance E,D NR   06/15/18 1101     Active Acceptance E,D NR   06/14/18 1016     Active Acceptance E NR  KR 06/13/18 1200    Family Active Eager E,D NR   06/18/18 1745     Active Acceptance E NR  KR 06/13/18 1200          Point:  Precautions (Active)    Learning Progress Summary     Learner Status Readiness Method Response Comment Documented by    Patient Active Acceptance E NR  AS 06/19/18 1144     Active Eager E,D NR  DM 06/18/18 1745     Active Acceptance E,D NR  CM 06/15/18 1101     Active Acceptance E,D NR  CM 06/14/18 1016     Active Acceptance E NR  KR 06/13/18 1200    Family Active Eager E,D NR  DM 06/18/18 1745     Active Acceptance E NR  KR 06/13/18 1200                      User Key     Initials Effective Dates Name Provider Type Discipline     06/19/15 -  Mar Koenig, PT Physical Therapist PT    AS 06/22/15 -  Gerda Childress, PTA Physical Therapy Assistant PT    KR 04/03/18 -  Leanna Molina, PT Physical Therapist PT     06/07/18 -  Barbara Martinez, PT Student PT Student PT                    PT Recommendation and Plan     Plan of Care Reviewed With: patient  Progress: no change  Outcome Summary: patient refused OOB and sitting EOB activity, agreed to bialteral LE/UE exercises. Complaints of weakness and L LE pain.          Outcome Measures     Row Name 06/19/18 1105 06/18/18 1630 06/18/18 1410       How much help from another person do you currently need...    Turning from your back to your side while in flat bed without using bedrails? 2  -AS 2  -DM  --    Moving from lying on back to sitting on the side of a flat bed without bedrails? 2  -AS 2  -DM  --    Moving to and from a bed to a chair (including a wheelchair)? 1  -AS 2  -DM  --    Standing up from a chair using your arms (e.g., wheelchair, bedside chair)? 1  -AS 2  -DM  --    Climbing 3-5 steps with a railing? 1  -AS 1  -DM  --    To walk in hospital room? 1  -AS 2  -DM  --    AM-PAC 6 Clicks Score 8  -AS 11  -DM  --       How much help from another is currently needed...    Putting on and taking off regular lower body clothing?  --  -- 2  -HK    Bathing (including washing, rinsing, and drying)  --  -- 2  -HK    Toileting (which includes using toilet bed pan  or urinal)  --  -- 2  -HK    Putting on and taking off regular upper body clothing  --  -- 2  -HK    Taking care of personal grooming (such as brushing teeth)  --  -- 3  -HK    Eating meals  --  -- 3  -HK    Score  --  -- 14  -HK       Functional Assessment    Outcome Measure Options AM-PAC 6 Clicks Basic Mobility (PT)  -AS AM-PAC 6 Clicks Basic Mobility (PT)  -DM AM-PAC 6 Clicks Daily Activity (OT)  -HK      User Key  (r) = Recorded By, (t) = Taken By, (c) = Cosigned By    Initials Name Provider Type    DM Mar Koenig, PT Physical Therapist    AS Gerda Childress PTA Physical Therapy Assistant    HK Holli Oneal, OT Occupational Therapist           Time Calculation:         PT Charges     Row Name 06/19/18 1105             Time Calculation    Start Time 1105  -AS      PT Received On 06/19/18  -AS      PT Goal Re-Cert Due Date 06/23/18  -AS         Timed Charges    51272 - PT Therapeutic Exercise Minutes 23  -AS        User Key  (r) = Recorded By, (t) = Taken By, (c) = Cosigned By    Initials Name Provider Type    AS Gerda Childress PTA Physical Therapy Assistant        Therapy Suggested Charges     Code   Minutes Charges    48482 (CPT®) Hc Pt Neuromusc Re Education Ea 15 Min      44324 (CPT®) Hc Pt Ther Proc Ea 15 Min 23 2    60339 (CPT®) Hc Gait Training Ea 15 Min      24325 (CPT®) Hc Pt Therapeutic Act Ea 15 Min      32111 (CPT®) Hc Pt Manual Therapy Ea 15 Min      52650 (CPT®) Hc Pt Iontophoresis Ea 15 Min      31762 (CPT®) Hc Pt Elec Stim Ea-Per 15 Min      68049 (CPT®) Hc Pt Ultrasound Ea 15 Min      16179 (CPT®) Hc Pt Self Care/Mgmt/Train Ea 15 Min      Total  23 2        Therapy Charges for Today     Code Description Service Date Service Provider Modifiers Qty    55057288693 HC PT THER PROC EA 15 MIN 6/19/2018 Gerda Childress PTA GP 2          PT G-Codes  Outcome Measure Options: AM-PAC 6 Clicks Basic Mobility (PT)    Gerda Childress PTA  6/19/2018

## 2018-06-19 NOTE — PROGRESS NOTES
[facility] Medicine Services  PROGRESS NOTE    Patient Name: Harris Shane  : 9/15/1927  MRN: [id number]    Date of Admission: 2018  Length of Stay: 7  Primary Care Physician: Charli Mccormack MD    Subjective   Subjective     CC:  Severe weakness    Subjective:  Resting in a chair in no acute distress. Pain is still not well controlled.   still is very weak.  No F/C, no N/V or abdominal pain.  Review of Systems      Otherwise ROS is negative except as mentioned in the HPI.    Objective   Objective     Vital Signs:   Temp:  [98.3 °F (36.8 °C)-98.5 °F (36.9 °C)] 98.3 °F (36.8 °C)  Resp:  [18] 18  BP: (152)/(106) 152/106        Physical Exam:  Constitutional: No acute distress, awake, alert  Eyes: PERRLA, sclerae anicteric, no conjunctival injection. Right eye ptosis and blindness.  HENT: NCAT, mucous membranes moist  Neck: Supple, no thyromegaly, no lymphadenopathy, trachea midline  Respiratory: Clear to auscultation bilaterally, nonlabored respirations   Cardiovascular: RRR, no murmurs, rubs, or gallops, palpable pedal pulses bilaterally  Gastrointestinal: Positive bowel sounds, soft, nontender, mildly distended  Musculoskeletal: No bilateral ankle edema, no clubbing or cyanosis to extremities  Psychiatric: Appropriate affect, cooperative  Neurologic: Awake, alert, follows commands.  Patient does have some weakness of lower extremities however he can lift her legs against gravity.  Patient does have difficulty with ambulating independently.  Skin: No rashes    Results Reviewed:  I have personally reviewed current lab, radiology, and data and agree.      Results from last 7 days  Lab Units 18  0914 18  0627 18  0508  18  0509   WBC 10*3/mm3 11.34* 10.81* 9.35  < > 20.12*   HEMOGLOBIN g/dL 13.4 12.4* 11.7*  < > 12.0*   HEMATOCRIT % 40.4 37.4* 35.0*  < > 36.5*   PLATELETS 10*3/mm3 222 184 143*  < > 153   INR   --   --   --   --  1.11*   < > = values in this  interval not displayed.    Results from last 7 days  Lab Units 06/19/18  0914 06/18/18  0625 06/16/18  0508 06/15/18  0337  06/13/18  1740 06/13/18  1105 06/13/18  0509   SODIUM mmol/L  --  134 135 135  < >  --   --  134   POTASSIUM mmol/L  --  4.3 3.4* 3.8  < >  --   --  4.2   CHLORIDE mmol/L  --  101 102 104  < >  --   --  103   CO2 mmol/L  --  28.0 26.0 26.0  < >  --   --  24.0   BUN mg/dL  --  16 21 24*  < >  --   --  23   CREATININE mg/dL  --  0.82 0.88 1.03  < >  --   --  1.37*   GLUCOSE mg/dL  --  92 84 99  < >  --   --  134*   CALCIUM mg/dL  --  9.1 8.2* 8.1*  < >  --   --  8.5*   ALT (SGPT) U/L 72* 74* 94* 136*  < >  --   --  296*   AST (SGOT) U/L 48* 43* 46* 72*  < >  --   --  346*   TROPONIN I ng/mL  --   --   --   --   --  1.489* 1.596* 1.109*   < > = values in this interval not displayed.  Estimated Creatinine Clearance: 65 mL/min (by C-G formula based on SCr of 0.82 mg/dL).  No results found for: BNP  No results found for: PHART    Microbiology Results Abnormal     Procedure Component Value - Date/Time    Blood Culture - Blood, [[id number]]  (Normal) Collected:  06/12/18 2152    Lab Status:  Final result Specimen:  Blood from Arm, Left Updated:  06/17/18 2200     Blood Culture No growth at 5 days    Blood Culture - Blood, [[id number]]  (Abnormal)  (Susceptibility) Collected:  06/12/18 2152    Lab Status:  Final result Specimen:  Blood from Arm, Right Updated:  06/15/18 0728     Blood Culture Escherichia coli (A)     Isolated from Aerobic Bottle     Gram Stain Result Aerobic Bottle Gram negative bacilli    Susceptibility      Escherichia coli     SISSY     Ampicillin <=8 ug/ml Susceptible     Ampicillin + Sulbactam <=8/4 ug/ml Susceptible     Aztreonam <=8 ug/ml Susceptible     Cefepime <=8 ug/ml Susceptible     Cefotaxime <=2 ug/ml Susceptible     Ceftriaxone <=8 ug/ml Susceptible     Cefuroxime sodium <=4 ug/ml Susceptible     Ertapenem <=1 ug/ml Susceptible     Gentamicin <=4 ug/ml Susceptible      Levofloxacin <=2 ug/ml Susceptible     Meropenem <=1 ug/ml Susceptible     Piperacillin + Tazobactam <=16 ug/ml Susceptible     Tetracycline <=4 ug/ml Susceptible     Tobramycin <=4 ug/ml Susceptible     Trimethoprim + Sulfamethoxazole <=2/38 ug/ml Susceptible                    Blood Culture ID, PCR - Blood, [[id number]]  (Abnormal) Collected:  06/12/18 2152    Lab Status:  Final result Specimen:  Blood from Arm, Right Updated:  06/13/18 1421     BCID, PCR Escherichia coli. Identification by BCID PCR. (C)          Imaging Results (last 24 hours)     ** No results found for the last 24 hours. **        Results for orders placed during the hospital encounter of 06/12/18   Adult Transthoracic Echo Complete W/ Cont if Necessary Per Protocol    Narrative · Left ventricular systolic function is normal. Estimated EF = 55%.  · Left ventricular diastolic dysfunction (grade I) consistent with   impaired relaxation.          I have reviewed the medications.    Assessment/Plan   Assessment / Plan     Hospital Problem List     * (Principal)Sepsis    Elevated liver enzymes    Prostate cancer (Mets to L4-L5)    Pulmonary infiltrate, LLLobe    Cholelithiases of GB neck per CT A/P    Acute renal insufficiency, CRE 1.43, unknown baseline    Blindness of right eye    WILLIAM (obstructive sleep apnea) remote, intolerant of CPAP    R/O Cholecystitis    NSTEMI (non-ST elevated myocardial infarction) (R/O Type 2)    Bacteremia due to Escherichia coli             Brief Hospital Course to date:  Harris Shane is a 89 y.o. male with past medical history significant for prostate cancer with metastases  to L4 on L5, blindness of right eye, weakness.  Patient was admitted to ICU for sepsis.      Assessment & Plan:  *Sepsis markedly improved.    * Escherichia coli bacteremia.    * Elevated liver enzymes, improving.    * Back pain that radiates to lower extremities particularly on the left side.  This is mainly secondary to metastatic disease in  L4 and L5.    * Severe generalized weakness particularly lower extremities.  Patient has significant difficulty with ambulating independently.  This weakness is partially secondary to metastatic disease to the spine.    * Metastatic prostate cancer.  Metastases to L4 on L5.  Patient was supposed to start radiation therapy for that.  We need to coordinate with radiation oncology for this.    * Blindness and ptosis of the right eye.    PLAN:  - increase pain medication, add iv mrophine  - radiation oncology on board, will arrange for radiation therapy.      DVT Prophylaxis:      CODE STATUS:   Code Status and Medical Interventions:   Ordered at: 06/19/18 0835     Code Status:    CPR     Medical Interventions (Level of Support Prior to Arrest):    Full       Disposition: TBD      Electronically signed by Sukhwinder Howell MD, 06/19/18, 4:44 PM.

## 2018-06-19 NOTE — PLAN OF CARE
Problem: Patient Care Overview  Goal: Plan of Care Review  Outcome: Ongoing (interventions implemented as appropriate)   06/19/18 4709   Coping/Psychosocial   Plan of Care Reviewed With patient   OTHER   Outcome Summary VSS. pt had radiation today. complains of pain. scheduled and PRN medication adjusted. awaiting discharge plans. will continue to monitor.    Plan of Care Review   Progress improving

## 2018-06-19 NOTE — PROGRESS NOTES
Continued Stay Note  [facility]     Patient Name: Harris Shane  MRN: [id number]  Today's Date: 6/19/2018    Admit Date: 6/12/2018          Discharge Plan     Row Name 06/19/18 1556       Plan    Plan SNF    Patient/Family in Agreement with Plan yes    Plan Comments I spoke with the pts daughter Loida. The plan is for the pt to get one RAD TX here on 6-20-18 and future RAD TX after he completes SNF rehab. I have spoken to Claire From [facility] who is evaluating for [facility] in [place] or [facility] . CM will f/u with her and extend SNF search as needed.              Discharge Codes    No documentation.       Expected Discharge Date and Time     Expected Discharge Date Expected Discharge Time    Jun 20, 2018             Stacia Graham RN

## 2018-06-19 NOTE — PLAN OF CARE
Problem: Patient Care Overview  Goal: Plan of Care Review  Outcome: Ongoing (interventions implemented as appropriate)   06/19/18 1144   Coping/Psychosocial   Plan of Care Reviewed With patient   OTHER   Outcome Summary patient refused OOB and sitting EOB activity, agreed to bialteral LE/UE exercises. Complaints of weakness and L LE pain.   Plan of Care Review   Progress no change

## 2018-06-20 PROCEDURE — 87040 BLOOD CULTURE FOR BACTERIA: CPT | Performed by: INTERNAL MEDICINE

## 2018-06-20 PROCEDURE — 77280 THER RAD SIMULAJ FIELD SMPL: CPT | Performed by: RADIOLOGY

## 2018-06-20 PROCEDURE — 99232 SBSQ HOSP IP/OBS MODERATE 35: CPT | Performed by: INTERNAL MEDICINE

## 2018-06-20 PROCEDURE — 77412 RADIATION TX DELIVERY LVL 3: CPT | Performed by: RADIOLOGY

## 2018-06-20 PROCEDURE — 25010000002 HEPARIN (PORCINE) PER 1000 UNITS: Performed by: NURSE PRACTITIONER

## 2018-06-20 PROCEDURE — 25010000003 CEFTRIAXONE PER 250 MG: Performed by: INTERNAL MEDICINE

## 2018-06-20 PROCEDURE — 25010000002 MORPHINE SULFATE (PF) 2 MG/ML SOLUTION: Performed by: INTERNAL MEDICINE

## 2018-06-20 RX ADMIN — MULTIPLE VITAMINS W/ MINERALS TAB 1 TABLET: TAB ORAL at 08:58

## 2018-06-20 RX ADMIN — HEPARIN SODIUM 5000 UNITS: 5000 INJECTION, SOLUTION INTRAVENOUS; SUBCUTANEOUS at 20:03

## 2018-06-20 RX ADMIN — CEFTRIAXONE SODIUM 2 G: 2 INJECTION, SOLUTION INTRAVENOUS at 11:14

## 2018-06-20 RX ADMIN — OXYCODONE HYDROCHLORIDE 5 MG: 5 TABLET ORAL at 15:05

## 2018-06-20 RX ADMIN — MORPHINE SULFATE 1 MG: 2 INJECTION, SOLUTION INTRAMUSCULAR; INTRAVENOUS at 10:40

## 2018-06-20 RX ADMIN — OXYCODONE HYDROCHLORIDE 5 MG: 5 TABLET ORAL at 00:17

## 2018-06-20 RX ADMIN — ASPIRIN 81 MG: 81 TABLET, COATED ORAL at 08:58

## 2018-06-20 RX ADMIN — HEPARIN SODIUM 5000 UNITS: 5000 INJECTION, SOLUTION INTRAVENOUS; SUBCUTANEOUS at 08:58

## 2018-06-20 RX ADMIN — SENNOSIDES AND DOCUSATE SODIUM 2 TABLET: 8.6; 5 TABLET ORAL at 08:59

## 2018-06-20 RX ADMIN — OXYCODONE HYDROCHLORIDE 5 MG: 5 TABLET ORAL at 09:03

## 2018-06-20 RX ADMIN — OXYCODONE HYDROCHLORIDE 5 MG: 5 TABLET ORAL at 20:57

## 2018-06-20 RX ADMIN — SENNOSIDES AND DOCUSATE SODIUM 2 TABLET: 8.6; 5 TABLET ORAL at 20:03

## 2018-06-20 NOTE — PROGRESS NOTES
Continued Stay Note  [facility]     Patient Name: Harris Shane  MRN: [id number]  Today's Date: 6/20/2018    Admit Date: 6/12/2018          Discharge Plan     Row Name 06/20/18 1127       Plan    Plan Comments [facility] is following for possible rehab after discharge.  His radition would need to be completed prior to being transferred.              Discharge Codes    No documentation.       Expected Discharge Date and Time     Expected Discharge Date Expected Discharge Time    Jun 20, 2018             Ofe Pruitt RN

## 2018-06-20 NOTE — PROGRESS NOTES
Mr. Shane received a single fraction of 8Gy to the L4-S1 vertebral levels today.  He tolerated the procedure well.  I anticipate that we could begin seeing improvement in his pain as soon as within 2-3 days.  He is currently not planned to receive any additional radiation, but in the event his pain increases in the upcoming months, his radiation could be repeated as needed.  He can follow-up with us after he completes rehab, or if he would prefer to follow-up with his Medical Oncologist, Dr. Maco Mejia, this will be fine.

## 2018-06-20 NOTE — PLAN OF CARE
Problem: Patient Care Overview  Goal: Plan of Care Review  Outcome: Ongoing (interventions implemented as appropriate)   06/19/18 1604 06/19/18 2028 06/20/18 0417   Coping/Psychosocial   Plan of Care Reviewed With --  patient;family --    OTHER   Outcome Summary --  --  VSS, pt to have radiation today. Medicated with PRN for pain.   Plan of Care Review   Progress improving --  --        Problem: Fall Risk (Adult)  Goal: Absence of Fall  Outcome: Ongoing (interventions implemented as appropriate)      Problem: Sepsis/Septic Shock (Adult)  Goal: Signs and Symptoms of Listed Potential Problems Will be Absent, Minimized or Managed (Sepsis/Septic Shock)  Outcome: Ongoing (interventions implemented as appropriate)      Problem: Pneumonia (Adult)  Goal: Signs and Symptoms of Listed Potential Problems Will be Absent, Minimized or Managed (Pneumonia)  Outcome: Ongoing (interventions implemented as appropriate)      Problem: Skin Injury Risk (Adult)  Goal: Skin Health and Integrity  Outcome: Ongoing (interventions implemented as appropriate)

## 2018-06-20 NOTE — PROGRESS NOTES
[facility] Medicine Services  PROGRESS NOTE    Patient Name: Harris Shane  : 9/15/1927  MRN: [id number]    Date of Admission: 2018  Length of Stay: 8  Primary Care Physician: Charli Mccormack MD    Subjective   Subjective     CC:  Severe weakness    Subjective:  Resting in a chair in no acute distress. Pain isbetter contrlled but patient still has some pain.   still is very weak.  No F/C, no N/V or abdominal pain.  Review of Systems      Otherwise ROS is negative except as mentioned in the HPI.    Objective   Objective     Vital Signs:   Temp:  [97.5 °F (36.4 °C)-97.9 °F (36.6 °C)] 97.9 °F (36.6 °C)  Heart Rate:  [87] 87  Resp:  [16-20] 16  BP: (118-138)/(84-86) 138/86        Physical Exam:  Constitutional: No acute distress, awake, alert  Eyes: PERRLA, sclerae anicteric, no conjunctival injection. Right eye ptosis and blindness.  HENT: NCAT, mucous membranes moist  Neck: Supple, no thyromegaly, no lymphadenopathy, trachea midline  Respiratory: Clear to auscultation bilaterally, nonlabored respirations   Cardiovascular: RRR, no murmurs, rubs, or gallops, palpable pedal pulses bilaterally  Gastrointestinal: Positive bowel sounds, soft, nontender, mildly distended  Musculoskeletal: No bilateral ankle edema, no clubbing or cyanosis to extremities  Psychiatric: Appropriate affect, cooperative  Neurologic: Awake, alert, follows commands.  Patient does have some weakness of lower extremities however he can lift her legs against gravity.  Patient does have difficulty with ambulating independently.  Skin: No rashes    Results Reviewed:  I have personally reviewed current lab, radiology, and data and agree.      Results from last 7 days  Lab Units 18  0914 18  0627 18  0508   WBC 10*3/mm3 11.34* 10.81* 9.35   HEMOGLOBIN g/dL 13.4 12.4* 11.7*   HEMATOCRIT % 40.4 37.4* 35.0*   PLATELETS 10*3/mm3 222 184 143*       Results from last 7 days  Lab Units 18  0914  06/18/18  0625 06/16/18  0508 06/15/18  0337  06/13/18  1740   SODIUM mmol/L  --  134 135 135  < >  --    POTASSIUM mmol/L  --  4.3 3.4* 3.8  < >  --    CHLORIDE mmol/L  --  101 102 104  < >  --    CO2 mmol/L  --  28.0 26.0 26.0  < >  --    BUN mg/dL  --  16 21 24*  < >  --    CREATININE mg/dL  --  0.82 0.88 1.03  < >  --    GLUCOSE mg/dL  --  92 84 99  < >  --    CALCIUM mg/dL  --  9.1 8.2* 8.1*  < >  --    ALT (SGPT) U/L 72* 74* 94* 136*  < >  --    AST (SGOT) U/L 48* 43* 46* 72*  < >  --    TROPONIN I ng/mL  --   --   --   --   --  1.489*   < > = values in this interval not displayed.  Estimated Creatinine Clearance: 64.7 mL/min (by C-G formula based on SCr of 0.82 mg/dL).  No results found for: BNP  No results found for: PHART    Microbiology Results Abnormal     Procedure Component Value - Date/Time    Blood Culture - Blood, [[id number]]  (Normal) Collected:  06/12/18 2152    Lab Status:  Final result Specimen:  Blood from Arm, Left Updated:  06/17/18 2200     Blood Culture No growth at 5 days    Blood Culture - Blood, [[id number]]  (Abnormal)  (Susceptibility) Collected:  06/12/18 2152    Lab Status:  Final result Specimen:  Blood from Arm, Right Updated:  06/15/18 0728     Blood Culture Escherichia coli (A)     Isolated from Aerobic Bottle     Gram Stain Result Aerobic Bottle Gram negative bacilli    Susceptibility      Escherichia coli     SISSY     Ampicillin <=8 ug/ml Susceptible     Ampicillin + Sulbactam <=8/4 ug/ml Susceptible     Aztreonam <=8 ug/ml Susceptible     Cefepime <=8 ug/ml Susceptible     Cefotaxime <=2 ug/ml Susceptible     Ceftriaxone <=8 ug/ml Susceptible     Cefuroxime sodium <=4 ug/ml Susceptible     Ertapenem <=1 ug/ml Susceptible     Gentamicin <=4 ug/ml Susceptible     Levofloxacin <=2 ug/ml Susceptible     Meropenem <=1 ug/ml Susceptible     Piperacillin + Tazobactam <=16 ug/ml Susceptible     Tetracycline <=4 ug/ml Susceptible     Tobramycin <=4 ug/ml Susceptible     Trimethoprim +  Sulfamethoxazole <=2/38 ug/ml Susceptible                    Blood Culture ID, PCR - Blood, [[id number]]  (Abnormal) Collected:  06/12/18 2152    Lab Status:  Final result Specimen:  Blood from Arm, Right Updated:  06/13/18 1421     BCID, PCR Escherichia coli. Identification by BCID PCR. (C)          Imaging Results (last 24 hours)     ** No results found for the last 24 hours. **        Results for orders placed during the hospital encounter of 06/12/18   Adult Transthoracic Echo Complete W/ Cont if Necessary Per Protocol    Narrative · Left ventricular systolic function is normal. Estimated EF = 55%.  · Left ventricular diastolic dysfunction (grade I) consistent with   impaired relaxation.          I have reviewed the medications.    Assessment/Plan   Assessment / Plan     Hospital Problem List     * (Principal)Sepsis    Elevated liver enzymes    Prostate cancer (Mets to L4-L5)    Pulmonary infiltrate, LLLobe    Cholelithiases of GB neck per CT A/P    Acute renal insufficiency, CRE 1.43, unknown baseline    Blindness of right eye    WILLIAM (obstructive sleep apnea) remote, intolerant of CPAP    R/O Cholecystitis    NSTEMI (non-ST elevated myocardial infarction) (R/O Type 2)    Bacteremia due to Escherichia coli             Brief Hospital Course to date:  Harris Shane is a 89 y.o. male with past medical history significant for prostate cancer with metastases  to L4 on L5, blindness of right eye, weakness.  Patient was admitted to ICU for sepsis.      Assessment & Plan:  *Sepsis markedly improved.    * Escherichia coli bacteremia, has been on IV rocephin. Afebrile.    * Elevated liver enzymes, improving.    * Back pain that radiates to lower extremities particularly on the left side.  This is mainly secondary to metastatic disease in L4 and L5. Had radiation to L4-S1 today.    * Severe generalized weakness particularly lower extremities.  Patient has significant difficulty with ambulating independently.  This  weakness is partially secondary to metastatic disease to the spine.    * Metastatic prostate cancer.  Metastases to L4 on L5.  Patient was supposed to start radiation therapy for that.  We need to coordinate with radiation oncology for this.    * Blindness and ptosis of the right eye.    PLAN:  - cont current medication  - labs in am  - home/ rehab when pain is better controlled.        DVT Prophylaxis:      CODE STATUS:   Code Status and Medical Interventions:   Ordered at: 06/19/18 0835     Code Status:    CPR     Medical Interventions (Level of Support Prior to Arrest):    Full       Disposition: TBD      Electronically signed by Sukhwinder Howell MD, 06/20/18, 5:13 PM.

## 2018-06-21 PROCEDURE — 25010000003 CEFTRIAXONE PER 250 MG: Performed by: INTERNAL MEDICINE

## 2018-06-21 PROCEDURE — 25010000002 MORPHINE SULFATE (PF) 2 MG/ML SOLUTION: Performed by: INTERNAL MEDICINE

## 2018-06-21 PROCEDURE — 97530 THERAPEUTIC ACTIVITIES: CPT

## 2018-06-21 PROCEDURE — 25010000002 HEPARIN (PORCINE) PER 1000 UNITS: Performed by: NURSE PRACTITIONER

## 2018-06-21 PROCEDURE — 99232 SBSQ HOSP IP/OBS MODERATE 35: CPT | Performed by: INTERNAL MEDICINE

## 2018-06-21 PROCEDURE — 97110 THERAPEUTIC EXERCISES: CPT

## 2018-06-21 RX ORDER — LORAZEPAM 0.5 MG/1
0.5 TABLET ORAL ONCE
Status: COMPLETED | OUTPATIENT
Start: 2018-06-21 | End: 2018-06-21

## 2018-06-21 RX ORDER — LORAZEPAM 0.5 MG/1
0.5 TABLET ORAL EVERY 12 HOURS SCHEDULED
Status: DISCONTINUED | OUTPATIENT
Start: 2018-06-21 | End: 2018-06-22

## 2018-06-21 RX ADMIN — MORPHINE SULFATE 1 MG: 2 INJECTION, SOLUTION INTRAMUSCULAR; INTRAVENOUS at 02:21

## 2018-06-21 RX ADMIN — ASPIRIN 81 MG: 81 TABLET, COATED ORAL at 08:03

## 2018-06-21 RX ADMIN — MULTIPLE VITAMINS W/ MINERALS TAB 1 TABLET: TAB ORAL at 08:02

## 2018-06-21 RX ADMIN — SENNOSIDES AND DOCUSATE SODIUM 2 TABLET: 8.6; 5 TABLET ORAL at 20:14

## 2018-06-21 RX ADMIN — OXYCODONE HYDROCHLORIDE 5 MG: 5 TABLET ORAL at 11:23

## 2018-06-21 RX ADMIN — LORAZEPAM 0.5 MG: 0.5 TABLET ORAL at 20:14

## 2018-06-21 RX ADMIN — HEPARIN SODIUM 5000 UNITS: 5000 INJECTION, SOLUTION INTRAVENOUS; SUBCUTANEOUS at 20:14

## 2018-06-21 RX ADMIN — LORAZEPAM 0.5 MG: 0.5 TABLET ORAL at 10:35

## 2018-06-21 RX ADMIN — MORPHINE SULFATE 1 MG: 2 INJECTION, SOLUTION INTRAMUSCULAR; INTRAVENOUS at 21:17

## 2018-06-21 RX ADMIN — OXYCODONE HYDROCHLORIDE 5 MG: 5 TABLET ORAL at 18:16

## 2018-06-21 RX ADMIN — MORPHINE SULFATE 1 MG: 2 INJECTION, SOLUTION INTRAMUSCULAR; INTRAVENOUS at 15:26

## 2018-06-21 RX ADMIN — OXYCODONE HYDROCHLORIDE 5 MG: 5 TABLET ORAL at 23:30

## 2018-06-21 RX ADMIN — SENNOSIDES AND DOCUSATE SODIUM 2 TABLET: 8.6; 5 TABLET ORAL at 08:02

## 2018-06-21 RX ADMIN — CEFTRIAXONE SODIUM 2 G: 2 INJECTION, SOLUTION INTRAVENOUS at 11:23

## 2018-06-21 RX ADMIN — HEPARIN SODIUM 5000 UNITS: 5000 INJECTION, SOLUTION INTRAVENOUS; SUBCUTANEOUS at 08:02

## 2018-06-21 NOTE — DISCHARGE PLACEMENT REQUEST
"Helena Lopez (89 y.o. Male)     Looking for short term rehab  Ofe Pruitt RN   [phone]      Date of Birth Social Security Number Address Home Phone MRN    09/15/1927  [address] [place] [id number] [phone] [id number]    [employer] Marital Status          [employer]        Admission Date Admission Type Admitting Provider Attending Provider Department, Room/Bed    6/12/18 Emergency Sukhwinder Howell MD Kalantar, Masoud, MD [facility] 5G, S556/1    Discharge Date Discharge Disposition Discharge Destination                       Attending Provider:  Sukhwinder Howell MD    Allergies:  Contrast Dye    Isolation:  None   Infection:  None   Code Status:  CPR    Ht:  170.2 cm (67.01\")   Wt:  77 kg (169 lb 12.8 oz)    Admission Cmt:  None   Principal Problem:  Sepsis [A41.9]                 Active Insurance as of 6/12/2018     Primary Coverage     Payor Plan Insurance Group Employer/Plan Group    MEDICARE MEDICARE A & B      Payor Plan Address Payor Plan Phone Number Effective From Effective To    [address] [phone] 9/1/1992     [facility] [id number]       Subscriber Name Subscriber Birth Date Member ID       HELENA LOPEZ 9/15/1927 [id number]           Secondary Coverage     Payor Plan Insurance Group Employer/Plan Group    [place] [id number]     Payor Plan Address Payor Plan Phone Number Effective From Effective To    [address]  1/[birth year]1989     [facility] [id number]       Subscriber Name Subscriber Birth Date Member ID       HELENA LOPEZ 9/15/1927 [id number]                 Emergency Contacts      (Rel.) Home Phone Work Phone Mobile Phone    Loida Lopez (Daughter) -- -- [phone]    Danyell Lopez (Daughter) -- -- [phone]            Emergency Contact Information     Name Relation Home Work Mobile    Loida Lopez Daughter   [phone]    Danyell Lopez Daughter   [phone]          Insurance Information                " MEDICARE/MEDICARE A & B Phone: [phone]    Subscriber: Harris Shane Subscriber#: [id number]    Group#:  Precert#:         CLOVER [facility] Phone:     Subscriber: Harris Shane Subscriber#: [id number]    Group#: [id number] Precert#:              History & Physical      Donte Dolan MD at 2018  4:05 PM          CONSULTATION NOTE      :                                                          9/15/1927  DATE OF CONSULTATION:                       2018   REQUESTING PHYSICIAN:                   No ref. provider found  REASON FOR CONSULTATION:            Cancer Staging  No matching staging information was found for the patient.    I am seeing Mr. Shane is an inpatient today at the request of the primary medicine team for evaluation of metastatic prostate cancer.     BRIEF HISTORY:  The patient is a very pleasant 89 y.o. 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  He is had a prolonged hospital stay as a result of this requiring intensive care unit admission, intervention, and IV antibiotics, and now that he has recovered, he is obviously significantly weak and somewhat debilitated due to this hospital stay, and he is being prepared for short-term rehabilitation had a skilled nursing facility.  His recovery has since been complicated by the fact that he has metastatic prostate cancer in his lumbar spine, specifically at L4-5, causing him low back pain as well as weakness in his right lower extremity.  Prior to admission, he had been evaluated by Nemo Pacheco, who is planning to perform 5 external beam radiation therapy treatments to his lumbar spine as an outpatient.  Considering that he is currently admitted and awaiting placement at rehabilitation facility, I am being asked to evaluate him today in the context of performing this radiation  therapy so that he may recover and be placed at a skilled nursing facility.  At this time he continues to complain of low back pain as well as right lower extremity weakness which was present earlier, that he also complains of generalized fatigue and tiredness.  He denies having any new sites of bone pain.    His oncologic history includes a diagnosis of metastatic prostate cancer to bone only in 2015 he has been managed with hormonal blockade since that time.    Allergies   Allergen Reactions   • Contrast Dye Rash and Other (See Comments)     RASH AND SYNCOPE       Social History     Social History   • Marital status:    • Number of children: 3     Social History Main Topics   • Smoking status: Former Smoker     Types: Pipe   • Smokeless tobacco: Never Used      Comment: QUIT IN THE 70'S   • Alcohol use No   • Drug use: No   • Sexual activity: Defer     Other Topics Concern   • Not on file     Social History Narrative    Patient is  and lives with his spouse and one of his daughters.       Past Medical History:   Diagnosis Date   • Blindness of right eye 6/13/2018   • Cancer     PROSTATE   • DVT (deep venous thrombosis) 1990's   • Metastatic cancer to spine, L4-L5 6/13/2018   • Myocardial infarction 1970's   • WILLIAM (obstructive sleep apnea) 6/13/2018   • UTI (urinary tract infection)        family history includes Breast cancer in his mother; Coronary artery disease in his brother and father; No Known Problems in his sister; Prostate cancer in his father; Stroke in his brother.     Past Surgical History:   Procedure Laterality Date   • EYE SURGERY      RIGHT        Review of Systems - Oncology        Objective   VITAL SIGNS:   Vitals:    06/17/18 0801 06/17/18 2000 06/18/18 0500 06/18/18 0705   BP: 151/92 158/92     BP Location: Left arm      Patient Position: Lying      Pulse: 74      Resp: 18 18 18   Temp: 98.1 °F (36.7 °C) 97.9 °F (36.6 °C)  98.5 °F (36.9 °C)   TempSrc: Oral Oral  Oral   SpO2: 97%       Weight:   75.5 kg (166 lb 8 oz)    Height:            No Data Recorded      Physical Exam   Constitutional: He is oriented to person, place, and time. He appears well-developed and well-nourished. No distress.   He appears younger than his stated age.   HENT:   Head: Normocephalic and atraumatic.   Mouth/Throat: Oropharynx is clear and moist.   Eyes:   He has an atrophic right globe, with right eye ptosis and tethering.  He is blind in the right eye, left eye tracks and responds to light normally.   Neck: Normal range of motion. Neck supple.   Cardiovascular: Normal rate and regular rhythm.  Exam reveals no friction rub.    No murmur heard.  Pulmonary/Chest: Effort normal and breath sounds normal. He has no wheezes.   Abdominal: Soft. Bowel sounds are normal. He exhibits no distension and no mass. There is no tenderness.   Musculoskeletal: Normal range of motion. He exhibits no edema.        Right shoulder: He exhibits decreased strength.   His strength is diffusely 3+, and in the right lower extremity he has 2+ strength to dorsiflexion and plantarflexion.   Lymphadenopathy:     He has no cervical adenopathy.   Neurological: He is alert and oriented to person, place, and time. No cranial nerve deficit. He displays a negative Romberg sign. He displays no Babinski's sign on the right side. He displays no Babinski's sign on the left side.   Reflex Scores:       Patellar reflexes are 3+ on the right side.       Achilles reflexes are 3+ on the right side.  Skin: Skin is warm and dry.   Psychiatric: He has a normal mood and affect. His behavior is normal. Judgment and thought content normal.   Nursing note and vitals reviewed.    IMAGING  I have personally reviewed her relevant imaging studies, as follows:  Ct Abdomen Pelvis Without Contrast    Result Date: 6/13/2018  EXAM:   CT Abdomen and Pelvis Without Intravenous Contrast  Bones/joints:  Sclerosis within L4 and especially L5 vertebrae, suspicious for potential  osteoblastic metastases.  Thoracolumbar mild scoliosis.  Degenerative changes in the spine.  No acute fracture.  No dislocation.   Soft tissues:  Minimal umbilical hernia present, containing only Fat.   Vasculature:  Scattered atherosclerotic placques are seen along some vessels.  No aortic aneurysm.   Lymph nodes:  Possibly small perivesical lymph nodes.  No enlarged lymph nodes visualized.     Ct Lumbar Spine Wo Contrast    Result Date: 6/6/2018  EXAMINATION: CT LUMBAR SPINE WO CONTRAST-06/06/2018: markedly abnormal diffuse sclerotic change in the L5 vertebral body. This corresponds precisely with the abnormality noted on the bone scan of 05/25/2018.      The trabecular pattern is markedly abnormal at L5 with diffuse sclerosis. There are otherwise diffuse osteoarthritic changes. The L5 abnormality corresponds to the bone scan abnormality on 05/25/2018. The most likely diagnosis with this finding in a patient of this age would be metastatic carcinoma of the prostate gland.  D:  06/06/2018 E:  06/06/2018    This report was finalized on 6/6/2018 1:07 PM by Dr. Reginald Anguiano MD.      Nm Bone Scan Whole Body    Result Date: 5/29/2018  EXAMINATION: NM BONE SCAN WHOLE-BODY - 05/25/2018  INDICATION:  M79.605-Pain in left leg.  TECHNIQUE: Patient was injected with 27.0 mCi of Technetium MDP and scanned after a four-hour delay.  COMPARISON: None.  FINDINGS: There is thoracolumbar scoliosis with convexity to the right. There are small areas of somewhat eccentric activity in the thoracic spine consistent with arthritic change. Arthritic changes are also noted in the wrists, hands and knees. There is intensely increased activity in the region of the L5 vertebral body.      There is intensely increased activity involving the L5 vertebral body, greater on the left than on the right. This should be correlated with plain radiographs or CT imaging. Other findings are arthritic in nature.   DICTATED:     05/25/2018 EDITED/ls :      05/25/2018  This report was finalized on 5/29/2018 9:21 AM by Dr. Regianld Anguiano MD.       PATHOLOGY  There currently is no pathology available in the system.  We have contacted [facility] to receive these records.    The following portions of the patient's history were reviewed and updated as appropriate: allergies, current medications, past family history, past medical history, past social history, past surgical history and problem list.    Assessment  Mr. Shane is a 89-year-old gentleman with a known diagnosis of metastatic prostate cancer most significant weight affecting his lumbar spine.  On CT scans as well as bone nuclear imaging he has evidence of a large lesion involving the L5 vertebral body which I think is contributing to both of the symptoms of pain and weakness.   He is certainly likely to benefit from a course of radiation therapy to the lumbar spine, and given his current situation where he will need to be place in the subacute rehabilitation facility, I agree that I think he would be in his best interest to move up these treatments and get them accomplished while he is in inpatient prior to transfer.  I have met and examined the patient as well as contacted his daughter, Loida Shane, who is his power of .  I discussed the logistics and rationale for treating him now and I think he would benefit from a single fraction of 8 Gray treating the lumbar spine.  She was agreeable, but requested that I contact his medical oncologist Dr. Maco Mejia to discuss treatment with him.  I attempted to call Dr. hobson afternoon at 4 PM, but he is unfortunately out-of-town on vacation.  I have not been able to obtain consent as of yet as there is no power of  on file here at [facility].  Mrs. Shane states that she is coming tomorrow morning to see her father again, so I will meet her at that time.  If we are able to complete the consents tomorrow, then I will likely have his treatment  "completed before Wednesday, June 20.    Thank you for allowing me to participate in the care of this individual.    Sincerely,       Donte Dolan MD      Electronically signed by Donte Dolan MD at 6/18/2018  4:15 PM     Charli Duran MD at 6/13/2018  4:08 AM            CRITICAL CARE ADMISSION NOTE    Chief Complaint     Sepsis    History of Present Illness     Harris Shane is a 89 yo male who presented to [facility] ED last night at 2042 c/o bilateral lower extremity weakness.   At baseline the patient is able to ambulate with the assistance of walker, however, for the past day, patient has been unable to get out of bed secondary to diffuse weakness, particularly in his bilateral lower extremities. He was unable to walk to supper this evening, which concerned his children and prompted them to bring him to the emergency room. He does admit to some mild chest \"ache\" earlier in the morning but they resolved on their own.  He has also had SOB and some abdominal discomfort when he was struggling to get up and was unable.      He has a history of prostate cancer diagnoses three years ago and was treated with injections and radiation per Dr Mejia, oncologist at [facility]). He is currently on Bicalutamide. He was recently diagnosed with spine malignancy at L5 with plans for radiation therapy to start today (6/13/18) at [facility].  He has had BLE pain related to this, R>L, for several months now.    In the ED he was found to be hypotensive with lactate 3.1 and WBC 12.93. Procalcitonin pending. His liver enzymes were mildly elevated ([id number], ) as well as his his creatine 1.43 and troponin 0.26> 0.45. CT abdomen and pelvis revealed cholelithiasis at GB neck, prostate enlargement, and colonic diverticulosis without diverticulitis.  Radiology report also noted L4, L5 osteoblastic metastases,  LLL atelectasis/infiltrate, and a chronic large hiatal hernia with organorotation of stomach. Mild " peripancreatic edema at head of gland was also seen.    The patient received 2 liters of NS with marginal improvement in BP initially. Azithromycin, Rocephin, and Flagyl was given in the ED for probable sepsis related to pneumonia and intra abdominal infection.  Th e patient was initially going to be admitted per Hospitalist group, however there was concern for marginal BP and it was decided that the patient would need closer observation in the ICU.    Currently the patient is not requiring vasopressor support. Indicates the abdominal pain he has yesterday has improved. Denies chest pain.    Problem List, Surgical History, Family, Social History, and ROS     Patient Active Problem List   Diagnosis   • Sepsis   • Elevated liver enzymes   • Prostate cancer (Mets to L4-L5)   • Pulmonary infiltrate, LLLobe   • Cholelithiases of GB neck per CT A/P   • Acute renal insufficiency, CRE 1.43, unknown baseline   • Blindness of right eye   • WILLIAM (obstructive sleep apnea) remote, intolerant of CPAP   • R/O Cholecystitis   • NSTEMI (non-ST elevated myocardial infarction) (R/O Type 2)     Past Surgical History:   Procedure Laterality Date   • EYE SURGERY      RIGHT       Allergies   Allergen Reactions   • Contrast Dye Rash and Other (See Comments)     RASH AND SYNCOPE       MEDICATION LIST AND ALLERGIES REVIEWED.    Family History   Problem Relation Age of Onset   • Breast cancer Mother    • Prostate cancer Father    • Coronary artery disease Father    • No Known Problems Sister    • Stroke Brother    • Coronary artery disease Brother      Social History   Substance Use Topics   • Smoking status: Former Smoker     Types: Pipe   • Smokeless tobacco: Never Used      Comment: QUIT IN THE 70'S   • Alcohol use No     Social History     Social History Narrative    Patient is  and lives with his spouse and one of his daughters.     FAMILY AND SOCIAL HISTORY REVIEWED.    Review of Systems   Constitutional: Positive for fatigue.  "Negative for chills and fever.   Eyes:        Right eye blindness, chronic   Respiratory: Positive for shortness of breath and wheezing (upper airway, chronic). Negative for cough.    Cardiovascular: Positive for chest pain (chest \"aches\", now resolved) and leg swelling.   Gastrointestinal: Positive for abdominal pain and constipation. Negative for diarrhea, nausea and vomiting.   Endocrine: Negative.    Genitourinary: Positive for dysuria and frequency.   Musculoskeletal: Positive for arthralgias, back pain and gait problem.   Skin: Negative.    Allergic/Immunologic: Negative.    Neurological: Positive for weakness (generalized). Negative for speech difficulty and headaches.   Hematological: Negative.    Psychiatric/Behavioral: Negative.      ALL OTHER SYSTEMS REVIEWED AND ARE NEGATIVE.    Physical Exam and Clinical Information   /87   Pulse 64   Temp 97.7 °F (36.5 °C) (Oral)   Resp 18   Ht 170.2 cm (67\")   Wt 86.2 kg (190 lb)   SpO2 96%   BMI 29.76 kg/m²    Physical Exam:   GENERAL: Awake, no distress   HEENT: No adednopathy or thyromegaly   LUNGS: Basilar crackles, no wheezes   HEART: HRR without murmurs   ABDOMEN: Soft, non-tender. Few BS noted.   EXTREMITIES: Trace edema, no cyanosis   NEURO/PSYCH: Awake, alert, conversant, no focal motor defecit      Results from last 7 days  Lab Units 06/13/18  0509 06/12/18  2152   WBC 10*3/mm3 20.12* 12.93*   HEMOGLOBIN g/dL 12.0* 13.1   PLATELETS 10*3/mm3 153 168       Results from last 7 days  Lab Units 06/13/18  0509 06/12/18  2152   SODIUM mmol/L 134 137   POTASSIUM mmol/L 4.2 3.3*   CO2 mmol/L 24.0 25.0   BUN mg/dL 23 24*   CREATININE mg/dL 1.37* 1.43*   MAGNESIUM mg/dL 2.1 2.0   PHOSPHORUS mg/dL 3.6  --    GLUCOSE mg/dL 134* 143*     Estimated Creatinine Clearance: 37.6 mL/min (A) (by C-G formula based on SCr of 1.37 mg/dL (H)).          Lab Results   Component Value Date    LACTATE 2.3 (C) 06/13/2018        IMAGES:     CT ABDOMEN AND " PELVIS:  IMPRESSION:  1.  Prostate enlargement.  2.  Cholelithiasis at GB neck.  3.  Nonspecific bowel gas pattern without dilatation or obstruction.  4.  Colonic diverticulosis without diverticulitis.  5.  Suspicious for L4, L5 osteoblastic metastases.  6.  LLL atelectasis/infiltrate.  7.  Chronic large hiatal hernia with organorotation of stomach.  8.  CAD.  9.  Chronic right renal nodularity unchanged.  10.  Mild peripancreatic edema at head of gland, consider serum lipase.  11.  Additional findings, as above.     I reviewed the patient's results and images.     [facility] Problem List     * (Principal)Sepsis    Cholelithiases of GB neck per CT A/P    R/O Cholecystitis    Pulmonary infiltrate, LLLobe    Acute renal insufficiency, CRE 1.43, unknown baseline    NSTEMI (non-ST elevated myocardial infarction) (R/O Type 2)    Elevated liver enzymes    Prostate cancer (Mets to L4-L5)    Blindness of right eye    WILLIAM (obstructive sleep apnea) remote, intolerant of CPAP        Plan/Recommendations     ICU admission  Monitor BP closely  Continue gentle hydration  Pan cultures  ABX coverage  Heparin for DVT and PPI for GI prophylaxis  Follow labs in am, including serial troponin  ECHO in am  GI consultation  Abdominal ultrasound  Consider HIDA scan  Consider Cardiology consultation if troponin continues to rise; currently no evidence of STEMI    Critical Care time spent in direct patient care: 40 minutes (excluding procedure time, if applicable) including high complexity decision making to assess, manipulate, and support vital organ system failure in this individual who has impairment of one or more vital organ systems such that there is a high probability of imminent or life threatening deterioration in the patient’s condition.    CHELITA Coy  Pulmonary and Critical Care Medicine  06/13/18 6:21 AM     I have seen and examined patient, performing a face-to-face diagnostic evaluation with plan of care  "reviewed and developed with APRN and nursing staff. I have addended and modified the above history of present illness, physical examination, and assessment and plan to reflect my findings and impressions.    SYLVIA Duran MD  Pulmonary and Critical Care Medicine     CC: Charli Mccormack MD        Electronically signed by Charli Duran MD at 6/13/2018  6:25 AM       Hospital Medications (active)       Dose Frequency Start End    aspirin EC tablet 81 mg 81 mg Daily 6/13/2018     Sig - Route: Take 1 tablet by mouth Daily. - Oral    cefTRIAXone (ROCEPHIN) IVPB 2 g 2 g Every 24 Hours 6/15/2018 6/27/2018    Sig - Route: Infuse 50 mL into a venous catheter Daily. - Intravenous    fentaNYL (DURAGESIC) 50 MCG/HR patch 1 patch 1 patch Every 72 Hours 6/19/2018 7/1/2018    Sig - Route: Place 1 patch on the skin Every 72 (Seventy-Two) Hours. - Transdermal    heparin (porcine) 5000 UNIT/ML injection 5,000 Units 5,000 Units Every 12 Hours Scheduled 6/13/2018     Sig - Route: Inject 1 mL under the skin Every 12 (Twelve) Hours. - Subcutaneous    Magnesium Sulfate 2 gram infusion- Mg 1.6 - 1.9 mg/dL 2 g As Needed 6/13/2018     Sig - Route: Infuse 50 mL into a venous catheter As Needed (Mg 1.6 - 1.9 mg/dL). - Intravenous    Linked Group 1:  \"Or\" Linked Group Details        magnesium sulfate 3 gram infusion (1gm x 3) - Mg 1.1 - 1.5 mg/dL 1 g As Needed 6/13/2018     Sig - Route: Infuse 100 mL into a venous catheter As Needed (Mg 1.1 - 1.5 mg/dL). - Intravenous    Linked Group 1:  \"Or\" Linked Group Details        magnesium sulfate 4 gram infusion - Mg less than or equal to 1mg/dL 4 g As Needed 6/13/2018     Sig - Route: Infuse 100 mL into a venous catheter As Needed (Mg less than or equal to 1mg/dL). - Intravenous    Linked Group 1:  \"Or\" Linked Group Details        Morphine sulfate (PF) injection 1 mg 1 mg Every 2 Hours PRN 6/19/2018 6/29/2018    Sig - Route: Infuse 0.5 mL into a venous catheter Every 2 (Two) Hours As " "Needed for Severe Pain . - Intravenous    multivitamin with minerals 1 tablet 1 tablet Daily 6/14/2018     Sig - Route: Take 1 tablet by mouth Daily. - Oral    ondansetron (ZOFRAN) injection 4 mg 4 mg Every 6 Hours PRN 6/13/2018     Sig - Route: Infuse 2 mL into a venous catheter Every 6 (Six) Hours As Needed for Nausea or Vomiting. - Intravenous    oxyCODONE (ROXICODONE) immediate release tablet 5 mg 5 mg Every 6 Hours PRN 6/18/2018     Sig - Route: Take 1 tablet by mouth Every 6 (Six) Hours As Needed for Moderate Pain . - Oral    potassium chloride (KLOR-CON) packet 40 mEq 40 mEq As Needed 6/17/2018     Sig - Route: Take 40 mEq by mouth As Needed (potassium replacement, see admin instructions). - Oral    Linked Group 2:  \"Or\" Linked Group Details        potassium chloride (MICRO-K) CR capsule 40 mEq 40 mEq As Needed 6/17/2018     Sig - Route: Take 4 capsules by mouth As Needed (potassium replacement.  see admin instructions). - Oral    Linked Group 2:  \"Or\" Linked Group Details        potassium chloride 10 mEq in 100 mL IVPB 10 mEq Every 1 Hour PRN 6/17/2018     Sig - Route: Infuse 100 mL into a venous catheter Every 1 (One) Hour As Needed (potassium protocol PERIPHERAL - see admin instructions). - Intravenous    Linked Group 2:  \"Or\" Linked Group Details        potassium chloride in NS 20 mEq/250 mL IVPB 20 mEq Every 2 Hours PRN 6/13/2018     Sig - Route: Infuse 250 mL into a venous catheter Every 2 (Two) Hours As Needed (See admin Instructions.). - Intravenous    sennosides-docusate sodium (SENOKOT-S) 8.6-50 MG tablet 2 tablet 2 tablet 2 Times Daily 6/13/2018     Sig - Route: Take 2 tablets by mouth 2 (Two) Times a Day. - Oral    sodium chloride 0.9 % flush 1-10 mL 1-10 mL As Needed 6/13/2018     Sig - Route: Infuse 1-10 mL into a venous catheter As Needed for Line Care. - Intravenous    sodium chloride 0.9 % flush 10 mL 10 mL As Needed 6/12/2018     Sig - Route: Infuse 10 mL into a venous catheter As Needed for " Line Care. - Intravenous    Cosign for Ordering: Accepted by Tai Anders MD on 2018  2:09 AM             Physician Progress Notes (last 24 hours) (Notes from 2018  9:25 AM through 2018  9:25 AM)      Sukhwinder Howell MD at 2018  5:13 PM              [facility] Medicine Services  PROGRESS NOTE    Patient Name: Harris Shane  : 9/15/1927  MRN: [id number]    Date of Admission: 2018  Length of Stay: 8  Primary Care Physician: Charli Mccormack MD    Subjective   Subjective     CC:  Severe weakness    Subjective:  Resting in a chair in no acute distress. Pain isbetter contrlled but patient still has some pain.   still is very weak.  No F/C, no N/V or abdominal pain.  Review of Systems      Otherwise ROS is negative except as mentioned in the HPI.    Objective   Objective     Vital Signs:   Temp:  [97.5 °F (36.4 °C)-97.9 °F (36.6 °C)] 97.9 °F (36.6 °C)  Heart Rate:  [87] 87  Resp:  [16-20] 16  BP: (118-138)/(84-86) 138/86        Physical Exam:  Constitutional: No acute distress, awake, alert  Eyes: PERRLA, sclerae anicteric, no conjunctival injection. Right eye ptosis and blindness.  HENT: NCAT, mucous membranes moist  Neck: Supple, no thyromegaly, no lymphadenopathy, trachea midline  Respiratory: Clear to auscultation bilaterally, nonlabored respirations   Cardiovascular: RRR, no murmurs, rubs, or gallops, palpable pedal pulses bilaterally  Gastrointestinal: Positive bowel sounds, soft, nontender, mildly distended  Musculoskeletal: No bilateral ankle edema, no clubbing or cyanosis to extremities  Psychiatric: Appropriate affect, cooperative  Neurologic: Awake, alert, follows commands.  Patient does have some weakness of lower extremities however he can lift her legs against gravity.  Patient does have difficulty with ambulating independently.  Skin: No rashes    Results Reviewed:  I have personally reviewed current lab, radiology, and data and  agree.      Results from last 7 days  Lab Units 06/19/18  0914 06/18/18  0627 06/16/18  0508   WBC 10*3/mm3 11.34* 10.81* 9.35   HEMOGLOBIN g/dL 13.4 12.4* 11.7*   HEMATOCRIT % 40.4 37.4* 35.0*   PLATELETS 10*3/mm3 222 184 143*       Results from last 7 days  Lab Units 06/19/18  0914 06/18/18  0625 06/16/18  0508 06/15/18  0337  06/13/18  1740   SODIUM mmol/L  --  134 135 135  < >  --    POTASSIUM mmol/L  --  4.3 3.4* 3.8  < >  --    CHLORIDE mmol/L  --  101 102 104  < >  --    CO2 mmol/L  --  28.0 26.0 26.0  < >  --    BUN mg/dL  --  16 21 24*  < >  --    CREATININE mg/dL  --  0.82 0.88 1.03  < >  --    GLUCOSE mg/dL  --  92 84 99  < >  --    CALCIUM mg/dL  --  9.1 8.2* 8.1*  < >  --    ALT (SGPT) U/L 72* 74* 94* 136*  < >  --    AST (SGOT) U/L 48* 43* 46* 72*  < >  --    TROPONIN I ng/mL  --   --   --   --   --  1.489*   < > = values in this interval not displayed.  Estimated Creatinine Clearance: 64.7 mL/min (by C-G formula based on SCr of 0.82 mg/dL).  No results found for: BNP  No results found for: PHART    Microbiology Results Abnormal     Procedure Component Value - Date/Time    Blood Culture - Blood, [[id number]]  (Normal) Collected:  06/12/18 2152    Lab Status:  Final result Specimen:  Blood from Arm, Left Updated:  06/17/18 2200     Blood Culture No growth at 5 days    Blood Culture - Blood, [[id number]]  (Abnormal)  (Susceptibility) Collected:  06/12/18 2152    Lab Status:  Final result Specimen:  Blood from Arm, Right Updated:  06/15/18 0728     Blood Culture Escherichia coli (A)     Isolated from Aerobic Bottle     Gram Stain Result Aerobic Bottle Gram negative bacilli    Susceptibility      Escherichia coli     SISSY     Ampicillin <=8 ug/ml Susceptible     Ampicillin + Sulbactam <=8/4 ug/ml Susceptible     Aztreonam <=8 ug/ml Susceptible     Cefepime <=8 ug/ml Susceptible     Cefotaxime <=2 ug/ml Susceptible     Ceftriaxone <=8 ug/ml Susceptible     Cefuroxime sodium <=4 ug/ml Susceptible     Ertapenem  <=1 ug/ml Susceptible     Gentamicin <=4 ug/ml Susceptible     Levofloxacin <=2 ug/ml Susceptible     Meropenem <=1 ug/ml Susceptible     Piperacillin + Tazobactam <=16 ug/ml Susceptible     Tetracycline <=4 ug/ml Susceptible     Tobramycin <=4 ug/ml Susceptible     Trimethoprim + Sulfamethoxazole <=2/38 ug/ml Susceptible                    Blood Culture ID, PCR - Blood, [[id number]]  (Abnormal) Collected:  06/12/18 2152    Lab Status:  Final result Specimen:  Blood from Arm, Right Updated:  06/13/18 1421     BCID, PCR Escherichia coli. Identification by BCID PCR. (C)          Imaging Results (last 24 hours)     ** No results found for the last 24 hours. **        Results for orders placed during the hospital encounter of 06/12/18   Adult Transthoracic Echo Complete W/ Cont if Necessary Per Protocol    Narrative · Left ventricular systolic function is normal. Estimated EF = 55%.  · Left ventricular diastolic dysfunction (grade I) consistent with   impaired relaxation.          I have reviewed the medications.    Assessment/Plan   Assessment / Plan     Hospital Problem List     * (Principal)Sepsis    Elevated liver enzymes    Prostate cancer (Mets to L4-L5)    Pulmonary infiltrate, LLLobe    Cholelithiases of GB neck per CT A/P    Acute renal insufficiency, CRE 1.43, unknown baseline    Blindness of right eye    WILLIAM (obstructive sleep apnea) remote, intolerant of CPAP    R/O Cholecystitis    NSTEMI (non-ST elevated myocardial infarction) (R/O Type 2)    Bacteremia due to Escherichia coli             Brief Hospital Course to date:  Harris Shane is a 89 y.o. male with past medical history significant for prostate cancer with metastases  to L4 on L5, blindness of right eye, weakness.  Patient was admitted to ICU for sepsis.      Assessment & Plan:  *Sepsis markedly improved.    * Escherichia coli bacteremia, has been on IV rocephin. Afebrile.    * Elevated liver enzymes, improving.    * Back pain that radiates to  lower extremities particularly on the left side.  This is mainly secondary to metastatic disease in L4 and L5. Had radiation to L4-S1 today.    * Severe generalized weakness particularly lower extremities.  Patient has significant difficulty with ambulating independently.  This weakness is partially secondary to metastatic disease to the spine.    * Metastatic prostate cancer.  Metastases to L4 on L5.  Patient was supposed to start radiation therapy for that.  We need to coordinate with radiation oncology for this.    * Blindness and ptosis of the right eye.    PLAN:  - cont current medication  - labs in am  - home/ rehab when pain is better controlled.        DVT Prophylaxis:      CODE STATUS:   Code Status and Medical Interventions:   Ordered at: 06/19/18 0835     Code Status:    CPR     Medical Interventions (Level of Support Prior to Arrest):    Full       Disposition: TBD      Electronically signed by Sukhwinder Howell MD, 06/20/18, 5:13 PM.      Electronically signed by Sukhwinder Howell MD at 6/20/2018  5:16 PM     Donte Dolan MD at 6/20/2018  4:42 PM        Mr. Shane received a single fraction of 8Gy to the L4-S1 vertebral levels today.  He tolerated the procedure well.  I anticipate that we could begin seeing improvement in his pain as soon as within 2-3 days.  He is currently not planned to receive any additional radiation, but in the event his pain increases in the upcoming months, his radiation could be repeated as needed.  He can follow-up with us after he completes rehab, or if he would prefer to follow-up with his Medical Oncologist, Dr. Maco Mejia, this will be fine.    Electronically signed by Donte Dolan MD at 6/20/2018  4:45 PM       Physical Therapy Notes (last 24 hours) (Notes from 6/20/2018  9:25 AM through 6/21/2018  9:25 AM)     No notes of this type exist for this encounter.

## 2018-06-21 NOTE — THERAPY DISCHARGE NOTE
Date: 6/20/2018    Referring Physician:    No referring provider defined for this encounter.    Patient: Harris Shane     Medical Record Number: [id number]     Mr. Shane completed radiation therapy today.      As you know, this patient has a diagnosis of metastatic prostate cancer, with painful lesion at the L5 vertebral level also causing lower extremity weakness.  He was unfortunately admitted for Escherichia coli septicemia, and as he has required a prolonged hospitalization and is planning to be released to rehabilitation, we treated his lumbar spine in order to improve his symptoms so that rehabilitation can be successful.  His treatment is as detailed below:      Dates of treatment:   Radiation Treatments     Patient's record has no active or historical radiation treatments documented.   Date of Radiation - 6/20/2018  Number of fractions: 1  Treatment Site: L4-S1 spine  Technique: 3-field using 2 laterals and a single PA field       The Patient tolerated the treatment well.  He experienced a little bit of discomfort while laying on her table and therefore was premedicated with oral pain medicines.  He otherwise tolerated the treatment very well.    Initial follow up visit will be in 3 week(s) pending his release from rehabilitation.. He was encouraged to follow up with his other physicians as well.  He knows to call if any problems or concerns develop in the meantime.    Thank you again for your referral of Mr. Shane.      Sincerely,    Donte Dolan MD

## 2018-06-21 NOTE — PLAN OF CARE
Problem: Patient Care Overview  Goal: Plan of Care Review  Outcome: Ongoing (interventions implemented as appropriate)   06/21/18 9101   Coping/Psychosocial   Plan of Care Reviewed With patient   OTHER   Outcome Summary great effort today working on sitting EOB endurance and other general strengthening. came to stand at B/S x 2 with max assist   Plan of Care Review   Progress improving

## 2018-06-21 NOTE — PLAN OF CARE
Problem: Patient Care Overview  Goal: Plan of Care Review  Outcome: Ongoing (interventions implemented as appropriate)   06/21/18 1550   Coping/Psychosocial   Plan of Care Reviewed With Continues to have urgency/frequency. Has bladder scanned post void X2 today to access if he has been retaining urine. Has voiced C/O pain in his back throughout this shift and has been medicated with PRN medications. Has been moved to a lift room, worked with PT today. Will continue to monitor.       Problem: Fall Risk (Adult)  Goal: Absence of Fall  Outcome: Ongoing (interventions implemented as appropriate)      Problem: Sepsis/Septic Shock (Adult)  Goal: Signs and Symptoms of Listed Potential Problems Will be Absent, Minimized or Managed (Sepsis/Septic Shock)  Outcome: Ongoing (interventions implemented as appropriate)      Problem: Skin Injury Risk (Adult)  Goal: Skin Health and Integrity  Outcome: Ongoing (interventions implemented as appropriate)

## 2018-06-21 NOTE — PROGRESS NOTES
[facility] Medicine Services  PROGRESS NOTE    Patient Name: Harris Shane  : 9/15/1927  MRN: [id number]    Date of Admission: 2018  Length of Stay: 9  Primary Care Physician: Charli Mccormack MD    Subjective   Subjective     CC:  Severe weakness    Subjective:  Resting in bed in no acute distress but complains that he just can't get comfortable.  Tells me that the pain is better controlled.  still is very weak.  No F/C, no N/V or abdominal pain.  Review of Systems      Otherwise ROS is negative except as mentioned in the HPI.    Objective   Objective     Vital Signs:   Temp:  [97.9 °F (36.6 °C)-98.2 °F (36.8 °C)] 97.9 °F (36.6 °C)  Heart Rate:  [79-81] 79  Resp:  [17-18] 18  BP: (121-153)/(74-77) 121/74        Physical Exam:  Constitutional: No acute distress, awake, alert  Eyes: PERRLA, sclerae anicteric, no conjunctival injection. Right eye ptosis and blindness.  HENT: NCAT, mucous membranes moist  Neck: Supple, no thyromegaly, no lymphadenopathy, trachea midline  Respiratory: Clear to auscultation bilaterally, nonlabored respirations   Cardiovascular: RRR, no murmurs, rubs, or gallops, palpable pedal pulses bilaterally  Gastrointestinal: Positive bowel sounds, soft, nontender, mildly distended  Musculoskeletal: No bilateral ankle edema, no clubbing or cyanosis to extremities  Psychiatric: Appropriate affect, cooperative  Neurologic: Awake, alert, follows commands.  Patient does have some weakness of lower extremities however he can lift her legs against gravity.  Patient does have difficulty with ambulating independently.  Skin: No rashes    Results Reviewed:  I have personally reviewed current lab, radiology, and data and agree.      Results from last 7 days  Lab Units 18  0914 18  0627 18  0508   WBC 10*3/mm3 11.34* 10.81* 9.35   HEMOGLOBIN g/dL 13.4 12.4* 11.7*   HEMATOCRIT % 40.4 37.4* 35.0*   PLATELETS 10*3/mm3 222 184 143*       Results from last 7  days  Lab Units 06/19/18  0914 06/18/18  0625 06/16/18  0508 06/15/18  0337   SODIUM mmol/L  --  134 135 135   POTASSIUM mmol/L  --  4.3 3.4* 3.8   CHLORIDE mmol/L  --  101 102 104   CO2 mmol/L  --  28.0 26.0 26.0   BUN mg/dL  --  16 21 24*   CREATININE mg/dL  --  0.82 0.88 1.03   GLUCOSE mg/dL  --  92 84 99   CALCIUM mg/dL  --  9.1 8.2* 8.1*   ALT (SGPT) U/L 72* 74* 94* 136*   AST (SGOT) U/L 48* 43* 46* 72*     Estimated Creatinine Clearance: 65.2 mL/min (by C-G formula based on SCr of 0.82 mg/dL).  No results found for: BNP  No results found for: PHART    Microbiology Results Abnormal     Procedure Component Value - Date/Time    Blood Culture - Blood, [[id number]]  (Normal) Collected:  06/20/18 1740    Lab Status:  Preliminary result Specimen:  Blood from Arm, Left Updated:  06/21/18 0630     Blood Culture No growth at less than 24 hours    Blood Culture - Blood, [[id number]]  (Normal) Collected:  06/20/18 1745    Lab Status:  Preliminary result Specimen:  Blood from Wrist, Left Updated:  06/21/18 0630     Blood Culture No growth at less than 24 hours    Blood Culture - Blood, [[id number]]  (Normal) Collected:  06/12/18 2152    Lab Status:  Final result Specimen:  Blood from Arm, Left Updated:  06/17/18 2200     Blood Culture No growth at 5 days    Blood Culture - Blood, [[id number]]  (Abnormal)  (Susceptibility) Collected:  06/12/18 2152    Lab Status:  Final result Specimen:  Blood from Arm, Right Updated:  06/15/18 0728     Blood Culture Escherichia coli (A)     Isolated from Aerobic Bottle     Gram Stain Result Aerobic Bottle Gram negative bacilli    Susceptibility      Escherichia coli     SISSY     Ampicillin <=8 ug/ml Susceptible     Ampicillin + Sulbactam <=8/4 ug/ml Susceptible     Aztreonam <=8 ug/ml Susceptible     Cefepime <=8 ug/ml Susceptible     Cefotaxime <=2 ug/ml Susceptible     Ceftriaxone <=8 ug/ml Susceptible     Cefuroxime sodium <=4 ug/ml Susceptible     Ertapenem <=1 ug/ml Susceptible      Gentamicin <=4 ug/ml Susceptible     Levofloxacin <=2 ug/ml Susceptible     Meropenem <=1 ug/ml Susceptible     Piperacillin + Tazobactam <=16 ug/ml Susceptible     Tetracycline <=4 ug/ml Susceptible     Tobramycin <=4 ug/ml Susceptible     Trimethoprim + Sulfamethoxazole <=2/38 ug/ml Susceptible                    Blood Culture ID, PCR - Blood, [[id number]]  (Abnormal) Collected:  06/12/18 2152    Lab Status:  Final result Specimen:  Blood from Arm, Right Updated:  06/13/18 1421     BCID, PCR Escherichia coli. Identification by BCID PCR. (C)          Imaging Results (last 24 hours)     ** No results found for the last 24 hours. **        Results for orders placed during the hospital encounter of 06/12/18   Adult Transthoracic Echo Complete W/ Cont if Necessary Per Protocol    Narrative · Left ventricular systolic function is normal. Estimated EF = 55%.  · Left ventricular diastolic dysfunction (grade I) consistent with   impaired relaxation.          I have reviewed the medications.    Assessment/Plan   Assessment / Plan     Hospital Problem List     * (Principal)Sepsis    Elevated liver enzymes    Prostate cancer (Mets to L4-L5)    Pulmonary infiltrate, LLLobe    Cholelithiases of GB neck per CT A/P    Acute renal insufficiency, CRE 1.43, unknown baseline    Blindness of right eye    WILLIAM (obstructive sleep apnea) remote, intolerant of CPAP    R/O Cholecystitis    NSTEMI (non-ST elevated myocardial infarction) (R/O Type 2)    Bacteremia due to Escherichia coli             Brief Hospital Course to date:  Harris Shane is a 89 y.o. male with past medical history significant for prostate cancer with metastases  to L4 on L5, blindness of right eye, weakness.  Patient was admitted to ICU for sepsis.      Assessment & Plan:  *Sepsis markedly improved.    * Escherichia coli bacteremia, has been on IV rocephin. Afebrile.    * Elevated liver enzymes, improving.    * Back pain that radiates to lower extremities  particularly on the left side.  This is mainly secondary to metastatic disease in L4 and L5. Had radiation to L4-S1 today.    * Severe generalized weakness particularly lower extremities.  Patient has significant difficulty with ambulating independently.  This weakness is partially secondary to metastatic disease to the spine.    * Metastatic prostate cancer.  Metastases to L4 on L5.  Patient was supposed to start radiation therapy for that.  We need to coordinate with radiation oncology for this.    * Blindness and ptosis of the right eye.    PLAN:  - cont current medication and add low-dose Ativan.  - We will transferred to rehabilitation when bed available.        DVT Prophylaxis:      CODE STATUS:   Code Status and Medical Interventions:   Ordered at: 06/19/18 0835     Code Status:    CPR     Medical Interventions (Level of Support Prior to Arrest):    Full       Disposition: TBD      Electronically signed by Sukhwinder Howell MD, 06/21/18, 5:16 PM.

## 2018-06-21 NOTE — PLAN OF CARE
Problem: Patient Care Overview  Goal: Plan of Care Review  Outcome: Ongoing (interventions implemented as appropriate)   06/19/18 1604 06/20/18 2000 06/21/18 0433   Coping/Psychosocial   Plan of Care Reviewed With --  patient --    OTHER   Outcome Summary --  --  VSS, pt mediated with PRN for pain. Has rested well.    Plan of Care Review   Progress improving --  --        Problem: Fall Risk (Adult)  Goal: Absence of Fall  Outcome: Ongoing (interventions implemented as appropriate)      Problem: Sepsis/Septic Shock (Adult)  Goal: Signs and Symptoms of Listed Potential Problems Will be Absent, Minimized or Managed (Sepsis/Septic Shock)  Outcome: Ongoing (interventions implemented as appropriate)      Problem: Pneumonia (Adult)  Goal: Signs and Symptoms of Listed Potential Problems Will be Absent, Minimized or Managed (Pneumonia)  Outcome: Ongoing (interventions implemented as appropriate)      Problem: Skin Injury Risk (Adult)  Goal: Skin Health and Integrity  Outcome: Ongoing (interventions implemented as appropriate)

## 2018-06-21 NOTE — THERAPY TREATMENT NOTE
Acute Care - Physical Therapy Treatment Note  [facility]     Patient Name: Harris Shane  : 9/15/1927  MRN: [id number]  Today's Date: 2018  Onset of Illness/Injury or Date of Surgery: 18  Date of Referral to PT: 18  Referring Physician: Luiz     Admit Date: 2018    Visit Dx:    ICD-10-CM ICD-9-CM   1. Sepsis, due to unspecified organism A41.9 038.9     995.91   2. Elevated troponin R74.8 790.6   3. Elevated liver enzymes R74.8 790.5   4. Hyperbilirubinemia E80.6 782.4   5. History of prostate cancer Z85.46 V10.46   6. Metastatic cancer to spine C79.51 198.5   7. Pneumonia of left lower lobe due to infectious organism J18.1 486   8. Biliary calculus of other site with obstruction K80.81 [id number]   9. Impaired functional mobility, balance, gait, and endurance Z74.09 V49.89   10. Impaired mobility and ADLs Z74.09 799.89     Patient Active Problem List   Diagnosis   • Sepsis   • Elevated liver enzymes   • Prostate cancer (Mets to L4-L5)   • Pulmonary infiltrate, LLLobe   • Cholelithiases of GB neck per CT A/P   • Acute renal insufficiency, CRE 1.43, unknown baseline   • Blindness of right eye   • WILLIAM (obstructive sleep apnea) remote, intolerant of CPAP   • R/O Cholecystitis   • NSTEMI (non-ST elevated myocardial infarction) (R/O Type 2)   • Bacteremia due to Escherichia coli       Therapy Treatment          Rehabilitation Treatment Summary     Row Name 18 1415             Treatment Time/Intention    Discipline physical therapist  -MM      Document Type therapy note (daily note)  -MM      Subjective Information no complaints  -MM      Mode of Treatment individual therapy  -MM      Patient/Family Observations no family  present  -MM      Care Plan Review care plan/treatment goals reviewed;patient/other agree to care plan  -MM      Therapy Frequency (PT Clinical Impression) daily  -MM      Patient Effort good  -MM      Comment pt agreeable to Rx; wanted to go to recliner, but  expecting transfer to sling room soon  -MM      Existing Precautions/Restrictions fall  -MM      Patient Response to Treatment good  -MM      Recorded by [MM] Kait Johnston, PT 06/21/18 1516      Row Name 06/21/18 1415             Vital Signs    O2 Delivery Pre Treatment room air  -MM      Pre Patient Position Supine  -MM      Intra Patient Position Sitting  -MM      Post Patient Position Supine  -MM      Recorded by [MM] Kait Johnston, PT 06/21/18 1516      Row Name 06/21/18 1415             Cognitive Assessment/Intervention- PT/OT    Affect/Mental Status (Cognitive) WFL  -MM      Orientation Status (Cognition) oriented x 3  -MM      Follows Commands (Cognition) follows one step commands  -MM      Cognitive Function (Cognitive) WFL  -MM      Personal Safety Interventions gait belt;nonskid shoes/slippers when out of bed;fall prevention program maintained  -MM      Recorded by [MM] Kait Johnston, PT 06/21/18 1516      Row Name 06/21/18 1415             Bed Mobility Assessment/Treatment    Supine-Sit [place] (Bed Mobility) moderate assist (50% patient effort);verbal cues  -MM      Sit-Supine [place] (Bed Mobility) moderate assist (50% patient effort);verbal cues  -MM      Bed Mobility, Safety Issues decreased use of arms for pushing/pulling;decreased use of legs for bridging/pushing  -MM      Assistive Device (Bed Mobility) bed rails  -MM      Comment (Bed Mobility) pt worked on scooting up the bed in sidelying in trendelenburg,   -MM      Recorded by [MM] Kait Johnston, PT 06/21/18 1516      Row Name 06/21/18 1415             Transfer Assessment/Treatment    Sit-Stand [place] (Transfers) maximum assist (25% patient effort);verbal cues;nonverbal cues (demo/gesture)  -MM      Stand-Sit [place] (Transfers) maximum assist (25% patient effort);verbal cues  -MM      Recorded by [MM] Kait Johnston, PT 06/21/18 1516      Row Name 06/21/18 1415             Sit-Stand Transfer     Assistive Device (Sit-Stand Transfers) --   gait belt  -MM      Recorded by [MM] Kait Johnston, PT 06/21/18 1516      Row Name 06/21/18 1415             Gait/Stairs Assessment/Training    Gait/Stairs Assessment/Training other (see comments)   unable to  -MM      Recorded by [MM] Kait Johnston, PT 06/21/18 1516      Row Name 06/21/18 1415             Therapeutic Exercise    45509 - PT Therapeutic Exercise Minutes 15  -MM      28326 - PT Therapeutic Activity Minutes 20  -MM      43432 - PT Manual Therapy Minutes 5  -MM      Recorded by [MM] Kait Johnston, PT 06/21/18 1516      Row Name 06/21/18 1415             Lower Extremity Seated Therapeutic Exercise    Performed, Seated Lower Extremity (Therapeutic Exercise) hip flexion/extension;ankle dorsiflexion/plantarflexion;LAQ (long arc quad), knee extension  -MM      Exercise Type, Seated Lower Extremity (Therapeutic Exercise) AAROM (active assistive range of motion)  -MM      Transfers Skills, Training to Functional Activity, Seated Lower Extremity (Therapeutic Exercise) beginning to transfer skills to functional activity;other (see comments)   worked on leaning forward and sit to stand x 2  -MM      Recorded by [MM] Kait Johnston, PT 06/21/18 1516      Row Name 06/21/18 1415             Therapeutic Exercise    Upper Extremity Range of Motion (Therapeutic Exercise) shoulder flexion/extension, bilateral  -MM      Lower Extremity (Therapeutic Exercise) heel slides, left;other (see comments)   bridges x 10  -MM      Lower Extremity Range of Motion (Therapeutic Exercise) hip abduction/adduction, bilateral  -MM      Core Strength (Therapeutic Exercise) abdominal bracing;bridging, bilateral lower extremities  -MM      Position (Therapeutic Exercise) seated  -MM      Recorded by [MM] Kait Johnston, PT 06/21/18 1516      Row Name 06/21/18 1415             Static Sitting Balance    Level of [place] (Unsupported Sitting, Static Balance) contact guard  assist;minimal assist, 75% patient effort;other (see comments)   drifted backeards and min A to bring back to balance  -MM      Time Able to Maintain Position (Unsupported Sitting, Static Balance) more than 5 minutes;other (see comments)   15  -MM      Comment (Supported Sitting, Static Balance) sat EOB 15 min with some trunk sise to side, rotation and forward lean for future standing  -MM      Recorded by [MM] Kait Johnston, PT 06/21/18 1516      Row Name 06/21/18 1415             Static Standing Balance    Level of [place] (Supported Standing, Static Balance) maximal assist, 25 to 49% patient effort  -MM      Time Able to Maintain Position (Supported Standing, Static Balance) less than 15 seconds   twice  -MM      Recorded by [MM] Kait Johnston, PT 06/21/18 1516      Row Name 06/21/18 1415             Positioning and Restraints    Pre-Treatment Position in bed  -MM      Post Treatment Position bed  -MM      In Bed notified nsg;supine;call light within reach;encouraged to call for assist;exit alarm on   soft boots on feet  -MM      Recorded by [MM] Kait Johnston, PT 06/21/18 1516      Row Name 06/21/18 1415             Pain Scale: Numbers Pre/Post-Treatment    Pain Scale: Numbers, Pretreatment 2/10  -MM      Pain Scale: Numbers, Post-Treatment 2/10  -MM      Pain Location - Side Right  -MM      Pain Location - Orientation lateral  -MM      Pain Location other (see comments)   leg  -MM      Recorded by [MM] Kait Johnston, PT 06/21/18 1516      Row Name 06/21/18 1415             Pain Scale: FACES Pre/Post-Treatment    Pain: FACES Scale, Pretreatment 0-->no hurt  -MM      Pain: FACES Scale, Post-Treatment 2-->hurts little bit  -MM      Recorded by [MM] Kait Johnston, PT 06/21/18 1516      Row Name 06/21/18 1415             Outcome Summary/Treatment Plan (PT)    Daily Summary of Progress (PT) progress toward functional goals is gradual  -MM      Barriers to Overall Progress (PT) prolonged bed  rest since in hospital  -MM      Plan for Continued Treatment (PT) continue towards active transfers  -MM      Anticipated Discharge Disposition (PT) skilled nursing facility  -MM      Recorded by [MM] Kait Johnston, PT 06/21/18 1516        User Key  (r) = Recorded By, (t) = Taken By, (c) = Cosigned By    Initials Name Effective Dates Discipline    MM Kait RONEL Johnston, PT 06/19/15 -  PT                     Physical Therapy Education     Title: PT OT SLP Therapies (Active)     Topic: Physical Therapy (Active)     Point: Mobility training (Active)    Learning Progress Summary     Learner Status Readiness Method Response Comment Documented by    Patient Done Acceptance E,D VUDU,NR   06/21/18 1517     Active Acceptance E NR  AS 06/19/18 1144     Active Eager E,D NR   06/18/18 1745     Active Acceptance E,D NR   06/15/18 1101     Active Acceptance E,D NR   06/14/18 1016     Active Acceptance E NR  KR 06/13/18 1200    Family Active Eager E,D NR   06/18/18 1745     Active Acceptance E NR  KR 06/13/18 1200          Point: Home exercise program (Active)    Learning Progress Summary     Learner Status Readiness Method Response Comment Documented by    Patient Done Acceptance E,D VU,DU,NR   06/21/18 1517     Active Acceptance E NR  AS 06/19/18 1144     Active Eager E,D NR   06/18/18 1745     Active Acceptance E,D NR   06/15/18 1101     Active Acceptance E,D NR   06/14/18 1016    Family Active Eager E,D NR   06/18/18 1745          Point: Body mechanics (Active)    Learning Progress Summary     Learner Status Readiness Method Response Comment Documented by    Patient Done Acceptance E,D VU,DU,NR   06/21/18 1517     Active Acceptance E NR  AS 06/19/18 1144     Active Eager E,D NR   06/18/18 1745     Active Acceptance E,D NR   06/15/18 1101     Active Acceptance E,D NR   06/14/18 1016     Active Acceptance E NR  KR 06/13/18 1200    Family Active Eager E,D NR  DM 06/18/18 1745     Active Acceptance  E NR  KR 06/13/18 1200          Point: Precautions (Active)    Learning Progress Summary     Learner Status Readiness Method Response Comment Documented by    Patient Done Acceptance E,D MARGARITA PRESCOTT,NR  MM 06/21/18 1517     Active Acceptance E NR  AS 06/19/18 1144     Active Eager E,D NR  DM 06/18/18 1745     Active Acceptance E,D NR  CM 06/15/18 1101     Active Acceptance E,D NR  CM 06/14/18 1016     Active Acceptance E NR  KR 06/13/18 1200    Family Active Eager E,D NR  DM 06/18/18 1745     Active Acceptance E NR  KR 06/13/18 1200                      User Key     Initials Effective Dates Name Provider Type Discipline     06/19/15 -  Kait Johnston, PT Physical Therapist PT    DM 06/19/15 -  Mar Koenig, PT Physical Therapist PT    AS 06/22/15 -  Gerda Childress, PTA Physical Therapy Assistant PT    KR 04/03/18 -  Leanna Molina, PT Physical Therapist PT    CM 06/07/18 -  Barbara Martinez, PT Student PT Student PT                    PT Recommendation and Plan  Anticipated Discharge Disposition (PT): skilled nursing facility  Therapy Frequency (PT Clinical Impression): daily  Outcome Summary/Treatment Plan (PT)  Daily Summary of Progress (PT): progress toward functional goals is gradual  Barriers to Overall Progress (PT): prolonged bed rest since in hospital  Plan for Continued Treatment (PT): continue towards active transfers  Anticipated Discharge Disposition (PT): skilled nursing facility  Plan of Care Reviewed With: patient  Progress: improving  Outcome Summary: great effort today working on sitting EOB endurance and other general strengthening.  came to stand at B/S x 2 with max assist          Outcome Measures     Row Name 06/21/18 1415 06/19/18 1105 06/18/18 1630       How much help from another person do you currently need...    Turning from your back to your side while in flat bed without using bedrails? 3  -MM 2  -AS 2  -DM    Moving from lying on back to sitting on the side of a flat bed without  bedrails? 2  -MM 2  -AS 2  -DM    Moving to and from a bed to a chair (including a wheelchair)? 1  -MM 1  -AS 2  -DM    Standing up from a chair using your arms (e.g., wheelchair, bedside chair)? 2  -MM 1  -AS 2  -DM    Climbing 3-5 steps with a railing? 1  -MM 1  -AS 1  -DM    To walk in hospital room? 1  -MM 1  -AS 2  -DM    AM-PAC 6 Clicks Score 10  -MM 8  -AS 11  -DM       Functional Assessment    Outcome Measure Options AM-PAC 6 Clicks Basic Mobility (PT)  -MM AM-PAC 6 Clicks Basic Mobility (PT)  -AS AM-PAC 6 Clicks Basic Mobility (PT)  -DM      User Key  (r) = Recorded By, (t) = Taken By, (c) = Cosigned By    Initials Name Provider Type    SHANNAN Johnston, PT Physical Therapist    DM Mar Koenig, PT Physical Therapist    AS Gerda Childress, PTA Physical Therapy Assistant           Time Calculation:         PT Charges     Row Name 06/21/18 1415             Time Calculation    Start Time 1415  -MM      PT Received On 06/21/18  -MM      PT Goal Re-Cert Due Date 06/23/18  -MM         Timed Charges    97744 - PT Therapeutic Exercise Minutes 15  -MM      25046 - PT Manual Therapy Minutes 5  -MM      48139 - PT Therapeutic Activity Minutes 20  -MM        User Key  (r) = Recorded By, (t) = Taken By, (c) = Cosigned By    Initials Name Provider Type    SHANNAN Johnston, PT Physical Therapist        Therapy Suggested Charges     Code   Minutes Charges    61562 (CPT®) Hc Pt Neuromusc Re Education Ea 15 Min      98657 (CPT®) Hc Pt Ther Proc Ea 15 Min 15 1    75476 (CPT®) Hc Gait Training Ea 15 Min      00693 (CPT®) Hc Pt Therapeutic Act Ea 15 Min 20 2    83741 (CPT®) Hc Pt Manual Therapy Ea 15 Min 5     24246 (CPT®) Hc Pt Iontophoresis Ea 15 Min      76795 (CPT®) Hc Pt Elec Stim Ea-Per 15 Min      02182 (CPT®) Hc Pt Ultrasound Ea 15 Min      82443 (CPT®) Hc Pt Self Care/Mgmt/Train Ea 15 Min      Total  40 3        Therapy Charges for Today     Code Description Service Date Service Provider Modifiers Qty     [id number]  PT THER PROC EA 15 MIN 6/21/2018 Kait Johnston, PT GP 1    [id number] HC PT THERAPEUTIC ACT EA 15 MIN 6/21/2018 Kait Johnston, PT GP 2          PT G-Codes  Outcome Measure Options: AM-PAC 6 Clicks Basic Mobility (PT)    Kait Johnston, PT  6/21/2018

## 2018-06-21 NOTE — PROGRESS NOTES
Continued Stay Note  [facility]     Patient Name: Harris Shane  MRN: [id number]  Today's Date: 6/21/2018    Admit Date: 6/12/2018          Discharge Plan     Row Name 06/21/18 0847       Plan    Plan Comments [facility] at [facility] reports they do not have any beds at their facilities at this time to accept pt. A referral has been made to [facility] and CM has left a VM with pts daughter do discuss other rehab options as well. CM to follow.              Discharge Codes    No documentation.       Expected Discharge Date and Time     Expected Discharge Date Expected Discharge Time    Jun 20, 2018             Ofe Pruitt RN

## 2018-06-21 NOTE — PROGRESS NOTES
Continued Stay Note  [facility]     Patient Name: Harris Shane  MRN: [id number]  Today's Date: 6/21/2018    Admit Date: 6/12/2018          Discharge Plan     Row Name 06/21/18 1132       Plan    Plan Comments Additional referrals was made to [facility] at families request    Row Name 06/21/18 7607       Plan    Plan Comments [facility] at [facility] reports they do not have any beds at their facilities at this time to accept pt. A referral has been made to [facility] and CM has left a VM with pts daughter do discuss other rehab options as well. CM to follow.              Discharge Codes    No documentation.       Expected Discharge Date and Time     Expected Discharge Date Expected Discharge Time    Jun 20, 2018             Ofe Pruitt RN

## 2018-06-22 PROCEDURE — 97112 NEUROMUSCULAR REEDUCATION: CPT

## 2018-06-22 PROCEDURE — 99232 SBSQ HOSP IP/OBS MODERATE 35: CPT | Performed by: INTERNAL MEDICINE

## 2018-06-22 PROCEDURE — 25010000003 CEFTRIAXONE PER 250 MG: Performed by: INTERNAL MEDICINE

## 2018-06-22 PROCEDURE — 63710000001 DEXAMETHASONE PER 0.25 MG: Performed by: INTERNAL MEDICINE

## 2018-06-22 PROCEDURE — 25010000002 CEFTRIAXONE PER 250 MG: Performed by: INTERNAL MEDICINE

## 2018-06-22 PROCEDURE — 25010000002 MORPHINE SULFATE (PF) 2 MG/ML SOLUTION: Performed by: INTERNAL MEDICINE

## 2018-06-22 PROCEDURE — 97110 THERAPEUTIC EXERCISES: CPT

## 2018-06-22 PROCEDURE — 25010000002 HEPARIN (PORCINE) PER 1000 UNITS: Performed by: NURSE PRACTITIONER

## 2018-06-22 RX ORDER — MORPHINE SULFATE 10 MG/5ML
5 SOLUTION ORAL
Status: DISCONTINUED | OUTPATIENT
Start: 2018-06-22 | End: 2018-06-24

## 2018-06-22 RX ORDER — CEFTRIAXONE 2 G/1
2 INJECTION, POWDER, FOR SOLUTION INTRAMUSCULAR; INTRAVENOUS ONCE
Status: COMPLETED | OUTPATIENT
Start: 2018-06-22 | End: 2018-06-22

## 2018-06-22 RX ORDER — LIDOCAINE HYDROCHLORIDE 10 MG/ML
4.2 INJECTION, SOLUTION EPIDURAL; INFILTRATION; INTRACAUDAL; PERINEURAL ONCE
Status: COMPLETED | OUTPATIENT
Start: 2018-06-22 | End: 2018-06-22

## 2018-06-22 RX ORDER — DEXAMETHASONE 4 MG/1
2 TABLET ORAL ONCE
Status: COMPLETED | OUTPATIENT
Start: 2018-06-22 | End: 2018-06-22

## 2018-06-22 RX ADMIN — DEXAMETHASONE 2 MG: 4 TABLET ORAL at 20:10

## 2018-06-22 RX ADMIN — MULTIPLE VITAMINS W/ MINERALS TAB 1 TABLET: TAB ORAL at 09:20

## 2018-06-22 RX ADMIN — ASPIRIN 81 MG: 81 TABLET, COATED ORAL at 09:20

## 2018-06-22 RX ADMIN — OXYCODONE HYDROCHLORIDE 5 MG: 5 TABLET ORAL at 06:23

## 2018-06-22 RX ADMIN — CEFTRIAXONE 2 G: 2 INJECTION, POWDER, FOR SOLUTION INTRAMUSCULAR; INTRAVENOUS at 16:32

## 2018-06-22 RX ADMIN — SENNOSIDES AND DOCUSATE SODIUM 2 TABLET: 8.6; 5 TABLET ORAL at 09:20

## 2018-06-22 RX ADMIN — LIDOCAINE HYDROCHLORIDE 4.2 ML: 10 INJECTION, SOLUTION EPIDURAL; INFILTRATION; INTRACAUDAL; PERINEURAL at 16:32

## 2018-06-22 RX ADMIN — MORPHINE SULFATE 5 MG: 10 SOLUTION ORAL at 19:02

## 2018-06-22 RX ADMIN — HEPARIN SODIUM 5000 UNITS: 5000 INJECTION, SOLUTION INTRAVENOUS; SUBCUTANEOUS at 20:10

## 2018-06-22 RX ADMIN — MORPHINE SULFATE 1 MG: 2 INJECTION, SOLUTION INTRAMUSCULAR; INTRAVENOUS at 06:28

## 2018-06-22 RX ADMIN — LORAZEPAM 0.5 MG: 0.5 TABLET ORAL at 09:20

## 2018-06-22 RX ADMIN — FENTANYL 1 PATCH: 50 PATCH TRANSDERMAL at 10:44

## 2018-06-22 RX ADMIN — SENNOSIDES AND DOCUSATE SODIUM 2 TABLET: 8.6; 5 TABLET ORAL at 20:10

## 2018-06-22 RX ADMIN — OXYCODONE HYDROCHLORIDE 5 MG: 5 TABLET ORAL at 16:32

## 2018-06-22 RX ADMIN — HEPARIN SODIUM 5000 UNITS: 5000 INJECTION, SOLUTION INTRAVENOUS; SUBCUTANEOUS at 09:19

## 2018-06-22 NOTE — PLAN OF CARE
Problem: Patient Care Overview  Goal: Plan of Care Review  Outcome: Ongoing (interventions implemented as appropriate)   06/22/18 1234   Coping/Psychosocial   Plan of Care Reviewed With patient;daughter   OTHER   Outcome Summary Able to perform 2 trials STS w/ max 2 A using R wx, x 1 min. each, as well as ther ex x 10 w/ freq rests,but limited by drowsiness/lethargy from sedative given last night,, as well as elev LFT's, knee instabil, & fatigue   Plan of Care Review   Progress improving

## 2018-06-22 NOTE — PROGRESS NOTES
Continued Stay Note  [facility]     Patient Name: Harris Shane  MRN: [id number]  Today's Date: 6/22/2018    Admit Date: 6/12/2018          Discharge Plan     Row Name 06/22/18 1132       Plan    Plan Comments Referrals have been made out to multiple facilites. [facility] and [facility] are currently unable to offer pt a bed. [facility] is following and [facility] may be able to offer a bed on Monday. CM to carmen.              Discharge Codes    No documentation.       Expected Discharge Date and Time     Expected Discharge Date Expected Discharge Time    Jun 20, 2018             Ofe Pruitt RN

## 2018-06-22 NOTE — PLAN OF CARE
Problem: Patient Care Overview  Goal: Plan of Care Review  Outcome: Ongoing (interventions implemented as appropriate)   06/22/18 0459   Coping/Psychosocial   Plan of Care Reviewed With patient   OTHER   Outcome Summary VSS. Pain controlled with medication, and pt resting well throughout the night. Will continue to monitor.    Plan of Care Review   Progress no change       Problem: Fall Risk (Adult)  Goal: Absence of Fall  Outcome: Ongoing (interventions implemented as appropriate)      Problem: Sepsis/Septic Shock (Adult)  Goal: Signs and Symptoms of Listed Potential Problems Will be Absent, Minimized or Managed (Sepsis/Septic Shock)  Outcome: Ongoing (interventions implemented as appropriate)      Problem: Pneumonia (Adult)  Goal: Signs and Symptoms of Listed Potential Problems Will be Absent, Minimized or Managed (Pneumonia)  Outcome: Ongoing (interventions implemented as appropriate)      Problem: Skin Injury Risk (Adult)  Goal: Skin Health and Integrity  Outcome: Ongoing (interventions implemented as appropriate)

## 2018-06-22 NOTE — PROGRESS NOTES
Continued Stay Note  [facility]     Patient Name: Harris Shane  MRN: [id number]  Today's Date: 6/22/2018    Admit Date: 6/12/2018          Discharge Plan     Row Name 06/22/18 1326       Plan    Plan Comments [place] from [facility] reports they currently do not have any beds available. [facility] has a bed available Monday.    Row Name 06/22/18 5130       Plan    Plan Comments Referrals have been made out to multiple facilites. [facility] and [facility] are currently unable to offer pt a bed. [facility] is following and [facility] may be able to offer a bed on Monday. CM to carmen.              Discharge Codes    No documentation.       Expected Discharge Date and Time     Expected Discharge Date Expected Discharge Time    Jun 20, 2018             Ofe Pruitt RN

## 2018-06-22 NOTE — THERAPY TREATMENT NOTE
Acute Care - Physical Therapy Treatment Note  [facility]     Patient Name: Harris Shane  : 9/15/1927  MRN: [id number]  Today's Date: 2018  Onset of Illness/Injury or Date of Surgery: 18  Date of Referral to PT: 18  Referring Physician: Luiz,     Admit Date: 2018    Visit Dx:    ICD-10-CM ICD-9-CM   1. Sepsis, due to unspecified organism A41.9 038.9     995.91   2. Elevated troponin R74.8 790.6   3. Elevated liver enzymes R74.8 790.5   4. Hyperbilirubinemia E80.6 782.4   5. History of prostate cancer Z85.46 V10.46   6. Metastatic cancer to spine C79.51 198.5   7. Pneumonia of left lower lobe due to infectious organism J18.1 486   8. Biliary calculus of other site with obstruction K80.81 [id number]   9. Impaired functional mobility, balance, gait, and endurance Z74.09 V49.89   10. Impaired mobility and ADLs Z74.09 799.89     Patient Active Problem List   Diagnosis   • Sepsis   • Elevated liver enzymes   • Prostate cancer (Mets to L4-L5)   • Pulmonary infiltrate, LLLobe   • Cholelithiases of GB neck per CT A/P   • Acute renal insufficiency, CRE 1.43, unknown baseline   • Blindness of right eye   • WILLIAM (obstructive sleep apnea) remote, intolerant of CPAP   • R/O Cholecystitis   • NSTEMI (non-ST elevated myocardial infarction) (R/O Type 2)   • Bacteremia due to Escherichia coli       Therapy Treatment          Rehabilitation Treatment Summary     Row Name 18 1127             Treatment Time/Intention    Discipline physical therapist  -DM      Document Type therapy note (daily note)  -DM      Subjective Information complains of;weakness   Dtr:drowsy/weak from being sedated lastPM;willAskMDtoCutDose  -DM      Mode of Treatment individual therapy;physical therapy  -DM      Patient/Family Observations Lifted to chair w/sling;tele;iv Hep locked; o2; exit alarm;dtr present;pt req. dental partial plate be cleaned/inserted (done)  -DM      Care Plan Review care plan/treatment goals  reviewed;patient/other agree to care plan  -DM      Care Plan Review, Other Participant(s) daughter  -DM      Therapy Frequency (PT Clinical Impression) daily  -DM      Patient Effort good  -DM      Comment dozing off mid sentence d/t sedative still in system  -DM      Existing Precautions/Restrictions fall;oxygen therapy device and L/min   blind R eye  -DM2      Recorded by [DM] Mar Koenig, PT 06/22/18 1233  [DM2] Mar KLEBER Liberty, PT 06/22/18 1236      Row Name 06/22/18 1127             Vital Signs    Pre Systolic BP Rehab 114  -DM      Pre Treatment Diastolic BP 71  -DM      Post Systolic BP Rehab 118  -DM      Post Treatment Diastolic BP 81  -DM      Pretreatment Heart Rate (beats/min) 79  -DM      Intratreatment Heart Rate (beats/min) 88  -DM      Posttreatment Heart Rate (beats/min) 81  -DM      Pre SpO2 (%) 97  -DM      O2 Delivery Pre Treatment supplemental O2  -DM      Post SpO2 (%) 96  -DM      O2 Delivery Post Treatment supplemental O2  -DM      Pre Patient Position Sitting  -DM      Intra Patient Position Standing  -DM      Post Patient Position Sitting  -DM      Recorded by [DM] Mar Koenig, PT 06/22/18 1233      Row Name 06/22/18 1127             Cognitive Assessment/Intervention    Additional Documentation Cognitive Assessment/Intervention (Group)  -DM      Recorded by [DM] Mar Koenig, PT 06/22/18 1233      Row Name 06/22/18 1127             Cognitive Assessment/Intervention- PT/OT    Affect/Mental Status (Cognitive) WFL  -DM      Orientation Status (Cognition) oriented to;person;place  -DM      Follows Commands (Cognition) follows one step commands  -DM      Cognitive Function (Cognitive) attention deficit   dozing frreq  -DM      Attention Deficit (Cognitive) mild deficit;arousal/alertness  -DM      Safety Deficit (Cognitive) mild deficit;awareness of need for assistance;impulsivity  -DM      Personal Safety Interventions fall prevention program maintained;gait belt;nonskid  shoes/slippers when out of bed  -DM      Recorded by [DM] Mar Koenig, PT 06/22/18 1233      Row Name 06/22/18 1127             Safety Issues, Functional Mobility    Impairments Affecting Function (Mobility) balance;cognition;endurance/activity tolerance;postural/trunk control;shortness of breath;strength  -DM      Recorded by [DM] Mar Koenig, PT 06/22/18 1233      Row Name 06/22/18 1127             Bed Mobility Assessment/Treatment    Bed Mobility Assessment/Treatment rolling left;rolling right  -DM      Rolling Left Randolph (Bed Mobility) moderate assist (50% patient effort)   for sling insertion by tech  -DM      Rolling Right Randolph (Bed Mobility) moderate assist (50% patient effort)  -DM      Supine-Sit Randolph (Bed Mobility) --  -DM      Sit-Supine Randolph (Bed Mobility) --  -DM      Bed Mobility, Safety Issues --  -DM      Assistive Device (Bed Mobility) bed rails;draw sheet  -DM      Recorded by [DM] Mar Koenig, PT 06/22/18 1233      Row Name 06/22/18 1127             Transfer Assessment/Treatment    Sit-Stand Randolph (Transfers) verbal cues;maximum assist (25% patient effort);2 person assist   2STS trials(1min.each)w/maxCounterpress.toSacrum;feetBlock'd  -DM      Stand-Sit Randolph (Transfers) verbal cues;maximum assist (25% patient effort);2 person assist   knees buckling mult x's;max A toRecover Bal.  -DM      Recorded by [DM] Mar Koenig, PT 06/22/18 1233      Row Name 06/22/18 1127             Sit-Stand Transfer    Assistive Device (Sit-Stand Transfers) walker, front-wheeled   gait belt  -DM      Recorded by [DM] Mar Koenig, PT 06/22/18 1233      Row Name 06/22/18 1127             Stand-Sit Transfer    Assistive Device (Stand-Sit Transfers) walker, front-wheeled  -DM      Recorded by [DM] Mar Koenig, PT 06/22/18 1233      Row Name 06/22/18 1127             Gait/Stairs Assessment/Training    Gait/Stairs Assessment/Training other (see comments)    unable to,d/t drowsy/knee instabil.  -DM      Recorded by [DM] Mar KLEBER [place], PT 06/22/18 1233      Row Name 06/22/18 1127             Motor Skills Assessment/Interventions    Additional Documentation Balance (Group);Balance Interventions (Group);Therapeutic Exercise (Group);Therapeutic Exercise Interventions (Group)  -DM      Recorded by [DM] Mar Koenig, PT 06/22/18 1233      Row Name 06/22/18 1127             Therapeutic Exercise    Therapeutic Exercise seated, lower extremities  -DM      28556 - PT Therapeutic Exercise Minutes 23  -DM      50374 -  PT Neuromuscular Reeducation Minutes 15  -DM      Recorded by [DM] Mar KLEBER [place], PT 06/22/18 1233      Row Name 06/22/18 1127             Lower Extremity Seated Therapeutic Exercise    Performed, Seated Lower Extremity (Therapeutic Exercise) hip flexion/extension;hip abduction/adduction;hip external/internal rotation;knee flexion/extension;LAQ (long arc quad), knee extension;SAQ (short arc quad), knee extension   nahun, PILLOW squeezes,marches  -DM      Exercise Type, Seated Lower Extremity (Therapeutic Exercise) AAROM (active assistive range of motion);AROM (active range of motion);isometric contraction, static   ret. w/ HEP & INSTRUCTED  -DM2      Sets/Reps Detail, Seated Lower Extremity (Therapeutic Exercise) 1/10  -DM      Comment, Seated Lower Extremity (Therapeutic Exercise) rests btwn sets;dozing off  -DM      Recorded by [DM] Mar Koenig, PT 06/22/18 1233  [DM2] Mar Koenig, PT 06/22/18 1236      Row Name 06/22/18 1127             Therapeutic Exercise    Upper Extremity Range of Motion (Therapeutic Exercise) shoulder flexion/extension, bilateral;shoulder abduction/adduction, bilateral;elbow flexion/extension, bilateral  -DM      Position (Therapeutic Exercise) seated  -DM      Sets/Reps (Therapeutic Exercise) 1/10  -DM      Recorded by [DM] Mar Koenig, PT 06/22/18 1233      Row Name 06/22/18 1127             Balance    Balance static  sitting balance;static standing balance;dynamic sitting balance;dynamic standing balance  -DM      Recorded by [DM] Mar Koenig, PT 06/22/18 1233      Row Name 06/22/18 1127             Static Sitting Balance    Level of [place]valon (Unsupported Sitting, Static Balance) minimal assist, 75% patient effort  -DM      Sitting Position (Unsupported Sitting, Static Balance) sitting in chair  -DM      Time Able to Maintain Position (Unsupported Sitting, Static Balance) 1 to 2 minutes  -DM      Comment (Unsupported Sitting, Static Balance) trunk ext;PLB;LE Exer  -DM      Recorded by [DM] Mar Koenig, PT 06/22/18 1233      Row Name 06/22/18 1127             Dynamic Sitting Balance    Level of [place], Reaches Outside Midline (Sitting, Dynamic Balance) moderate assist, 50 to 74% patient effort  -DM      Sitting Position, Reaches Outside Midline (Sitting, Dynamic Balance) sitting in chair  -DM      Comment, Reaches Outside Midline (Sitting, Dynamic Balance) scooting F/B  -DM      Recorded by [DM] Mar Koenig, PT 06/22/18 1233      Row Name 06/22/18 1127             Static Standing Balance    Level of [place]val[place] (Supported Standing, Static Balance) maximal assist, 25 to 49% patient effort  -DM      Time Able to Maintain Position (Supported Standing, Static Balance) 45 to 60 seconds  -DM      Assistive Device Utilized (Supported Standing, Static Balance) rolling walker  -DM      Recorded by [DM] Mar Koenig, PT 06/22/18 1233      Row Name 06/22/18 1127             Dynamic Standing Balance    Level of [place], Reaches Outside Midline (Standing, Dynamic Balance) maximal assist, 25 to 49% patient effort   MAX 2 A  -DM      Time Able to Maintain Position, Reaches Outside Midline (Standing, Dynamic Balance) 45 to 60 seconds  -DM      Comment, Reaches Outside Midline (Standing, Dynamic Balance) 'R WX for supp  -DM      Recorded by [DM] Mar Koenig, PT 06/22/18 1233      Row Name 06/22/18 1127              Positioning and Restraints    Pre-Treatment Position sitting in chair/recliner  -DM      Post Treatment Position chair  -DM      In Chair notified nsg;reclined;call light within reach;encouraged to call for assist;exit alarm on;with family/caregiver;waffle cushion;on mechanical lift sling;legs elevated  -DM      Recorded by [DM] Mar Koenig, PT 06/22/18 1233      Row Name 06/22/18 1127             Pain Assessment    Additional Documentation Pain Scale: FACES Pre/Post-Treatment (Group)  -DM      Recorded by [DM] Mar Koenig, PT 06/22/18 1233      Row Name 06/22/18 1127             Pain Scale: Numbers Pre/Post-Treatment    Pain Scale: Numbers, Pretreatment 2/10  -DM      Pain Scale: Numbers, Post-Treatment 2/10  -DM      Pain Location - Side Right  -DM      Pain Location - Orientation lateral  -DM      Pain Location other (see comments)   leg  -DM      Recorded by [DM] Mar Koenig, PT 06/22/18 1233      Row Name 06/22/18 1127             Pain Scale: FACES Pre/Post-Treatment    Pain: FACES Scale, Pretreatment 0-->no hurt  -DM      Pain: FACES Scale, Post-Treatment 2-->hurts little bit  -DM      Recorded by [DM] Mar Koenig, PT 06/22/18 1233      Row Name 06/22/18 1127             Plan of Care Review    Plan of Care Reviewed With patient;daughter  -DM      Recorded by [DM] Mar Koenig, PT 06/22/18 1233      Row Name 06/22/18 1127             Outcome Summary/Treatment Plan (PT)    Anticipated Discharge Disposition (PT) skilled nursing facility  -DM      Recorded by [DM] Mar Koenig, PT 06/22/18 1233        User Key  (r) = Recorded By, (t) = Taken By, (c) = Cosigned By    Initials Name Effective Dates Discipline    DM Mar Koenig, PT 06/19/15 -  PT                     Physical Therapy Education     Title: PT OT SLP Therapies (Active)     Topic: Physical Therapy (Active)     Point: Mobility training (Active)    Learning Progress Summary     Learner Status Readiness Method Response Comment  Documented by    Patient Active Eager E,D NR   06/22/18 1233     Done Acceptance E,D VU,DU,NR   06/21/18 1517     Active Acceptance E NR  AS 06/19/18 1144     Active Eager E,D NR   06/18/18 1745     Active Acceptance E,D NR   06/15/18 1101     Active Acceptance E,D NR   06/14/18 1016     Active Acceptance E NR  KR 06/13/18 1200    Family Active Eager E,D NR   06/22/18 1233     Active Eager E,D NR   06/18/18 1745     Active Acceptance E NR  KR 06/13/18 1200          Point: Home exercise program (Active)    Learning Progress Summary     Learner Status Readiness Method Response Comment Documented by    Patient Active Eager E,D NR   06/22/18 1233     Done Acceptance E,D VU,DU,NR   06/21/18 1517     Active Acceptance E NR  AS 06/19/18 1144     Active Eager E,D NR   06/18/18 1745     Active Acceptance E,D NR   06/15/18 1101     Active Acceptance E,D NR   06/14/18 1016    Family Active Eager E,D NR   06/22/18 1233     Active Eager E,D NR   06/18/18 1745          Point: Body mechanics (Active)    Learning Progress Summary     Learner Status Readiness Method Response Comment Documented by    Patient Active Eager E,D NR   06/22/18 1233     Done Acceptance E,D VU,DU,NR   06/21/18 1517     Active Acceptance E NR  AS 06/19/18 1144     Active Eager E,D NR   06/18/18 1745     Active Acceptance E,D NR   06/15/18 1101     Active Acceptance E,D NR   06/14/18 1016     Active Acceptance E NR  KR 06/13/18 1200    Family Active Eager E,D NR   06/22/18 1233     Active Eager E,D NR   06/18/18 1745     Active Acceptance E NR  KR 06/13/18 1200          Point: Precautions (Active)    Learning Progress Summary     Learner Status Readiness Method Response Comment Documented by    Patient Active Eager E,D NR   06/22/18 1233     Done Acceptance E,D VU,DU,NR   06/21/18 1517     Active Acceptance E NR  AS 06/19/18 1144     Active Eager E,D NR  DM 06/18/18 1745     Active Acceptance E,D NR  CM 06/15/18  1101     Active Acceptance E,D NR  CM 06/14/18 1016     Active Acceptance E NR  KR 06/13/18 1200    Family Active Eager E,D NR  DM 06/22/18 1233     Active Eager E,D NR  DM 06/18/18 1745     Active Acceptance E NR  KR 06/13/18 1200                      User Key     Initials Effective Dates Name Provider Type Discipline     06/19/15 -  Kait Johnston, PT Physical Therapist PT    DM 06/19/15 -  Mar Koenig, PT Physical Therapist PT    AS 06/22/15 -  Gerda Childress, PTA Physical Therapy Assistant PT    KR 04/03/18 -  Leanna Molina, PT Physical Therapist PT    CM 06/07/18 -  Barbara Martinez, PT Student PT Student PT                    PT Recommendation and Plan  Anticipated Discharge Disposition (PT): skilled nursing facility  Therapy Frequency (PT Clinical Impression): daily  Outcome Summary/Treatment Plan (PT)  Daily Summary of Progress (PT): progress towards functional goals is fair  Anticipated Discharge Disposition (PT): skilled nursing facility  Plan of Care Reviewed With: patient, daughter  Progress: improving  Outcome Summary: Able to perform 2 trials STS w/ max 2 A using R wx, x 1 min. each, as well as ther ex x 10 w/ freq rests,but limited by drowsiness/lethargy from sedative given last night,, as well as elev LFT's, knee instabil, & fatigue          Outcome Measures     Row Name 06/22/18 1127 06/21/18 1415          How much help from another person do you currently need...    Turning from your back to your side while in flat bed without using bedrails? 3  -DM 3  -MM     Moving from lying on back to sitting on the side of a flat bed without bedrails? 2  -DM 2  -MM     Moving to and from a bed to a chair (including a wheelchair)? 1  -DM 1  -MM     Standing up from a chair using your arms (e.g., wheelchair, bedside chair)? 2  -DM 2  -MM     Climbing 3-5 steps with a railing? 1  -DM 1  -MM     To walk in hospital room? 1  -DM 1  -MM     AM-PAC 6 Clicks Score 10  -DM 10  -MM        Functional  Assessment    Outcome Measure Options AM-PAC 6 Clicks Basic Mobility (PT)  -DM AM-PAC 6 Clicks Basic Mobility (PT)  -MM       User Key  (r) = Recorded By, (t) = Taken By, (c) = Cosigned By    Initials Name Provider Type    MM Kait Johnston, PT Physical Therapist    DM Mar Koenig, PT Physical Therapist           Time Calculation:         PT Charges     Row Name 06/22/18 1237 06/22/18 1127          Time Calculation    Start Time 1127  -DM  --     PT Received On 06/22/18  -DM  --     PT Goal Re-Cert Due Date 06/23/18  -DM  --        Time Calculation- PT    Total Timed Code Minutes- PT 38 minute(s)  -DM  --        Timed Charges    47129 - PT Therapeutic Exercise Minutes  -- 23  -DM     24293 -  PT Neuromuscular Reeducation Minutes  -- 15  -DM       User Key  (r) = Recorded By, (t) = Taken By, (c) = Cosigned By    Initials Name Provider Type    ROBERTA Koenig, PT Physical Therapist        Therapy Suggested Charges     Code   Minutes Charges    54184 (CPT®) Hc Pt Neuromusc Re Education Ea 15 Min 15 1    64249 (CPT®) Hc Pt Ther Proc Ea 15 Min 23 2    20062 (CPT®) Hc Gait Training Ea 15 Min      11246 (CPT®) Hc Pt Therapeutic Act Ea 15 Min      40723 (CPT®) Hc Pt Manual Therapy Ea 15 Min      73394 (CPT®) Hc Pt Iontophoresis Ea 15 Min      70635 (CPT®) Hc Pt Elec Stim Ea-Per 15 Min      09255 (CPT®) Hc Pt Ultrasound Ea 15 Min      49269 (CPT®) Hc Pt Self Care/Mgmt/Train Ea 15 Min      Total  38 3        Therapy Charges for Today     Code Description Service Date Service Provider Modifiers Qty    25143603845 HC PT NEUROMUSC RE EDUCATION EA 15 MIN 6/22/2018 Mar Koenig, PT GP 1    44273898922 HC PT THER PROC EA 15 MIN 6/22/2018 Mar Koenig, PT GP 2          PT G-Codes  Outcome Measure Options: AM-PAC 6 Clicks Basic Mobility (PT)    Mar Koenig, PT  6/22/2018

## 2018-06-22 NOTE — PLAN OF CARE
Problem: Fall Risk (Adult)  Goal: Absence of Fall  Outcome: Ongoing (interventions implemented as appropriate)   06/22/18 1802   Fall Risk (Adult)   Absence of Fall making progress toward outcome       Problem: Sepsis/Septic Shock (Adult)  Goal: Signs and Symptoms of Listed Potential Problems Will be Absent, Minimized or Managed (Sepsis/Septic Shock)  Outcome: Ongoing (interventions implemented as appropriate)   06/17/18 0313   Goal/Outcome Evaluation   Problems Assessed (Sepsis) all   Problems Present (Sepsis) none       Problem: Pneumonia (Adult)  Goal: Signs and Symptoms of Listed Potential Problems Will be Absent, Minimized or Managed (Pneumonia)  Outcome: Ongoing (interventions implemented as appropriate)   06/17/18 0313   Goal/Outcome Evaluation   Problems Assessed (Pneumonia) all   Problems Present (Pneumonia) none       Problem: Skin Injury Risk (Adult)  Goal: Skin Health and Integrity  Outcome: Ongoing (interventions implemented as appropriate)   06/22/18 1802   Skin Injury Risk (Adult)   Skin Health and Integrity making progress toward outcome

## 2018-06-23 LAB
ALBUMIN SERPL-MCNC: 3.89 G/DL (ref 3.2–4.8)
ALBUMIN/GLOB SERPL: 1.4 G/DL (ref 1.5–2.5)
ALP SERPL-CCNC: 202 U/L (ref 25–100)
ALT SERPL W P-5'-P-CCNC: 35 U/L (ref 7–40)
ANION GAP SERPL CALCULATED.3IONS-SCNC: 8 MMOL/L (ref 3–11)
AST SERPL-CCNC: 30 U/L (ref 0–33)
BASOPHILS # BLD AUTO: 0.02 10*3/MM3 (ref 0–0.2)
BASOPHILS NFR BLD AUTO: 0.2 % (ref 0–1)
BILIRUB SERPL-MCNC: 0.8 MG/DL (ref 0.3–1.2)
BUN BLD-MCNC: 19 MG/DL (ref 9–23)
BUN/CREAT SERPL: 21.3 (ref 7–25)
CALCIUM SPEC-SCNC: 9.7 MG/DL (ref 8.7–10.4)
CHLORIDE SERPL-SCNC: 96 MMOL/L (ref 99–109)
CO2 SERPL-SCNC: 28 MMOL/L (ref 20–31)
CREAT BLD-MCNC: 0.89 MG/DL (ref 0.6–1.3)
DEPRECATED RDW RBC AUTO: 48.4 FL (ref 37–54)
EOSINOPHIL # BLD AUTO: 0.02 10*3/MM3 (ref 0–0.3)
EOSINOPHIL NFR BLD AUTO: 0.2 % (ref 0–3)
ERYTHROCYTE [DISTWIDTH] IN BLOOD BY AUTOMATED COUNT: 14.5 % (ref 11.3–14.5)
GFR SERPL CREATININE-BSD FRML MDRD: 80 ML/MIN/1.73
GLOBULIN UR ELPH-MCNC: 2.8 GM/DL
GLUCOSE BLD-MCNC: 116 MG/DL (ref 70–100)
HCT VFR BLD AUTO: 41.9 % (ref 38.9–50.9)
HGB BLD-MCNC: 13.6 G/DL (ref 13.1–17.5)
IMM GRANULOCYTES # BLD: 0.14 10*3/MM3 (ref 0–0.03)
IMM GRANULOCYTES NFR BLD: 1.3 % (ref 0–0.6)
LYMPHOCYTES # BLD AUTO: 0.69 10*3/MM3 (ref 0.6–4.8)
LYMPHOCYTES NFR BLD AUTO: 6.2 % (ref 24–44)
MCH RBC QN AUTO: 29.7 PG (ref 27–31)
MCHC RBC AUTO-ENTMCNC: 32.5 G/DL (ref 32–36)
MCV RBC AUTO: 91.5 FL (ref 80–99)
MONOCYTES # BLD AUTO: 0.65 10*3/MM3 (ref 0–1)
MONOCYTES NFR BLD AUTO: 5.9 % (ref 0–12)
NEUTROPHILS # BLD AUTO: 9.67 10*3/MM3 (ref 1.5–8.3)
NEUTROPHILS NFR BLD AUTO: 87.5 % (ref 41–71)
PLATELET # BLD AUTO: 293 10*3/MM3 (ref 150–450)
PMV BLD AUTO: 10.8 FL (ref 6–12)
POTASSIUM BLD-SCNC: 5 MMOL/L (ref 3.5–5.5)
PROT SERPL-MCNC: 6.7 G/DL (ref 5.7–8.2)
RBC # BLD AUTO: 4.58 10*6/MM3 (ref 4.2–5.76)
SODIUM BLD-SCNC: 132 MMOL/L (ref 132–146)
WBC NRBC COR # BLD: 11.05 10*3/MM3 (ref 3.5–10.8)

## 2018-06-23 PROCEDURE — 99232 SBSQ HOSP IP/OBS MODERATE 35: CPT | Performed by: HOSPITALIST

## 2018-06-23 PROCEDURE — 85025 COMPLETE CBC W/AUTO DIFF WBC: CPT | Performed by: INTERNAL MEDICINE

## 2018-06-23 PROCEDURE — 25010000002 HEPARIN (PORCINE) PER 1000 UNITS: Performed by: NURSE PRACTITIONER

## 2018-06-23 PROCEDURE — 25010000003 CEFTRIAXONE PER 250 MG: Performed by: INTERNAL MEDICINE

## 2018-06-23 PROCEDURE — 80053 COMPREHEN METABOLIC PANEL: CPT | Performed by: INTERNAL MEDICINE

## 2018-06-23 RX ADMIN — SENNOSIDES AND DOCUSATE SODIUM 2 TABLET: 8.6; 5 TABLET ORAL at 20:53

## 2018-06-23 RX ADMIN — HEPARIN SODIUM 5000 UNITS: 5000 INJECTION, SOLUTION INTRAVENOUS; SUBCUTANEOUS at 08:20

## 2018-06-23 RX ADMIN — HEPARIN SODIUM 5000 UNITS: 5000 INJECTION, SOLUTION INTRAVENOUS; SUBCUTANEOUS at 20:53

## 2018-06-23 RX ADMIN — OXYCODONE HYDROCHLORIDE 5 MG: 5 TABLET ORAL at 08:21

## 2018-06-23 RX ADMIN — ASPIRIN 81 MG: 81 TABLET, COATED ORAL at 08:20

## 2018-06-23 RX ADMIN — OXYCODONE HYDROCHLORIDE 5 MG: 5 TABLET ORAL at 20:53

## 2018-06-23 RX ADMIN — MULTIPLE VITAMINS W/ MINERALS TAB 1 TABLET: TAB ORAL at 08:20

## 2018-06-23 RX ADMIN — SENNOSIDES AND DOCUSATE SODIUM 2 TABLET: 8.6; 5 TABLET ORAL at 08:20

## 2018-06-23 RX ADMIN — CEFTRIAXONE SODIUM 2 G: 2 INJECTION, SOLUTION INTRAVENOUS at 13:20

## 2018-06-23 NOTE — PLAN OF CARE
Problem: Patient Care Overview  Goal: Plan of Care Review  Outcome: Ongoing (interventions implemented as appropriate)   06/23/18 0541   Coping/Psychosocial   Plan of Care Reviewed With patient   OTHER   Outcome Summary VSS. Pain controlled with PO pain medication. Pt resting well through the night, will continue to monitor   Plan of Care Review   Progress improving       Problem: Fall Risk (Adult)  Goal: Absence of Fall  Outcome: Ongoing (interventions implemented as appropriate)      Problem: Skin Injury Risk (Adult)  Goal: Skin Health and Integrity  Outcome: Ongoing (interventions implemented as appropriate)

## 2018-06-23 NOTE — PLAN OF CARE
Problem: Patient Care Overview  Goal: Plan of Care Review  Outcome: Ongoing (interventions implemented as appropriate)   06/23/18 0523 06/23/18 0840   Coping/Psychosocial   Plan of Care Reviewed With --  patient   Plan of Care Review   Progress improving --      Goal: Individualization and Mutuality  Outcome: Ongoing (interventions implemented as appropriate)    Goal: Discharge Needs Assessment  Outcome: Ongoing (interventions implemented as appropriate)   06/13/18 1247   Discharge Needs Assessment   Readmission Within the Last 30 Days no previous admission in last 30 days   Concerns to be Addressed discharge planning;home safety   Concerns Comments Assess for home needs prior to discharge   Patient/Family Anticipates Transition to home with family   Patient/Family Anticipated Services at Transition home health care   Transportation Concerns car, none   Transportation Anticipated family or friend will provide   Anticipated Changes Related to Illness inability to care for self   Equipment Needed After Discharge other (see comments)   Outpatient/Agency/Support Group Needs other (see comments)   Discharge Facility/Level of Care Needs other (see comments)   Offered/Gave Vendor List no   Current Discharge Risk physical impairment   Disability   Equipment Currently Used at Home walker, rolling;wheelchair;shower chair     Goal: Interprofessional Rounds/Family Conf  Outcome: Ongoing (interventions implemented as appropriate)   06/23/18 1842   Interdisciplinary Rounds/Family Conf   Participants nursing       Problem: Fall Risk (Adult)  Goal: Absence of Fall  Outcome: Ongoing (interventions implemented as appropriate)   06/23/18 0523   Fall Risk (Adult)   Absence of Fall making progress toward outcome       Problem: Sepsis/Septic Shock (Adult)  Goal: Signs and Symptoms of Listed Potential Problems Will be Absent, Minimized or Managed (Sepsis/Septic Shock)  Outcome: Ongoing (interventions implemented as appropriate)   06/17/18  0313   Goal/Outcome Evaluation   Problems Assessed (Sepsis) all   Problems Present (Sepsis) none       Problem: Pneumonia (Adult)  Goal: Signs and Symptoms of Listed Potential Problems Will be Absent, Minimized or Managed (Pneumonia)  Outcome: Ongoing (interventions implemented as appropriate)   06/17/18 0313   Goal/Outcome Evaluation   Problems Assessed (Pneumonia) all   Problems Present (Pneumonia) none       Problem: Skin Injury Risk (Adult)  Goal: Skin Health and Integrity  Outcome: Ongoing (interventions implemented as appropriate)   06/23/18 1842   Skin Injury Risk (Adult)   Skin Health and Integrity making progress toward outcome

## 2018-06-23 NOTE — PROGRESS NOTES
[facility] Medicine Services  PROGRESS NOTE    Patient Name: Harris Shane  : 9/15/1927  MRN: [id number]    Date of Admission: 2018  Length of Stay: 11  Primary Care Physician: Charli Mccormack MD    Subjective   Subjective     CC:  Severe weakness    Subjective:  Weakness persists.  Needs to use the bathroom #1 today.  No family in room.  No pain.        Review of Systems      Otherwise ROS is negative except as mentioned in the HPI.    Objective   Objective     Vital Signs:   Temp:  [97.8 °F (36.6 °C)-98.2 °F (36.8 °C)] 97.8 °F (36.6 °C)  Heart Rate:  [88] 88  Resp:  [18] 18  BP: (105-121)/(68-75) 121/68        Physical Exam:  Constitutional: No acute distress, A bit drowsy but arousable.  Eyes: PERRLA, sclerae anicteric, no conjunctival injection. Right eye ptosis and blindness.  HENT: NCAT, mucous membranes moist  Neck: Supple, no JVD, trachea midline  Respiratory: Clear to auscultation bilaterally, nonlabored respirations   Cardiovascular: RRR, no murmurs, rubs, or gallops, palpable pedal pulses bilaterally  Gastrointestinal: Positive bowel sounds, soft, nontender, mildly distended  Musculoskeletal: No bilateral ankle edema, no clubbing or cyanosis to extremities  Psychiatric: Appropriate affect, cooperative  Neurologic: Awake, alert, follows commands.  Patient does have some weakness of lower extremities however he can lift her legs against gravity.  Patient does have difficulty with ambulating independently.  Skin: No rashes    Results Reviewed:  I have personally reviewed current lab, radiology, and data and agree.      Results from last 7 days  Lab Units 18  0611 18  0914 18  0627   WBC 10*3/mm3 11.05* 11.34* 10.81*   HEMOGLOBIN g/dL 13.6 13.4 12.4*   HEMATOCRIT % 41.9 40.4 37.4*   PLATELETS 10*3/mm3 293 222 184       Results from last 7 days  Lab Units 18  0546 18  0914 18  0625   SODIUM mmol/L 132  --  134   POTASSIUM mmol/L 5.0  --   4.3   CHLORIDE mmol/L 96*  --  101   CO2 mmol/L 28.0  --  28.0   BUN mg/dL 19  --  16   CREATININE mg/dL 0.89  --  0.82   GLUCOSE mg/dL 116*  --  92   CALCIUM mg/dL 9.7  --  9.1   ALT (SGPT) U/L 35 72* 74*   AST (SGOT) U/L 30 48* 43*     Estimated Creatinine Clearance: 57.7 mL/min (by C-G formula based on SCr of 0.89 mg/dL).  No results found for: BNP  No results found for: PHART    Microbiology Results Abnormal     Procedure Component Value - Date/Time    Blood Culture - Blood, [[id number]]  (Normal) Collected:  06/20/18 1740    Lab Status:  Preliminary result Specimen:  Blood from Arm, Left Updated:  06/22/18 1830     Blood Culture No growth at 2 days    Blood Culture - Blood, [[id number]]  (Normal) Collected:  06/20/18 1745    Lab Status:  Preliminary result Specimen:  Blood from Wrist, Left Updated:  06/22/18 1830     Blood Culture No growth at 2 days    Blood Culture - Blood, [[id number]]  (Normal) Collected:  06/12/18 2152    Lab Status:  Final result Specimen:  Blood from Arm, Left Updated:  06/17/18 2200     Blood Culture No growth at 5 days    Blood Culture - Blood, [[id number]]  (Abnormal)  (Susceptibility) Collected:  06/12/18 2152    Lab Status:  Final result Specimen:  Blood from Arm, Right Updated:  06/15/18 0728     Blood Culture Escherichia coli (A)     Isolated from Aerobic Bottle     Gram Stain Result Aerobic Bottle Gram negative bacilli    Susceptibility      Escherichia coli     SISSY     Ampicillin <=8 ug/ml Susceptible     Ampicillin + Sulbactam <=8/4 ug/ml Susceptible     Aztreonam <=8 ug/ml Susceptible     Cefepime <=8 ug/ml Susceptible     Cefotaxime <=2 ug/ml Susceptible     Ceftriaxone <=8 ug/ml Susceptible     Cefuroxime sodium <=4 ug/ml Susceptible     Ertapenem <=1 ug/ml Susceptible     Gentamicin <=4 ug/ml Susceptible     Levofloxacin <=2 ug/ml Susceptible     Meropenem <=1 ug/ml Susceptible     Piperacillin + Tazobactam <=16 ug/ml Susceptible     Tetracycline <=4 ug/ml Susceptible      Tobramycin <=4 ug/ml Susceptible     Trimethoprim + Sulfamethoxazole <=2/38 ug/ml Susceptible                    Blood Culture ID, PCR - Blood, [[id number]]  (Abnormal) Collected:  06/12/18 2152    Lab Status:  Final result Specimen:  Blood from Arm, Right Updated:  06/13/18 1421     BCID, PCR Escherichia coli. Identification by BCID PCR. (C)          Imaging Results (last 24 hours)     ** No results found for the last 24 hours. **        Results for orders placed during the hospital encounter of 06/12/18   Adult Transthoracic Echo Complete W/ Cont if Necessary Per Protocol    Narrative · Left ventricular systolic function is normal. Estimated EF = 55%.  · Left ventricular diastolic dysfunction (grade I) consistent with   impaired relaxation.          I have reviewed the medications.    Assessment/Plan   Assessment / Plan     Hospital Problem List     * (Principal)Sepsis    Elevated liver enzymes    Prostate cancer (Mets to L4-L5)    Pulmonary infiltrate, LLLobe    Cholelithiases of GB neck per CT A/P    Acute renal insufficiency, CRE 1.43, unknown baseline    Blindness of right eye    WILLIAM (obstructive sleep apnea) remote, intolerant of CPAP    R/O Cholecystitis    NSTEMI (non-ST elevated myocardial infarction) (R/O Type 2)    Bacteremia due to Escherichia coli             Brief Hospital Course to date:  Harris Shane is a 89 y.o. male with past medical history significant for prostate cancer with metastases  to L4 on L5, blindness of right eye, weakness.  Patient was admitted to ICU for sepsis.      Assessment & Plan:  *Sepsis markedly improved.    * Escherichia coli bacteremia, has been on IV rocephin. Afebrile.    * Elevated liver enzymes, improving.    * Back pain that radiates to lower extremities particularly on the left side.  This is mainly secondary to metastatic disease in L4 and L5. Had radiation to L4-S1 today.    * Severe generalized weakness particularly lower extremities.  Patient has significant  difficulty with ambulating independently.  This weakness is partially secondary to metastatic disease to the spine.    * Metastatic prostate cancer.  Metastases to L4 on L5.  Patient was supposed to start radiation therapy for that.  We need to coordinate with radiation oncology for this.    * Blindness and ptosis of the right eye.    PLAN:  - there has been a gradual escalation of pain medication since admission - may need to work on weaning or getting palliative involved  - We will transferred to rehabilitation when bed available.        DVT Prophylaxis:      CODE STATUS:   Code Status and Medical Interventions:   Ordered at: 06/19/18 0835     Code Status:    CPR     Medical Interventions (Level of Support Prior to Arrest):    Full       Disposition: TBD      Electronically signed by Lance Garner MD, 06/23/18, 5:02 PM.

## 2018-06-24 PROCEDURE — 25010000002 HEPARIN (PORCINE) PER 1000 UNITS: Performed by: NURSE PRACTITIONER

## 2018-06-24 PROCEDURE — 99232 SBSQ HOSP IP/OBS MODERATE 35: CPT | Performed by: HOSPITALIST

## 2018-06-24 PROCEDURE — 25010000003 CEFTRIAXONE PER 250 MG: Performed by: INTERNAL MEDICINE

## 2018-06-24 RX ORDER — LACTULOSE 10 G/15ML
10 SOLUTION ORAL 3 TIMES DAILY
Status: DISCONTINUED | OUTPATIENT
Start: 2018-06-24 | End: 2018-06-25 | Stop reason: HOSPADM

## 2018-06-24 RX ADMIN — HEPARIN SODIUM 5000 UNITS: 5000 INJECTION, SOLUTION INTRAVENOUS; SUBCUTANEOUS at 08:32

## 2018-06-24 RX ADMIN — HEPARIN SODIUM 5000 UNITS: 5000 INJECTION, SOLUTION INTRAVENOUS; SUBCUTANEOUS at 20:07

## 2018-06-24 RX ADMIN — CEFTRIAXONE SODIUM 2 G: 2 INJECTION, SOLUTION INTRAVENOUS at 13:20

## 2018-06-24 RX ADMIN — POLYETHYLENE GLYCOL (3350) 17 G: 17 POWDER, FOR SOLUTION ORAL at 08:32

## 2018-06-24 RX ADMIN — MAGNESIUM HYDROXIDE 10 ML: 2400 SUSPENSION ORAL at 14:29

## 2018-06-24 RX ADMIN — OXYCODONE HYDROCHLORIDE 5 MG: 5 TABLET ORAL at 20:07

## 2018-06-24 RX ADMIN — SENNOSIDES AND DOCUSATE SODIUM 2 TABLET: 8.6; 5 TABLET ORAL at 20:07

## 2018-06-24 RX ADMIN — ASPIRIN 81 MG: 81 TABLET, COATED ORAL at 08:32

## 2018-06-24 RX ADMIN — SENNOSIDES AND DOCUSATE SODIUM 2 TABLET: 8.6; 5 TABLET ORAL at 08:33

## 2018-06-24 RX ADMIN — MULTIPLE VITAMINS W/ MINERALS TAB 1 TABLET: TAB ORAL at 08:32

## 2018-06-24 NOTE — PROGRESS NOTES
[facility] Medicine Services  PROGRESS NOTE    Patient Name: Harris Shane  : 9/15/1927  MRN: [id number]    Date of Admission: 2018  Length of Stay: 12  Primary Care Physician: Charli Mccormack MD    Subjective   Subjective     CC:  Severe weakness    Subjective:  Reported that BM is still an issue.  No family today.  Not getting IV morphine and oral morphine.  No issues today.  Watching car racing on TV.  Concerned that his breakfast is not warm today.    Review of Systems     Otherwise ROS is negative except as mentioned in the HPI.    Objective   Objective     Vital Signs:   Temp:  [97.3 °F (36.3 °C)-98.3 °F (36.8 °C)] 98.3 °F (36.8 °C)  Heart Rate:  [75-86] 75  Resp:  [16-18] 16  BP: (103-120)/(70-75) 120/70        Physical Exam:  Constitutional: No acute distress, A bit drowsy but arousable.  Eyes: PERRLA, sclerae anicteric, no conjunctival injection. Right eye ptosis and blindness.  HENT: NCAT, mucous membranes moist  Neck: Supple, no JVD, trachea midline  Respiratory: Clear to auscultation bilaterally, nonlabored respirations   Cardiovascular: RRR, no murmurs, rubs, or gallops, palpable pedal pulses bilaterally  Gastrointestinal: Positive bowel sounds, soft, nontender, mildly distended  Musculoskeletal: No bilateral ankle edema, no clubbing or cyanosis to extremities  Psychiatric: Appropriate affect, cooperative  Neurologic: Awake, alert, follows commands.  Patient does have some weakness of lower extremities however he can lift her legs against gravity.  Patient does have difficulty with ambulating independently.  Skin: No rashes    Results Reviewed:  I have personally reviewed current lab, radiology, and data and agree.      Results from last 7 days  Lab Units 18  0611 18  0914 18  0627   WBC 10*3/mm3 11.05* 11.34* 10.81*   HEMOGLOBIN g/dL 13.6 13.4 12.4*   HEMATOCRIT % 41.9 40.4 37.4*   PLATELETS 10*3/mm3 293 222 184       Results from last 7 days  Lab  Units 06/23/18  0546 06/19/18  0914 06/18/18  0625   SODIUM mmol/L 132  --  134   POTASSIUM mmol/L 5.0  --  4.3   CHLORIDE mmol/L 96*  --  101   CO2 mmol/L 28.0  --  28.0   BUN mg/dL 19  --  16   CREATININE mg/dL 0.89  --  0.82   GLUCOSE mg/dL 116*  --  92   CALCIUM mg/dL 9.7  --  9.1   ALT (SGPT) U/L 35 72* 74*   AST (SGOT) U/L 30 48* 43*     Estimated Creatinine Clearance: 58.4 mL/min (by C-G formula based on SCr of 0.89 mg/dL).  No results found for: BNP  No results found for: PHART    Microbiology Results Abnormal     Procedure Component Value - Date/Time    Blood Culture - Blood, [[id number]]  (Normal) Collected:  06/20/18 1740    Lab Status:  Preliminary result Specimen:  Blood from Arm, Left Updated:  06/23/18 1830     Blood Culture No growth at 3 days    Blood Culture - Blood, [[id number]]  (Normal) Collected:  06/20/18 1745    Lab Status:  Preliminary result Specimen:  Blood from Wrist, Left Updated:  06/23/18 1830     Blood Culture No growth at 3 days    Blood Culture - Blood, [[id number]]  (Normal) Collected:  06/12/18 2152    Lab Status:  Final result Specimen:  Blood from Arm, Left Updated:  06/17/18 2200     Blood Culture No growth at 5 days    Blood Culture - Blood, [[id number]]  (Abnormal)  (Susceptibility) Collected:  06/12/18 2152    Lab Status:  Final result Specimen:  Blood from Arm, Right Updated:  06/15/18 0728     Blood Culture Escherichia coli (A)     Isolated from Aerobic Bottle     Gram Stain Result Aerobic Bottle Gram negative bacilli    Susceptibility      Escherichia coli     SISSY     Ampicillin <=8 ug/ml Susceptible     Ampicillin + Sulbactam <=8/4 ug/ml Susceptible     Aztreonam <=8 ug/ml Susceptible     Cefepime <=8 ug/ml Susceptible     Cefotaxime <=2 ug/ml Susceptible     Ceftriaxone <=8 ug/ml Susceptible     Cefuroxime sodium <=4 ug/ml Susceptible     Ertapenem <=1 ug/ml Susceptible     Gentamicin <=4 ug/ml Susceptible     Levofloxacin <=2 ug/ml Susceptible     Meropenem <=1 ug/ml  Susceptible     Piperacillin + Tazobactam <=16 ug/ml Susceptible     Tetracycline <=4 ug/ml Susceptible     Tobramycin <=4 ug/ml Susceptible     Trimethoprim + Sulfamethoxazole <=2/38 ug/ml Susceptible                    Blood Culture ID, PCR - Blood, [[id number]]  (Abnormal) Collected:  06/12/18 2152    Lab Status:  Final result Specimen:  Blood from Arm, Right Updated:  06/13/18 1421     BCID, PCR Escherichia coli. Identification by BCID PCR. (C)          Imaging Results (last 24 hours)     ** No results found for the last 24 hours. **        Results for orders placed during the hospital encounter of 06/12/18   Adult Transthoracic Echo Complete W/ Cont if Necessary Per Protocol    Narrative · Left ventricular systolic function is normal. Estimated EF = 55%.  · Left ventricular diastolic dysfunction (grade I) consistent with   impaired relaxation.        I have reviewed the medications.    Assessment/Plan   Assessment / Plan     Hospital Problem List     * (Principal)Sepsis    Elevated liver enzymes    Prostate cancer (Mets to L4-L5)    Pulmonary infiltrate, LLLobe    Cholelithiases of GB neck per CT A/P    Acute renal insufficiency, CRE 1.43, unknown baseline    Blindness of right eye    WILLIAM (obstructive sleep apnea) remote, intolerant of CPAP    R/O Cholecystitis    NSTEMI (non-ST elevated myocardial infarction) (R/O Type 2)    Bacteremia due to Escherichia coli        Brief Hospital Course to date:  Harris Shane is a 89 y.o. male with past medical history significant for prostate cancer with metastases  to L4 on L5, blindness of right eye, weakness.  Patient was admitted to ICU for sepsis.    Assessment & Plan:  *Sepsis markedly improved.    * Escherichia coli bacteremia, has been on IV rocephin. Afebrile.    * Elevated liver enzymes, improving.    * Back pain that radiates to lower extremities particularly on the left side.  This is mainly secondary to metastatic disease in L4 and L5. Had radiation to L4-S1  today.    * Severe generalized weakness particularly lower extremities.  Patient has significant difficulty with ambulating independently.  This weakness is partially secondary to metastatic disease to the spine.    * Metastatic prostate cancer.  Metastases to L4 on L5.  Patient was supposed to start radiation therapy for that.  We need to coordinate with radiation oncology for this.    * Blindness and ptosis of the right eye.    PLAN:    - de-escalate narcotic analgesia today  - increase bowel regimen due to no BM - likely opiate induced constipation  - Milk of Mag  - lactulose TID added today    DVT Prophylaxis:  Heparin SC    CODE STATUS:   Code Status and Medical Interventions:   Ordered at: 06/19/18 0835     Code Status:    CPR     Medical Interventions (Level of Support Prior to Arrest):    Full     Disposition: TBD    Electronically signed by Lance Garner MD, 06/24/18, 5:07 PM.

## 2018-06-24 NOTE — PLAN OF CARE
Problem: Patient Care Overview  Goal: Plan of Care Review  Outcome: Ongoing (interventions implemented as appropriate)   06/24/18 0519   Coping/Psychosocial   Plan of Care Reviewed With patient   Plan of Care Review   Progress improving     Goal: Individualization and Mutuality  Outcome: Ongoing (interventions implemented as appropriate)    Goal: Discharge Needs Assessment  Outcome: Ongoing (interventions implemented as appropriate)   06/13/18 1247   Discharge Needs Assessment   Readmission Within the Last 30 Days no previous admission in last 30 days   Concerns to be Addressed discharge planning;home safety   Concerns Comments Assess for home needs prior to discharge   Patient/Family Anticipates Transition to home with family   Patient/Family Anticipated Services at Transition home health care   Transportation Concerns car, none   Transportation Anticipated family or friend will provide   Anticipated Changes Related to Illness inability to care for self   Equipment Needed After Discharge other (see comments)   Outpatient/Agency/Support Group Needs other (see comments)   Discharge Facility/Level of Care Needs other (see comments)   Offered/Gave Vendor List no   Current Discharge Risk physical impairment   Disability   Equipment Currently Used at Home walker, rolling;wheelchair;shower chair     Goal: Interprofessional Rounds/Family Conf  Outcome: Ongoing (interventions implemented as appropriate)   06/24/18 1451   Interdisciplinary Rounds/Family Conf   Participants nursing       Problem: Fall Risk (Adult)  Goal: Absence of Fall  Outcome: Ongoing (interventions implemented as appropriate)   06/24/18 0519   Fall Risk (Adult)   Absence of Fall making progress toward outcome       Problem: Sepsis/Septic Shock (Adult)  Goal: Signs and Symptoms of Listed Potential Problems Will be Absent, Minimized or Managed (Sepsis/Septic Shock)  Outcome: Ongoing (interventions implemented as appropriate)   06/17/18 0313   Goal/Outcome  Evaluation   Problems Assessed (Sepsis) all   Problems Present (Sepsis) none       Problem: Pneumonia (Adult)  Goal: Signs and Symptoms of Listed Potential Problems Will be Absent, Minimized or Managed (Pneumonia)  Outcome: Ongoing (interventions implemented as appropriate)   06/17/18 0313   Goal/Outcome Evaluation   Problems Assessed (Pneumonia) all   Problems Present (Pneumonia) none       Problem: Skin Injury Risk (Adult)  Goal: Skin Health and Integrity  Outcome: Ongoing (interventions implemented as appropriate)   06/24/18 0519   Skin Injury Risk (Adult)   Skin Health and Integrity making progress toward outcome

## 2018-06-24 NOTE — PLAN OF CARE
Problem: Patient Care Overview  Goal: Plan of Care Review  Outcome: Ongoing (interventions implemented as appropriate)   06/24/18 0519   Coping/Psychosocial   Plan of Care Reviewed With patient   OTHER   Outcome Summary VSS. Pain medication given once, and pt rested well throughout the night. Will continue to monitor.    Plan of Care Review   Progress improving       Problem: Fall Risk (Adult)  Goal: Absence of Fall  Outcome: Ongoing (interventions implemented as appropriate)      Problem: Skin Injury Risk (Adult)  Goal: Skin Health and Integrity  Outcome: Ongoing (interventions implemented as appropriate)

## 2018-06-25 VITALS
OXYGEN SATURATION: 96 % | HEART RATE: 91 BPM | DIASTOLIC BLOOD PRESSURE: 85 MMHG | HEIGHT: 67 IN | SYSTOLIC BLOOD PRESSURE: 153 MMHG | BODY MASS INDEX: 29.87 KG/M2 | TEMPERATURE: 98.6 F | RESPIRATION RATE: 18 BRPM | WEIGHT: 190.3 LBS

## 2018-06-25 LAB
BACTERIA SPEC AEROBE CULT: NORMAL
BACTERIA SPEC AEROBE CULT: NORMAL

## 2018-06-25 PROCEDURE — 99239 HOSP IP/OBS DSCHRG MGMT >30: CPT | Performed by: HOSPITALIST

## 2018-06-25 PROCEDURE — 25010000003 CEFTRIAXONE PER 250 MG: Performed by: INTERNAL MEDICINE

## 2018-06-25 PROCEDURE — 25010000002 CEFTRIAXONE PER 250 MG: Performed by: HOSPITALIST

## 2018-06-25 PROCEDURE — 25010000002 HEPARIN (PORCINE) PER 1000 UNITS: Performed by: NURSE PRACTITIONER

## 2018-06-25 RX ORDER — SENNA AND DOCUSATE SODIUM 50; 8.6 MG/1; MG/1
2 TABLET, FILM COATED ORAL 2 TIMES DAILY
Qty: 120 TABLET | Refills: 0 | Status: SHIPPED | OUTPATIENT
Start: 2018-06-25 | End: 2018-07-25

## 2018-06-25 RX ORDER — CEFTRIAXONE SODIUM 2 G/50ML
2 INJECTION, SOLUTION INTRAVENOUS EVERY 24 HOURS
Qty: 50 ML | Refills: 0 | Status: SHIPPED | OUTPATIENT
Start: 2018-06-25 | End: 2018-06-26

## 2018-06-25 RX ORDER — LIDOCAINE HYDROCHLORIDE 10 MG/ML
4.2 INJECTION, SOLUTION EPIDURAL; INFILTRATION; INTRACAUDAL; PERINEURAL ONCE
Status: DISCONTINUED | OUTPATIENT
Start: 2018-06-25 | End: 2018-06-25 | Stop reason: HOSPADM

## 2018-06-25 RX ORDER — FENTANYL 50 UG/H
1 PATCH TRANSDERMAL
Qty: 2 PATCH | Refills: 0 | Status: SHIPPED | OUTPATIENT
Start: 2018-06-25 | End: 2018-06-29

## 2018-06-25 RX ORDER — CEFTRIAXONE 2 G/1
2 INJECTION, POWDER, FOR SOLUTION INTRAMUSCULAR; INTRAVENOUS EVERY 24 HOURS
Status: COMPLETED | OUTPATIENT
Start: 2018-06-25 | End: 2018-06-25

## 2018-06-25 RX ADMIN — FENTANYL 1 PATCH: 50 PATCH TRANSDERMAL at 12:34

## 2018-06-25 RX ADMIN — ASPIRIN 81 MG: 81 TABLET, COATED ORAL at 09:36

## 2018-06-25 RX ADMIN — MULTIPLE VITAMINS W/ MINERALS TAB 1 TABLET: TAB ORAL at 09:36

## 2018-06-25 RX ADMIN — CEFTRIAXONE SODIUM 2 G: 2 INJECTION, POWDER, FOR SOLUTION INTRAMUSCULAR; INTRAVENOUS at 13:23

## 2018-06-25 RX ADMIN — HEPARIN SODIUM 5000 UNITS: 5000 INJECTION, SOLUTION INTRAVENOUS; SUBCUTANEOUS at 09:36

## 2018-06-25 NOTE — PLAN OF CARE
Problem: Patient Care Overview  Goal: Plan of Care Review  Outcome: Ongoing (interventions implemented as appropriate)   06/25/18 0413   Coping/Psychosocial   Plan of Care Reviewed With patient   OTHER   Outcome Summary Vitals stable. Pt voiding very frequently. 3 BMs in past 24 hours. Pt hopeful to go to rehab/ltach today.    Plan of Care Review   Progress improving       Problem: Sepsis/Septic Shock (Adult)  Goal: Signs and Symptoms of Listed Potential Problems Will be Absent, Minimized or Managed (Sepsis/Septic Shock)  Outcome: Ongoing (interventions implemented as appropriate)      Problem: Pneumonia (Adult)  Goal: Signs and Symptoms of Listed Potential Problems Will be Absent, Minimized or Managed (Pneumonia)  Outcome: Ongoing (interventions implemented as appropriate)

## 2018-06-25 NOTE — DISCHARGE SUMMARY
[facility] Medicine Services  DISCHARGE SUMMARY    Patient Name: Harris Shane  : 9/15/1927  MRN: [id number]    Date of Admission: 2018  Date of Discharge:  2018 (Monday)  Primary Care Physician: Charli Mccormack MD    Consults     Date and Time Order Name Status Description    2018 1010 Inpatient Radiation Oncology Consult      2018 1018 Inpatient General Surgery Consult Completed     2018 0619 Inpatient Gastroenterology Consult Completed         Donte Dolan MD - Radiation Oncology  Jones Mcintyre MD - Surgery  Naa Villareal MD - Gastroenterology    Hospital Course     Presenting Problem:   Sepsis [A41.9]    Active Hospital Problems (** Indicates Principal Problem)    Diagnosis Date Noted   • **Sepsis [A41.9] 2018   • Bacteremia due to Escherichia coli [R78.81] 06/15/2018   • Elevated liver enzymes [R74.8] 2018   • Prostate cancer (Mets to L4-L5) [C61] 2018   • Pulmonary infiltrate, LLLobe [R91.8] 2018   • Cholelithiases of GB neck per CT A/P [K80.20] 2018   • Acute renal insufficiency, CRE 1.43, unknown baseline [N28.9] 2018   • Blindness of right eye [H54.40] 2018   • WILLIAM (obstructive sleep apnea) remote, intolerant of CPAP [G47.33] 2018   • R/O Cholecystitis [K81.9] 2018   • NSTEMI (non-ST elevated myocardial infarction) (R/O Type 2) [I21.4] 2018      Resolved Hospital Problems    Diagnosis Date Noted Date Resolved   No resolved problems to display.      Ascending Colangitis / Sepsis (POA)  Untreated Sleep Apnea (WILLIAM)  Weakness  Metastatic Prostate Cancer - L4/L5  Chronic Back Pain  Chronic Constipation    Hospital Course:  Harris Shane is a 89 y.o. male who presented with Sepsis POA and NSTEMI and was admitted to ICU for several days.  It was felt that he had sepsis and e coli bacteremia due to ascending colangitis and General Surgery and GI was consulted and a decision was made for  conservative mgmt.  No cholecystectomy and no ERCP was performed.  He received 12-13 days of IV abx while here and will get one more day of Ceftriaxone for a total of 14 days of therapy.  He may benefit from low fat diet and [employer], but a recurrence of this biliary problem is possible.    He received one fraction of radiation therapy to his back during this hospital stay.  1 fraction of 8 Gy to his L4-S1 region by Dr. Donte Dolan during this hospitalization.    His pain regimen appears to have been escalated due to pain which is tricky with untreated sleep apnea which is likely to have a central component due to narcotic analgesia.        Day of Discharge     HPI: feels well.  No pain reported.  Had bowel movement by report after milk of Mag.  No family today.  Feels ok with rehab today.    Review of Systems  Gen- No fevers, chills  CV- No chest pain, palpitations  Resp- No cough, dyspnea  GI- No N/V/D, abd pain    Otherwise ROS is negative except as mentioned in the HPI.    Vital Signs:   Temp:  [97.9 °F (36.6 °C)-98.6 °F (37 °C)] 98.6 °F (37 °C)  Heart Rate:  [91] 91  Resp:  [18-20] 18  BP: (133-153)/(85-95) 153/85     Physical Exam:    Gen:  NAD  HENT: NCAT, no JVD  Respiratory: poor insp effort, clear  Cardiovascular: RRR, s1 and s2  Gastrointestinal: Positive bowel sounds, soft, nontender, nondistended  Ext:  No edema      Pertinent  and/or Most Recent Results       Results from last 7 days  Lab Units 06/23/18  0611 06/23/18  0546 06/19/18  0914   WBC 10*3/mm3 11.05*  --  11.34*   HEMOGLOBIN g/dL 13.6  --  13.4   HEMATOCRIT % 41.9  --  40.4   PLATELETS 10*3/mm3 293  --  222   SODIUM mmol/L  --  132  --    POTASSIUM mmol/L  --  5.0  --    CHLORIDE mmol/L  --  96*  --    CO2 mmol/L  --  28.0  --    BUN mg/dL  --  19  --    CREATININE mg/dL  --  0.89  --    GLUCOSE mg/dL  --  116*  --    CALCIUM mg/dL  --  9.7  --        Results from last 7 days  Lab Units 06/23/18  0546 06/19/18  0914    BILIRUBIN mg/dL 0.8 0.8   ALK PHOS U/L 202* 262*   ALT (SGPT) U/L 35 72*   AST (SGOT) U/L 30 48*           Invalid input(s): TG, LDLCALC, LDLREALC      Brief Urine Lab Results  (Last result in the past 365 days)      Color   Clarity   Blood   Leuk Est   Nitrite   Protein   CREAT   Urine HCG        06/12/18 2336 Yellow Clear Negative Negative Negative Negative               Microbiology Results Abnormal     Procedure Component Value - Date/Time    Blood Culture - Blood, [[id number]]  (Normal) Collected:  06/20/18 1740    Lab Status:  Preliminary result Specimen:  Blood from Arm, Left Updated:  06/24/18 1830     Blood Culture No growth at 4 days    Blood Culture - Blood, [[id number]]  (Normal) Collected:  06/20/18 1745    Lab Status:  Preliminary result Specimen:  Blood from Wrist, Left Updated:  06/24/18 1830     Blood Culture No growth at 4 days    Blood Culture - Blood, [[id number]]  (Normal) Collected:  06/12/18 2152    Lab Status:  Final result Specimen:  Blood from Arm, Left Updated:  06/17/18 2200     Blood Culture No growth at 5 days    Blood Culture - Blood, [[id number]]  (Abnormal)  (Susceptibility) Collected:  06/12/18 2152    Lab Status:  Final result Specimen:  Blood from Arm, Right Updated:  06/15/18 0728     Blood Culture Escherichia coli (A)     Isolated from Aerobic Bottle     Gram Stain Result Aerobic Bottle Gram negative bacilli    Susceptibility      Escherichia coli     SISSY     Ampicillin <=8 ug/ml Susceptible     Ampicillin + Sulbactam <=8/4 ug/ml Susceptible     Aztreonam <=8 ug/ml Susceptible     Cefepime <=8 ug/ml Susceptible     Cefotaxime <=2 ug/ml Susceptible     Ceftriaxone <=8 ug/ml Susceptible     Cefuroxime sodium <=4 ug/ml Susceptible     Ertapenem <=1 ug/ml Susceptible     Gentamicin <=4 ug/ml Susceptible     Levofloxacin <=2 ug/ml Susceptible     Meropenem <=1 ug/ml Susceptible     Piperacillin + Tazobactam <=16 ug/ml Susceptible     Tetracycline <=4 ug/ml Susceptible     Tobramycin  <=4 ug/ml Susceptible     Trimethoprim + Sulfamethoxazole <=2/38 ug/ml Susceptible                    Blood Culture ID, PCR - Blood, [[id number]]  (Abnormal) Collected:  06/12/18 2152    Lab Status:  Final result Specimen:  Blood from Arm, Right Updated:  06/13/18 1421     BCID, PCR Escherichia coli. Identification by BCID PCR. (C)          Imaging Results (all)     Procedure Component Value Units Date/Time    XR Abdomen KUB [[id number]] Collected:  06/18/18 0845     Updated:  06/18/18 0927    Narrative:       EXAMINATION: XR ABDOMEN KUB-      INDICATION: Abdominal distention; A41.9-Sepsis, unspecified organism;  R74.8-Abnormal levels of other serum enzymes; E80.6-Other disorders of  bilirubin metabolism; Z85.46-Personal history of malignant neoplasm of  prostate; C79.51-Secondary malignant neoplasm of bone; J18.1-Lobar  pneumonia, unspecified organism; K80.81-Other cholelithiasis with  obstruction.     COMPARISON: 02/06/2015.     FINDINGS: Portable KUB reveals the bowel gas pattern that is  unremarkable. No evidence of obvious obstruction or gross free  intraperitoneal air.  No abnormal mass or calcification is seen within  the abdomen. The bony structures reveal extensive degenerative change is  seen within the spine. Minimal degenerative change is seen within both  hips. No air-fluid level to suggest  an obstruction.       Impression:       Nonspecific bowel gas pattern with no evidence of obvious  obstruction or gross free peritoneal air.     D:  06/18/2018  E:  06/18/2018     This report was finalized on 6/18/2018 9:25 AM by Dr. Chelo Francisco MD.       US Abdomen Limited [[id number]] Collected:  06/13/18 1202     Updated:  06/14/18 0951    Narrative:       EXAMINATION: US ABDOMEN, LIMITED-06/13/2018:     INDICATION: Evaluate ductal dilation/obstruction; A41.9-Sepsis,  unspecified organism; R74.8-Abnormal levels of other serum enzymes;  R74.8-Abnormal levels of other serum enzymes; E80.6-Other disorders  of  bilirubin metabolism; Z85.46-Personal history of malignant neoplasm of  prostate; C79.51-Secondary malignant neoplasm of bone; J18.1-Lobar  pneumonia, unspecified organism; K80.81-Other cholelithiasis with  obstruction, evaluate right upper quadrant pain.     TECHNIQUE: Sonographic imaging was obtained of the right upper quadrant  in both the sagittal and transverse planes.     COMPARISON: NONE.     FINDINGS: The pancreas is not well seen. The left lobe of the liver is  somewhat limited in its visualization due to overlying bowel gas. The  right lobe of the liver is grossly unremarkable. No underlying mass or  intrahepatic biliary ductal dilatation. There are echogenic stones seen  within the neck of the gallbladder on CT scan are not well seen on this  examination. There is a fold seen at the neck of the gallbladder. There  is a small amount of pericholecystic fluid. There is thickening  identified of the gallbladder wall at 7 mm. The common bile duct is  within normal limits and measuring 5 mm. The right kidney is normal in  size, configuration, and texture measuring in length from pole to pole  12.7 cm. Several renal cortical cysts, the largest measuring 2.6 x 4.5 x  4.3 cm. No solid mass identified. No hydronephrosis or nephrolithiasis.       Impression:       Gallstones seen on CT scan are not well identified on this  examination. There is thickening identified of the gallbladder wall with  no evidence of biliary ductal dilatation. Small amount of  pericholecystic fluid identified. Several right renal cortical cysts.     D:  06/13/2018  E:  06/13/2018     This report was finalized on 6/14/2018 9:48 AM by Dr. Chelo Francisco MD.       CT Abdomen Pelvis Without Contrast [[id number]] Collected:  06/12/18 2331     Updated:  06/13/18 0039    Narrative:       EXAM:    CT Abdomen and Pelvis Without Intravenous Contrast    CLINICAL HISTORY:    89 years old, male; Sepsis, unspecified organism; Secondary malignant    neoplasm of bone; Other disorders of bilirubin metabolism; Heart failure,   unspecified; Abnormal levels of other serum enzymes; Abnormal levels of other   serum enzymes; Personal history of malignant neoplasm of prostate; Abnormal   findings; Abnormal lab test; Elevated liver enzymes; Patient HX: Mid/lower   chest included to attempt to evaluate entire hiatal hernia; Additional info:   Eelvated efts lactic acidosis    TECHNIQUE:    Axial computed tomography images of the abdomen and pelvis without   intravenous contrast.  All CT scans at this facility use one or more dose   reduction techniques, viz.: automated exposure control; ma/kV adjustment per   patient size (including targeted exams where dose is matched to indication;   i.e. head); or iterative reconstruction technique.    Coronal reformatted images were created and reviewed.    COMPARISON:    CT ABD PELVIS WO CONTRAST 2014-12-17 13:24    FINDINGS:    Lung bases:  LLL atelectasis/infiltrate.  Mild right basilar subsegmental   atelectasis and/or fibrosis.    Heart:  Minimal pericardial effusion.  Normal heart size.  Minimal   pericardial effusion.  CAD.     ABDOMEN:    Liver:  Evidence of old granulomatous disease of the liver.    Gallbladder and bile ducts:  Cholelithiasis at GB neck.  No ductal dilation.    Pancreas:  Minimal edema bordering head of the pancreas, consider correlation   with serum lipase to rule out nonspecific edema versus early pancreatitis.    Pancreatic atrophy.  No ductal dilation.    Spleen:  Evidence of old granulomatous disease of the spleen.  No   splenomegaly.    Adrenals:  Unremarkable.  No mass.    Kidneys and ureters:  No hydronephrosis or nephrolithiasis.  No obstructive   uropathy.  Chronic hypodense right renal nodules, one of which at its rim   calcifications, possibly cysts, similar to prior study, most likely benign.    Stomach and bowel:  Nonspecific bowel gas pattern without dilatation or   obstruction.  Colonic  diverticulosis without overt diverticulitis.  Chronic   large hiatal hernia with organorotation of stomach, accounting for some of the   abnormal density seen in lower left chest on CXR today.  The contracted   segments of bowel restrict wall evaluation to rule out any abnormal thickening.     PELVIS:    Appendix:  Normal appendix.  No appendicitis.    Bladder: No calculi.  Enlarged prostate causes an indentation on the bladder   base.    Reproductive:  Prostate enlargement and lobulation, 6.3 cm transverse   diameter indenting the bladder base.  Minimal periprostatic edema or   infiltrative changes into the fat.     ABDOMEN and PELVIS:    Intraperitoneal space:  No pneumoperitoneum.  No significant ascites.    Bones/joints:  Sclerosis within L4 and especially L5 vertebrae, suspicious   for potential osteoblastic metastases.  Thoracolumbar mild scoliosis.    Degenerative changes in the spine.  No acute fracture.  No dislocation.    Soft tissues:  Minimal umbilical hernia present, containing only Fat.    Vasculature:  Scattered atherosclerotic placques are seen along some vessels.    No aortic aneurysm.    Lymph nodes:  Possibly small perivesical lymph nodes.  No enlarged lymph   nodes visualized.      Impression:       1.  Prostate enlargement.  2.  Cholelithiasis at GB neck.  3.  Nonspecific bowel gas pattern without dilatation or obstruction.  4.  Colonic diverticulosis without diverticulitis.  5.  Suspicious for L4, L5 osteoblastic metastases.  6.  LLL atelectasis/infiltrate.  7.  Chronic large hiatal hernia with organorotation of stomach.  8.  CAD.  9.  Chronic right renal nodularity unchanged.  10.  Mild peripancreatic edema at head of gland, consider serum lipase.  11.  Additional findings, as above.    THIS DOCUMENT HAS BEEN ELECTRONICALLY SIGNED BY DU BAUMAN MD    XR Chest 1 View [[id number]] Collected:  06/12/18 2052     Updated:  06/13/18 0010    Narrative:       EXAM:    XR Chest, 1 View    CLINICAL  HISTORY:    89 years old, male; Signs and symptoms; Other: Fatigue; Additional info:   Weak/dizzy/ams triage protocol    TECHNIQUE:    Frontal view of the chest.    COMPARISON:    CR - XR CHEST 1 VIEW PORTABLE 2014-12-17 10:51    FINDINGS:    Lungs:  Left basilar/lower lobe atelectasis/infiltrate, increased compared to   prior study.  A few scattered areas of suspected mild hyperinflation in the   lungs.    Pleural space:  Chronic mild bilateral lateral pleural thickening.  No   pneumothorax or pleural effusion.    Heart:  Mild cardiomegaly.    Mediastinum:  Unremarkable.  No acute abnormality compared to prior study.    Bones/joints:  Scoliosis.  Degenerative changes of the spine.  Old healed   fracture mid left clavicle.  DJD of bilateral shoulders.  Narrowed interval   between humeral head and acromion bilaterally, suspect shoulder impingement   phenomenon.    Soft tissues:  Probable streak of air caught between left upper arm and the   lateral left chest wall, creating the streak-like lucent suspected artifact.      Impression:       1.  Mild cardiomegaly.  2.  Left basilar atelectasis/infiltrate.    THIS DOCUMENT HAS BEEN ELECTRONICALLY SIGNED BY DU BAUMAN MD                    Results for orders placed during the hospital encounter of 06/12/18   Adult Transthoracic Echo Complete W/ Cont if Necessary Per Protocol    Narrative · Left ventricular systolic function is normal. Estimated EF = 55%.  · Left ventricular diastolic dysfunction (grade I) consistent with   impaired relaxation.           Order Current Status    Blood Culture - Blood, Preliminary result    Blood Culture - Blood, Preliminary result        Discharge Details        Discharge Medications      New Medications      Instructions Start Date   cefTRIAXone 40 MG/ML IVPB  Commonly known as:  ROCEPHIN   2 g, Intravenous, Every 24 Hours      fentaNYL 50 MCG/HR patch  Commonly known as:  DURAGESIC   1 patch, Transdermal, Every 72 Hours       polyethylene glycol pack packet  Commonly known as:  MIRALAX   17 g, Oral, Daily   Start Date:  6/26/2018     sennosides-docusate sodium 8.6-50 MG tablet  Commonly known as:  SENOKOT-S   2 tablets, Oral, 2 Times Daily         Continue These Medications      Instructions Start Date   aspirin 81 MG EC tablet   81 mg, Oral, Daily      CENTRUM SILVER PO   Oral      ibuprofen 200 MG tablet  Commonly known as:  ADVIL,MOTRIN   200 mg, Oral, Every 6 Hours PRN      terazosin 5 MG capsule  Commonly known as:  HYTRIN   5 mg, Oral, Nightly      vitamin D3 5000 units capsule capsule   5,000 Units, Oral, Daily         Stop These Medications    furosemide 20 MG tablet  Commonly known as:  LASIX     HYDROCODONE-ACETAMINOPHEN PO          Discharge Disposition:  Rehab Facility or Unit (DC - External)    Discharge Diet:  Low Fat Diet    Discharge Activity:  As tolerated    Special Instructions:  May benefit from [employer] going forward per GI    Future Appointments  Date Time Provider Department Center   7/16/2018 2:00 PM MD NADIA Lopez [facility] None       Additional Instructions for the Follow-ups that You Need to Schedule     Discharge Follow-up with PCP    As directed      Currently Documented PCP:  Charli Mccormack MD  PCP Phone Number:  [phone]    Follow Up Details:  Charli Mccormack MD - PCP - 2 weeks         Discharge Follow-up with Specialty: Donte Dolan MD - Radiation Oncology; 3 Weeks    As directed      Specialty:  Donte Dolan MD - Radiation Oncology    Follow Up:  3 Weeks    Follow Up Details:  follow up for radiation therapy after Rehab         Discharge Follow-up with Specialty: Maco Mejia MD - Oncology; 3 Weeks    As directed      Specialty:  Maco Mejia MD - Oncology    Follow Up:  3 Weeks    Follow Up Details:  metastatic prostate cancer             Patient received on fraction of 8 Gy of radiation to L4-S1 Level by Dr. Dolan - Radiation Oncology during this hospital  stay.    Will need follow up with Dr. Dolan and Dr. Maco Mejia in 3 weeks after rehab    Time Spent on Discharge:  45 minutes    Electronically signed by Lance Garner MD, 06/25/18, 12:18 PM.

## 2018-06-25 NOTE — PROGRESS NOTES
Case Management Discharge Note    Final Note: [facility] via [facility]    Destination - Selection Complete     Service Request Status Selected Specialties Address Phone Number Fax Number    [facility] Selected Skilled Nursing Facility [address] [facility] [address] [phone]    [facility] Pending - Request Sent N/A [address] [facility] [address] [phone]      Durable Medical Equipment     No service coordination in this encounter.      Dialysis/Infusion     No service coordination in this encounter.      Home Medical Care     No service coordination in this encounter.      Social Care     No service coordination in this encounter.        Ambulance: [facility]    Final Discharge Disposition Code: 03 - skilled nursing facility (SNF)

## 2018-06-25 NOTE — PROGRESS NOTES
Continued Stay Note  [facility]     Patient Name: Harris Shane  MRN: [id number]  Today's Date: 6/25/2018    Admit Date: 6/12/2018          Discharge Plan     Row Name 06/25/18 1010       Plan    Plan Comments Pt has been accepted to [facility] for today 6/25/18. He will be transferred via [facility] at 1300. PCS is in the chart. Report can be called to [phone], they have access to EPIC for the transfer summary.    Final Discharge Disposition Code 03 - skilled nursing facility (SNF)    Final Note [facility] via [facility]              Discharge Codes    No documentation.       Expected Discharge Date and Time     Expected Discharge Date Expected Discharge Time    Jun 20, 2018             Ofe Pruitt RN

## 2018-06-29 NOTE — TELEPHONE ENCOUNTER
I called the patient's daughter who has power of  to obtain consent for radiation therapy to the lumbar spine and sacrum.  She did not answer.

## 2018-07-16 ENCOUNTER — HOSPITAL ENCOUNTER (OUTPATIENT)
Dept: RADIATION ONCOLOGY | Facility: HOSPITAL | Age: 83
Setting detail: RADIATION/ONCOLOGY SERIES
Discharge: HOME | End: 2018-07-16

## 2018-07-16 ENCOUNTER — OFFICE VISIT (OUTPATIENT)
Dept: RADIATION ONCOLOGY | Facility: HOSPITAL | Age: 83
End: 2018-07-16

## 2018-07-16 VITALS
OXYGEN SATURATION: 93 % | BODY MASS INDEX: 27.95 KG/M2 | HEART RATE: 82 BPM | DIASTOLIC BLOOD PRESSURE: 68 MMHG | RESPIRATION RATE: 18 BRPM | WEIGHT: 178.5 LBS | TEMPERATURE: 97.2 F | SYSTOLIC BLOOD PRESSURE: 129 MMHG

## 2018-07-16 DIAGNOSIS — C61 PROSTATE CANCER (HCC): ICD-10-CM

## 2018-07-16 PROCEDURE — G0463 HOSPITAL OUTPT CLINIC VISIT: HCPCS | Performed by: RADIOLOGY

## 2018-07-16 RX ORDER — BICALUTAMIDE 50 MG/1
TABLET, FILM COATED ORAL DAILY
COMMUNITY
Start: 2017-07-25 | End: 2018-11-13

## 2018-07-16 RX ORDER — FENTANYL 50 UG/H
1 PATCH TRANSDERMAL
COMMUNITY
End: 2018-12-13

## 2018-07-16 RX ORDER — TERAZOSIN 5 MG/1
CAPSULE ORAL
COMMUNITY
End: 2018-11-13

## 2018-07-16 RX ORDER — FUROSEMIDE 20 MG/1
TABLET ORAL
COMMUNITY
End: 2018-11-13

## 2018-07-16 RX ORDER — POTASSIUM CHLORIDE 750 MG/1
10 TABLET, FILM COATED, EXTENDED RELEASE ORAL DAILY
COMMUNITY
End: 2018-11-13

## 2018-07-16 RX ORDER — HYDROCODONE BITARTRATE AND ACETAMINOPHEN 10; 300 MG/1; MG/1
TABLET ORAL EVERY 6 HOURS
COMMUNITY
End: 2018-07-16

## 2018-07-16 NOTE — PROGRESS NOTES
FOLLOW UP NOTE    PATIENT:                                                      Harris Shane  MEDICAL RECORD #:                        [id number]  :                                                          9/15/1927  COMPLETION DATE:    2018  DIAGNOSIS:     Cancer Staging  Prostate cancer (Mets to L4-L5)  Staging form: Prostate, AJCC 8th Edition  - Clinical stage from 10/27/2015: Stage IVB (cT2c, cN0, cM1, PSA: 7.8, Grade Group: 4) - Signed by Donte Dolan MD on 2018    BRIEF HISTORY:  Mr. Shane is a 89-year-old gentleman with metastatic prostate cancer currently maintained on Eligard, who was recently admitted for an Escherichia coli septicemia.  He had a rather prolonged hospitalization and during that time it was determined that he would require acute rehabilitation, but due to symptoms of severe pain radiating down his right leg from an L5 vertebral metastasis, I was consulted and delivered a single fraction of radiation therapy to his L5 vertebral level.  He returns today now post discharge and rehabilitation for reevaluation.  He states he is doing well today, his pain is much improved, and he has been able to get through rehabilitation and has significant improvement in his strength and mobility.  He identifies some occasional discomfort in his low back, but otherwise has no symptoms today.  He also states that he's been having white/tan-colored stool since discharge.    MEDICATIONS: Medication reconciliation for the patient was reviewed and confirmed in the electronic medical record.    Review of Systems   Constitutional: Positive for fatigue.        Resides currently at [facility]- here with daughter   HENT:   Positive for hearing loss.    Eyes: Positive for eye problems (blind right eye).   Respiratory: Positive for wheezing (occasionally- states from sleep apnea).    Cardiovascular: Positive for leg swelling (reports- bilateral LE). Palpitations: reports  bilateral LE.   Gastrointestinal: Positive for constipation.   Genitourinary: Positive for frequency and nocturia.    Musculoskeletal: Positive for back pain and gait problem (walks with walker- whellchair for distance).   Neurological: Positive for gait problem (walks with walker- whellchair for distance).   Hematological: Bruises/bleeds easily.   Psychiatric/Behavioral: Positive for depression and sleep disturbance. The patient is nervous/anxious.    All other systems reviewed and are negative.      KPS 50%    Physical Exam   Constitutional: He is oriented to person, place, and time. He appears well-developed and well-nourished. No distress.   HENT:   Head: Normocephalic and atraumatic.   Mouth/Throat: Oropharynx is clear and moist.   Eyes: Conjunctivae and EOM are normal.   Right esotropia and internal rotation   Neck: Normal range of motion. Neck supple.   Cardiovascular: Normal rate and regular rhythm.  Exam reveals no friction rub.    No murmur heard.  Pulmonary/Chest: Effort normal and breath sounds normal. He has no wheezes.   Abdominal: Soft. Bowel sounds are normal. He exhibits no distension and no mass. There is no tenderness.   Musculoskeletal: Normal range of motion. He exhibits no edema.   No tenderness to percussion within the bilateral lower back or SI joints.  2+ bilateral lower extremity edema with compression stockings.   Lymphadenopathy:     He has no cervical adenopathy.   Neurological: He is alert and oriented to person, place, and time. He displays normal reflexes. No cranial nerve deficit or sensory deficit. He exhibits normal muscle tone. Coordination normal.   Skin: Skin is warm and dry.   Psychiatric: He has a normal mood and affect. His behavior is normal. Judgment and thought content normal.   Nursing note and vitals reviewed.      VITAL SIGNS:   Vitals:    07/16/18 1414   BP: 129/68   Pulse: 82   Resp: 18   Temp: 97.2 °F (36.2 °C)   TempSrc: Temporal Artery    SpO2: 93%   Weight: 81 kg  (178 lb 8 oz)   PainSc: 3  Comment: reports fentynal patch   PainLoc: Back       The following portions of the patient's history were reviewed and updated as appropriate: allergies, current medications, past family history, past medical history, past social history, past surgical history and problem list.       Harris was seen today for prostate cancer.    Diagnoses and all orders for this visit:    Prostate cancer (CMS/[facility])      IMPRESSION:  Mr. Shane is a 89-year-old gentleman with a metastatic prostate cancer who appears to have had a very good response to palliative radiation therapy to his lumbar spine as evidenced by his pain improvement as well as has improved strength in his lower extremities.  He is seeing Dr. Maco Mejia on an ongoing basis regarding his prostate cancer, and he and his daughter have expressed their interest in limiting the number of physicians as well as appointments that he must attend.  I will defer all future prostate cancer treatment to Dr. Mejia and he can be seen on an as-needed basis here at the radiation oncology clinic.    RECOMMENDATIONS:  Return to clinic as needed.    Donte Dolan MD    Errors in dictation may reflect use of voice recognition software and not all errors in transcription may have been detected prior to signing.

## 2018-09-18 ENCOUNTER — TRANSCRIBE ORDERS (OUTPATIENT)
Dept: ADMINISTRATIVE | Facility: HOSPITAL | Age: 83
End: 2018-09-18

## 2018-09-18 DIAGNOSIS — C79.52 SECONDARY MALIGNANT NEOPLASM OF BONE AND BONE MARROW (HCC): Primary | ICD-10-CM

## 2018-09-18 DIAGNOSIS — C79.51 SECONDARY MALIGNANT NEOPLASM OF BONE AND BONE MARROW (HCC): Primary | ICD-10-CM

## 2018-09-25 ENCOUNTER — HOSPITAL ENCOUNTER (OUTPATIENT)
Dept: NUCLEAR MEDICINE | Facility: HOSPITAL | Age: 83
Discharge: HOME | End: 2018-09-25

## 2018-09-25 DIAGNOSIS — C79.52 SECONDARY MALIGNANT NEOPLASM OF BONE AND BONE MARROW (HCC): ICD-10-CM

## 2018-09-25 DIAGNOSIS — C79.51 SECONDARY MALIGNANT NEOPLASM OF BONE AND BONE MARROW (HCC): ICD-10-CM

## 2018-09-25 PROCEDURE — 78306 BONE IMAGING WHOLE BODY: CPT

## 2018-09-25 PROCEDURE — 0 TECHNETIUM MEDRONATE KIT: Performed by: RADIOLOGY

## 2018-09-25 PROCEDURE — A9503 TC99M MEDRONATE: HCPCS | Performed by: RADIOLOGY

## 2018-09-25 RX ORDER — TC 99M MEDRONATE 20 MG/10ML
26.9 INJECTION, POWDER, LYOPHILIZED, FOR SOLUTION INTRAVENOUS
Status: COMPLETED | OUTPATIENT
Start: 2018-09-25 | End: 2018-09-25

## 2018-09-25 RX ADMIN — Medication 26.9 MILLICURIE: at 10:55

## 2018-09-28 ENCOUNTER — TRANSCRIBE ORDERS (OUTPATIENT)
Dept: ADMINISTRATIVE | Facility: HOSPITAL | Age: 83
End: 2018-09-28

## 2018-09-28 ENCOUNTER — HOSPITAL ENCOUNTER (OUTPATIENT)
Dept: GENERAL RADIOLOGY | Facility: HOSPITAL | Age: 83
Discharge: HOME | End: 2018-09-28
Attending: INTERNAL MEDICINE | Admitting: INTERNAL MEDICINE

## 2018-09-28 DIAGNOSIS — R07.81 RIB PAIN: ICD-10-CM

## 2018-09-28 DIAGNOSIS — R07.81 RIB PAIN: Primary | ICD-10-CM

## 2018-09-28 PROCEDURE — 71100 X-RAY EXAM RIBS UNI 2 VIEWS: CPT

## 2018-11-13 ENCOUNTER — APPOINTMENT (OUTPATIENT)
Dept: GENERAL RADIOLOGY | Facility: HOSPITAL | Age: 83
End: 2018-11-13

## 2018-11-13 ENCOUNTER — APPOINTMENT (OUTPATIENT)
Dept: CT IMAGING | Facility: HOSPITAL | Age: 83
End: 2018-11-13

## 2018-11-13 ENCOUNTER — HOSPITAL ENCOUNTER (INPATIENT)
Facility: HOSPITAL | Age: 83
LOS: 3 days | Discharge: HOME HEALTH | End: 2018-11-16
Attending: EMERGENCY MEDICINE | Admitting: INTERNAL MEDICINE

## 2018-11-13 ENCOUNTER — APPOINTMENT (OUTPATIENT)
Dept: MRI IMAGING | Facility: HOSPITAL | Age: 83
End: 2018-11-13

## 2018-11-13 DIAGNOSIS — G06.2 EPIDURAL ABSCESS: ICD-10-CM

## 2018-11-13 DIAGNOSIS — Z78.9 IMPAIRED MOBILITY AND ADLS: ICD-10-CM

## 2018-11-13 DIAGNOSIS — R29.898 WEAKNESS OF BOTH LOWER EXTREMITIES: ICD-10-CM

## 2018-11-13 DIAGNOSIS — Z74.09 IMPAIRED MOBILITY AND ADLS: ICD-10-CM

## 2018-11-13 DIAGNOSIS — Z74.09 IMPAIRED FUNCTIONAL MOBILITY, BALANCE, GAIT, AND ENDURANCE: ICD-10-CM

## 2018-11-13 DIAGNOSIS — J18.9 COMMUNITY ACQUIRED PNEUMONIA, UNSPECIFIED LATERALITY: Primary | ICD-10-CM

## 2018-11-13 DIAGNOSIS — M46.46 DISCITIS OF LUMBAR REGION: ICD-10-CM

## 2018-11-13 DIAGNOSIS — M86.9 OSTEOMYELITIS OF OTHER SITE, UNSPECIFIED TYPE (HCC): ICD-10-CM

## 2018-11-13 PROBLEM — N40.0 BPH (BENIGN PROSTATIC HYPERPLASIA): Status: ACTIVE | Noted: 2018-11-13

## 2018-11-13 PROBLEM — I10 ESSENTIAL HYPERTENSION: Status: ACTIVE | Noted: 2018-11-13

## 2018-11-13 PROBLEM — Z86.718 HISTORY OF DVT (DEEP VEIN THROMBOSIS): Status: ACTIVE | Noted: 2018-11-13

## 2018-11-13 LAB
ALBUMIN SERPL-MCNC: 3.99 G/DL (ref 3.2–4.8)
ALBUMIN/GLOB SERPL: 1.8 G/DL (ref 1.5–2.5)
ALP SERPL-CCNC: 346 U/L (ref 25–100)
ALT SERPL W P-5'-P-CCNC: 21 U/L (ref 7–40)
ANION GAP SERPL CALCULATED.3IONS-SCNC: 7 MMOL/L (ref 3–11)
AST SERPL-CCNC: 38 U/L (ref 0–33)
BASOPHILS # BLD MANUAL: 0 10*3/MM3 (ref 0–0.2)
BASOPHILS NFR BLD AUTO: 0 % (ref 0–1)
BILIRUB SERPL-MCNC: 0.4 MG/DL (ref 0.3–1.2)
BILIRUB UR QL STRIP: NEGATIVE
BUN BLD-MCNC: 20 MG/DL (ref 9–23)
BUN/CREAT SERPL: 21.3 (ref 7–25)
CALCIUM SPEC-SCNC: 8.9 MG/DL (ref 8.7–10.4)
CHLORIDE SERPL-SCNC: 101 MMOL/L (ref 99–109)
CLARITY UR: CLEAR
CO2 SERPL-SCNC: 28 MMOL/L (ref 20–31)
COLOR UR: YELLOW
CREAT BLD-MCNC: 0.94 MG/DL (ref 0.6–1.3)
DEPRECATED RDW RBC AUTO: 47.6 FL (ref 37–54)
EOSINOPHIL # BLD MANUAL: 0 10*3/MM3 (ref 0.1–0.3)
EOSINOPHIL NFR BLD MANUAL: 0 % (ref 0–3)
ERYTHROCYTE [DISTWIDTH] IN BLOOD BY AUTOMATED COUNT: 14.8 % (ref 11.3–14.5)
GFR SERPL CREATININE-BSD FRML MDRD: 75 ML/MIN/1.73
GLOBULIN UR ELPH-MCNC: 2.2 GM/DL
GLUCOSE BLD-MCNC: 109 MG/DL (ref 70–100)
GLUCOSE UR STRIP-MCNC: NEGATIVE MG/DL
HCT VFR BLD AUTO: 37.5 % (ref 38.9–50.9)
HGB BLD-MCNC: 12.2 G/DL (ref 13.1–17.5)
HGB UR QL STRIP.AUTO: NEGATIVE
HOLD SPECIMEN: NORMAL
HOLD SPECIMEN: NORMAL
INR PPP: 1.07 (ref 0.91–1.09)
KETONES UR QL STRIP: NEGATIVE
LEUKOCYTE ESTERASE UR QL STRIP.AUTO: NEGATIVE
LYMPHOCYTES # BLD MANUAL: 1 10*3/MM3 (ref 0.6–4.8)
LYMPHOCYTES NFR BLD MANUAL: 13 % (ref 24–44)
LYMPHOCYTES NFR BLD MANUAL: 18 % (ref 0–12)
MAGNESIUM SERPL-MCNC: 2.1 MG/DL (ref 1.3–2.7)
MCH RBC QN AUTO: 28.7 PG (ref 27–31)
MCHC RBC AUTO-ENTMCNC: 32.5 G/DL (ref 32–36)
MCV RBC AUTO: 88.2 FL (ref 80–99)
MONOCYTES # BLD AUTO: 1.39 10*3/MM3 (ref 0–1)
NEUTROPHILS # BLD AUTO: 5.31 10*3/MM3 (ref 1.5–8.3)
NEUTROPHILS NFR BLD MANUAL: 69 % (ref 41–71)
NITRITE UR QL STRIP: NEGATIVE
PH UR STRIP.AUTO: <=5 [PH] (ref 5–8)
PLAT MORPH BLD: NORMAL
PLATELET # BLD AUTO: 250 10*3/MM3 (ref 150–450)
PMV BLD AUTO: 10.6 FL (ref 6–12)
POTASSIUM BLD-SCNC: 3.8 MMOL/L (ref 3.5–5.5)
PROT SERPL-MCNC: 6.2 G/DL (ref 5.7–8.2)
PROT UR QL STRIP: NEGATIVE
PROTHROMBIN TIME: 11.2 SECONDS (ref 9.6–11.5)
RBC # BLD AUTO: 4.25 10*6/MM3 (ref 4.2–5.76)
RBC MORPH BLD: NORMAL
SODIUM BLD-SCNC: 136 MMOL/L (ref 132–146)
SP GR UR STRIP: 1.02 (ref 1–1.03)
T4 FREE SERPL-MCNC: 1.16 NG/DL (ref 0.89–1.76)
TROPONIN I SERPL-MCNC: 0.02 NG/ML (ref 0–0.07)
TROPONIN I SERPL-MCNC: 0.02 NG/ML (ref 0–0.07)
UROBILINOGEN UR QL STRIP: NORMAL
WBC MORPH BLD: NORMAL
WBC NRBC COR # BLD: 7.7 10*3/MM3 (ref 3.5–10.8)
WHOLE BLOOD HOLD SPECIMEN: NORMAL
WHOLE BLOOD HOLD SPECIMEN: NORMAL

## 2018-11-13 PROCEDURE — 99285 EMERGENCY DEPT VISIT HI MDM: CPT

## 2018-11-13 PROCEDURE — 87150 DNA/RNA AMPLIFIED PROBE: CPT | Performed by: EMERGENCY MEDICINE

## 2018-11-13 PROCEDURE — 85007 BL SMEAR W/DIFF WBC COUNT: CPT

## 2018-11-13 PROCEDURE — 87077 CULTURE AEROBIC IDENTIFY: CPT | Performed by: EMERGENCY MEDICINE

## 2018-11-13 PROCEDURE — 87147 CULTURE TYPE IMMUNOLOGIC: CPT | Performed by: EMERGENCY MEDICINE

## 2018-11-13 PROCEDURE — 84439 ASSAY OF FREE THYROXINE: CPT | Performed by: EMERGENCY MEDICINE

## 2018-11-13 PROCEDURE — 72148 MRI LUMBAR SPINE W/O DYE: CPT

## 2018-11-13 PROCEDURE — 93005 ELECTROCARDIOGRAM TRACING: CPT

## 2018-11-13 PROCEDURE — 93005 ELECTROCARDIOGRAM TRACING: CPT | Performed by: EMERGENCY MEDICINE

## 2018-11-13 PROCEDURE — 85025 COMPLETE CBC W/AUTO DIFF WBC: CPT

## 2018-11-13 PROCEDURE — 84484 ASSAY OF TROPONIN QUANT: CPT

## 2018-11-13 PROCEDURE — 25010000002 CEFTRIAXONE PER 250 MG: Performed by: EMERGENCY MEDICINE

## 2018-11-13 PROCEDURE — 81003 URINALYSIS AUTO W/O SCOPE: CPT

## 2018-11-13 PROCEDURE — 80053 COMPREHEN METABOLIC PANEL: CPT

## 2018-11-13 PROCEDURE — 25010000002 HYDROMORPHONE PER 4 MG: Performed by: EMERGENCY MEDICINE

## 2018-11-13 PROCEDURE — 83735 ASSAY OF MAGNESIUM: CPT | Performed by: EMERGENCY MEDICINE

## 2018-11-13 PROCEDURE — 87186 SC STD MICRODIL/AGAR DIL: CPT | Performed by: EMERGENCY MEDICINE

## 2018-11-13 PROCEDURE — 70551 MRI BRAIN STEM W/O DYE: CPT

## 2018-11-13 PROCEDURE — 72170 X-RAY EXAM OF PELVIS: CPT

## 2018-11-13 PROCEDURE — 87040 BLOOD CULTURE FOR BACTERIA: CPT | Performed by: EMERGENCY MEDICINE

## 2018-11-13 PROCEDURE — 70450 CT HEAD/BRAIN W/O DYE: CPT

## 2018-11-13 PROCEDURE — 71045 X-RAY EXAM CHEST 1 VIEW: CPT

## 2018-11-13 PROCEDURE — 85610 PROTHROMBIN TIME: CPT | Performed by: EMERGENCY MEDICINE

## 2018-11-13 PROCEDURE — 99223 1ST HOSP IP/OBS HIGH 75: CPT | Performed by: FAMILY MEDICINE

## 2018-11-13 RX ORDER — SENNA AND DOCUSATE SODIUM 50; 8.6 MG/1; MG/1
2 TABLET, FILM COATED ORAL 2 TIMES DAILY
COMMUNITY

## 2018-11-13 RX ORDER — CEFTRIAXONE SODIUM 1 G/50ML
1 INJECTION, SOLUTION INTRAVENOUS ONCE
Status: COMPLETED | OUTPATIENT
Start: 2018-11-13 | End: 2018-11-13

## 2018-11-13 RX ORDER — HYDROMORPHONE HYDROCHLORIDE 1 MG/ML
0.5 INJECTION, SOLUTION INTRAMUSCULAR; INTRAVENOUS; SUBCUTANEOUS ONCE
Status: COMPLETED | OUTPATIENT
Start: 2018-11-13 | End: 2018-11-14

## 2018-11-13 RX ORDER — HYDROMORPHONE HYDROCHLORIDE 1 MG/ML
0.5 INJECTION, SOLUTION INTRAMUSCULAR; INTRAVENOUS; SUBCUTANEOUS ONCE
Status: COMPLETED | OUTPATIENT
Start: 2018-11-13 | End: 2018-11-13

## 2018-11-13 RX ORDER — FUROSEMIDE 20 MG/1
20 TABLET ORAL DAILY
COMMUNITY
End: 2018-12-13

## 2018-11-13 RX ORDER — CEFTRIAXONE SODIUM 1 G/50ML
1 INJECTION, SOLUTION INTRAVENOUS ONCE
Status: COMPLETED | OUTPATIENT
Start: 2018-11-13 | End: 2018-11-14

## 2018-11-13 RX ORDER — ASPIRIN 81 MG/1
81 TABLET ORAL NIGHTLY
COMMUNITY

## 2018-11-13 RX ORDER — HYDROCODONE BITARTRATE AND ACETAMINOPHEN 5; 325 MG/1; MG/1
1 TABLET ORAL EVERY 6 HOURS PRN
COMMUNITY
Start: 2018-09-25 | End: 2018-11-16 | Stop reason: HOSPADM

## 2018-11-13 RX ORDER — SODIUM CHLORIDE 0.9 % (FLUSH) 0.9 %
10 SYRINGE (ML) INJECTION AS NEEDED
Status: DISCONTINUED | OUTPATIENT
Start: 2018-11-13 | End: 2018-11-16 | Stop reason: HOSPADM

## 2018-11-13 RX ORDER — TERAZOSIN 5 MG/1
5 CAPSULE ORAL NIGHTLY
COMMUNITY
End: 2018-11-16 | Stop reason: HOSPADM

## 2018-11-13 RX ADMIN — HYDROMORPHONE HYDROCHLORIDE 0.5 MG: 1 INJECTION, SOLUTION INTRAMUSCULAR; INTRAVENOUS; SUBCUTANEOUS at 22:09

## 2018-11-13 RX ADMIN — CEFTRIAXONE SODIUM 1 G: 1 INJECTION, SOLUTION INTRAVENOUS at 22:57

## 2018-11-13 NOTE — ED PROVIDER NOTES
Subjective   89-year-old male presents for evaluation of generalized weakness.  The patient states that over the past 2 days he has been experiencing gradually worsening generalized weakness that is making walking difficult.  He states that both legs feel weak.  He states that he has fallen several times as a result of his weakness.  He denies any paresthesias.  He endorses pain in his mid back as a result of a fall.  He denies any hip pain.  He denies any leg pain.  He states that he is able to stand but walking is difficult secondary to his generalized weakness.  No fevers or systemic symptoms.  He is unsure as to what may have triggered his symptoms.        History provided by:  Patient  Weakness - Generalized   Severity:  Moderate  Onset quality:  Gradual  Duration:  2 days  Timing:  Constant  Progression:  Worsening  Chronicity:  New  Associated symptoms: falls        Review of Systems   Musculoskeletal: Positive for back pain, falls and neck pain.        No leg pain   Neurological: Positive for weakness.   All other systems reviewed and are negative.      Past Medical History:   Diagnosis Date   • Arthritis    • Blind     right eye   • Blindness of right eye 6/13/2018   • BPH (benign prostatic hyperplasia)    • Cancer (CMS/HCC)     PROSTATE   • Coronary artery disease    • DVT (deep venous thrombosis) (CMS/HCC) 1990's   • GERD (gastroesophageal reflux disease)    • Hyperlipidemia    • Hypertension    • Metastatic cancer to spine, L4-L5 6/13/2018   • Myocardial infarction (CMS/HCC) 1970's   • WILLIAM (obstructive sleep apnea) 06/13/2018    no cpap   • Prostate cancer (CMS/HCC)    • UTI (urinary tract infection)        Allergies   Allergen Reactions   • Contrast Dye Rash and Other (See Comments)     RASH AND SYNCOPE       Past Surgical History:   Procedure Laterality Date   • CATARACT EXTRACTION Bilateral    • COLONOSCOPY      3 years ago   • EYE SURGERY      RIGHT       Family History   Problem Relation Age of  Onset   • Breast cancer Mother    • Prostate cancer Father    • Coronary artery disease Father    • No Known Problems Sister    • Stroke Brother    • Coronary artery disease Brother        Social History     Socioeconomic History   • Marital status:      Spouse name: Not on file   • Number of children: 3   • Years of education: Not on file   • Highest education level: Not on file   Tobacco Use   • Smoking status: Former Smoker     Types: Pipe   • Smokeless tobacco: Never Used   • Tobacco comment: QUIT IN THE 70'S   Substance and Sexual Activity   • Alcohol use: No   • Drug use: No   • Sexual activity: Defer   Social History Narrative    Patient is  and lives with his spouse and one of his daughters.         Objective   Physical Exam   Constitutional: He is oriented to person, place, and time. He appears well-developed and well-nourished. No distress.   Well appearing elderly male in no acute distress   HENT:   Head: Normocephalic and atraumatic.   Mouth/Throat: Oropharynx is clear and moist.   Eyes: EOM are normal.   Blind in right eye   Neck: Normal range of motion. No JVD present.   No midline cervical spine tenderness to palpation, no step-off or deformity noted   Cardiovascular: Normal rate, regular rhythm and normal heart sounds. Exam reveals no gallop and no friction rub.   No murmur heard.  Pulmonary/Chest: Effort normal and breath sounds normal. No respiratory distress. He has no wheezes. He has no rales.   Abdominal: Soft. Bowel sounds are normal. He exhibits no distension and no mass. There is no tenderness. There is no guarding.   Musculoskeletal: He exhibits no edema.   Neurological: He is alert and oriented to person, place, and time. No cranial nerve deficit or sensory deficit. Coordination normal.   4 out of 5 strength in all fours, neurovascularly intact distally in all fours with bounding distal pulses and normal sensation, no saddle anesthesia, no ataxia, no dysmetria, clear and  fluent speech, awake, alert, and oriented ×3, no droop, no drift, normoreflexic   Skin: Skin is warm and dry. No rash noted. He is not diaphoretic. No erythema. No pallor.   Psychiatric: He has a normal mood and affect. Judgment and thought content normal.   Nursing note and vitals reviewed.      Critical Care  Performed by: Reginald Saini MD  Authorized by: Reginald Saini MD     Critical care provider statement:     Critical care time (minutes):  40    Critical care was necessary to treat or prevent imminent or life-threatening deterioration of the following conditions:  CNS failure or compromise    Critical care was time spent personally by me on the following activities:  Development of treatment plan with patient or surrogate, discussions with consultants, evaluation of patient's response to treatment, examination of patient, obtaining history from patient or surrogate, ordering and performing treatments and interventions, ordering and review of laboratory studies, ordering and review of radiographic studies, pulse oximetry and re-evaluation of patient's condition             ED Course  ED Course as of Nov 13 2343   Tue Nov 13, 2018   1853 89-year-old male presents for evaluation of progressively worsening generalized weakness over the past 2 days as well as difficulty walking secondary to lower extremity weakness.  On arrival to the ED, patient well-appearing.  No focal neurological deficits noted.  Normoreflexic.  Doubt Guillain-Barré syndrome.  Neurovascularly intact distally in bilateral lower extremities with bounding distal pulses and normal sensation.  5 out of 5 strength in both lower extremities.  We will obtain labs and imaging and we will reassess following initial interventions.  [DD]   1943 Plain films of pelvis equivocal.  [DD]   1953 Labs unrevealing.  Chest x-ray suggestive of  pneumonia and pleural effusion.  Rocephin and azithromycin given for community-acquired pneumonia coverage.   [DD]   2002 CT head negative.  [DD]   2002 Given the patient's chest x-ray findings, clinical presentation, and inability to walk, I feel that he warrants inpatient treatment at this time.  We will seek admission to the hospital for further evaluation and treatment as well as for further advanced imaging which is currently pending.  The patient is hemodynamically stable and aware/agreeable with the plan.  [DD]   2053 I discussed the patient's case with Dr. Garcia, and we will admit for further evaluation and treatment.  The patient is hemodynamically stable and aware/agreeable with the plan.  Advanced imaging pending at this time.  [DD]   2324 MRI Brain negative.  MRI concerning for osteomyelitis, discitis, and possible epidural abscess at L4-5.  Awaiting callback from Dr. Donnelly at this time.  [DD]   2329 I discussed the patient's case with Dr. Donnelly who recommended keeping the patient nothing by mouth and he will see the patient in the hospital tonight.  Clinically, the patient does not appear to have cauda equina syndrome.  I do not feel that they need to go to the OR emergently.  I notified Dr. Garcia of the patient's MRI findings and that Dr. Donnelly will be consulting.  [DD]      ED Course User Index  [DD] Reginald Saini MD         Recent Results (from the past 24 hour(s))   Comprehensive Metabolic Panel    Collection Time: 11/13/18  6:54 PM   Result Value Ref Range    Glucose 109 (H) 70 - 100 mg/dL    BUN 20 9 - 23 mg/dL    Creatinine 0.94 0.60 - 1.30 mg/dL    Sodium 136 132 - 146 mmol/L    Potassium 3.8 3.5 - 5.5 mmol/L    Chloride 101 99 - 109 mmol/L    CO2 28.0 20.0 - 31.0 mmol/L    Calcium 8.9 8.7 - 10.4 mg/dL    Total Protein 6.2 5.7 - 8.2 g/dL    Albumin 3.99 3.20 - 4.80 g/dL    ALT (SGPT) 21 7 - 40 U/L    AST (SGOT) 38 (H) 0 - 33 U/L    Alkaline Phosphatase 346 (H) 25 - 100 U/L    Total Bilirubin 0.4 0.3 - 1.2 mg/dL    eGFR Non African Amer 75 >60 mL/min/1.73    Globulin 2.2 gm/dL    A/G Ratio 1.8  1.5 - 2.5 g/dL    BUN/Creatinine Ratio 21.3 7.0 - 25.0    Anion Gap 7.0 3.0 - 11.0 mmol/L   Light Blue Top    Collection Time: 11/13/18  6:54 PM   Result Value Ref Range    Extra Tube hold for add-on    Green Top (Gel)    Collection Time: 11/13/18  6:54 PM   Result Value Ref Range    Extra Tube Hold for add-ons.    Lavender Top    Collection Time: 11/13/18  6:54 PM   Result Value Ref Range    Extra Tube hold for add-on    Gold Top - SST    Collection Time: 11/13/18  6:54 PM   Result Value Ref Range    Extra Tube Hold for add-ons.    CBC Auto Differential    Collection Time: 11/13/18  6:54 PM   Result Value Ref Range    WBC 7.70 3.50 - 10.80 10*3/mm3    RBC 4.25 4.20 - 5.76 10*6/mm3    Hemoglobin 12.2 (L) 13.1 - 17.5 g/dL    Hematocrit 37.5 (L) 38.9 - 50.9 %    MCV 88.2 80.0 - 99.0 fL    MCH 28.7 27.0 - 31.0 pg    MCHC 32.5 32.0 - 36.0 g/dL    RDW 14.8 (H) 11.3 - 14.5 %    RDW-SD 47.6 37.0 - 54.0 fl    MPV 10.6 6.0 - 12.0 fL    Platelets 250 150 - 450 10*3/mm3   Protime-INR    Collection Time: 11/13/18  6:54 PM   Result Value Ref Range    Protime 11.2 9.6 - 11.5 Seconds    INR 1.07 0.91 - 1.09   T4, Free    Collection Time: 11/13/18  6:54 PM   Result Value Ref Range    Free T4 1.16 0.89 - 1.76 ng/dL   Magnesium    Collection Time: 11/13/18  6:54 PM   Result Value Ref Range    Magnesium 2.1 1.3 - 2.7 mg/dL   Manual Differential    Collection Time: 11/13/18  6:54 PM   Result Value Ref Range    Neutrophil % 69.0 41.0 - 71.0 %    Lymphocyte % 13.0 (L) 24.0 - 44.0 %    Monocyte % 18.0 (H) 0.0 - 12.0 %    Eosinophil % 0.0 0.0 - 3.0 %    Basophil % 0.0 0.0 - 1.0 %    Neutrophils Absolute 5.31 1.50 - 8.30 10*3/mm3    Lymphocytes Absolute 1.00 0.60 - 4.80 10*3/mm3    Monocytes Absolute 1.39 (H) 0.00 - 1.00 10*3/mm3    Eosinophils Absolute 0.00 (L) 0.10 - 0.30 10*3/mm3    Basophils Absolute 0.00 0.00 - 0.20 10*3/mm3    RBC Morphology Normal Normal    WBC Morphology Normal Normal    Platelet Morphology Normal Normal   POC  Troponin, Rapid    Collection Time: 11/13/18  6:58 PM   Result Value Ref Range    Troponin I 0.02 0.00 - 0.07 ng/mL   Urinalysis With Microscopic If Indicated (No Culture) - Urine, Clean Catch    Collection Time: 11/13/18  7:11 PM   Result Value Ref Range    Color, UA Yellow Yellow, Straw    Appearance, UA Clear Clear    pH, UA <=5.0 5.0 - 8.0    Specific Gravity, UA 1.019 1.001 - 1.030    Glucose, UA Negative Negative    Ketones, UA Negative Negative    Bilirubin, UA Negative Negative    Blood, UA Negative Negative    Protein, UA Negative Negative    Leuk Esterase, UA Negative Negative    Nitrite, UA Negative Negative    Urobilinogen, UA 0.2 E.U./dL 0.2 - 1.0 E.U./dL   POC Troponin, Rapid    Collection Time: 11/13/18  8:45 PM   Result Value Ref Range    Troponin I 0.02 0.00 - 0.07 ng/mL     Note: In addition to lab results from this visit, the labs listed above may include labs taken at another facility or during a different encounter within the last 24 hours. Please correlate lab times with ED admission and discharge times for further clarification of the services performed during this visit.    CT Head Without Contrast   Final Result   1. Mild chronic microvascular disease and generalized atrophy without acute    intracranial abnormality.    2. No evidence of acute hemorrhage, mass lesion or obvious acute ischemic    infarction.       THIS DOCUMENT HAS BEEN ELECTRONICALLY SIGNED BY BEKA SOUSA MD      XR Chest 1 View   Final Result   1. Chronic cardiopulmonary changes with small bilateral pleural effusion with    atelectasis and consolidation in the lung bases.    2. A large retrocardiac hiatal hernia.       THIS DOCUMENT HAS BEEN ELECTRONICALLY SIGNED BY BEKA SOUSA MD      XR Pelvis 1 or 2 View   Final Result   Chronic degenerative changes with questionable pubic rami fractures. Recommend    followup with MRI if persistent/worsening symptoms.       THIS DOCUMENT HAS BEEN ELECTRONICALLY SIGNED BY BEKA SOUSA  "MD      MRI Brain Without Contrast    (Results Pending)   MRI Lumbar Spine Without Contrast    (Results Pending)   MRI Pelvis Without Contrast    (Results Pending)     Vitals:    11/13/18 1820 11/13/18 1926 11/13/18 2000 11/13/18 2015   BP: 153/69  133/79 138/97   BP Location: Left arm      Patient Position: Sitting      Pulse: 96 88 87 91   Resp: 16      Temp: 98.2 °F (36.8 °C)      TempSrc: Oral      SpO2: 96% 95% 95% 95%   Weight: 79.4 kg (175 lb)      Height: 165.1 cm (65\")        Medications   sodium chloride 0.9 % flush 10 mL (not administered)   cefTRIAXone (ROCEPHIN) IVPB 1 g (not administered)   AZITHROMYCIN 500 MG/250 ML 0.9% NS IVPB (MBP) (not administered)     ECG/EMG Results (last 24 hours)     Procedure Component Value Units Date/Time    ECG 12 Lead [[id number]] Collected:  11/13/18 1822     Updated:  11/13/18 1823    ECG 12 Lead [[id number]] Collected:  11/13/18 2056     Updated:  11/13/18 2058                Recent Results (from the past 24 hour(s))   Comprehensive Metabolic Panel    Collection Time: 11/13/18  6:54 PM   Result Value Ref Range    Glucose 109 (H) 70 - 100 mg/dL    BUN 20 9 - 23 mg/dL    Creatinine 0.94 0.60 - 1.30 mg/dL    Sodium 136 132 - 146 mmol/L    Potassium 3.8 3.5 - 5.5 mmol/L    Chloride 101 99 - 109 mmol/L    CO2 28.0 20.0 - 31.0 mmol/L    Calcium 8.9 8.7 - 10.4 mg/dL    Total Protein 6.2 5.7 - 8.2 g/dL    Albumin 3.99 3.20 - 4.80 g/dL    ALT (SGPT) 21 7 - 40 U/L    AST (SGOT) 38 (H) 0 - 33 U/L    Alkaline Phosphatase 346 (H) 25 - 100 U/L    Total Bilirubin 0.4 0.3 - 1.2 mg/dL    eGFR Non African Amer 75 >60 mL/min/1.73    Globulin 2.2 gm/dL    A/G Ratio 1.8 1.5 - 2.5 g/dL    BUN/Creatinine Ratio 21.3 7.0 - 25.0    Anion Gap 7.0 3.0 - 11.0 mmol/L   Light Blue Top    Collection Time: 11/13/18  6:54 PM   Result Value Ref Range    Extra Tube hold for add-on    Green Top (Gel)    Collection Time: 11/13/18  6:54 PM   Result Value Ref Range    Extra Tube Hold for add-ons.    Lavender " Top    Collection Time: 11/13/18  6:54 PM   Result Value Ref Range    Extra Tube hold for add-on    Gold Top - SST    Collection Time: 11/13/18  6:54 PM   Result Value Ref Range    Extra Tube Hold for add-ons.    CBC Auto Differential    Collection Time: 11/13/18  6:54 PM   Result Value Ref Range    WBC 7.70 3.50 - 10.80 10*3/mm3    RBC 4.25 4.20 - 5.76 10*6/mm3    Hemoglobin 12.2 (L) 13.1 - 17.5 g/dL    Hematocrit 37.5 (L) 38.9 - 50.9 %    MCV 88.2 80.0 - 99.0 fL    MCH 28.7 27.0 - 31.0 pg    MCHC 32.5 32.0 - 36.0 g/dL    RDW 14.8 (H) 11.3 - 14.5 %    RDW-SD 47.6 37.0 - 54.0 fl    MPV 10.6 6.0 - 12.0 fL    Platelets 250 150 - 450 10*3/mm3   Protime-INR    Collection Time: 11/13/18  6:54 PM   Result Value Ref Range    Protime 11.2 9.6 - 11.5 Seconds    INR 1.07 0.91 - 1.09   T4, Free    Collection Time: 11/13/18  6:54 PM   Result Value Ref Range    Free T4 1.16 0.89 - 1.76 ng/dL   Magnesium    Collection Time: 11/13/18  6:54 PM   Result Value Ref Range    Magnesium 2.1 1.3 - 2.7 mg/dL   Manual Differential    Collection Time: 11/13/18  6:54 PM   Result Value Ref Range    Neutrophil % 69.0 41.0 - 71.0 %    Lymphocyte % 13.0 (L) 24.0 - 44.0 %    Monocyte % 18.0 (H) 0.0 - 12.0 %    Eosinophil % 0.0 0.0 - 3.0 %    Basophil % 0.0 0.0 - 1.0 %    Neutrophils Absolute 5.31 1.50 - 8.30 10*3/mm3    Lymphocytes Absolute 1.00 0.60 - 4.80 10*3/mm3    Monocytes Absolute 1.39 (H) 0.00 - 1.00 10*3/mm3    Eosinophils Absolute 0.00 (L) 0.10 - 0.30 10*3/mm3    Basophils Absolute 0.00 0.00 - 0.20 10*3/mm3    RBC Morphology Normal Normal    WBC Morphology Normal Normal    Platelet Morphology Normal Normal   POC Troponin, Rapid    Collection Time: 11/13/18  6:58 PM   Result Value Ref Range    Troponin I 0.02 0.00 - 0.07 ng/mL   Urinalysis With Microscopic If Indicated (No Culture) - Urine, Clean Catch    Collection Time: 11/13/18  7:11 PM   Result Value Ref Range    Color, UA Yellow Yellow, Straw    Appearance, UA Clear Clear    pH, UA  <=5.0 5.0 - 8.0    Specific Gravity, UA 1.019 1.001 - 1.030    Glucose, UA Negative Negative    Ketones, UA Negative Negative    Bilirubin, UA Negative Negative    Blood, UA Negative Negative    Protein, UA Negative Negative    Leuk Esterase, UA Negative Negative    Nitrite, UA Negative Negative    Urobilinogen, UA 0.2 E.U./dL 0.2 - 1.0 E.U./dL   POC Troponin, Rapid    Collection Time: 11/13/18  8:45 PM   Result Value Ref Range    Troponin I 0.02 0.00 - 0.07 ng/mL     Note: In addition to lab results from this visit, the labs listed above may include labs taken at another facility or during a different encounter within the last 24 hours. Please correlate lab times with ED admission and discharge times for further clarification of the services performed during this visit.    CT Head Without Contrast   Final Result   1. Mild chronic microvascular disease and generalized atrophy without acute    intracranial abnormality.    2. No evidence of acute hemorrhage, mass lesion or obvious acute ischemic    infarction.       THIS DOCUMENT HAS BEEN ELECTRONICALLY SIGNED BY BEKA SOUSA MD      XR Chest 1 View   Final Result   1. Chronic cardiopulmonary changes with small bilateral pleural effusion with    atelectasis and consolidation in the lung bases.    2. A large retrocardiac hiatal hernia.       THIS DOCUMENT HAS BEEN ELECTRONICALLY SIGNED BY BEKA SOUSA MD      XR Pelvis 1 or 2 View   Final Result   Chronic degenerative changes with questionable pubic rami fractures. Recommend    followup with MRI if persistent/worsening symptoms.       THIS DOCUMENT HAS BEEN ELECTRONICALLY SIGNED BY BEKA SOUSA MD      MRI Brain Without Contrast    (Results Pending)   MRI Lumbar Spine Without Contrast    (Results Pending)   MRI Pelvis Without Contrast    (Results Pending)     Vitals:    11/13/18 2015 11/13/18 2030 11/13/18 2045 11/13/18 2100   BP: 138/97 141/82 134/77 138/81   BP Location:       Patient Position:       Pulse: 91 89 87  90   Resp:       Temp:       TempSrc:       SpO2: 95% 94% 95% 95%   Weight:       Height:         Medications   sodium chloride 0.9 % flush 10 mL (not administered)   cefTRIAXone (ROCEPHIN) IVPB 1 g (not administered)   AZITHROMYCIN 500 MG/250 ML 0.9% NS IVPB (MBP) (not administered)   HYDROmorphone (DILAUDID) injection 0.5 mg (0.5 mg Intravenous Given 11/13/18 2209)     ECG/EMG Results (last 24 hours)     Procedure Component Value Units Date/Time    ECG 12 Lead [[id number]] Collected:  11/13/18 1822     Updated:  11/13/18 1823    ECG 12 Lead [[id number]] Collected:  11/13/18 2056     Updated:  11/13/18 2058        Recent Results (from the past 24 hour(s))   Comprehensive Metabolic Panel    Collection Time: 11/13/18  6:54 PM   Result Value Ref Range    Glucose 109 (H) 70 - 100 mg/dL    BUN 20 9 - 23 mg/dL    Creatinine 0.94 0.60 - 1.30 mg/dL    Sodium 136 132 - 146 mmol/L    Potassium 3.8 3.5 - 5.5 mmol/L    Chloride 101 99 - 109 mmol/L    CO2 28.0 20.0 - 31.0 mmol/L    Calcium 8.9 8.7 - 10.4 mg/dL    Total Protein 6.2 5.7 - 8.2 g/dL    Albumin 3.99 3.20 - 4.80 g/dL    ALT (SGPT) 21 7 - 40 U/L    AST (SGOT) 38 (H) 0 - 33 U/L    Alkaline Phosphatase 346 (H) 25 - 100 U/L    Total Bilirubin 0.4 0.3 - 1.2 mg/dL    eGFR Non African Amer 75 >60 mL/min/1.73    Globulin 2.2 gm/dL    A/G Ratio 1.8 1.5 - 2.5 g/dL    BUN/Creatinine Ratio 21.3 7.0 - 25.0    Anion Gap 7.0 3.0 - 11.0 mmol/L   Light Blue Top    Collection Time: 11/13/18  6:54 PM   Result Value Ref Range    Extra Tube hold for add-on    Green Top (Gel)    Collection Time: 11/13/18  6:54 PM   Result Value Ref Range    Extra Tube Hold for add-ons.    Lavender Top    Collection Time: 11/13/18  6:54 PM   Result Value Ref Range    Extra Tube hold for add-on    Gold Top - SST    Collection Time: 11/13/18  6:54 PM   Result Value Ref Range    Extra Tube Hold for add-ons.    CBC Auto Differential    Collection Time: 11/13/18  6:54 PM   Result Value Ref Range    WBC 7.70 3.50  - 10.80 10*3/mm3    RBC 4.25 4.20 - 5.76 10*6/mm3    Hemoglobin 12.2 (L) 13.1 - 17.5 g/dL    Hematocrit 37.5 (L) 38.9 - 50.9 %    MCV 88.2 80.0 - 99.0 fL    MCH 28.7 27.0 - 31.0 pg    MCHC 32.5 32.0 - 36.0 g/dL    RDW 14.8 (H) 11.3 - 14.5 %    RDW-SD 47.6 37.0 - 54.0 fl    MPV 10.6 6.0 - 12.0 fL    Platelets 250 150 - 450 10*3/mm3   Protime-INR    Collection Time: 11/13/18  6:54 PM   Result Value Ref Range    Protime 11.2 9.6 - 11.5 Seconds    INR 1.07 0.91 - 1.09   T4, Free    Collection Time: 11/13/18  6:54 PM   Result Value Ref Range    Free T4 1.16 0.89 - 1.76 ng/dL   Magnesium    Collection Time: 11/13/18  6:54 PM   Result Value Ref Range    Magnesium 2.1 1.3 - 2.7 mg/dL   Manual Differential    Collection Time: 11/13/18  6:54 PM   Result Value Ref Range    Neutrophil % 69.0 41.0 - 71.0 %    Lymphocyte % 13.0 (L) 24.0 - 44.0 %    Monocyte % 18.0 (H) 0.0 - 12.0 %    Eosinophil % 0.0 0.0 - 3.0 %    Basophil % 0.0 0.0 - 1.0 %    Neutrophils Absolute 5.31 1.50 - 8.30 10*3/mm3    Lymphocytes Absolute 1.00 0.60 - 4.80 10*3/mm3    Monocytes Absolute 1.39 (H) 0.00 - 1.00 10*3/mm3    Eosinophils Absolute 0.00 (L) 0.10 - 0.30 10*3/mm3    Basophils Absolute 0.00 0.00 - 0.20 10*3/mm3    RBC Morphology Normal Normal    WBC Morphology Normal Normal    Platelet Morphology Normal Normal   POC Troponin, Rapid    Collection Time: 11/13/18  6:58 PM   Result Value Ref Range    Troponin I 0.02 0.00 - 0.07 ng/mL   Urinalysis With Microscopic If Indicated (No Culture) - Urine, Clean Catch    Collection Time: 11/13/18  7:11 PM   Result Value Ref Range    Color, UA Yellow Yellow, Straw    Appearance, UA Clear Clear    pH, UA <=5.0 5.0 - 8.0    Specific Gravity, UA 1.019 1.001 - 1.030    Glucose, UA Negative Negative    Ketones, UA Negative Negative    Bilirubin, UA Negative Negative    Blood, UA Negative Negative    Protein, UA Negative Negative    Leuk Esterase, UA Negative Negative    Nitrite, UA Negative Negative    Urobilinogen, UA  0.2 E.U./dL 0.2 - 1.0 E.U./dL   POC Troponin, Rapid    Collection Time: 11/13/18  8:45 PM   Result Value Ref Range    Troponin I 0.02 0.00 - 0.07 ng/mL     Note: In addition to lab results from this visit, the labs listed above may include labs taken at another facility or during a different encounter within the last 24 hours. Please correlate lab times with ED admission and discharge times for further clarification of the services performed during this visit.    MRI Lumbar Spine Without Contrast   Final Result   Addendum 1 of 1   THIS REPORT CONTAINS FINDINGS THAT MAY BE CRITICAL TO PATIENT CARE. The    findings were verbally communicated via telephone conference with PASHA RUELAS    at 11/13/2018 11:22 PM EST. The findings were acknowledged and understood.      THIS DOCUMENT HAS BEEN ELECTRONICALLY SIGNED BY BEKA SOUSA MD      Final   1. Findings suspicious for L4-L5 OSTEOMYELITIS, DISCITIS and probable EPIDURAL    ABSCESS WITH SEVERE SPINAL STENOSIS AND IMPINGEMENT OF THE CAUDA EQUINA.    2. Mottled heterogenous fatty replacement of the marrow diffusely in the spine    with numerous small nodular T2 abnormalities suspicious for metastatic disease.    3. Moderately advanced chronic degenerative changes in lumbar spine.       THIS DOCUMENT HAS BEEN ELECTRONICALLY SIGNED BY BEKA SOUSA MD      MRI Brain Without Contrast   Final Result   1. Mild chronic microvascular ischemic disease and generalized age appropriate    atrophy.    2. No evidence of acute/subacute hemorrhagic or ischemic infarct and no    hydrocephalus.       NOTE: Suboptimal study due to extensive motion artifact.      THIS DOCUMENT HAS BEEN ELECTRONICALLY SIGNED BY BEKA SOUSA MD      CT Head Without Contrast   Final Result   1. Mild chronic microvascular disease and generalized atrophy without acute    intracranial abnormality.    2. No evidence of acute hemorrhage, mass lesion or obvious acute ischemic    infarction.       THIS DOCUMENT HAS  BEEN ELECTRONICALLY SIGNED BY BEKA SOUSA MD      XR Chest 1 View   Final Result   1. Chronic cardiopulmonary changes with small bilateral pleural effusion with    atelectasis and consolidation in the lung bases.    2. A large retrocardiac hiatal hernia.       THIS DOCUMENT HAS BEEN ELECTRONICALLY SIGNED BY BEKA SOUSA MD      XR Pelvis 1 or 2 View   Final Result   Chronic degenerative changes with questionable pubic rami fractures. Recommend    followup with MRI if persistent/worsening symptoms.       THIS DOCUMENT HAS BEEN ELECTRONICALLY SIGNED BY BEKA SOUSA MD      MRI Pelvis Without Contrast    (Results Pending)     Vitals:    11/13/18 2100 11/13/18 2251 11/13/18 2259 11/13/18 2310   BP: 138/81 151/87     BP Location:       Patient Position:       Pulse: 90   96   Resp:       Temp:       TempSrc:       SpO2: 95%  94%    Weight:       Height:         Medications   sodium chloride 0.9 % flush 10 mL (not administered)   AZITHROMYCIN 500 MG/250 ML 0.9% NS IVPB (MBP) (not administered)   HYDROmorphone (DILAUDID) injection 0.5 mg (not administered)   cefTRIAXone (ROCEPHIN) IVPB 1 g (not administered)   vancomycin 2000 mg/500 mL 0.9% NS IVPB ([facility]) (not administered)   cefTRIAXone (ROCEPHIN) IVPB 1 g (1 g Intravenous New Bag 11/13/18 2257)   HYDROmorphone (DILAUDID) injection 0.5 mg (0.5 mg Intravenous Given 11/13/18 2209)     ECG/EMG Results (last 24 hours)     Procedure Component Value Units Date/Time    ECG 12 Lead [[id number]] Collected:  11/13/18 1822     Updated:  11/13/18 1823    ECG 12 Lead [[id number]] Collected:  11/13/18 2056     Updated:  11/13/18 2058              MDM    Final diagnoses:   Community acquired pneumonia, unspecified laterality   Weakness of both lower extremities   Osteomyelitis of other site, unspecified type (CMS/HCC)   Discitis of lumbar region   Epidural abscess       Documentation assistance provided by manuel Merrill.  Information recorded by the manuel was done at my direction  and has been verified and validated by me.     Angelica Merrill  11/13/18 1833       Angelica Merrill  11/13/18 2103       Reginald Saini MD  11/13/18 1078       Reginald Saini MD  11/13/18 6907

## 2018-11-14 ENCOUNTER — APPOINTMENT (OUTPATIENT)
Dept: MRI IMAGING | Facility: HOSPITAL | Age: 83
End: 2018-11-14
Attending: EMERGENCY MEDICINE

## 2018-11-14 LAB
ANION GAP SERPL CALCULATED.3IONS-SCNC: 6 MMOL/L (ref 3–11)
BACTERIA BLD CULT: ABNORMAL
BASOPHILS # BLD AUTO: 0.03 10*3/MM3 (ref 0–0.2)
BASOPHILS NFR BLD AUTO: 0.4 % (ref 0–1)
BUN BLD-MCNC: 17 MG/DL (ref 9–23)
BUN/CREAT SERPL: 23.3 (ref 7–25)
CALCIUM SPEC-SCNC: 8.1 MG/DL (ref 8.7–10.4)
CHLORIDE SERPL-SCNC: 102 MMOL/L (ref 99–109)
CO2 SERPL-SCNC: 27 MMOL/L (ref 20–31)
CREAT BLD-MCNC: 0.73 MG/DL (ref 0.6–1.3)
CRP SERPL-MCNC: 5.81 MG/DL (ref 0–1)
DEPRECATED RDW RBC AUTO: 47.6 FL (ref 37–54)
EOSINOPHIL # BLD AUTO: 0.06 10*3/MM3 (ref 0–0.3)
EOSINOPHIL NFR BLD AUTO: 0.9 % (ref 0–3)
ERYTHROCYTE [DISTWIDTH] IN BLOOD BY AUTOMATED COUNT: 14.8 % (ref 11.3–14.5)
ERYTHROCYTE [SEDIMENTATION RATE] IN BLOOD: 33 MM/HR (ref 0–20)
GFR SERPL CREATININE-BSD FRML MDRD: 101 ML/MIN/1.73
GLUCOSE BLD-MCNC: 82 MG/DL (ref 70–100)
HCT VFR BLD AUTO: 33.4 % (ref 38.9–50.9)
HGB BLD-MCNC: 10.9 G/DL (ref 13.1–17.5)
IMM GRANULOCYTES # BLD: 0.02 10*3/MM3 (ref 0–0.03)
IMM GRANULOCYTES NFR BLD: 0.3 % (ref 0–0.6)
LYMPHOCYTES # BLD AUTO: 1.09 10*3/MM3 (ref 0.6–4.8)
LYMPHOCYTES NFR BLD AUTO: 15.5 % (ref 24–44)
MCH RBC QN AUTO: 28.8 PG (ref 27–31)
MCHC RBC AUTO-ENTMCNC: 32.6 G/DL (ref 32–36)
MCV RBC AUTO: 88.4 FL (ref 80–99)
MONOCYTES # BLD AUTO: 1.32 10*3/MM3 (ref 0–1)
MONOCYTES NFR BLD AUTO: 18.8 % (ref 0–12)
NEUTROPHILS # BLD AUTO: 4.53 10*3/MM3 (ref 1.5–8.3)
NEUTROPHILS NFR BLD AUTO: 64.4 % (ref 41–71)
PLATELET # BLD AUTO: 238 10*3/MM3 (ref 150–450)
PMV BLD AUTO: 10.9 FL (ref 6–12)
POTASSIUM BLD-SCNC: 3.6 MMOL/L (ref 3.5–5.5)
PROCALCITONIN SERPL-MCNC: 0.08 NG/ML
PSA SERPL-MCNC: 27.37 NG/ML (ref 0–4)
RBC # BLD AUTO: 3.78 10*6/MM3 (ref 4.2–5.76)
SODIUM BLD-SCNC: 135 MMOL/L (ref 132–146)
WBC NRBC COR # BLD: 7.03 10*3/MM3 (ref 3.5–10.8)

## 2018-11-14 PROCEDURE — DP0C2ZZ BEAM RADIATION OF OTHER BONE USING PHOTONS >10 MEV: ICD-10-PCS | Performed by: RADIOLOGY

## 2018-11-14 PROCEDURE — 25010000002 DEXAMETHASONE SODIUM PHOSPHATE 120 MG/30ML SOLUTION: Performed by: NEUROLOGICAL SURGERY

## 2018-11-14 PROCEDURE — 77307 TELETHX ISODOSE PLAN CPLX: CPT | Performed by: RADIOLOGY

## 2018-11-14 PROCEDURE — 25010000002 DEXAMETHASONE PER 1 MG: Performed by: NEUROLOGICAL SURGERY

## 2018-11-14 PROCEDURE — 99233 SBSQ HOSP IP/OBS HIGH 50: CPT | Performed by: INTERNAL MEDICINE

## 2018-11-14 PROCEDURE — 25010000002 CEFTRIAXONE PER 250 MG: Performed by: NURSE PRACTITIONER

## 2018-11-14 PROCEDURE — 25010000002 VANCOMYCIN 10 G RECONSTITUTED SOLUTION

## 2018-11-14 PROCEDURE — 77417 THER RADIOLOGY PORT IMAGE(S): CPT | Performed by: RADIOLOGY

## 2018-11-14 PROCEDURE — 25010000002 VANCOMYCIN PER 500 MG: Performed by: INTERNAL MEDICINE

## 2018-11-14 PROCEDURE — 84153 ASSAY OF PSA TOTAL: CPT | Performed by: RADIOLOGY

## 2018-11-14 PROCEDURE — 84145 PROCALCITONIN (PCT): CPT | Performed by: FAMILY MEDICINE

## 2018-11-14 PROCEDURE — 77334 RADIATION TREATMENT AID(S): CPT | Performed by: RADIOLOGY

## 2018-11-14 PROCEDURE — 97116 GAIT TRAINING THERAPY: CPT

## 2018-11-14 PROCEDURE — 86140 C-REACTIVE PROTEIN: CPT | Performed by: NURSE PRACTITIONER

## 2018-11-14 PROCEDURE — 25010000002 CEFTRIAXONE PER 250 MG: Performed by: EMERGENCY MEDICINE

## 2018-11-14 PROCEDURE — 87899 AGENT NOS ASSAY W/OPTIC: CPT | Performed by: NURSE PRACTITIONER

## 2018-11-14 PROCEDURE — 87449 NOS EACH ORGANISM AG IA: CPT | Performed by: NURSE PRACTITIONER

## 2018-11-14 PROCEDURE — 97164 PT RE-EVAL EST PLAN CARE: CPT

## 2018-11-14 PROCEDURE — 85025 COMPLETE CBC W/AUTO DIFF WBC: CPT | Performed by: NURSE PRACTITIONER

## 2018-11-14 PROCEDURE — 97530 THERAPEUTIC ACTIVITIES: CPT

## 2018-11-14 PROCEDURE — 80048 BASIC METABOLIC PNL TOTAL CA: CPT | Performed by: NURSE PRACTITIONER

## 2018-11-14 PROCEDURE — 85652 RBC SED RATE AUTOMATED: CPT | Performed by: NURSE PRACTITIONER

## 2018-11-14 PROCEDURE — 77412 RADIATION TX DELIVERY LVL 3: CPT | Performed by: RADIOLOGY

## 2018-11-14 PROCEDURE — 25010000002 HYDROMORPHONE PER 4 MG: Performed by: FAMILY MEDICINE

## 2018-11-14 PROCEDURE — 25010000002 ENOXAPARIN PER 10 MG: Performed by: NURSE PRACTITIONER

## 2018-11-14 PROCEDURE — 72195 MRI PELVIS W/O DYE: CPT

## 2018-11-14 PROCEDURE — 97110 THERAPEUTIC EXERCISES: CPT

## 2018-11-14 PROCEDURE — 25010000002 AZITHROMYCIN: Performed by: EMERGENCY MEDICINE

## 2018-11-14 PROCEDURE — 97166 OT EVAL MOD COMPLEX 45 MIN: CPT

## 2018-11-14 PROCEDURE — 25010000002 HYDROMORPHONE PER 4 MG: Performed by: INTERNAL MEDICINE

## 2018-11-14 PROCEDURE — 77290 THER RAD SIMULAJ FIELD CPLX: CPT | Performed by: RADIOLOGY

## 2018-11-14 RX ORDER — HYDROMORPHONE HYDROCHLORIDE 1 MG/ML
0.5 INJECTION, SOLUTION INTRAMUSCULAR; INTRAVENOUS; SUBCUTANEOUS ONCE
Status: COMPLETED | OUTPATIENT
Start: 2018-11-14 | End: 2018-11-14

## 2018-11-14 RX ORDER — PANTOPRAZOLE SODIUM 40 MG/10ML
40 INJECTION, POWDER, LYOPHILIZED, FOR SOLUTION INTRAVENOUS
Status: DISCONTINUED | OUTPATIENT
Start: 2018-11-14 | End: 2018-11-16 | Stop reason: HOSPADM

## 2018-11-14 RX ORDER — TERAZOSIN 5 MG/1
5 CAPSULE ORAL NIGHTLY
Status: DISCONTINUED | OUTPATIENT
Start: 2018-11-14 | End: 2018-11-15

## 2018-11-14 RX ORDER — SENNA AND DOCUSATE SODIUM 50; 8.6 MG/1; MG/1
1 TABLET, FILM COATED ORAL DAILY
Status: DISCONTINUED | OUTPATIENT
Start: 2018-11-14 | End: 2018-11-16 | Stop reason: HOSPADM

## 2018-11-14 RX ORDER — FUROSEMIDE 20 MG/1
20 TABLET ORAL DAILY
Status: DISCONTINUED | OUTPATIENT
Start: 2018-11-14 | End: 2018-11-16 | Stop reason: HOSPADM

## 2018-11-14 RX ORDER — VANCOMYCIN HYDROCHLORIDE 1 G/200ML
1000 INJECTION, SOLUTION INTRAVENOUS
Status: DISCONTINUED | OUTPATIENT
Start: 2018-11-14 | End: 2018-11-15

## 2018-11-14 RX ORDER — FENTANYL 50 UG/H
1 PATCH TRANSDERMAL
Status: DISCONTINUED | OUTPATIENT
Start: 2018-11-14 | End: 2018-11-16 | Stop reason: HOSPADM

## 2018-11-14 RX ORDER — AZITHROMYCIN 250 MG/1
250 TABLET, FILM COATED ORAL
Status: DISCONTINUED | OUTPATIENT
Start: 2018-11-15 | End: 2018-11-16 | Stop reason: HOSPADM

## 2018-11-14 RX ORDER — DEXAMETHASONE IN 0.9 % SOD CHL 10 MG/50ML
10 INTRAVENOUS SOLUTION, PIGGYBACK (ML) INTRAVENOUS ONCE
Status: COMPLETED | OUTPATIENT
Start: 2018-11-14 | End: 2018-11-14

## 2018-11-14 RX ORDER — CEFTRIAXONE SODIUM 1 G/50ML
1 INJECTION, SOLUTION INTRAVENOUS EVERY 24 HOURS
Status: DISCONTINUED | OUTPATIENT
Start: 2018-11-14 | End: 2018-11-16 | Stop reason: HOSPADM

## 2018-11-14 RX ORDER — HYDROMORPHONE HYDROCHLORIDE 1 MG/ML
0.5 INJECTION, SOLUTION INTRAMUSCULAR; INTRAVENOUS; SUBCUTANEOUS ONCE AS NEEDED
Status: COMPLETED | OUTPATIENT
Start: 2018-11-14 | End: 2018-11-14

## 2018-11-14 RX ORDER — SODIUM CHLORIDE 9 MG/ML
100 INJECTION, SOLUTION INTRAVENOUS CONTINUOUS
Status: DISCONTINUED | OUTPATIENT
Start: 2018-11-14 | End: 2018-11-16 | Stop reason: HOSPADM

## 2018-11-14 RX ORDER — DEXAMETHASONE SODIUM PHOSPHATE 4 MG/ML
4 INJECTION, SOLUTION INTRA-ARTICULAR; INTRALESIONAL; INTRAMUSCULAR; INTRAVENOUS; SOFT TISSUE EVERY 6 HOURS
Status: DISCONTINUED | OUTPATIENT
Start: 2018-11-14 | End: 2018-11-16 | Stop reason: HOSPADM

## 2018-11-14 RX ORDER — HYDROCODONE BITARTRATE AND ACETAMINOPHEN 5; 325 MG/1; MG/1
1 TABLET ORAL 2 TIMES DAILY PRN
Status: DISCONTINUED | OUTPATIENT
Start: 2018-11-14 | End: 2018-11-16 | Stop reason: HOSPADM

## 2018-11-14 RX ADMIN — TERAZOSIN HYDROCHLORIDE 5 MG: 5 CAPSULE ORAL at 20:09

## 2018-11-14 RX ADMIN — VANCOMYCIN HYDROCHLORIDE 1000 MG: 1 INJECTION, SOLUTION INTRAVENOUS at 18:08

## 2018-11-14 RX ADMIN — Medication 10 ML: at 10:00

## 2018-11-14 RX ADMIN — CHOLECALCIFEROL CAP 125 MCG (5000 UNIT) 5000 UNITS: 125 CAP at 10:01

## 2018-11-14 RX ADMIN — SODIUM CHLORIDE 100 ML/HR: 9 INJECTION, SOLUTION INTRAVENOUS at 02:24

## 2018-11-14 RX ADMIN — PANTOPRAZOLE SODIUM 40 MG: 40 INJECTION, POWDER, FOR SOLUTION INTRAVENOUS at 05:37

## 2018-11-14 RX ADMIN — HYDROMORPHONE HYDROCHLORIDE 0.5 MG: 1 INJECTION, SOLUTION INTRAMUSCULAR; INTRAVENOUS; SUBCUTANEOUS at 15:07

## 2018-11-14 RX ADMIN — ENOXAPARIN SODIUM 40 MG: 40 INJECTION SUBCUTANEOUS at 10:02

## 2018-11-14 RX ADMIN — AZITHROMYCIN MONOHYDRATE 500 MG: 500 INJECTION, POWDER, LYOPHILIZED, FOR SOLUTION INTRAVENOUS at 02:15

## 2018-11-14 RX ADMIN — SODIUM CHLORIDE 100 ML/HR: 9 INJECTION, SOLUTION INTRAVENOUS at 02:05

## 2018-11-14 RX ADMIN — DEXAMETHASONE SODIUM PHOSPHATE 4 MG: 4 INJECTION, SOLUTION INTRA-ARTICULAR; INTRALESIONAL; INTRAMUSCULAR; INTRAVENOUS; SOFT TISSUE at 20:09

## 2018-11-14 RX ADMIN — TERAZOSIN HYDROCHLORIDE 5 MG: 5 CAPSULE ORAL at 03:09

## 2018-11-14 RX ADMIN — SODIUM CHLORIDE 100 ML/HR: 9 INJECTION, SOLUTION INTRAVENOUS at 18:08

## 2018-11-14 RX ADMIN — DEXAMETHASONE SODIUM PHOSPHATE 10 MG: 4 INJECTION, SOLUTION INTRA-ARTICULAR; INTRALESIONAL; INTRAMUSCULAR; INTRAVENOUS; SOFT TISSUE at 03:48

## 2018-11-14 RX ADMIN — DEXAMETHASONE SODIUM PHOSPHATE 4 MG: 4 INJECTION, SOLUTION INTRA-ARTICULAR; INTRALESIONAL; INTRAMUSCULAR; INTRAVENOUS; SOFT TISSUE at 13:38

## 2018-11-14 RX ADMIN — HYDROCODONE BITARTRATE AND ACETAMINOPHEN 1 TABLET: 5; 325 TABLET ORAL at 20:09

## 2018-11-14 RX ADMIN — VANCOMYCIN HYDROCHLORIDE 2000 MG: 10 INJECTION, POWDER, LYOPHILIZED, FOR SOLUTION INTRAVENOUS at 00:53

## 2018-11-14 RX ADMIN — HYDROMORPHONE HYDROCHLORIDE 0.5 MG: 1 INJECTION, SOLUTION INTRAMUSCULAR; INTRAVENOUS; SUBCUTANEOUS at 03:42

## 2018-11-14 RX ADMIN — DEXAMETHASONE SODIUM PHOSPHATE 4 MG: 4 INJECTION, SOLUTION INTRA-ARTICULAR; INTRALESIONAL; INTRAMUSCULAR; INTRAVENOUS; SOFT TISSUE at 10:01

## 2018-11-14 RX ADMIN — Medication 1 TABLET: at 10:01

## 2018-11-14 RX ADMIN — HYDROMORPHONE HYDROCHLORIDE 0.5 MG: 1 INJECTION, SOLUTION INTRAMUSCULAR; INTRAVENOUS; SUBCUTANEOUS at 08:08

## 2018-11-14 RX ADMIN — CEFTRIAXONE SODIUM 1 G: 1 INJECTION, SOLUTION INTRAVENOUS at 20:10

## 2018-11-14 RX ADMIN — FENTANYL 1 PATCH: 50 PATCH TRANSDERMAL at 10:00

## 2018-11-14 RX ADMIN — FUROSEMIDE 20 MG: 20 TABLET ORAL at 10:01

## 2018-11-14 RX ADMIN — CEFTRIAXONE SODIUM 1 G: 1 INJECTION, SOLUTION INTRAVENOUS at 03:48

## 2018-11-14 NOTE — H&P
"[facility] Medicine Services  HISTORY AND PHYSICAL    Patient Name: Harris Shane  : 9/15/1927  MRN: [id number]  Primary Care Physician: Charli Mccormack MD    Subjective   Subjective     Chief Complaint:  Lower extremity weakness, falls    HPI:  Harris Shane is a 89 y.o. male with a past medical history significant for BPH, prostate cancer with metastasis to L4-L5, DVT, essential hypertension, WILLIAM, hyperlipidemia, MI, GERD, and CAD presents to the ED with complaints of bilateral lower extremity weakness.  Patient states that about 10 days ago he was walking without any difficulty.  Patient states that over the past several days he has had multiple falls due to lower extremity weakness.  His most recent fall was this morning in which he states he was standing with his walker trying to put his sweater on when \"my legs gave out.\"  He states that he did not have any LOC but does admit to hitting his head.  He did not immediately have pain and was able to stand afterwards.  He denies any numbness/tingling, saddle paraesthesia, chest pain, shortness of air, cough, unilateral weakness, changes in speech, abdominal pain, nausea, vomiting, fever or chills. He does endorse mid back pain as a result of his fall.  Patient will be admitted to [facility] under the care of the Hospitalist for further evaluation and treatment.     Review of Systems   Constitutional: Positive for activity change and fatigue. Negative for appetite change, chills, diaphoresis, fever and unexpected weight change.   HENT: Negative.    Eyes: Negative for photophobia and visual disturbance.   Respiratory: Negative for cough and shortness of breath.    Cardiovascular: Negative for chest pain, palpitations and leg swelling.   Gastrointestinal: Negative for abdominal distention, abdominal pain, constipation, diarrhea, nausea and vomiting.   Genitourinary: Negative.    Musculoskeletal: Positive for back pain and gait problem. " Negative for neck pain and neck stiffness.   Skin: Negative.    Neurological: Positive for weakness. Negative for dizziness, tremors, syncope, facial asymmetry, speech difficulty, light-headedness, numbness and headaches.   Psychiatric/Behavioral: Negative.         Otherwise 10-system ROS reviewed and is negative except as mentioned in the HPI.    Personal History     Past Medical History:   Diagnosis Date   • Arthritis    • Blind     right eye   • Blindness of right eye 6/13/2018   • BPH (benign prostatic hyperplasia)    • Cancer (CMS/HCC)     PROSTATE   • Coronary artery disease    • DVT (deep venous thrombosis) (CMS/HCC) 1990's   • GERD (gastroesophageal reflux disease)    • Hyperlipidemia    • Hypertension    • Metastatic cancer to spine, L4-L5 6/13/2018   • Myocardial infarction (CMS/HCC) 1970's   • WILILAM (obstructive sleep apnea) 06/13/2018    no cpap   • Prostate cancer (CMS/HCC)    • UTI (urinary tract infection)        Past Surgical History:   Procedure Laterality Date   • CATARACT EXTRACTION Bilateral    • COLONOSCOPY      3 years ago   • EYE SURGERY      RIGHT       Family History: family history includes Breast cancer in his mother; Coronary artery disease in his brother and father; No Known Problems in his sister; Prostate cancer in his father; Stroke in his brother.     Social History:  reports that he has quit smoking. His smoking use included pipe. he has never used smokeless tobacco. He reports that he does not drink alcohol or use drugs.    Medications:    (Not in a hospital admission)    Allergies   Allergen Reactions   • Contrast Dye Rash and Other (See Comments)     RASH AND SYNCOPE       Objective   Objective     Vital Signs:   Temp:  [98.2 °F (36.8 °C)] 98.2 °F (36.8 °C)  Heart Rate:  [87-96] 90  Resp:  [16] 16  BP: (133-153)/(69-97) 138/81        Physical Exam   Constitutional: He is oriented to person, place, and time. He appears well-developed and well-nourished. No distress.   HENT:   Head:  Normocephalic and atraumatic.   Eyes: Conjunctivae and EOM are normal. Right eye exhibits no discharge. Left eye exhibits no discharge. No scleral icterus.   Right eye blindness    Neck: Normal range of motion. Neck supple. No JVD present. No tracheal deviation present. No thyromegaly present.   Cardiovascular: Normal rate, regular rhythm, normal heart sounds and intact distal pulses. Exam reveals no gallop and no friction rub.   No murmur heard.  Pulmonary/Chest: Effort normal and breath sounds normal. No stridor. No respiratory distress. He has no wheezes. He has no rales. He exhibits no tenderness.   Abdominal: Soft. Bowel sounds are normal. He exhibits no distension and no mass. There is no tenderness. There is no rebound and no guarding. No hernia.   Musculoskeletal: Normal range of motion. He exhibits no edema, tenderness or deformity.   Lymphadenopathy:     He has no cervical adenopathy.   Neurological: He is alert and oriented to person, place, and time.    5 out of 5 strength in all fours, neurovascularly intact distally in all fours with bounding distal pulses and normal sensation, no saddle parasthesia, no ataxia, no dysmetria, clear and fluent speech, awake, alert, and oriented ×3, no droop, no drift, normoreflexic    Skin: Skin is warm and dry. He is not diaphoretic.   Psychiatric: He has a normal mood and affect. His behavior is normal. Judgment and thought content normal.   Nursing note and vitals reviewed.       Results Reviewed:  I have personally reviewed current lab, radiology, and data and agree.    Results from last 7 days   Lab Units  11/13/18   1854   WBC 10*3/mm3  7.70   HEMOGLOBIN g/dL  12.2*   HEMATOCRIT %  37.5*   PLATELETS 10*3/mm3  250   INR   1.07     Results from last 7 days   Lab Units  11/13/18   1854   SODIUM mmol/L  136   POTASSIUM mmol/L  3.8   CHLORIDE mmol/L  101   CO2 mmol/L  28.0   BUN mg/dL  20   CREATININE mg/dL  0.94   GLUCOSE mg/dL  109*   CALCIUM mg/dL  8.9   ALT (SGPT)  U/L  21   AST (SGOT) U/L  38*     No results found for: BNP  No results found for: PHART, PCO2  Imaging Results (last 24 hours)     Procedure Component Value Units Date/Time    CT Head Without Contrast [[id number]] Collected:  11/13/18 1835     Updated:  11/13/18 1958    Narrative:       EXAM:    CT Head Without Intravenous Contrast     EXAM DATE/TIME:    11/13/2018 6:35 PM     CLINICAL HISTORY:    89 years old, male; Injury or trauma; Fall; Initial encounter; Concussion /   head injury; Consciousness not specified; Additional info: Fall, hit head     TECHNIQUE:    Axial computed tomography images of the head/brain without intravenous   contrast.    All CT scans at this facility use at least one of these dose optimization   techniques: automated exposure control; mA and/or kV adjustment per patient   size (includes targeted exams where dose is matched to clinical indication); or   iterative reconstruction.     COMPARISON:    No relevant prior studies available.     FINDINGS:     Bilateral periventricular lucencies without intraparenchymal hemorrhage and   no obvious acute ischemic stroke. No intra-or extra-axial fluid collection, no   supra-or infratentorial mass, no mass effect or midline shift.     Mildly dilated ventricles, sulci and basal cisterns without evidence of   hydrocephalus. Skull bones are normal. Mild mucoperiosteal thickening in the   paranasal sinuses. No mastoid effusion.       Impression:       1. Mild chronic microvascular disease and generalized atrophy without acute   intracranial abnormality.   2. No evidence of acute hemorrhage, mass lesion or obvious acute ischemic   infarction.     THIS DOCUMENT HAS BEEN ELECTRONICALLY SIGNED BY BEKA SOUSA MD    XR Chest 1 View [[id number]] Collected:  11/13/18 1819     Updated:  11/13/18 1943    Narrative:       EXAM:    XR Chest, 1 View     EXAM DATE/TIME:    11/13/2018 6:19 PM     CLINICAL HISTORY:    89 years old, male; Signs and symptoms; Other:  Weakness, unable to walk;   Additional info: Weak/dizzy/ams triage protocol     TECHNIQUE:    XR of the chest, 1 view.     COMPARISON:    No relevant prior studies available.     FINDINGS:     Prominent lung markings/peribronchial thickening with small bilateral pleural   effusion with atelectasis and consolidation in the lung bases. Cardiomegaly   with normal lung vascularity. Calcification and unfolding of the aortic arch.     Chronic LEFT clavicle fracture with marked deformity at the fracture site.   Mild deformity of the RIGHT chest wall suspicious for old fractures. Osteopenia   with associated chronic degenerative changes in the visualized spine and   shoulder joints. A large retrocardiac hiatal hernia.       Impression:       1. Chronic cardiopulmonary changes with small bilateral pleural effusion with   atelectasis and consolidation in the lung bases.   2. A large retrocardiac hiatal hernia.     THIS DOCUMENT HAS BEEN ELECTRONICALLY SIGNED BY BEKA SOUSA MD    XR Pelvis 1 or 2 View [[id number]] Collected:  11/13/18 1835     Updated:  11/13/18 1942    Narrative:       EXAM:    XR Pelvis, 1 or 2 Views     EXAM DATE/TIME:    11/13/2018 6:35 PM     CLINICAL HISTORY:    89 years old, male; Pain; Pelvic pain; Additional info: Fall, le weakness     TECHNIQUE:    XR pelvis, 1 or 2 views     COMPARISON:    CT ABDOMEN PELVIS WO CONTRAST 6/12/2018 11:45 PM     FINDINGS:     Marked diffuse osteopenia, advanced chronic degenerative changes in the lower   lumbar spine. Chronic degenerative changes in the sacroiliac joints and hip   joints.     Questionable nondisplaced fractures of the pubic rami without obvious   displaced fracture around the hip joints. Remainder of the visualized bones and   joints are unremarkable. No discrete lytic or blastic lesion.       Impression:       Chronic degenerative changes with questionable pubic rami fractures. Recommend   followup with MRI if persistent/worsening symptoms.     THIS  DOCUMENT HAS BEEN ELECTRONICALLY SIGNED BY BEKA SOUSA MD        Results for orders placed during the hospital encounter of 06/12/18   Adult Transthoracic Echo Complete W/ Cont if Necessary Per Protocol    Narrative · Left ventricular systolic function is normal. Estimated EF = 55%.  · Left ventricular diastolic dysfunction (grade I) consistent with   impaired relaxation.          Assessment/Plan   Assessment / Plan     Active Hospital Problems    Diagnosis   • **Community acquired pneumonia   • Lower extremity weakness   • Essential hypertension   • History of DVT (deep vein thrombosis)   • BPH (benign prostatic hyperplasia)   • Prostate cancer (Mets to L4-L5)   • WILLIAM (obstructive sleep apnea) remote, intolerant of CPAP   • Blindness of right eye         Assessment & Plan:  89 year old male presents to the ED with progressive weakness of bilateral lower extremities with multiple falls.     1) Lower extremity weakness  -etiology not completely clear  -patient does have known prostate cancer with mets to L4 L5  -xray of lumbar spine shows ? Pubic ramus fracture, MRI pending  -CT of head negative, MRI of brain pending, MRI of pelvis: patient is unable to tolerate due to pain, have given 0.5 mg of dilaudid but still unable to lay flat to tolerate rest of MRI of pelvis, will re-attempt in am.   -fall precautions  -up with assistance only   -prn pain  Medication    2) Community Acquired pneumonia  -CXR as noted above with consolidation in the lung bases  -will continue azithromycin and rocephin for now  -blood cultures x2 pending  -check sputum culture  -check urine antigens  -scheduled duo-nebs  -keep o2 sat >90%    3) Multiple falls  -secondary to #1  -CT of head unremarkable  -MRI pending  -fall precautions  -up with assistance only    4) Prostate cancer with mets to L4-L5  -follows with Dr. [facility] at [facility]  -currently states that he is on Eligard every six months and is due for an injection tomorrow.   -MRI  of lumbar spine pending    5) History of DVT    6) Essential hypertension  -continue home meds    7) WILLIAM  -cpap at HS    DVT prophylaxis:teds/scds, lovenox     CODE STATUS:  Full code   Code Status and Medical Interventions:   Ordered at: 11/13/18 6711     Level Of Support Discussed With:    Patient     Code Status:    CPR     Medical Interventions (Level of Support Prior to Arrest):    Full       Admission Status:  INPATIENT status due to the need for care which can only be reasonably provided in an hospital setting such as aggressive/expedited ancillary services and/or consultation services, the necessity for IV medications, close physician monitoring and/or the possible need for procedures.  In such, I feel patient’s risk for adverse outcomes and need for care warrant INPATIENT evaluation and predict the patient’s care encounter to likely last beyond 2 midnights.    Ophelia Aguilar, APRN   11/13/18   10:32 PM        Brief Attending Admission Attestation     I have seen and examined the patient, performing an independent face-to-face diagnostic evaluation with plan of care reviewed and developed with the advanced practice clinician (APC).      Brief Summary Statement/HPI:   Harris Shane is a 89 y.o. male with hx of prostate cancer with mets to the lumbar spine. Presents with severe low back pain and increased weakness in lower ext. Pt daughter at bedside reports a progressive weakness since June. Pt was dx with metastasis to l-spine and has been on Leuprolide. NEXT DOSE DUE Thursday. During recent admission for sepsis pt required radiation therapy b/c his pain became so severe. He is currently on fentanyl patches and in the past 2 days has had to take norco up to 2x a day for breakthrough pain. Pt is ambulatory with a walker at baseline and is not currently able to ambulate. Pt was seen by Dr Donnelly after arriving to the floor from the ED and he reviewed films and reported that the findings on MRI of the  Lumbar spine were more consistent with metastasis than infectious. Pt was also noted to have early CAP, treated with Zithromax and Rocephin. We will continue hose for now and admit pt to telemetry for pain control. Dr Donnelly has started Dexamethasone for symptom relief .      Attending Physical Exam:  Vitals: reviewed  Constitutional: No acute distress, awake, alert, nontoxic, pleasant   Respiratory: Clear to auscultation bilaterally, good effort, nonlabored respirations   Cardiovascular: RRR, no murmur  Gastrointestinal: normal bowel sounds, soft, nontender, nondistended  Musculoskeletal: MS equal in lower ext bilaterally, sensation intact, tender across Lumbar spine   Psychiatric: Appropriate affect, good insight and judgement, cooperative  Skin: No rashes    Brief Assessment/Plan :  See above for further detailed assessment and plan developed with APC which I have reviewed and/or edited.      Electronically signed by Lyubov Garcia DO, 11/14/18, 4:03 AM.

## 2018-11-14 NOTE — PLAN OF CARE
Problem: Patient Care Overview  Goal: Plan of Care Review  Outcome: Ongoing (interventions implemented as appropriate)   11/14/18 9671   Coping/Psychosocial   Plan of Care Reviewed With patient;daughter   OTHER   Outcome Summary Pt improved distance ambulated today. he is able to walk in hallway with CGA and VC's. Continue with PT.   Plan of Care Review   Progress improving

## 2018-11-14 NOTE — THERAPY EVALUATION
Acute Care - Physical Therapy Initial Evaluation  [facility]     Patient Name: Harris Shane  : 9/15/1927  MRN: [id number]  Today's Date: 2018   Onset of Illness/Injury or Date of Surgery: 18  Date of Referral to PT: 18  Referring Physician: CHELITA Aguilar      Admit Date: 2018    Visit Dx:     ICD-10-CM ICD-9-CM   1. Community acquired pneumonia, unspecified laterality J18.9 486   2. Weakness of both lower extremities R29.898 729.89   3. Osteomyelitis of other site, unspecified type (CMS/HCC) M86.9 730.28   4. Discitis of lumbar region M46.46 722.93   5. Epidural abscess G06.2 324.9   6. Impaired mobility and ADLs Z74.09 799.89   7. Impaired functional mobility, balance, gait, and endurance Z74.09 V49.89     Patient Active Problem List   Diagnosis   • Sepsis (CMS/HCC)   • Elevated liver enzymes   • Prostate cancer (Mets to L4-L5)   • Pulmonary infiltrate, LLLobe   • Cholelithiases of GB neck per CT A/P   • Acute renal insufficiency, CRE 1.43, unknown baseline   • Blindness of right eye   • WILLIAM (obstructive sleep apnea) remote, intolerant of CPAP   • R/O Cholecystitis   • NSTEMI (non-ST elevated myocardial infarction) (R/O Type 2)   • Bacteremia due to Escherichia coli   • Community acquired pneumonia   • Lower extremity weakness   • Essential hypertension   • History of DVT (deep vein thrombosis)   • BPH (benign prostatic hyperplasia)     Past Medical History:   Diagnosis Date   • Arthritis    • Blind     right eye   • Blindness of right eye 2018   • BPH (benign prostatic hyperplasia)    • Cancer (CMS/HCC)     PROSTATE   • Coronary artery disease    • DVT (deep venous thrombosis) (CMS/HCC)    • GERD (gastroesophageal reflux disease)    • Hyperlipidemia    • Hypertension    • Metastatic cancer to spine, L4-L5 2018   • Myocardial infarction (CMS/HCC) s   • WILLIAM (obstructive sleep apnea) 2018    no cpap   • Prostate cancer (CMS/HCC)    • UTI (urinary tract  infection)      Past Surgical History:   Procedure Laterality Date   • CATARACT EXTRACTION Bilateral    • COLONOSCOPY      3 years ago   • EYE SURGERY      RIGHT, age 10         PT ASSESSMENT (last 12 hours)      Physical Therapy Evaluation     Row Name 11/14/18 0905          PT Evaluation Time/Intention    Subjective Information  no complaints  -BD     Document Type  evaluation  -BD     Mode of Treatment  physical therapy  -BD     Patient Effort  good  -BD     Symptoms Noted During/After Treatment  fatigue  -BD     Row Name 11/14/18 0905          General Information    Patient Profile Reviewed?  yes  -BD     Onset of Illness/Injury or Date of Surgery  11/13/18  -BD     Referring Physician  CHELITA Aguilar  -BD     Patient Observations  alert;cooperative;agree to therapy  -BD     Patient/Family Observations  pt's daughter present and helpful with information  -BD     General Observations of Patient  Pt supine in bed, IV, NC  -BD     Prior Level of Function  independent:;all household mobility;transfer;ADL's;mod assist:;dressing;bathing;home management;dependent:;driving  -BD     Equipment Currently Used at Home  shower chair;walker, rolling;grab bar;commode, bedside  -BD     Pertinent History of Current Functional Problem  Pt became weak and had 2 falls at home. Pt admitted with pneumonia.  -BD     Existing Precautions/Restrictions  fall;oxygen therapy device and L/min  -BD     Limitations/Impairments  visual  -BD     Risks Reviewed  patient and family:;LOB;dizziness;change in vital signs;increased discomfort  -BD     Benefits Reviewed  patient and family:;improve function;increase strength;decrease pain  -BD     Barriers to Rehab  medically complex  -BD     Row Name 11/14/18 0905          Relationship/Environment    Primary Source of Support/Comfort  child(lakia);spouse  -BD     Lives With  spouse;child(lakia), adult  -BD     Family Caregiver if Needed  child(lakia), adult  -BD     Concerns About Impact on Relationships   Pt lives with spouse and one daughter that is caretaker  -BD     Row Name 11/14/18 0905          Resource/Environmental Concerns    Current Living Arrangements  home/apartment/condo  -BD     Row Name 11/14/18 0905          Home Main Entrance    Number of Stairs, Main Entrance  three  -BD     Stair Railings, Main Entrance  railings on both sides of stairs  -BD     Row Name 11/14/18 0905          Cognitive Assessment/Interventions    Additional Documentation  Cognitive Assessment/Intervention (Group)  -BD     Row Name 11/14/18 0905          Cognitive Assessment/Intervention- PT/OT    Affect/Mental Status (Cognitive)  WFL  -BD     Orientation Status (Cognition)  oriented x 4  -BD     Follows Commands (Cognition)  WFL;follows one step commands;over 90% accuracy  -BD     Cognitive Function (Cognitive)  safety deficit  -BD     Safety Deficit (Cognitive)  mild deficit;insight into deficits/self awareness  -BD     Row Name 11/14/18 0905          Bed Mobility Assessment/Treatment    Bed Mobility Assessment/Treatment  supine-sit  -BD     Scooting/Bridging [place] (Bed Mobility)  conditional independence;supervision  -BD     Comment (Bed Mobility)  Pt able to move to sit on EOB with extra time and VC's  -BD     Row Name 11/14/18 0905          Transfer Assessment/Treatment    Transfer Assessment/Treatment  sit-stand transfer;stand-sit transfer  -BD     Maintains Weight-bearing Status (Transfers)  able to maintain  -BD     Sit-Stand [place] (Transfers)  minimum assist (75% patient effort)  -BD     Stand-Sit [place] (Transfers)  set up;minimum assist (75% patient effort)  -BD     Row Name 11/14/18 0905          Sit-Stand Transfer    Assistive Device (Sit-Stand Transfers)  walker, front-wheeled  -BD     Row Name 11/14/18 0905          Stand-Sit Transfer    Assistive Device (Stand-Sit Transfers)  walker, front-wheeled  -BD     Row Name 11/14/18 0905          Gait/Stairs Assessment/Training    47807 - Gait Training  Minutes   15  -BD     Gait/Stairs Assessment/Training  gait/ambulation independence;gait/ambulation assistive device;distance ambulated;gait pattern;gait deviations  -     [place] Level (Gait)  contact guard  -BD     Assistive Device (Gait)  walker, front-wheeled  -BD     Distance in Feet (Gait)  80  -BD     Pattern (Gait)  step-through  -BD     Deviations/Abnormal Patterns (Gait)  gait speed decreased;stride length decreased  -BD     Bilateral Gait Deviations  forward flexed posture Pt keepa AD too far in front of  himself. VC's to reposition  -     Row Name 11/14/18 0905          General ROM    GENERAL ROM COMMENTS  B LE ROM, WFL  -     Row Name 11/14/18 0905          MMT (Manual Muscle Testing)    General MMT Comments  Grossly[place], for LE's 3-/5  -     Row Name 11/14/18 0905          Motor Assessment/Intervention    Additional Documentation  Therapeutic Exercise (Group);Therapeutic Exercise Interventions (Group)  -     Row Name 11/14/18 0905          Therapeutic Exercise    60177 - PT Therapeutic Exercise Minutes  15  -BD     42914 - PT Therapeutic Activity Minutes  10  -     Row Name 11/14/18 0905          Therapeutic Exercise    Lower Extremity (Therapeutic Exercise)  heel slides, bilateral;SAQ (short arc quad), bilateral;quad sets, bilateral  -BD     Lower Extremity Range of Motion (Therapeutic Exercise)  hip flexion/extension, bilateral;hip abduction/adduction, bilateral;hip internal/external rotation, bilateral;knee flexion/extension, bilateral;ankle dorsiflexion/plantar flexion, bilateral  -BD     Exercise Type (Therapeutic Exercise)  AAROM (active assistive range of motion);AROM (active range of motion)  -BD     Position (Therapeutic Exercise)  seated  -BD     Expected Outcome (Therapeutic Exercise)  facilitate normal movement patterns  -     Row Name 11/14/18 0905          Balance    Balance  static sitting balance;static standing balance  -     Row Name 11/14/18 0905          Static  Sitting Balance    Level of [place] (Unsupported Sitting, Static Balance)  independent  -BD     Sitting Position (Unsupported Sitting, Static Balance)  sitting on edge of bed  -BD     Time Able to Maintain Position (Unsupported Sitting, Static Balance)  4 to 5 minutes  -BD     Row Name 11/14/18 0905          Static Standing Balance    Level of [place] (Supported Standing, Static Balance)  contact guard assist  -BD     Time Able to Maintain Position (Supported Standing, Static Balance)  45 to 60 seconds  -BD     Assistive Device Utilized (Supported Standing, Static Balance)  rolling walker  -BD     Comment (Supported Standing, Static Balance)  Forward head and flexed upper spine.  -     Row Name 11/14/18 0905          Dynamic Standing Balance    Level of [place], Reaches Outside Midline (Standing, Dynamic Balance)  minimal assist, 75% patient effort  -BD     Time Able to Maintain Position, Reaches Outside Midline (Standing, Dynamic Balance)  2 to 3 minutes  -BD     Comment, Reaches Outside Midline (Standing, Dynamic Balance)  Pt needs to have support to stand/ when standing  -BD     [facility] Name 11/14/18 0905          Sensory Assessment/Intervention    Sensory General Assessment  no sensation deficits identified  -BD     Row Name 11/14/18 0905          Hearing Assessment    Hearing Status  WFL  -BD     Row Name 11/14/18 0905          Vision Assessment/Intervention    Visual Impairment/Limitations  corrective lenses for reading;other (see comments) Blindness R eye.  -[facility] Name 11/14/18 0905          Pain Scale: Numbers Pre/Post-Treatment    Pain Scale: Numbers, Pretreatment  3/10  -BD     Pain Scale: Numbers, Post-Treatment  3/10  -BD     Pain Location - Side  Bilateral  -BD     Pain Location - Orientation  lower  -BD     Pain Location  back  -BD     Pain Intervention(s)  Ambulation/increased activity;Repositioned  -     Row Name 11/14/18 0905          Plan of Care Review    Plan of Care Reviewed  With  patient;daughter  -BD     Row Name 11/14/18 0905          Physical Therapy Clinical Impression    Date of Referral to PT  11/13/18  -BD     PT Diagnosis (PT Clinical Impression)  impaired functiuonal mobility  -BD     Patient/Family Goals Statement (PT Clinical Impression)  pt wants to return home after hospitlaization. Pt also open to returning rehab.  -BD     Criteria for Skilled Interventions Met (PT Clinical Impression)  yes  -BD     Rehab Potential (PT Clinical Summary)  good, to achieve stated therapy goals  -BD     Row Name 11/14/18 0905          Vital Signs    O2 Delivery Pre Treatment  supplemental O2  -BD     Pre Patient Position  Supine  -BD     Intra Patient Position  Standing  -BD     Post Patient Position  Sitting  -BD     Row Name 11/14/18 0905          Physical Therapy Goals    Bed Mobility Goal Selection (PT)  bed mobility, PT goal 1  -BD     Transfer Goal Selection (PT)  transfer, PT goal 1  -BD     Gait Training Goal Selection (PT)  gait training, PT goal 1  -BD     Row Name 11/14/18 0905          Bed Mobility Goal 1 (PT)    Activity/Assistive Device (Bed Mobility Goal 1, PT)  bed mobility activities, all;sit to supine/supine to sit  -BD     [place] Level/Cues Needed (Bed Mobility Goal 1, PT)  supervision required  -BD     Time Frame (Bed Mobility Goal 1, PT)  2 weeks  -BD     Progress/Outcomes (Bed Mobility Goal 1, PT)  goal ongoing  -BD     Row Name 11/14/18 0905          Transfer Goal 1 (PT)    Activity/Assistive Device (Transfer Goal 1, PT)  transfers, all;sit-to-stand/stand-to-sit  -BD     [place]/Cues Needed (Transfer Goal 1, PT)  supervision required;contact guard assist  -BD     Time Frame (Transfer Goal 1, PT)  2 weeks  -BD     Progress/Outcome (Transfer Goal 1, PT)  goal ongoing  -BD     Row Name 11/14/18 0905          Gait Training Goal 1 (PT)    Activity/Assistive Device (Gait Training Goal 1, PT)  gait (walking locomotion);assistive device use;improve balance and  speed;increase endurance/gait distance  -BD     [place] Level (Gait Training Goal 1, PT)  set-up required;supervision required  -BD     Distance (Gait Goal 1, PT)  350  -BD     Time Frame (Gait Training Goal 1, PT)  2 weeks  -BD     Progress/Outcome (Gait Training Goal 1, PT)  goal ongoing  -BD     Row Name 11/14/18 0905          Patient Education Goal (PT)    Activity (Patient Education Goal, PT)  Pt to be educated to safery precautions and importance of moving frequently  -BD     Row Name 11/14/18 0905          Positioning and Restraints    Pre-Treatment Position  in bed  -BD     Post Treatment Position  chair  -BD     In Chair  notified nsg;reclined;call light within reach;encouraged to call for assist;exit alarm on;with family/caregiver;legs elevated  -BD     Row Name 11/14/18 0905          Living Environment    Home Accessibility  stairs to enter home  -BD       User Key  (r) = Recorded By, (t) = Taken By, (c) = Cosigned By    Initials Name Provider Type    BD Luisa Gallardo, PT Physical Therapist        Physical Therapy Education     Title: PT OT SLP Therapies (Active)     Topic: Physical Therapy (Active)     Point: Mobility training (Active)     Learning Progress Summary           Patient Acceptance, E,D, NR by BD at 11/14/2018  1:15 PM   Family Acceptance, E,D, NR by BD at 11/14/2018  1:15 PM                   Point: Home exercise program (Active)     Learning Progress Summary           Patient Acceptance, E,D, NR by BD at 11/14/2018  1:15 PM   Family Acceptance, E,D, NR by BD at 11/14/2018  1:15 PM                   Point: Body mechanics (Active)     Learning Progress Summary           Patient Acceptance, E,D, NR by BD at 11/14/2018  1:15 PM   Family Acceptance, E,D, NR by BD at 11/14/2018  1:15 PM                   Point: Precautions (Active)     Learning Progress Summary           Patient Acceptance, E,D, NR by BD at 11/14/2018  1:15 PM   Family Acceptance, E,D, NR by BD at 11/14/2018  1:15 PM                                User Key     Initials Effective Dates Name Provider Type Discipline     06/08/18 -  Luisa Gallardo, PT Physical Therapist PT              PT Recommendation and Plan  Anticipated Discharge Disposition (PT): home with assist, inpatient rehabilitation facility  Planned Therapy Interventions (PT Eval): balance training, bed mobility training, gait training, home exercise program, joint mobilization, patient/family education, transfer training  Therapy Frequency (PT Clinical Impression): daily  Outcome Summary/Treatment Plan (PT)  Anticipated Discharge Disposition (PT): home with assist, inpatient rehabilitation facility  Plan of Care Reviewed With: patient, daughter  Progress: improving  Outcome Summary: Pt improved distance ambulated today. he is able to walk in hallway with CGA and VC's. Continue with PT.  Outcome Measures     Row Name 11/14/18 0800             How much help from another is currently needed...    Putting on and taking off regular lower body clothing?  2  -KF      Bathing (including washing, rinsing, and drying)  2  -KF      Toileting (which includes using toilet bed pan or urinal)  3  -KF      Putting on and taking off regular upper body clothing  3  -KF      Taking care of personal grooming (such as brushing teeth)  3  -KF      Eating meals  3  -KF      Score  16  -KF         Functional Assessment    Outcome Measure Options  AM-PAC 6 Clicks Daily Activity (OT)  -KF        User Key  (r) = Recorded By, (t) = Taken By, (c) = Cosigned By    Initials Name Provider Type    KF Jillian Bradford, OT Occupational Therapist         Time Calculation:   PT Charges     Row Name 11/14/18 1317 11/14/18 0905          Time Calculation    Start Time  0905 -BD  --     PT Received On  11/14/18  -BD  --     PT Goal Re-Cert Due Date  11/24/18  -BD  --        Time Calculation- PT    Total Timed Code Minutes- PT  38 minute(s)  -BD  --        Timed Charges    27744 - PT Therapeutic  Exercise Minutes  --  15  -BD     58425 - Gait Training Minutes   --  15  -BD     63914 - PT Therapeutic Activity Minutes  --  10  -BD       User Key  (r) = Recorded By, (t) = Taken By, (c) = Cosigned By    Initials Name Provider Type    Luisa Medrano, PT Physical Therapist        Therapy Suggested Charges     Code   Minutes Charges    54470 (CPT®) Hc Pt Neuromusc Re Education Ea 15 Min      68584 (CPT®) Hc Pt Ther Proc Ea 15 Min 15 1    86209 (CPT®) Hc Gait Training Ea 15 Min 15 1    77441 (CPT®) Hc Pt Therapeutic Act Ea 15 Min 10 1    06659 (CPT®) Hc Pt Manual Therapy Ea 15 Min      59974 (CPT®) Hc Pt Iontophoresis Ea 15 Min      77065 (CPT®) Hc Pt Elec Stim Ea-Per 15 Min      80423 (CPT®) Hc Pt Ultrasound Ea 15 Min      29283 (CPT®) Hc Pt Self Care/Mgmt/Train Ea 15 Min      34998 (CPT®) Hc Pt Prosthetic (S) Train Initial Encounter, Each 15 Min      09605 (CPT®) Hc Pt Orthotic(S)/Prosthetic(S) Encounter, Each 15 Min      38304 (CPT®) Hc Orthotic(S) Mgmt/Train Initial Encounter, Each 15min      Total  40 3        Therapy Charges for Today     Code Description Service Date Service Provider Modifiers Qty    11376568501 HC PT THER PROC EA 15 MIN 11/14/2018 Luisa Gallardo, PT GP 1    24669410355 HC GAIT TRAINING EA 15 MIN 11/14/2018 Luisa Gallardo, PT GP 1    24878988642 HC PT THERAPEUTIC ACT EA 15 MIN 11/14/2018 Luisa Gallardo, PT GP 1    56765313726 HC PT RE-EVAL ESTABLISHED PLAN 2 11/14/2018 Luisa Gallardo, PT GP 1          PT G-Codes  Outcome Measure Options: AM-PAC 6 Clicks Daily Activity (OT)  Score: 16      Luisa Gallardo, PT  11/14/2018

## 2018-11-14 NOTE — PROGRESS NOTES
Pharmacy Consult-Vancomycin Dosing  Harris Shane is a  89 y.o. male receiving vancomycin therapy.     Indication:bacteremia;cancer patient with CAP.  Consulting Provider:karen PARADA Consult:     Goal Trough:15-20    Current Antimicrobial Therapy  Anti-Infectives (From admission, onward)      Ordered     Dose/Rate Route Frequency Start Stop    11/14/18 1335  azithromycin (ZITHROMAX) tablet 250 mg     Ordering Provider:  Bob Juarez MD    250 mg Oral Every 24 Hours Scheduled 11/15/18 0900 11/20/18 0859    11/14/18 0052  cefTRIAXone (ROCEPHIN) IVPB 1 g     Ordering Provider:  Ophelia Aguilar APRN    1 g  100 mL/hr over 30 Minutes Intravenous Every 24 Hours 11/14/18 2100 11/19/18 2059    11/14/18 1344  vancomycin (VANCOCIN) in iso-osmotic dextrose IVPB 1 g (premix) 200 mL     Ordering Provider:  Bob Juarez MD    1,000 mg  over 60 Minutes Intravenous Every 18 Hours 11/14/18 1800 11/25/18 0559    11/14/18 1335  Pharmacy to dose vancomycin     Ordering Provider:  Bob Juarez MD     Does not apply Continuous PRN 11/14/18 1335 11/28/18 1334    11/13/18 2333  vancomycin 2000 mg/500 mL 0.9% NS IVPB ([facility])     Ordering Provider:  Maco Cleary, [facility]    25 mg/kg × 79.4 kg  over 120 Minutes Intravenous Once 11/13/18 2335 11/14/18 0253    11/13/18 2326  cefTRIAXone (ROCEPHIN) IVPB 1 g     Ordering Provider:  Reginald Saini MD    1 g  100 mL/hr over 30 Minutes Intravenous Once 11/13/18 2327 11/14/18 0418    11/13/18 1954  cefTRIAXone (ROCEPHIN) IVPB 1 g     Ordering Provider:  Reginald Saini MD    1 g  100 mL/hr over 30 Minutes Intravenous Once 11/13/18 1956 11/13/18 2330    11/13/18 1954  AZITHROMYCIN 500 MG/250 ML 0.9% NS IVPB ([facility])     Ordering Provider:  Reginald Saini MD    500 mg  over 60 Minutes Intravenous Once 11/13/18 1956 11/14/18 0315            Allergies  Allergies as of 11/13/2018 - Reviewed 11/13/2018   Allergen Reaction Noted   • Contrast dye Rash and  "Other (See Comments) 06/12/2018       Labs    Results from last 7 days   Lab Units  11/14/18   0341  11/13/18   1854   BUN mg/dL  17  20   CREATININE mg/dL  0.73  0.94       Results from last 7 days   Lab Units  11/14/18   0341  11/13/18   1854   WBC 10*3/mm3  7.03  7.70       Evaluation of Dosing     Last Dose Received in the ED/Outside Facility:     Ht - 165.1 cm (65\")  Wt - 79 kg (174 lb 2.6 oz)    Estimated Creatinine Clearance: 58.3 mL/min (by C-G formula based on SCr of 0.73 mg/dL).    Intake & Output (last 3 days)         11/11 0701 - 11/12 0700 11/12 0701 - 11/13 0700 11/13 0701 - 11/14 0700 11/14 0701 - 11/15 0700    IV Piggyback   100     Total Intake(mL/kg)   100 (1.3)     Urine (mL/kg/hr)   125 175 (0.3)    Stool    0    Total Output   125 175    Net   -25 -175            Urine Unmeasured Occurrence    1 x    Stool Unmeasured Occurrence    1 x            Microbiology and Radiology  Microbiology Results (last 10 days)       Procedure Component Value - Date/Time    Blood Culture - Blood, Arm, Left [[id number]] Collected:  11/13/18 2045    Lab Status:  Preliminary result Specimen:  Blood from Arm, Left Updated:  11/14/18 0901     Blood Culture No growth at less than 24 hours    Blood Culture - Blood, Arm, Right [[id number]]  (Abnormal) Collected:  11/13/18 2030    Lab Status:  Preliminary result Specimen:  Blood from Arm, Right Updated:  11/14/18 1319     Blood Culture Abnormal Stain     Gram Stain Anaerobic Bottle Gram positive cocci in groups            Evaluation of Level        Assessment/Plan: Cancer patient with pneumonia. Also on zithromax and rocephin. Recd vanc load 25 mg/kg. Following with q18h dosing 1000 mg. Obtain trough before 3rd dose 11/15.      "

## 2018-11-14 NOTE — ED PROVIDER NOTES
Subjective   History of Present Illness    Review of Systems    Past Medical History:   Diagnosis Date   • Arthritis    • Blind     right eye   • Blindness of right eye 6/13/2018   • BPH (benign prostatic hyperplasia)    • Cancer (CMS/HCC)     PROSTATE   • Coronary artery disease    • DVT (deep venous thrombosis) (CMS/HCC) 1990's   • GERD (gastroesophageal reflux disease)    • Hyperlipidemia    • Hypertension    • Metastatic cancer to spine, L4-L5 6/13/2018   • Myocardial infarction (CMS/HCC) 1970's   • WILLIAM (obstructive sleep apnea) 06/13/2018    no cpap   • Prostate cancer (CMS/HCC)    • UTI (urinary tract infection)        Allergies   Allergen Reactions   • Contrast Dye Rash and Other (See Comments)     RASH AND SYNCOPE       Past Surgical History:   Procedure Laterality Date   • CATARACT EXTRACTION Bilateral    • COLONOSCOPY      3 years ago   • EYE SURGERY      RIGHT       Family History   Problem Relation Age of Onset   • Breast cancer Mother    • Prostate cancer Father    • Coronary artery disease Father    • No Known Problems Sister    • Stroke Brother    • Coronary artery disease Brother        Social History     Socioeconomic History   • Marital status:      Spouse name: Not on file   • Number of children: 3   • Years of education: Not on file   • Highest education level: Not on file   Tobacco Use   • Smoking status: Former Smoker     Types: Pipe   • Smokeless tobacco: Never Used   • Tobacco comment: QUIT IN THE 70'S   Substance and Sexual Activity   • Alcohol use: No   • Drug use: No   • Sexual activity: Defer   Social History Narrative    Patient is  and lives with his spouse and one of his daughters.         Objective   Physical Exam    Procedures         ED Course  ED Course as of Nov 13 2336   Tue Nov 13, 2018   1853 89-year-old male presents for evaluation of progressively worsening generalized weakness over the past 2 days as well as difficulty walking secondary to lower extremity  weakness.  On arrival to the ED, patient well-appearing.  No focal neurological deficits noted.  Normoreflexic.  Doubt Guillain-Barré syndrome.  Neurovascularly intact distally in bilateral lower extremities with bounding distal pulses and normal sensation.  5 out of 5 strength in both lower extremities.  We will obtain labs and imaging and we will reassess following initial interventions.  [DD]   1943 Plain films of pelvis equivocal.  [DD]   1953 Labs unrevealing.  Chest x-ray suggestive of  pneumonia and pleural effusion.  Rocephin and azithromycin given for community-acquired pneumonia coverage.  [DD]   2002 CT head negative.  [DD]   2002 Given the patient's chest x-ray findings, clinical presentation, and inability to walk, I feel that he warrants inpatient treatment at this time.  We will seek admission to the hospital for further evaluation and treatment as well as for further advanced imaging which is currently pending.  The patient is hemodynamically stable and aware/agreeable with the plan.  [DD]   2053 I discussed the patient's case with Dr. Garcia, and we will admit for further evaluation and treatment.  The patient is hemodynamically stable and aware/agreeable with the plan.  Advanced imaging pending at this time.  [DD]   2324 MRI Brain negative.  MRI concerning for osteomyelitis, discitis, and possible epidural abscess at L4-5.  Awaiting callback from Dr. Donnelly at this time.  [DD]   2329 I discussed the patient's case with Dr. Donnelly who recommended keeping the patient nothing by mouth and he will see the patient in the hospital tonight.  Clinically, the patient does not appear to have cauda equina syndrome.  I do not feel that they need to go to the OR emergently.  I notified Dr. Garcia of the patient's MRI findings and that Dr. Donnelly will be consulting.  [DD]      ED Course User Index  [DD] Reginald Saini MD         Recent Results (from the past 24 hour(s))   Comprehensive Metabolic Panel     Collection Time: 11/13/18  6:54 PM   Result Value Ref Range    Glucose 109 (H) 70 - 100 mg/dL    BUN 20 9 - 23 mg/dL    Creatinine 0.94 0.60 - 1.30 mg/dL    Sodium 136 132 - 146 mmol/L    Potassium 3.8 3.5 - 5.5 mmol/L    Chloride 101 99 - 109 mmol/L    CO2 28.0 20.0 - 31.0 mmol/L    Calcium 8.9 8.7 - 10.4 mg/dL    Total Protein 6.2 5.7 - 8.2 g/dL    Albumin 3.99 3.20 - 4.80 g/dL    ALT (SGPT) 21 7 - 40 U/L    AST (SGOT) 38 (H) 0 - 33 U/L    Alkaline Phosphatase 346 (H) 25 - 100 U/L    Total Bilirubin 0.4 0.3 - 1.2 mg/dL    eGFR Non African Amer 75 >60 mL/min/1.73    Globulin 2.2 gm/dL    A/G Ratio 1.8 1.5 - 2.5 g/dL    BUN/Creatinine Ratio 21.3 7.0 - 25.0    Anion Gap 7.0 3.0 - 11.0 mmol/L   Light Blue Top    Collection Time: 11/13/18  6:54 PM   Result Value Ref Range    Extra Tube hold for add-on    Green Top (Gel)    Collection Time: 11/13/18  6:54 PM   Result Value Ref Range    Extra Tube Hold for add-ons.    Lavender Top    Collection Time: 11/13/18  6:54 PM   Result Value Ref Range    Extra Tube hold for add-on    Gold Top - SST    Collection Time: 11/13/18  6:54 PM   Result Value Ref Range    Extra Tube Hold for add-ons.    CBC Auto Differential    Collection Time: 11/13/18  6:54 PM   Result Value Ref Range    WBC 7.70 3.50 - 10.80 10*3/mm3    RBC 4.25 4.20 - 5.76 10*6/mm3    Hemoglobin 12.2 (L) 13.1 - 17.5 g/dL    Hematocrit 37.5 (L) 38.9 - 50.9 %    MCV 88.2 80.0 - 99.0 fL    MCH 28.7 27.0 - 31.0 pg    MCHC 32.5 32.0 - 36.0 g/dL    RDW 14.8 (H) 11.3 - 14.5 %    RDW-SD 47.6 37.0 - 54.0 fl    MPV 10.6 6.0 - 12.0 fL    Platelets 250 150 - 450 10*3/mm3   Protime-INR    Collection Time: 11/13/18  6:54 PM   Result Value Ref Range    Protime 11.2 9.6 - 11.5 Seconds    INR 1.07 0.91 - 1.09   T4, Free    Collection Time: 11/13/18  6:54 PM   Result Value Ref Range    Free T4 1.16 0.89 - 1.76 ng/dL   Magnesium    Collection Time: 11/13/18  6:54 PM   Result Value Ref Range    Magnesium 2.1 1.3 - 2.7 mg/dL   Manual  Differential    Collection Time: 11/13/18  6:54 PM   Result Value Ref Range    Neutrophil % 69.0 41.0 - 71.0 %    Lymphocyte % 13.0 (L) 24.0 - 44.0 %    Monocyte % 18.0 (H) 0.0 - 12.0 %    Eosinophil % 0.0 0.0 - 3.0 %    Basophil % 0.0 0.0 - 1.0 %    Neutrophils Absolute 5.31 1.50 - 8.30 10*3/mm3    Lymphocytes Absolute 1.00 0.60 - 4.80 10*3/mm3    Monocytes Absolute 1.39 (H) 0.00 - 1.00 10*3/mm3    Eosinophils Absolute 0.00 (L) 0.10 - 0.30 10*3/mm3    Basophils Absolute 0.00 0.00 - 0.20 10*3/mm3    RBC Morphology Normal Normal    WBC Morphology Normal Normal    Platelet Morphology Normal Normal   POC Troponin, Rapid    Collection Time: 11/13/18  6:58 PM   Result Value Ref Range    Troponin I 0.02 0.00 - 0.07 ng/mL   Urinalysis With Microscopic If Indicated (No Culture) - Urine, Clean Catch    Collection Time: 11/13/18  7:11 PM   Result Value Ref Range    Color, UA Yellow Yellow, Straw    Appearance, UA Clear Clear    pH, UA <=5.0 5.0 - 8.0    Specific Gravity, UA 1.019 1.001 - 1.030    Glucose, UA Negative Negative    Ketones, UA Negative Negative    Bilirubin, UA Negative Negative    Blood, UA Negative Negative    Protein, UA Negative Negative    Leuk Esterase, UA Negative Negative    Nitrite, UA Negative Negative    Urobilinogen, UA 0.2 E.U./dL 0.2 - 1.0 E.U./dL   POC Troponin, Rapid    Collection Time: 11/13/18  8:45 PM   Result Value Ref Range    Troponin I 0.02 0.00 - 0.07 ng/mL     Note: In addition to lab results from this visit, the labs listed above may include labs taken at another facility or during a different encounter within the last 24 hours. Please correlate lab times with ED admission and discharge times for further clarification of the services performed during this visit.    MRI Lumbar Spine Without Contrast   Final Result   Addendum 1 of 1   THIS REPORT CONTAINS FINDINGS THAT MAY BE CRITICAL TO PATIENT CARE. The    findings were verbally communicated via telephone conference with PASHA RUELAS     at 11/13/2018 11:22 PM EST. The findings were acknowledged and understood.      THIS DOCUMENT HAS BEEN ELECTRONICALLY SIGNED BY BEKA SOUSA MD      Final   1. Findings suspicious for L4-L5 OSTEOMYELITIS, DISCITIS and probable EPIDURAL    ABSCESS WITH SEVERE SPINAL STENOSIS AND IMPINGEMENT OF THE CAUDA EQUINA.    2. Mottled heterogenous fatty replacement of the marrow diffusely in the spine    with numerous small nodular T2 abnormalities suspicious for metastatic disease.    3. Moderately advanced chronic degenerative changes in lumbar spine.       THIS DOCUMENT HAS BEEN ELECTRONICALLY SIGNED BY BEKA SOUSA MD      MRI Brain Without Contrast   Final Result   1. Mild chronic microvascular ischemic disease and generalized age appropriate    atrophy.    2. No evidence of acute/subacute hemorrhagic or ischemic infarct and no    hydrocephalus.       NOTE: Suboptimal study due to extensive motion artifact.      THIS DOCUMENT HAS BEEN ELECTRONICALLY SIGNED BY BEKA SOUSA MD      CT Head Without Contrast   Final Result   1. Mild chronic microvascular disease and generalized atrophy without acute    intracranial abnormality.    2. No evidence of acute hemorrhage, mass lesion or obvious acute ischemic    infarction.       THIS DOCUMENT HAS BEEN ELECTRONICALLY SIGNED BY BEKA SOUSA MD      XR Chest 1 View   Final Result   1. Chronic cardiopulmonary changes with small bilateral pleural effusion with    atelectasis and consolidation in the lung bases.    2. A large retrocardiac hiatal hernia.       THIS DOCUMENT HAS BEEN ELECTRONICALLY SIGNED BY BEKA SOUSA MD      XR Pelvis 1 or 2 View   Final Result   Chronic degenerative changes with questionable pubic rami fractures. Recommend    followup with MRI if persistent/worsening symptoms.       THIS DOCUMENT HAS BEEN ELECTRONICALLY SIGNED BY BEKA SOUSA MD      MRI Pelvis Without Contrast    (Results Pending)     Vitals:    11/13/18 2100 11/13/18 2251 11/13/18 2259 11/13/18  2310   BP: 138/81 151/87     BP Location:       Patient Position:       Pulse: 90   96   Resp:       Temp:       TempSrc:       SpO2: 95%  94%    Weight:       Height:         Medications   sodium chloride 0.9 % flush 10 mL (not administered)   AZITHROMYCIN 500 MG/250 ML 0.9% NS IVPB (MBP) (not administered)   HYDROmorphone (DILAUDID) injection 0.5 mg (not administered)   cefTRIAXone (ROCEPHIN) IVPB 1 g (not administered)   vancomycin 2000 mg/500 mL 0.9% NS IVPB ([facility]) (not administered)   cefTRIAXone (ROCEPHIN) IVPB 1 g (1 g Intravenous New Bag 11/13/18 2257)   HYDROmorphone (DILAUDID) injection 0.5 mg (0.5 mg Intravenous Given 11/13/18 2209)     ECG/EMG Results (last 24 hours)     Procedure Component Value Units Date/Time    ECG 12 Lead [[id number]] Collected:  11/13/18 1822     Updated:  11/13/18 1823    ECG 12 Lead [[id number]] Collected:  11/13/18 2056     Updated:  11/13/18 2058                    MDM    Final diagnoses:   Community acquired pneumonia, unspecified laterality   Weakness of both lower extremities   Osteomyelitis of other site, unspecified type (CMS/HCC)   Discitis of lumbar region   Epidural abscess       Documentation assistance provided by manuel Merrill.  Information recorded by the scribe was done at my direction and has been verified and validated by me.     Angelica Merrill  11/13/18 [id number]

## 2018-11-14 NOTE — PROGRESS NOTES
[facility]  PROGRESS NOTE    Patient Name: Harris Shane  : 9/15/1927  MRN: [id number]    Date of Admission: 2018  Length of Stay: 1  Primary Care Physician: Charli Mccormack MD    Subjective   Subjective     CC:  Follow-up for pneumonia and prostate cancer    HPI:  Patient continues to complain of intractable pain in his back and hips.  He states it is a little improved since yesterday.  He notes improvement with IV hydromorphone.  He said the MRI was quite painful.  He is awaiting radiation therapy treatment today.  He hopes this will help with his pain.  He denies any fevers or chills.  He denies much shortness of breath or cough currently and says his breathing is stable.  His family is at the bedside providing support and care.  No other new complaints reported    Review of Systems  Gen- No fevers, chills  CV- No chest pain, palpitations  Resp- No cough, dyspnea  GI- No N/V/D, abd pain      Otherwise ROS is negative except as mentioned in the HPI.    Objective   Objective     Vital Signs:   Temp:  [97.5 °F (36.4 °C)-98.7 °F (37.1 °C)] 97.7 °F (36.5 °C)  Heart Rate:  [] 86  Resp:  [16-20] 18  BP: (115-153)/(69-99) 115/73        Physical Exam:  Constitutional: No acute distress, awake, alert  HENT: NCAT, mucous membranes moist  Respiratory: Decreased breath sounds at the bases otherwise clear to auscultation at the apex bilaterally, respiratory effort normal, nonlabored breathing  Cardiovascular: RRR, no murmurs, rubs, or gallops, palpable pedal pulses bilaterally  Gastrointestinal: Positive bowel sounds, soft, nontender, nondistended  Musculoskeletal: No bilateral ankle edema, elderly with normal musculature for age, BMI 28, lumbar spine tender  Psychiatric: Appropriate affect, cooperative, conversational and pleasant  Neurologic: No slurred speech or facial droop, follows commands well, not confused    Skin: No rashes, skin warm    Results  Reviewed:  I have personally reviewed current lab, radiology, and data and agree.    Results from last 7 days   Lab Units  11/14/18   0341  11/13/18   1854   WBC 10*3/mm3  7.03  7.70   HEMOGLOBIN g/dL  10.9*  12.2*   HEMATOCRIT %  33.4*  37.5*   PLATELETS 10*3/mm3  238  250   INR    --   1.07     Results from last 7 days   Lab Units  11/14/18   0341  11/13/18   1854   SODIUM mmol/L  135  136   POTASSIUM mmol/L  3.6  3.8   CHLORIDE mmol/L  102  101   CO2 mmol/L  27.0  28.0   BUN mg/dL  17  20   CREATININE mg/dL  0.73  0.94   GLUCOSE mg/dL  82  109*   CALCIUM mg/dL  8.1*  8.9   ALT (SGPT) U/L   --   21   AST (SGOT) U/L   --   38*     Estimated Creatinine Clearance: 58.3 mL/min (by C-G formula based on SCr of 0.73 mg/dL).  No results found for: BNP    Microbiology Results Abnormal     Procedure Component Value - Date/Time    Blood Culture - Blood, Arm, Right [[id number]]  (Abnormal) Collected:  11/13/18 2030    Lab Status:  Preliminary result Specimen:  Blood from Arm, Right Updated:  11/14/18 1319     Blood Culture Abnormal Stain     Gram Stain Anaerobic Bottle Gram positive cocci in groups    Blood Culture - Blood, Arm, Left [[id number]] Collected:  11/13/18 2045    Lab Status:  Preliminary result Specimen:  Blood from Arm, Left Updated:  11/14/18 0901     Blood Culture No growth at less than 24 hours          Imaging Results (last 24 hours)     Procedure Component Value Units Date/Time    MRI Pelvis Without Contrast [[id number]] Collected:  11/14/18 0950     Updated:  11/14/18 0950    Narrative:       EXAMINATION: MRI PELVIS WO CONTRAST- 11/14/2018     INDICATION: questionable pelvic fx s/p falls; J18.9-Pneumonia,  unspecified organism; R29.898-Other symptoms and signs involving the  musculoskeletal system; M86.9-Osteomyelitis, unspecified;  M46.46-Discitis, unspecified, lumbar region; G06.2-Extradural and  subdural abscess, unspecified; Z74.09-Other reduced mobility         TECHNIQUE: Patient terminated the exam after  axial T1 and T2 fat sat and  coronal T2 fat sat series due to discomfort.     COMPARISON: Pelvis plain films 01/13/2018. Nuclear medicine bone scan  09/25/2018     FINDINGS: Yesterday's MRI report indicates findings suspicious for L4-5  osteomyelitis discitis and epidural abscess with spinal stenosis.  Heterogeneous marrow pattern noted, suspicious for metastatic disease.     Inflammatory changes are again noted of the lower lumbar spine and  surrounding soft tissues. There is multifocal marrow space disease  throughout the pelvic bones, with numerous small T1 hypointense T2  hyperintense lesions, the largest in the posterior left ilium where a  couple of lesions 10 to 15 mm in diameter are noted. No fracture is  appreciated in the pelvic bones or proximal femurs on the limited images  obtained. There is again heterogeneous irregular enlargement of the  prostate as seen on CT scan of 06/12/2018. No definite intrapelvic  inflammatory soft tissue changes are seen. Bladder is moderately  distended.       Impression:       1. Limited exam, terminated early by the patient due to discomfort. Note  is again made of lower lumbar inflammation.   2. Enlarged heterogeneous prostate as on 06/12/2018 CT scan.  3. Multiple small metastatic lesions throughout the pelvis, the largest  10 to 15 mm in the posterior left ilium. No visible fracture.     D:  11/14/2018  E:  11/14/2018           MRI Lumbar Spine Without Contrast [[id number]] Collected:  11/13/18 1945     Updated:  11/13/18 2323    Addenda:        THIS REPORT CONTAINS FINDINGS THAT MAY BE CRITICAL TO PATIENT CARE. The   findings were verbally communicated via telephone conference with PASHA RUELAS   at 11/13/2018 11:22 PM EST. The findings were acknowledged and understood.    THIS DOCUMENT HAS BEEN ELECTRONICALLY SIGNED BY PHUONG STODDARD MD  Signed:  11/13/18 2323 by Phuong Stoddard MD    Narrative:       EXAM:    MR Lumbar Spine Without Contrast.     EXAM DATE/TIME:     11/13/2018 7:45 PM     CLINICAL HISTORY:    89 years old, male; Pneumonia, unspecified organism; Other symptoms and signs   involving the musculoskeletal system; Pain; Low back pain; Patient HX:   Generalized weakness that is worsening over the past 2 days which is making it   difficult to walk, he says his wife was pushing him in a walker with the chair   on it and went over a bump and he fell off landing on his back no HX of surgery   to low back; Additional info: Le weakness     TECHNIQUE:    Multiplanar magnetic resonance images of the lumbar spine without contrast.     COMPARISON:    CT LUMBAR SPINE WO CONTRAST 6/6/2018 11:45 AM     FINDINGS:     Loss of normal curvature of the spine with degenerative spondylolisthesis.   Conus ends normally at the level of L1-L2 with severe narrowing of the spinal   canal and impingement of the cauda equina at L4-L5.     Markedly heterogeneous fatty marrow replacement with numerous small less than   1 cm foci of T1 and T2 prolongation within the vertebral bodies.     Extensive marrow edema in L4 and L5 vertebral bodies without compression   deformity suspicious for osteomyelitis and intervening discitis. Also noted is   marrow edema along the posterior margin of S1. Suboptimally visualized   epidural, pre-and paravertebral soft tissue/fluid resulting in moderately   severe spinal canal stenosis and compression of the cauda equina.     At L5-S1 broad-based posterior disc herniation and osteophytes with moderate   to severe foraminal stenosis and bilateral L5 neural impingement. Mild to   moderate spinal stenosis. Hypertrophic facet arthrosis with facetal/perifacetal   edema.     At L4-L5 probably discitis and osteomyelitis, questionable epidural abscess   number severe spinal stenosis and impingement of the cauda equina. Severe   bilateral foraminal narrowing and L4 neural impingement.     At L3-L4 posterior disc herniation and osteophyte without significant spinal   stenosis  or neural foramen narrowing.     At L2-L3 no focal disc herniation, no significant spinal stenosis or neural   foramina narrowing.     At L1-L2 posterior disc herniation and osteophyte without significant spinal   stenosis or neural foramen narrowing.     At T12-L1 no focal disc herniation, no significant spinal stenosis or neural   foramina narrowing.     Chronic degenerative changes in the sacroiliac joints. Pre-and paravertebral   soft tissues are grossly normal. Visualized aorta is unremarkable. Nonspecific   bilateral perinephric fat stranding and indeterminate renal cortical cystic   nodule(s).       Impression:       1. Findings suspicious for L4-L5 OSTEOMYELITIS, DISCITIS and probable EPIDURAL   ABSCESS WITH SEVERE SPINAL STENOSIS AND IMPINGEMENT OF THE CAUDA EQUINA.   2. Mottled heterogenous fatty replacement of the marrow diffusely in the spine   with numerous small nodular T2 abnormalities suspicious for metastatic disease.   3. Moderately advanced chronic degenerative changes in lumbar spine.     THIS DOCUMENT HAS BEEN ELECTRONICALLY SIGNED BY BEKA SOUSA MD    MRI Brain Without Contrast [[id number]] Collected:  11/13/18 1945     Updated:  11/13/18 2306    Narrative:       EXAM:    MR Head Without Contrast     EXAM DATE/TIME:    11/13/2018 7:45 PM     CLINICAL HISTORY:    89 years old, male; Pneumonia, unspecified organism; Other symptoms and signs   involving the musculoskeletal system; Signs and symptoms; Weakness, extremity;   Bilateral; Patient HX: Generalized weakness that is worsening over the past 2   days which is making it difficult to walk, he says his wife was pushing him in   a walker with the chair on it and went over a bump and he fell off landing on   his back no HX of surgery to head; Additional info: Le weakness     TECHNIQUE:    MR of the head without contrast.     COMPARISON:    CT HEAD WO CONTRAST 11/13/2018 7:47 PM     FINDINGS:     A few small foci of abnormal T2 prolongation within  the periventricular and   subcortical white matter of the supratentorial brain without associated   restricted diffusion.     Remainder of the brain parenchyma demonstrates normal signal intensity   without an intra- or extra-axial mass or abnormality. No mass effect or midline   shift.     Midline brain structures including the corpus callosum, pituitary gland,   pineal region, and craniovertebral junction are within normal. Ventricles and   sulci are mildly dilated without evidence of hydrocephalus. Intracranial   vessels demonstrate a normal flow-void.       Impression:       1. Mild chronic microvascular ischemic disease and generalized age appropriate   atrophy.   2. No evidence of acute/subacute hemorrhagic or ischemic infarct and no   hydrocephalus.     NOTE: Suboptimal study due to extensive motion artifact.    THIS DOCUMENT HAS BEEN ELECTRONICALLY SIGNED BY BEKA SOUSA MD    CT Head Without Contrast [[id number]] Collected:  11/13/18 1835     Updated:  11/13/18 1958    Narrative:       EXAM:    CT Head Without Intravenous Contrast     EXAM DATE/TIME:    11/13/2018 6:35 PM     CLINICAL HISTORY:    89 years old, male; Injury or trauma; Fall; Initial encounter; Concussion /   head injury; Consciousness not specified; Additional info: Fall, hit head     TECHNIQUE:    Axial computed tomography images of the head/brain without intravenous   contrast.    All CT scans at this facility use at least one of these dose optimization   techniques: automated exposure control; mA and/or kV adjustment per patient   size (includes targeted exams where dose is matched to clinical indication); or   iterative reconstruction.     COMPARISON:    No relevant prior studies available.     FINDINGS:     Bilateral periventricular lucencies without intraparenchymal hemorrhage and   no obvious acute ischemic stroke. No intra-or extra-axial fluid collection, no   supra-or infratentorial mass, no mass effect or midline shift.     Mildly  dilated ventricles, sulci and basal cisterns without evidence of   hydrocephalus. Skull bones are normal. Mild mucoperiosteal thickening in the   paranasal sinuses. No mastoid effusion.       Impression:       1. Mild chronic microvascular disease and generalized atrophy without acute   intracranial abnormality.   2. No evidence of acute hemorrhage, mass lesion or obvious acute ischemic   infarction.     THIS DOCUMENT HAS BEEN ELECTRONICALLY SIGNED BY BEKA SOUSA MD    XR Chest 1 View [[id number]] Collected:  11/13/18 1819     Updated:  11/13/18 1943    Narrative:       EXAM:    XR Chest, 1 View     EXAM DATE/TIME:    11/13/2018 6:19 PM     CLINICAL HISTORY:    89 years old, male; Signs and symptoms; Other: Weakness, unable to walk;   Additional info: Weak/dizzy/ams triage protocol     TECHNIQUE:    XR of the chest, 1 view.     COMPARISON:    No relevant prior studies available.     FINDINGS:     Prominent lung markings/peribronchial thickening with small bilateral pleural   effusion with atelectasis and consolidation in the lung bases. Cardiomegaly   with normal lung vascularity. Calcification and unfolding of the aortic arch.     Chronic LEFT clavicle fracture with marked deformity at the fracture site.   Mild deformity of the RIGHT chest wall suspicious for old fractures. Osteopenia   with associated chronic degenerative changes in the visualized spine and   shoulder joints. A large retrocardiac hiatal hernia.       Impression:       1. Chronic cardiopulmonary changes with small bilateral pleural effusion with   atelectasis and consolidation in the lung bases.   2. A large retrocardiac hiatal hernia.     THIS DOCUMENT HAS BEEN ELECTRONICALLY SIGNED BY BEKA SOUSA MD    XR Pelvis 1 or 2 View [[id number]] Collected:  11/13/18 1835     Updated:  11/13/18 1942    Narrative:       EXAM:    XR Pelvis, 1 or 2 Views     EXAM DATE/TIME:    11/13/2018 6:35 PM     CLINICAL HISTORY:    89 years old, male; Pain; Pelvic pain;  Additional info: Fall, le weakness     TECHNIQUE:    XR pelvis, 1 or 2 views     COMPARISON:    CT ABDOMEN PELVIS WO CONTRAST 6/12/2018 11:45 PM     FINDINGS:     Marked diffuse osteopenia, advanced chronic degenerative changes in the lower   lumbar spine. Chronic degenerative changes in the sacroiliac joints and hip   joints.     Questionable nondisplaced fractures of the pubic rami without obvious   displaced fracture around the hip joints. Remainder of the visualized bones and   joints are unremarkable. No discrete lytic or blastic lesion.       Impression:       Chronic degenerative changes with questionable pubic rami fractures. Recommend   followup with MRI if persistent/worsening symptoms.     THIS DOCUMENT HAS BEEN ELECTRONICALLY SIGNED BY BEKA SOUSA MD        Results for orders placed during the hospital encounter of 06/12/18   Adult Transthoracic Echo Complete W/ Cont if Necessary Per Protocol    Narrative · Left ventricular systolic function is normal. Estimated EF = 55%.  · Left ventricular diastolic dysfunction (grade I) consistent with   impaired relaxation.          I have reviewed the medications.      [START ON 11/15/2018] azithromycin 250 mg Oral Q24H   ceftriaxone 1 g Intravenous Q24H   dexamethasone 4 mg Intravenous Q6H   enoxaparin 40 mg Subcutaneous Q24H   fentaNYL 1 patch Transdermal Q72H   furosemide 20 mg Oral Daily   pantoprazole 40 mg Intravenous Q AM   sennosides-docusate sodium 1 tablet Oral Daily   terazosin 5 mg Oral Nightly   vitamin D3 5,000 Units Oral Daily         Assessment/Plan   Assessment / Plan     Active Hospital Problems    Diagnosis   • **Community acquired pneumonia   • Lower extremity weakness   • Essential hypertension   • History of DVT (deep vein thrombosis)   • BPH (benign prostatic hyperplasia)   • Prostate cancer (Mets to L4-L5)   • WILLIAM (obstructive sleep apnea) remote, intolerant of CPAP   • Blindness of right eye          Brief Hospital Course to date:  Harris LOPEZ  Acosta is a 89 y.o. male with past medical history of metastatic prostate cancer who presents to the hospital with lower extremity weakness, falls, and worsening back/pelvic pain.  He was found on x-ray to have bilateral lung infiltrates concerning for pneumonia.  Additionally he was found to have MRI findings concerning for possible osteomyelitis, discitis, and epidural abscess L4/L5 and bony pelvic metastases with T2 metastasis.  He was seen by neurosurgery who felt L4 findings could be consistent with metastatic disease.  His blood culture on 11/14 returned positive for gram-positive cocci.      Assessment & Plan:    Diagnosis list:  Community acquired pneumonia  Possible gram-positive bacteremia with bilateral parapneumonic effusions  - Continue azithromycin and ceftriaxone.  vancomycin for bacteremia.  - Monitor for signs of sepsis, currently hemodynamically stable.  - Plan to follow-up on culture results, it is possible that this could be contaminant versus related to spinal pathology, will treat for now.    Possible metastatic findings at L4/L5 versus osteomyelitis/discitis/epidural abscess:  As per above  Neurosurgery feels more consistent with metastatic disease    Metastatic prostate cancer with T2 metastasis and pelvic metastasis, with intractable pelvic pain due to malignancy.  Continue fentanyl patch, continue oral narcotics for moderate pain, IV narcotics for breakthrough.  Monitor closely while receiving IV pain medications.  Appears to be tolerating currently.  Discussed options and risks of IV pain medication with patient and his family and they agree with this plan.  Radiation oncology is starting palliative radiation treatment for pain.    Severe lumbar spinal stenosis and impingement of cauda equina seen on MRI with corresponding lower extremity weakness:  Improved with steroids  Patient reports his strength is improving.  Continue steroids for now.  PT OT.    Generalized debility and advanced  age:  PT OT    Obstructive sleep apnea intolerant of CPAP  Oxygen as needed    Essential hypertension  Blood pressure controlled currently on alpha blocker.  Continue and monitor.    MRI of head and lumbar spine personally reviewed, pathology noted at L4 and no acute changes on MRI.  Laboratory studies ordered.        DVT Prophylaxis:  Lovenox    CODE STATUS:   Code Status and Medical Interventions:   Ordered at: 11/13/18 3801     Level Of Support Discussed With:    Patient     Code Status:    CPR     Medical Interventions (Level of Support Prior to Arrest):    Full       Disposition: I expect the patient to be discharged pending improvement.      Electronically signed by Bob Juarez MD, 11/14/18, 1:35 PM.

## 2018-11-14 NOTE — THERAPY EVALUATION
Acute Care - Occupational Therapy Initial Evaluation  [facility]     Patient Name: Harris Shane  : 9/15/1927  MRN: [id number]  Today's Date: 2018  Onset of Illness/Injury or Date of Surgery: 18  Date of Referral to OT: 18  Referring Physician: CHELITA Aguilar    Admit Date: 2018       ICD-10-CM ICD-9-CM   1. Community acquired pneumonia, unspecified laterality J18.9 486   2. Weakness of both lower extremities R29.898 729.89   3. Osteomyelitis of other site, unspecified type (CMS/HCC) M86.9 730.28   4. Discitis of lumbar region M46.46 722.93   5. Epidural abscess G06.2 324.9   6. Impaired mobility and ADLs Z74.09 799.89     Patient Active Problem List   Diagnosis   • Sepsis (CMS/HCC)   • Elevated liver enzymes   • Prostate cancer (Mets to L4-L5)   • Pulmonary infiltrate, LLLobe   • Cholelithiases of GB neck per CT A/P   • Acute renal insufficiency, CRE 1.43, unknown baseline   • Blindness of right eye   • WILLIAM (obstructive sleep apnea) remote, intolerant of CPAP   • R/O Cholecystitis   • NSTEMI (non-ST elevated myocardial infarction) (R/O Type 2)   • Bacteremia due to Escherichia coli   • Community acquired pneumonia   • Lower extremity weakness   • Essential hypertension   • History of DVT (deep vein thrombosis)   • BPH (benign prostatic hyperplasia)     Past Medical History:   Diagnosis Date   • Arthritis    • Blind     right eye   • Blindness of right eye 2018   • BPH (benign prostatic hyperplasia)    • Cancer (CMS/HCC)     PROSTATE   • Coronary artery disease    • DVT (deep venous thrombosis) (CMS/HCC)    • GERD (gastroesophageal reflux disease)    • Hyperlipidemia    • Hypertension    • Metastatic cancer to spine, L4-L5 2018   • Myocardial infarction (CMS/HCC) s   • WILLIAM (obstructive sleep apnea) 2018    no cpap   • Prostate cancer (CMS/HCC)    • UTI (urinary tract infection)      Past Surgical History:   Procedure Laterality Date   • CATARACT EXTRACTION  Bilateral    • COLONOSCOPY      3 years ago   • EYE SURGERY      RIGHT, age 10           OT ASSESSMENT FLOWSHEET (last 72 hours)      Occupational Therapy Evaluation     Row Name 11/14/18 0800                   OT Evaluation Time/Intention    Subjective Information  complains of;weakness;pain  -KF        Document Type  evaluation  -KF        Mode of Treatment  individual therapy;occupational therapy  -KF        Patient Effort  good  -KF        Symptoms Noted During/After Treatment  none  -KF           General Information    Patient Profile Reviewed?  yes  -KF        Onset of Illness/Injury or Date of Surgery  11/13/18  -KF        Referring Physician  CHELITA Aguilar  -KF        Patient Observations  alert;cooperative;agree to therapy  -KF        Patient/Family Observations  daugther present throughout, Pt up at [facility] with daughter  -KF        General Observations of Patient  on BS, IV intact throughout, NC throughout, RN cleared   -KF        Prior Level of Function  independent:;all household mobility;transfer;ADL's  -KF        Equipment Currently Used at Home  shower chair;walker, rolling;grab bar bed rail  -KF        Pertinent History of Current Functional Problem  89 yo male hx of R eye blindness, L4-L5 mets with prostate cancer, admit with recent falls due to (B) LE weakness, CT (-), neuro surgical consult completed  -KF        Existing Precautions/Restrictions  fall;oxygen therapy device and L/min;spinal  -KF        Limitations/Impairments  visual  -KF        Risks Reviewed  patient and family:;LOB;nausea/vomiting;dizziness;increased discomfort  -KF        Benefits Reviewed  patient and family:;improve function;increase independence;increase strength;increase balance;decrease pain;increase knowledge  -KF        Barriers to Rehab  medically complex;previous functional deficit  -KF           Relationship/Environment    Primary Source of Support/Comfort  child(lakia);spouse  -KF        Lives With  spouse;child(lakia),  adult  -KF        Family Caregiver if Needed  child(lakia), adult  -KF        Concerns About Impact on Relationships  lives with daugther and wife, daugther reported to be in good health to provide care  -KF           Resource/Environmental Concerns    Current Living Arrangements  home/apartment/condo  -KF        Resource/Environmental Concerns  other (see comments) moved bedroom downstairs now lives on downstairs entirely  -KF           Home Main Entrance    Number of Stairs, Main Entrance  three  -KF        Stair Railings, Main Entrance  railings on both sides of stairs  -KF           Cognitive Assessment/Interventions    Additional Documentation  Cognitive Assessment/Intervention (Group)  -KF           Cognitive Assessment/Intervention- PT/OT    Affect/Mental Status (Cognitive)  WFL  -KF        Orientation Status (Cognition)  oriented x 3  -KF        Follows Commands (Cognition)  follows one step commands;over 90% accuracy;verbal cues/prompting required  -KF        Cognitive Function (Cognitive)  safety deficit  -KF        Safety Deficit (Cognitive)  mild deficit;insight into deficits/self awareness;awareness of need for assistance;safety precautions awareness  -KF        Cognitive Interventions (Cognitive)  occupation/activity based interventions;process/task specific training  -KF        Personal Safety Interventions  fall prevention program maintained;muscle strengthening facilitated;gait belt;nonskid shoes/slippers when out of bed  -KF        Cognitive Assessment/Intervention Comment  recommend education on caregiver use of gait belt   -KF           Safety Issues, Functional Mobility    Safety Issues Affecting Function (Mobility)  awareness of need for assistance;insight into deficits/self awareness;judgment;safety precaution awareness  -KF        Impairments Affecting Function (Mobility)  balance;endurance/activity tolerance;strength  -KF           Bed Mobility Assessment/Treatment    Bed Mobility  Assessment/Treatment  sit-supine;scooting/bridging  -KF        Scooting/Bridging [place] (Bed Mobility)  moderate assist (50% patient effort);2 person assist;verbal cues towards HOB  -KF        Sit-Supine [place] (Bed Mobility)  minimum assist (75% patient effort);verbal cues  -KF        Bed Mobility, Safety Issues  decreased use of legs for bridging/pushing  -KF        Assistive Device (Bed Mobility)  bed rails;draw sheet;head of bed elevated  -KF        Comment (Bed Mobility)  use of log rolling and educated on use of log rolling technique for bed mobility for pain management   -KF           Functional Mobility    Functional Mobility- Comment  deferred to PT  -KF           Transfer Assessment/Treatment    Transfer Assessment/Treatment  sit-stand transfer;stand-sit transfer;toilet transfer  -KF        Comment (Transfers)  unsteady with transfer, R visual deficits, cues for hand placement   -KF           Sit-Stand Transfer    Sit-Stand [place] (Transfers)  minimum assist (75% patient effort);verbal cues  -KF        Assistive Device (Sit-Stand Transfers)  other (see comments) BUE supported  -KF           Stand-Sit Transfer    Stand-Sit [place] (Transfers)  minimum assist (75% patient effort);verbal cues  -KF        Assistive Device (Stand-Sit Transfers)  other (see comments) BUE support  -KF           Toilet Transfer    Type (Toilet Transfer)  stand pivot/stand step  -KF        [place] Level (Toilet Transfer)  minimum assist (75% patient effort);verbal cues  -KF        Assistive Device (Toilet Transfer)  other (see comments) BUE support  -KF           ADL Assessment/Intervention    BADL Assessment/Intervention  toileting  -KF           Toileting Assessment/Training    [place] Level (Toileting)  perform perineal hygiene;adjust/manage clothing;contact guard assist  -KF        Assistive Devices (Toileting)  commode, bedside without drop arms  -KF        Toileting Position  supported  standing;supported sitting  -KF           BADL Safety/Performance    Impairments, BADL Safety/Performance  balance;endurance/activity tolerance;strength;pain;range of motion  -KF        Skilled BADL Treatment/Intervention  BADL process/adaptation training;cognitive/safety deficit modifications  -KF           General ROM    GENERAL ROM COMMENTS  BUE shoulder limited to less than 90 degrees (B) flexion, AROM in ER/IR WFL  -KF           MMT (Manual Muscle Testing)    General MMT Comments  Grossly 3-/5 shoulders, (B)  grossly 4+/5  -KF           Motor Assessment/Interventions    Additional Documentation  Balance (Group);Balance Interventions (Group)  -KF           Balance    Balance  static sitting balance;static standing balance;dynamic sitting balance;dynamic standing balance  -KF           Static Sitting Balance    Level of [place] (Unsupported Sitting, Static Balance)  independent  -KF        Sitting Position (Unsupported Sitting, Static Balance)  sitting on edge of bed  -KF        Time Able to Maintain Position (Unsupported Sitting, Static Balance)  more than 5 minutes  -KF           Dynamic Sitting Balance    Level of [place], Reaches Outside Midline (Sitting, Dynamic Balance)  standby assist  -KF        Sitting Position, Reaches Outside Midline (Sitting, Dynamic Balance)  sitting on edge of bed  -KF        Comment, Reaches Outside Midline (Sitting, Dynamic Balance)  scooting to side and weight shifting  -KF           Static Standing Balance    Level of [place] (Supported Standing, Static Balance)  contact guard assist  -KF        Time Able to Maintain Position (Supported Standing, Static Balance)  1 to 2 minutes  -KF        Assistive Device Utilized (Supported Standing, Static Balance)  other (see comments) BUE support  -KF           Dynamic Standing Balance    Level of [place], Reaches Outside Midline (Standing, Dynamic Balance)  minimal assist, 75% patient effort  -KF        Time Able to  Maintain Position, Reaches Outside Midline (Standing, Dynamic Balance)  2 to 3 minutes  -KF        Comment, Reaches Outside Midline (Standing, Dynamic Balance)  requires UE support in standing, unsteady with small steps  -KF           Sensory Assessment/Intervention    Sensory General Assessment  no sensation deficits identified  -KF        Additional Documentation  Vision Assessment/Intervention (Group);Hearing Assessment (Group)  -KF           Hearing Assessment    Hearing Status  WFL  -KF           Vision Assessment/Intervention    Visual Impairment/Limitations  corrective lenses for reading;other (see comments) blindness in R eye  -KF           Positioning and Restraints    Pre-Treatment Position  bedside commode  -KF        Post Treatment Position  bed  -KF        In Bed  notified nsg;supine;fowlers;call light within reach;with nsg;with family/caregiver;side rails up x2;legs elevated  -KF           Pain Assessment    Additional Documentation  Pain Scale: Numbers Pre/Post-Treatment (Group)  -KF           Pain Scale: Numbers Pre/Post-Treatment    Pain Scale: Numbers, Pretreatment  3/10  -KF        Pain Scale: Numbers, Post-Treatment  3/10  -KF        Pain Location - Side  Bilateral  -KF        Pain Location - Orientation  lower  -KF        Pain Location  back  -KF        Pain Intervention(s)  Repositioned;Ambulation/increased activity  -KF           Plan of Care Review    Plan of Care Reviewed With  patient;daughter  -KF           Clinical Impression (OT)    Date of Referral to OT  11/14/18  -KF        OT Diagnosis  ADL decline  -KF        Criteria for Skilled Therapeutic Interventions Met (OT Eval)  yes;treatment indicated  -KF        Rehab Potential (OT Eval)  good, to achieve stated therapy goals  -KF        Therapy Frequency (OT Eval)  daily  -KF        Care Plan Review (OT)  evaluation/treatment results reviewed;current/potential barriers reviewed;care plan/treatment goals reviewed;risks/benefits  reviewed;patient/other agree to care plan  -KF        Care Plan Review, Other Participant (OT Eval)  daughter  -KF        Anticipated Equipment Needs at Discharge (OT)  -- TBD  -KF           Vital Signs    Pre Systolic BP Rehab  -- RN cleared vitals stable throughout  -KF        O2 Delivery Pre Treatment  supplemental O2  -KF        Pre Patient Position  Sitting  -KF        Intra Patient Position  Standing  -KF        Post Patient Position  Supine  -KF           Planned OT Interventions    Planned Therapy Interventions (OT Eval)  activity tolerance training;BADL retraining;adaptive equipment training;cognitive/visual perception retraining;functional balance retraining;IADL retraining;neuromuscular control/coordination retraining;occupation/activity based interventions;patient/caregiver education/training;ROM/therapeutic exercise;strengthening exercise;transfer/mobility retraining  -KF           OT Goals    Bed Mobility Goal Selection (OT)  bed mobility, OT goal 1  -KF        Transfer Goal Selection (OT)  transfer, OT goal 1  -KF        Dressing Goal Selection (OT)  dressing, OT goal 1  -KF           Bed Mobility Goal 1 (OT)    Activity/Assistive Device (Bed Mobility Goal 1, OT)  sit to supine/supine to sit  -KF        [place] Level/Cues Needed (Bed Mobility Goal 1, OT)  supervision required;verbal cues required  -KF        Time Frame (Bed Mobility Goal 1, OT)  long term goal (LTG);by discharge  -KF        Progress/Outcomes (Bed Mobility Goal 1, OT)  goal ongoing  -KF           Transfer Goal 1 (OT)    Activity/Assistive Device (Transfer Goal 1, OT)  bed-to-chair/chair-to-bed;commode, bedside without drop arms;walker, rolling  -KF        [place] Level/Cues Needed (Transfer Goal 1, OT)  contact guard assist;verbal cues required  -KF        Time Frame (Transfer Goal 1, OT)  long term goal (LTG);by discharge  -KF        Progress/Outcome (Transfer Goal 1, OT)  goal ongoing  -KF           Dressing Goal 1 (OT)     Activity/Assistive Device (Dressing Goal 1, OT)  lower body dressing AE prn  -KF        [place]/Cues Needed (Dressing Goal 1, OT)  set-up required  -KF        Time Frame (Dressing Goal 1, OT)  long term goal (LTG);by discharge  -KF        Progress/Outcome (Dressing Goal 1, OT)  goal ongoing  -KF           Living Environment    Home Accessibility  stairs to enter home walk in shower  -KF          User Key  (r) = Recorded By, (t) = Taken By, (c) = Cosigned By    Initials Name Effective Dates    Jillian Teague, OT 04/03/18 -          Occupational Therapy Education     Title: PT OT SLP Therapies (Active)     Topic: Occupational Therapy (Active)     Point: ADL training (Done)     Description: Instruct learner(s) on proper safety adaptation and remediation techniques during self care or transfers.   Instruct in proper use of assistive devices.    Learning Progress Summary           Patient Acceptance, E, VU,NR by KF at 11/14/2018  8:00 AM    Comment:  Purpose of skilled OT services, safety awareness for functional transfers, bed mobility sequencing with log roll technique to decrease pain   Family Acceptance, E, VU,NR by KF at 11/14/2018  8:00 AM    Comment:  Purpose of skilled OT services, safety awareness for functional transfers, bed mobility sequencing with log roll technique to decrease pain                   Point: Precautions (Done)     Description: Instruct learner(s) on prescribed precautions during self-care and functional transfers.    Learning Progress Summary           Patient Acceptance, E, VU,NR by KF at 11/14/2018  8:00 AM    Comment:  Purpose of skilled OT services, safety awareness for functional transfers, bed mobility sequencing with log roll technique to decrease pain   Family Acceptance, E, VU,NR by KF at 11/14/2018  8:00 AM    Comment:  Purpose of skilled OT services, safety awareness for functional transfers, bed mobility sequencing with log roll technique to decrease pain                    Point: Body mechanics (Done)     Description: Instruct learner(s) on proper positioning and spine alignment during self-care, functional mobility activities and/or exercises.    Learning Progress Summary           Patient Acceptance, E, VU,NR by KF at 11/14/2018  8:00 AM    Comment:  Purpose of skilled OT services, safety awareness for functional transfers, bed mobility sequencing with log roll technique to decrease pain   Family Acceptance, E, VU,NR by KF at 11/14/2018  8:00 AM    Comment:  Purpose of skilled OT services, safety awareness for functional transfers, bed mobility sequencing with log roll technique to decrease pain                               User Key     Initials Effective Dates Name Provider Type Discipline     04/03/18 -  Jillian Bradford, OT Occupational Therapist OT                  OT Recommendation and Plan  Outcome Summary/Treatment Plan (OT)  Anticipated Equipment Needs at Discharge (OT): (TBD)  Anticipated Discharge Disposition (OT): home with assist, home with home health  Planned Therapy Interventions (OT Eval): activity tolerance training, BADL retraining, adaptive equipment training, cognitive/visual perception retraining, functional balance retraining, IADL retraining, neuromuscular control/coordination retraining, occupation/activity based interventions, patient/caregiver education/training, ROM/therapeutic exercise, strengthening exercise, transfer/mobility retraining  Therapy Frequency (OT Eval): daily  Plan of Care Review  Plan of Care Reviewed With: patient, daughter  Plan of Care Reviewed With: patient, daughter  Outcome Summary: OT eval completed Pt presents with deficits in strength, activity tolerance, and balance limiting ADL completion and safe ADL completion. Pt min A for sit to stand transfer from BSC and transfer from BSC to bed, min A for bed mobility returning to supine. Recom IPOT and d/c home with assist and HHOT.     Outcome Measures     Row Name 11/14/18  0800             How much help from another is currently needed...    Putting on and taking off regular lower body clothing?  2  -KF      Bathing (including washing, rinsing, and drying)  2  -KF      Toileting (which includes using toilet bed pan or urinal)  3  -KF      Putting on and taking off regular upper body clothing  3  -KF      Taking care of personal grooming (such as brushing teeth)  3  -KF      Eating meals  3  -KF      Score  16  -KF         Functional Assessment    Outcome Measure Options  AM-PAC 6 Clicks Daily Activity (OT)  -KF        User Key  (r) = Recorded By, (t) = Taken By, (c) = Cosigned By    Initials Name Provider Type    Jillian Teague OT Occupational Therapist          Time Calculation:   Time Calculation- OT     Row Name 11/14/18 0800             Time Calculation- OT    OT Start Time  0800  -KF      Total Timed Code Minutes- OT  0 minute(s)  -KF      OT Received On  11/14/18  -KF      OT Goal Re-Cert Due Date  11/24/18  -KF        User Key  (r) = Recorded By, (t) = Taken By, (c) = Cosigned By    Initials Name Provider Type    Jillian Teague OT Occupational Therapist        Therapy Suggested Charges     Code   Minutes Charges    None           Therapy Charges for Today     Code Description Service Date Service Provider Modifiers Qty    [id number] HC OT EVAL MOD COMPLEXITY 4 11/14/2018 Jillian Bradford OT GO 1               Jillian Bradford OT  11/14/2018

## 2018-11-14 NOTE — PROGRESS NOTES
FOLLOW UP ENCOUNTER          CHIEF COMPLAINT::   Metastatic prostate carcinoma                          MEDICAL HISTORY SINCE LAST ENCOUNTER :       89-year-old admitted to the emergency department and neurosurgery was consulted based on x-ray interpretation no changes in the lumbar spine and back pain as being discitis.    Review of his medical records indicates he has metastatic disease in the lower lumbar region from L4 through S1.  This is been treated with radiation in the past.  According to the radiation oncology notes he may receive additional radiation.  X    I reviewed the MRI and believe this to be more of progression of his metastatic disease rather than infective process predicated on his past history.                                      ASSESSMENT/DISPOSITION :       1.  Dexamethasone may will be of some help in alleviating his discomfort and pain.    2.  Radiation oncology needs to see and consider radiation once again.    3.  markers for infection disease are in process.

## 2018-11-14 NOTE — PROGRESS NOTES
Discharge Planning Assessment  [facility]     Patient Name: Harris Shane  MRN: [id number]  Today's Date: 11/14/2018    Admit Date: 11/13/2018    Discharge Needs Assessment     Row Name 11/14/18 1700       Living Environment    Lives With  child(lakia), adult;spouse    Current Living Arrangements  home/apartment/condo    Primary Care Provided by  child(lakia)    Provides Primary Care For  no one, unable/limited ability to care for self    Family Caregiver if Needed  child(lakia), adult    Quality of Family Relationships  helpful;involved;supportive    Able to Return to Prior Arrangements  yes       Resource/Environmental Concerns    Transportation Concerns  car, none       Transition Planning    Patient/Family Anticipates Transition to  home with help/services    Patient/Family Anticipated Services at Transition  home health care    Transportation Anticipated  family or friend will provide       Discharge Needs Assessment    Readmission Within the Last 30 Days  no previous admission in last 30 days    Concerns to be Addressed  discharge planning    Equipment Currently Used at Home  walker, rolling;wheelchair;shower chair;commode    Anticipated Changes Related to Illness  inability to care for self    Equipment Needed After Discharge  none    Outpatient/Agency/Support Group Needs  homecare agency    Discharge Facility/Level of Care Needs  home with home health        Discharge Plan     Row Name 11/14/18 1701       Plan    Plan  home with home health    Patient/Family in Agreement with Plan  yes    Plan Comments  Mr. Shane resides in his home in [place] with his wife and adult daughter Danyell. CM spoke with patient and daughter Loida at bedside. Patient is ambulatory with equipment at home, daughter helps with home and cooking. Patient is currently being followed by [facility] and family wishes to resume those services at discharge. Patient was here in June and at that time  went to [facility] for rehab upon discharge. Family very pleased with their care and if rehab is needed they wish to use [facility] again. At this time plan is home with resumes home health services. Patient and daughter wish to see what PT and OT recommendations are before deciding to go to rehab. Patient has medicare a/b and [employer] federal insurance with rx drug coverage and has no issues or concerns in obtaing or affording medication copays. CM will continue to follow.         Destination      No service coordination in this encounter.      Durable Medical Equipment      No service coordination in this encounter.      Dialysis/Infusion      No service coordination in this encounter.      Home Medical Care      No service coordination in this encounter.      Community Resources      No service coordination in this encounter.        Expected Discharge Date and Time     Expected Discharge Date Expected Discharge Time    Nov 17, 2018         Demographic Summary     Row Name 11/14/18 1701       General Information    Admission Type  inpatient    Arrived From  home    Referral Source  admission list    Preferred Language  English       Contact Information    Permission Granted to Share Info With  ;family/designee        Functional Status     Row Name 11/14/18 1702       Functional Status    Usual Activity Tolerance  fair    Current Activity Tolerance  poor       Functional Status, IADL    Medications  assistive person    Meal Preparation  assistive person    Housekeeping  assistive person    Laundry  assistive person    Shopping  assistive person       Mental Status    General Appearance WDL  WDL       Mental Status Summary    Recent Changes in Mental Status/Cognitive Functioning  no changes        Psychosocial    No documentation.       Abuse/Neglect    No documentation.       Legal    No documentation.       Substance Abuse    No documentation.       Patient Forms    No documentation.           Tish DUTTON  DORY Cruz

## 2018-11-14 NOTE — CONSULTS
CONSULTATION NOTE    NAME:      Harris Shane  :                                                          9/15/1927  DATE OF CONSULTATION:                       2018   REQUESTING PHYSICIAN:                   No ref. provider found  REASON FOR CONSULTATION:             1. Community acquired pneumonia, unspecified laterality    2. Weakness of both lower extremities    3. Osteomyelitis of other site, unspecified type (CMS/HCC)    4. Discitis of lumbar region    5. Epidural abscess    6. Impaired mobility and ADLs       Mr. Shane is a well known patient to me, in short he is a 89-year-old gentleman with a known diagnosis of metastatic high-grade prostate cancer.  He has a previous clinical stage of the T2c, Mark 4+4 = 8 and I originally evaluated him in  of this year when he was admitted with sepsis secondary to a pneumonia, and had rather significant advanced lower back pain attributable to metastases within L4 and L5 vertebral levels.  I treated him with a short course of palliative radiation consisting of 8 Gray in the single fraction to the L4 through S1 levels of his spine.  He had rather dramatic pain improvement, and was then transferred to rehabilitation.  Since that time, he has continued under treatment with Dr. Maco Mejia at the [facility] receiving Eligard injections.  He was actually due for repeat injection tomorrow.  Per his daughter, his last PSA was in the first week of September and measured approximately 10.  He is now admitted after presenting with mental status changes and weakness.  He is on antibiotics for a community-acquired pneumonia.  He had repeat imaging obtained of his lumbar spine which was initially concerning for osteomyelitis, but his inflammatory markers have been relatively normal, and it is more likely that he has progression of disease within his lumbar spine.  I am being asked to evaluate him regarding additional palliative radiation therapy.      BRIEF HISTORY:  Harris Shane  is a very pleasant 89 y.o. male    Allergies   Allergen Reactions   • Contrast Dye Rash and Other (See Comments)     RASH AND SYNCOPE       Social History     Tobacco Use   • Smoking status: Former Smoker     Types: Pipe   • Smokeless tobacco: Never Used   • Tobacco comment: QUIT IN THE 70'S   Substance Use Topics   • Alcohol use: No   • Drug use: No       Past Medical History:   Diagnosis Date   • Arthritis    • Blind     right eye   • Blindness of right eye 6/13/2018   • BPH (benign prostatic hyperplasia)    • Cancer (CMS/HCC)     PROSTATE   • Coronary artery disease    • DVT (deep venous thrombosis) (CMS/HCC) 1990's   • GERD (gastroesophageal reflux disease)    • Hyperlipidemia    • Hypertension    • Metastatic cancer to spine, L4-L5 6/13/2018   • Myocardial infarction (CMS/HCC) 1970's   • WILLIAM (obstructive sleep apnea) 06/13/2018    no cpap   • Prostate cancer (CMS/HCC)    • UTI (urinary tract infection)        family history includes Breast cancer in his mother; Coronary artery disease in his brother and father; No Known Problems in his sister; Prostate cancer in his father; Stroke in his brother.     Past Surgical History:   Procedure Laterality Date   • CATARACT EXTRACTION Bilateral    • COLONOSCOPY      3 years ago   • EYE SURGERY      RIGHT, age 10         Review of Systems   Constitutional: Positive for fatigue.   HENT:   Positive for hearing loss.    Eyes: Positive for eye problems.   Respiratory: Positive for cough and shortness of breath. Negative for chest tightness, hemoptysis and wheezing.    Cardiovascular: Negative for chest pain, leg swelling and palpitations.   Gastrointestinal: Positive for constipation. Negative for abdominal distention and diarrhea.   Genitourinary: Positive for pelvic pain. Negative for difficulty urinating.    Musculoskeletal: Positive for arthralgias, back pain, gait problem and neck stiffness.   Neurological: Positive for dizziness,  extremity weakness, gait problem and numbness.   Hematological: Negative for adenopathy. Bruises/bleeds easily.   Psychiatric/Behavioral: Positive for confusion.           Objective   VITAL SIGNS:   Vitals:    11/13/18 2346 11/14/18 0309 11/14/18 0618 11/14/18 0939   BP: 144/99 124/74  115/73   BP Location: Left arm   Left arm   Patient Position: Lying   Lying   Pulse: 85 89  86   Resp: 20   18   Temp:   98.7 °F (37.1 °C) 97.7 °F (36.5 °C)   TempSrc:    Oral   SpO2: 91%      Weight:       Height:            KPS        70%    Physical Exam   Constitutional: He is oriented to person, place, and time. He appears well-developed and well-nourished. No distress.   HENT:   Head: Normocephalic and atraumatic.   Mouth/Throat: Oropharynx is clear and moist.   Eyes: Conjunctivae are normal.   Right eye blindness with retraction   Neck: Normal range of motion. Neck supple.   Cardiovascular: Normal rate and regular rhythm. Exam reveals no friction rub.   No murmur heard.  Pulmonary/Chest: Effort normal and breath sounds normal. He has no wheezes.   Abdominal: Soft. Bowel sounds are normal. He exhibits no distension and no mass. There is no tenderness.   Musculoskeletal: Normal range of motion. He exhibits no edema.   Tenderness in lumbar spine   Lymphadenopathy:     He has no cervical adenopathy.   Neurological: He is alert and oriented to person, place, and time. He displays abnormal reflex. A sensory deficit is present. He exhibits abnormal muscle tone. Coordination abnormal.   Reduced strength in the BLE predominantly to knee extension and ankle dorsiflexion, 3-4+   Skin: Skin is warm and dry.   Psychiatric: He has a normal mood and affect. His behavior is normal. Judgment and thought content normal.   Nursing note and vitals reviewed.    IMAGING  I have personally reviewed the relevant imaging studies, as follows:  Ct Head Without Contrast    Result Date: 11/13/2018  EXAM:  CT Head Without Intravenous Contrast EXAM  DATE/TIME:  11/13/2018 6:35 PM CLINICAL HISTORY:  89 years old, male; Injury or trauma; Fall; Initial encounter; Concussion / head injury; Consciousness not specified; Additional info: Fall, hit head TECHNIQUE:  Axial computed tomography images of the head/brain without intravenous contrast.  All CT scans at this facility use at least one of these dose optimization techniques: automated exposure control; mA and/or kV adjustment per patient size (includes targeted exams where dose is matched to clinical indication); or iterative reconstruction. COMPARISON:  No relevant prior studies available. FINDINGS:   Bilateral periventricular lucencies without intraparenchymal hemorrhage and no obvious acute ischemic stroke. No intra-or extra-axial fluid collection, no supra-or infratentorial mass, no mass effect or midline shift.   Mildly dilated ventricles, sulci and basal cisterns without evidence of hydrocephalus. Skull bones are normal. Mild mucoperiosteal thickening in the paranasal sinuses. No mastoid effusion.     1. Mild chronic microvascular disease and generalized atrophy without acute intracranial abnormality. 2. No evidence of acute hemorrhage, mass lesion or obvious acute ischemic infarction. THIS DOCUMENT HAS BEEN ELECTRONICALLY SIGNED BY BEKA SOUSA MD    Mri Brain Without Contrast    Result Date: 11/13/2018  EXAM:  MR Head Without Contrast EXAM DATE/TIME:  11/13/2018 7:45 PM CLINICAL HISTORY:  89 years old, male; Pneumonia, unspecified organism; Other symptoms and signs involving the musculoskeletal system; Signs and symptoms; Weakness, extremity; Bilateral; Patient HX: Generalized weakness that is worsening over the past 2 days which is making it difficult to walk, he says his wife was pushing him in a walker with the chair on it and went over a bump and he fell off landing on his back no HX of surgery to head; Additional info: Le weakness TECHNIQUE:  MR of the head without contrast. COMPARISON:  CT HEAD WO  CONTRAST 11/13/2018 7:47 PM FINDINGS:   A few small foci of abnormal T2 prolongation within the periventricular and subcortical white matter of the supratentorial brain without associated restricted diffusion.   Remainder of the brain parenchyma demonstrates normal signal intensity without an intra- or extra-axial mass or abnormality. No mass effect or midline shift.   Midline brain structures including the corpus callosum, pituitary gland, pineal region, and craniovertebral junction are within normal. Ventricles and sulci are mildly dilated without evidence of hydrocephalus. Intracranial vessels demonstrate a normal flow-void.     1. Mild chronic microvascular ischemic disease and generalized age appropriate atrophy. 2. No evidence of acute/subacute hemorrhagic or ischemic infarct and no hydrocephalus. NOTE: Suboptimal study due to extensive motion artifact. THIS DOCUMENT HAS BEEN ELECTRONICALLY SIGNED BY BEKA SOUSA MD    Mri Lumbar Spine Without Contrast    Addendum Date: 11/13/2018    THIS REPORT CONTAINS FINDINGS THAT MAY BE CRITICAL TO PATIENT CARE. The findings were verbally communicated via telephone conference with PASHA RUELAS at 11/13/2018 11:22 PM EST. The findings were acknowledged and understood. THIS DOCUMENT HAS BEEN ELECTRONICALLY SIGNED BY BEKA SOUSA MD    Result Date: 11/13/2018  EXAM:  MR Lumbar Spine Without Contrast. EXAM DATE/TIME:  11/13/2018 7:45 PM CLINICAL HISTORY:  89 years old, male; Pneumonia, unspecified organism; Other symptoms and signs involving the musculoskeletal system; Pain; Low back pain; Patient HX: Generalized weakness that is worsening over the past 2 days which is making it difficult to walk, he says his wife was pushing him in a walker with the chair on it and went over a bump and he fell off landing on his back no HX of surgery to low back; Additional info: Le weakness TECHNIQUE:  Multiplanar magnetic resonance images of the lumbar spine without contrast. COMPARISON:   CT LUMBAR SPINE WO CONTRAST 6/6/2018 11:45 AM FINDINGS:   Loss of normal curvature of the spine with degenerative spondylolisthesis. Conus ends normally at the level of L1-L2 with severe narrowing of the spinal canal and impingement of the cauda equina at L4-L5.   Markedly heterogeneous fatty marrow replacement with numerous small less than 1 cm foci of T1 and T2 prolongation within the vertebral bodies.   Extensive marrow edema in L4 and L5 vertebral bodies without compression deformity suspicious for osteomyelitis and intervening discitis. Also noted is marrow edema along the posterior margin of S1. Suboptimally visualized epidural, pre-and paravertebral soft tissue/fluid resulting in moderately severe spinal canal stenosis and compression of the cauda equina.   At L5-S1 broad-based posterior disc herniation and osteophytes with moderate to severe foraminal stenosis and bilateral L5 neural impingement. Mild to moderate spinal stenosis. Hypertrophic facet arthrosis with facetal/perifacetal edema.   At L4-L5 probably discitis and osteomyelitis, questionable epidural abscess number severe spinal stenosis and impingement of the cauda equina. Severe bilateral foraminal narrowing and L4 neural impingement.   At L3-L4 posterior disc herniation and osteophyte without significant spinal stenosis or neural foramen narrowing.   At L2-L3 no focal disc herniation, no significant spinal stenosis or neural foramina narrowing.   At L1-L2 posterior disc herniation and osteophyte without significant spinal stenosis or neural foramen narrowing.   At T12-L1 no focal disc herniation, no significant spinal stenosis or neural foramina narrowing.   Chronic degenerative changes in the sacroiliac joints. Pre-and paravertebral soft tissues are grossly normal. Visualized aorta is unremarkable. Nonspecific bilateral perinephric fat stranding and indeterminate renal cortical cystic nodule(s).     1. Findings suspicious for L4-L5 OSTEOMYELITIS,  DISCITIS and probable EPIDURAL ABSCESS WITH SEVERE SPINAL STENOSIS AND IMPINGEMENT OF THE CAUDA EQUINA. 2. Mottled heterogenous fatty replacement of the marrow diffusely in the spine with numerous small nodular T2 abnormalities suspicious for metastatic disease. 3. Moderately advanced chronic degenerative changes in lumbar spine. THIS DOCUMENT HAS BEEN ELECTRONICALLY SIGNED BY BEKA SOUSA MD    Mri Pelvis Without Contrast    Result Date: 11/14/2018  EXAMINATION: MRI PELVIS WO CONTRAST- 11/14/2018  INDICATION: questionable pelvic fx s/p falls; J18.9-Pneumonia, unspecified organism; R29.898-Other symptoms and signs involving the musculoskeletal system; M86.9-Osteomyelitis, unspecified; M46.46-Discitis, unspecified, lumbar region; G06.2-Extradural and subdural abscess, unspecified; Z74.09-Other reduced mobility     TECHNIQUE: Patient terminated the exam after axial T1 and T2 fat sat and coronal T2 fat sat series due to discomfort.  COMPARISON: Pelvis plain films 01/13/2018. Nuclear medicine bone scan 09/25/2018  FINDINGS: Yesterday's MRI report indicates findings suspicious for L4-5 osteomyelitis discitis and epidural abscess with spinal stenosis. Heterogeneous marrow pattern noted, suspicious for metastatic disease.  Inflammatory changes are again noted of the lower lumbar spine and surrounding soft tissues. There is multifocal marrow space disease throughout the pelvic bones, with numerous small T1 hypointense T2 hyperintense lesions, the largest in the posterior left ilium where a couple of lesions 10 to 15 mm in diameter are noted. No fracture is appreciated in the pelvic bones or proximal femurs on the limited images obtained. There is again heterogeneous irregular enlargement of the prostate as seen on CT scan of 06/12/2018. No definite intrapelvic inflammatory soft tissue changes are seen. Bladder is moderately distended.      1. Limited exam, terminated early by the patient due to discomfort. Note is again  made of lower lumbar inflammation. 2. Enlarged heterogeneous prostate as on 06/12/2018 CT scan. 3. Multiple small metastatic lesions throughout the pelvis, the largest 10 to 15 mm in the posterior left ilium. No visible fracture.  D:  11/14/2018 E:  11/14/2018        Xr Chest 1 View    Result Date: 11/13/2018  EXAM:  XR Chest, 1 View EXAM DATE/TIME:  11/13/2018 6:19 PM CLINICAL HISTORY:  89 years old, male; Signs and symptoms; Other: Weakness, unable to walk; Additional info: Weak/dizzy/ams triage protocol TECHNIQUE:  XR of the chest, 1 view. COMPARISON:  No relevant prior studies available. FINDINGS:   Prominent lung markings/peribronchial thickening with small bilateral pleural effusion with atelectasis and consolidation in the lung bases. Cardiomegaly with normal lung vascularity. Calcification and unfolding of the aortic arch.   Chronic LEFT clavicle fracture with marked deformity at the fracture site. Mild deformity of the RIGHT chest wall suspicious for old fractures. Osteopenia with associated chronic degenerative changes in the visualized spine and shoulder joints. A large retrocardiac hiatal hernia.     1. Chronic cardiopulmonary changes with small bilateral pleural effusion with atelectasis and consolidation in the lung bases. 2. A large retrocardiac hiatal hernia. THIS DOCUMENT HAS BEEN ELECTRONICALLY SIGNED BY BEKA SOUSA MD    Xr Pelvis 1 Or 2 View    Result Date: 11/13/2018  EXAM:  XR Pelvis, 1 or 2 Views EXAM DATE/TIME:  11/13/2018 6:35 PM CLINICAL HISTORY:  89 years old, male; Pain; Pelvic pain; Additional info: Fall, le weakness TECHNIQUE:  XR pelvis, 1 or 2 views COMPARISON:  CT ABDOMEN PELVIS WO CONTRAST 6/12/2018 11:45 PM FINDINGS:   Marked diffuse osteopenia, advanced chronic degenerative changes in the lower lumbar spine. Chronic degenerative changes in the sacroiliac joints and hip joints.   Questionable nondisplaced fractures of the pubic rami without obvious displaced fracture around the  hip joints. Remainder of the visualized bones and joints are unremarkable. No discrete lytic or blastic lesion.     Chronic degenerative changes with questionable pubic rami fractures. Recommend followup with MRI if persistent/worsening symptoms. THIS DOCUMENT HAS BEEN ELECTRONICALLY SIGNED BY BEKA SOUSA MD    LABORATORY 11/13/2018:  CRP 5.81  ESR 33  Procalcitonin 0.08  Ca 8.1  WBC 7.03       The following portions of the patient's history were reviewed and updated as appropriate: allergies, current medications, past family history, past medical history, past social history, past surgical history and problem list.    Assessment      IMPRESSION:  Mr. Shane is a 89-year-old gentleman who presents with progressive disease within the lumbar spine compatible with his known metastatic prostate cancer.  Even though his MRI scans were interpreted as possibly consistent with osteomyelitis and an epidural abscess, I think considering this is his known site of previous disease, and his relatively normal inflammatory markers, that this argues against an infectious etiology and instead supports progressive disease.  He has previously received 8 Gray of radiation therapy to his lumbar spine, so we can deliver further radiation safely.  I have discussed with him and his daughter (who has POA) again performing short course of radiation therapy to the lumbar spine which will consist of 18 Barron in 3 fractions of 6 Barron each.  I will begin these treatments today.  The daughter signed informed consent.    With respect to further treatment.  He was scheduled to have an Eligard injection tomorrow, but in light of his progressive disease, I anticipate Dr. Mejia will need to change his systemic therapy as hormone therapy alone does not appear to be working.  As an option, he would be a potential candidate for Xofigo injection which could be a very good therapeutic option as he appears to have bone only disease.  I will discuss with  Dr. Mejia and we can decide later if Xofigo will be a good option once we address his lumbar spine.    I have ordered a PSA to be drawn with his labs tomorrow as well as a Nuclear Medicine Bone Scan which can be used to assess for treatment with Xofigo.    RECOMMENDATIONS:   Will start palliative radiation therapy to the lumbar spine today consisting of 18Gy in 3 fractions of 6 Gy each.    PSA  Nuclear Medicine Bone Scan    Thank you for allowing me to participate in the care of this individual.    Sincerely,          Donte Dolan MD      Errors in dictation may reflect use of voice recognition software and not all errors in transcription may have been detected prior to signing.

## 2018-11-14 NOTE — PLAN OF CARE
Problem: Patient Care Overview  Goal: Plan of Care Review  Outcome: Ongoing (interventions implemented as appropriate)   11/14/18 0809   Coping/Psychosocial   Plan of Care Reviewed With patient;daughter   OTHER   Outcome Summary OT eval completed Pt presents with deficits in strength, activity tolerance, and balance limiting ADL completion and safe ADL completion. Pt min A for sit to stand transfer from BSC and transfer from BSC to bed, min A for bed mobility returning to supine. Recom IPOT and d/c home with assist and HHOT.

## 2018-11-15 ENCOUNTER — APPOINTMENT (OUTPATIENT)
Dept: NUCLEAR MEDICINE | Facility: HOSPITAL | Age: 83
End: 2018-11-15

## 2018-11-15 LAB
ANION GAP SERPL CALCULATED.3IONS-SCNC: 6 MMOL/L (ref 3–11)
BUN BLD-MCNC: 20 MG/DL (ref 9–23)
BUN/CREAT SERPL: 25.3 (ref 7–25)
CALCIUM SPEC-SCNC: 8.6 MG/DL (ref 8.7–10.4)
CHLORIDE SERPL-SCNC: 107 MMOL/L (ref 99–109)
CO2 SERPL-SCNC: 22 MMOL/L (ref 20–31)
CREAT BLD-MCNC: 0.79 MG/DL (ref 0.6–1.3)
DEPRECATED RDW RBC AUTO: 47.7 FL (ref 37–54)
ERYTHROCYTE [DISTWIDTH] IN BLOOD BY AUTOMATED COUNT: 14.6 % (ref 11.3–14.5)
GFR SERPL CREATININE-BSD FRML MDRD: 92 ML/MIN/1.73
GLUCOSE BLD-MCNC: 133 MG/DL (ref 70–100)
HCT VFR BLD AUTO: 33.4 % (ref 38.9–50.9)
HGB BLD-MCNC: 10.9 G/DL (ref 13.1–17.5)
MCH RBC QN AUTO: 28.8 PG (ref 27–31)
MCHC RBC AUTO-ENTMCNC: 32.6 G/DL (ref 32–36)
MCV RBC AUTO: 88.4 FL (ref 80–99)
PLATELET # BLD AUTO: 224 10*3/MM3 (ref 150–450)
PMV BLD AUTO: 10.6 FL (ref 6–12)
POTASSIUM BLD-SCNC: 4 MMOL/L (ref 3.5–5.5)
RBC # BLD AUTO: 3.78 10*6/MM3 (ref 4.2–5.76)
SODIUM BLD-SCNC: 135 MMOL/L (ref 132–146)
VANCOMYCIN TROUGH SERPL-MCNC: 9.8 MCG/ML (ref 10–20)
WBC NRBC COR # BLD: 10.82 10*3/MM3 (ref 3.5–10.8)

## 2018-11-15 PROCEDURE — 78306 BONE IMAGING WHOLE BODY: CPT

## 2018-11-15 PROCEDURE — A9503 TC99M MEDRONATE: HCPCS | Performed by: INTERNAL MEDICINE

## 2018-11-15 PROCEDURE — 25010000002 HYDROMORPHONE PER 4 MG: Performed by: INTERNAL MEDICINE

## 2018-11-15 PROCEDURE — 25010000002 ENOXAPARIN PER 10 MG: Performed by: NURSE PRACTITIONER

## 2018-11-15 PROCEDURE — 0 TECHNETIUM MEDRONATE KIT: Performed by: INTERNAL MEDICINE

## 2018-11-15 PROCEDURE — 77412 RADIATION TX DELIVERY LVL 3: CPT | Performed by: RADIOLOGY

## 2018-11-15 PROCEDURE — 85027 COMPLETE CBC AUTOMATED: CPT | Performed by: INTERNAL MEDICINE

## 2018-11-15 PROCEDURE — 77370 RADIATION PHYSICS CONSULT: CPT | Performed by: RADIOLOGY

## 2018-11-15 PROCEDURE — 80202 ASSAY OF VANCOMYCIN: CPT | Performed by: INTERNAL MEDICINE

## 2018-11-15 PROCEDURE — 25010000002 CEFTRIAXONE PER 250 MG: Performed by: NURSE PRACTITIONER

## 2018-11-15 PROCEDURE — 25010000002 DEXAMETHASONE PER 1 MG: Performed by: NEUROLOGICAL SURGERY

## 2018-11-15 PROCEDURE — 99233 SBSQ HOSP IP/OBS HIGH 50: CPT | Performed by: INTERNAL MEDICINE

## 2018-11-15 PROCEDURE — 80048 BASIC METABOLIC PNL TOTAL CA: CPT | Performed by: INTERNAL MEDICINE

## 2018-11-15 RX ORDER — HYDROMORPHONE HYDROCHLORIDE 1 MG/ML
0.5 INJECTION, SOLUTION INTRAMUSCULAR; INTRAVENOUS; SUBCUTANEOUS ONCE AS NEEDED
Status: COMPLETED | OUTPATIENT
Start: 2018-11-15 | End: 2018-11-15

## 2018-11-15 RX ORDER — TERAZOSIN 2 MG/1
2 CAPSULE ORAL NIGHTLY
Status: DISCONTINUED | OUTPATIENT
Start: 2018-11-15 | End: 2018-11-16 | Stop reason: HOSPADM

## 2018-11-15 RX ORDER — TC 99M MEDRONATE 20 MG/10ML
27.3 INJECTION, POWDER, LYOPHILIZED, FOR SOLUTION INTRAVENOUS
Status: COMPLETED | OUTPATIENT
Start: 2018-11-15 | End: 2018-11-15

## 2018-11-15 RX ORDER — GABAPENTIN 100 MG/1
100 CAPSULE ORAL NIGHTLY
Status: DISCONTINUED | OUTPATIENT
Start: 2018-11-15 | End: 2018-11-16 | Stop reason: HOSPADM

## 2018-11-15 RX ADMIN — FUROSEMIDE 20 MG: 20 TABLET ORAL at 08:59

## 2018-11-15 RX ADMIN — DEXAMETHASONE SODIUM PHOSPHATE 4 MG: 4 INJECTION, SOLUTION INTRA-ARTICULAR; INTRALESIONAL; INTRAMUSCULAR; INTRAVENOUS; SOFT TISSUE at 02:25

## 2018-11-15 RX ADMIN — DEXAMETHASONE SODIUM PHOSPHATE 4 MG: 4 INJECTION, SOLUTION INTRA-ARTICULAR; INTRALESIONAL; INTRAMUSCULAR; INTRAVENOUS; SOFT TISSUE at 16:26

## 2018-11-15 RX ADMIN — Medication 27.3 MILLICURIE: at 07:30

## 2018-11-15 RX ADMIN — HYDROCODONE BITARTRATE AND ACETAMINOPHEN 1 TABLET: 5; 325 TABLET ORAL at 22:49

## 2018-11-15 RX ADMIN — CEFTRIAXONE SODIUM 1 G: 1 INJECTION, SOLUTION INTRAVENOUS at 22:01

## 2018-11-15 RX ADMIN — GABAPENTIN 100 MG: 100 CAPSULE ORAL at 22:02

## 2018-11-15 RX ADMIN — TERAZOSIN HYDROCHLORIDE ANHYDROUS 2 MG: 2 CAPSULE ORAL at 22:02

## 2018-11-15 RX ADMIN — PANTOPRAZOLE SODIUM 40 MG: 40 INJECTION, POWDER, FOR SOLUTION INTRAVENOUS at 05:10

## 2018-11-15 RX ADMIN — CHOLECALCIFEROL CAP 125 MCG (5000 UNIT) 5000 UNITS: 125 CAP at 08:59

## 2018-11-15 RX ADMIN — HYDROMORPHONE HYDROCHLORIDE 0.5 MG: 1 INJECTION, SOLUTION INTRAMUSCULAR; INTRAVENOUS; SUBCUTANEOUS at 14:38

## 2018-11-15 RX ADMIN — DEXAMETHASONE SODIUM PHOSPHATE 4 MG: 4 INJECTION, SOLUTION INTRA-ARTICULAR; INTRALESIONAL; INTRAMUSCULAR; INTRAVENOUS; SOFT TISSUE at 08:59

## 2018-11-15 RX ADMIN — HYDROCODONE BITARTRATE AND ACETAMINOPHEN 1 TABLET: 5; 325 TABLET ORAL at 11:55

## 2018-11-15 RX ADMIN — AZITHROMYCIN MONOHYDRATE 250 MG: 250 TABLET ORAL at 08:59

## 2018-11-15 RX ADMIN — DEXAMETHASONE SODIUM PHOSPHATE 4 MG: 4 INJECTION, SOLUTION INTRA-ARTICULAR; INTRALESIONAL; INTRAMUSCULAR; INTRAVENOUS; SOFT TISSUE at 22:02

## 2018-11-15 RX ADMIN — ENOXAPARIN SODIUM 40 MG: 40 INJECTION SUBCUTANEOUS at 09:00

## 2018-11-15 RX ADMIN — Medication 1 TABLET: at 08:59

## 2018-11-15 NOTE — PROGRESS NOTES
[facility] Medicine Services  PROGRESS NOTE    Patient Name: Harris Shane  : 9/15/1927  MRN: [id number]    Date of Admission: 2018  Length of Stay: 2  Primary Care Physician: Charli Mccormack MD    Subjective   Subjective     CC:  Follow-up for pneumonia and prostate cancer    HPI:  Back pain is somewhat improved with pain medications.  Patient tolerating radiation treatments.  Patient denies fevers or chills.  Shortness breath stable and mostly resolved.  No current cough.  No other new complaints reported today.        Review of Systems  Gen- No fevers, chills  CV- No chest pain, palpitations  Resp- No cough, dyspnea  GI- No N/V/D, abd pain      Otherwise ROS is negative except as mentioned in the HPI.    Objective   Objective     Vital Signs:   Temp:  [97.5 °F (36.4 °C)-98.2 °F (36.8 °C)] 97.5 °F (36.4 °C)  Heart Rate:  [65-89] 75  Resp:  [18-20] 18  BP: ()/(68-85) 98/85        Physical Exam:  Constitutional: No acute distress, awake, alert  HENT: NCAT, mucous membranes moist  Respiratory: Decreased breath sounds at the bases otherwise clear to auscultation at the apex bilaterally, respiratory effort normal, nonlabored breathing  Cardiovascular: RRR, no murmurs, rubs, or gallops, palpable pedal pulses bilaterally  Gastrointestinal: Positive bowel sounds, soft, nontender, nondistended  Musculoskeletal: No bilateral ankle edema, elderly with normal musculature for age, BMI 28, lumbar spine tender  Psychiatric: Appropriate affect, cooperative, conversational and pleasant  Neurologic: No slurred speech or facial droop, follows commands well, not confused    Skin: No rashes, skin warm    Results Reviewed:  I have personally reviewed current lab, radiology, and data and agree.    Results from last 7 days   Lab Units  11/15/18   0351  18   0341  18   1854   WBC 10*3/mm3  10.82*  7.03  7.70   HEMOGLOBIN g/dL  10.9*  10.9*  12.2*   HEMATOCRIT %  33.4*  33.4*  37.5*    PLATELETS 10*3/mm3  224  238  250   INR    --    --   1.07     Results from last 7 days   Lab Units  11/15/18   0351  11/14/18   0341  11/13/18   1854   SODIUM mmol/L  135  135  136   POTASSIUM mmol/L  4.0  3.6  3.8   CHLORIDE mmol/L  107  102  101   CO2 mmol/L  22.0  27.0  28.0   BUN mg/dL  20  17  20   CREATININE mg/dL  0.79  0.73  0.94   GLUCOSE mg/dL  133*  82  109*   CALCIUM mg/dL  8.6*  8.1*  8.9   ALT (SGPT) U/L   --    --   21   AST (SGOT) U/L   --    --   38*     Estimated Creatinine Clearance: 58.3 mL/min (by C-G formula based on SCr of 0.79 mg/dL).  No results found for: BNP    Microbiology Results Abnormal     Procedure Component Value - Date/Time    Blood Culture - Blood, Arm, Right [[id number]]  (Abnormal) Collected:  11/13/18 2030    Lab Status:  Preliminary result Specimen:  Blood from Arm, Right Updated:  11/15/18 0755     Blood Culture Staphylococcus, coagulase negative     Isolated from Aerobic and Anaerobic Bottles     Gram Stain Anaerobic Bottle Gram positive cocci in groups      Aerobic Bottle Gram positive cocci in clusters    Blood Culture - Blood, Arm, Left [[id number]]  (Abnormal) Collected:  11/13/18 2045    Lab Status:  Preliminary result Specimen:  Blood from Arm, Left Updated:  11/15/18 0755     Blood Culture Staphylococcus, coagulase negative     Isolated from Aerobic and Anaerobic Bottles     Gram Stain Aerobic Bottle Gram positive cocci in clusters      Anaerobic Bottle Gram positive cocci in clusters    Blood Culture ID, PCR - Blood, Arm, Right [[id number]]  (Abnormal) Collected:  11/13/18 2030    Lab Status:  Final result Specimen:  Blood from Arm, Right Updated:  11/14/18 1431     BCID, PCR Staphylococcus spp, not aureus. Identification by BCID PCR.          Imaging Results (last 24 hours)     Procedure Component Value Units Date/Time    NM Bone Scan Whole Body [[id number]] Updated:  11/15/18 1238    MRI Pelvis Without Contrast [[id number]] Collected:  11/14/18 0950     Updated:   11/14/18 2158    Narrative:       EXAMINATION: MRI PELVIS WO CONTRAST- 11/14/2018     INDICATION: questionable pelvic fx s/p falls; J18.9-Pneumonia,  unspecified organism; R29.898-Other symptoms and signs involving the  musculoskeletal system; M86.9-Osteomyelitis, unspecified;  M46.46-Discitis, unspecified, lumbar region; G06.2-Extradural and  subdural abscess, unspecified; Z74.09-Other reduced mobility         TECHNIQUE: Patient terminated the exam after axial T1 and T2 fat sat and  coronal T2 fat sat series due to discomfort.     COMPARISON: Pelvis plain films 01/13/2018. Nuclear medicine bone scan  09/25/2018     FINDINGS: Yesterday's MRI report indicates findings suspicious for L4-5  osteomyelitis, discitis and epidural abscess with spinal stenosis.  Heterogeneous marrow pattern noted, suspicious for metastatic disease.     Inflammatory changes are again noted of the lower lumbar spine and  surrounding soft tissues. There is multifocal marrow space disease  throughout the pelvic bones, with numerous small T1 hypointense T2  hyperintense lesions, the largest in the posterior left ilium where a  couple of lesions 10 to 15 mm in diameter are noted. No fracture is  appreciated in the pelvic bones or proximal femurs on the limited images  obtained. There is again heterogeneous irregular enlargement of the  prostate as seen on CT scan of 06/12/2018. No definite intrapelvic  inflammatory soft tissue changes are seen. Bladder is moderately  distended.       Impression:       1. Limited exam, terminated early by the patient due to discomfort. Note  is again made of lower lumbar inflammation.   2. Enlarged heterogeneous prostate as on 06/12/2018 CT scan.  3. Multiple small metastatic lesions throughout the pelvis, the largest  10 to 15 mm in the posterior left ilium. No visible fracture.     D:  11/14/2018  E:  11/14/2018         This report was finalized on 11/14/2018 9:56 PM by DR. Joshua Rosen MD.           I have  reviewed the medications.      azithromycin 250 mg Oral Q24H   ceftriaxone 1 g Intravenous Q24H   dexamethasone 4 mg Intravenous Q6H   enoxaparin 40 mg Subcutaneous Q24H   fentaNYL 1 patch Transdermal Q72H   furosemide 20 mg Oral Daily   pantoprazole 40 mg Intravenous Q AM   sennosides-docusate sodium 1 tablet Oral Daily   terazosin 5 mg Oral Nightly   vitamin D3 5,000 Units Oral Daily         Assessment/Plan   Assessment / Plan     Active Hospital Problems    Diagnosis   • **Community acquired pneumonia   • Lower extremity weakness   • Essential hypertension   • History of DVT (deep vein thrombosis)   • BPH (benign prostatic hyperplasia)   • Prostate cancer (Mets to L4-L5)   • WILLIAM (obstructive sleep apnea) remote, intolerant of CPAP   • Blindness of right eye          Brief Hospital Course to date:  Harris Shane is a 89 y.o. male with past medical history of metastatic prostate cancer who presents to the hospital with lower extremity weakness, falls, and worsening back/pelvic pain.  He was found on x-ray to have bilateral lung infiltrates concerning for pneumonia.  Additionally he was found to have MRI findings concerning for possible osteomyelitis, discitis, and epidural abscess L4/L5 and bony pelvic metastases with T2 metastasis.  He was seen by neurosurgery who felt L4 findings could be consistent with metastatic disease.  His blood culture on 11/14 returned positive for gram-positive cocci which ultimately grew coag-negative Staphylococcus concerning for most likely contaminant.      Assessment & Plan:    Diagnosis list:  Community acquired pneumonia with bilateral parapneumonic effusions  Possible gram-positive bacteremia, now felt to be contaminants with coag-negative Staphylococcus  - Continue azithromycin and ceftriaxone.  vancomycin for bacteremia now stopped.  - Monitor for signs of sepsis, currently hemodynamically stable.    Mild asymptomatic hypotension today, but no tachycardia or  fever.    Possible metastatic findings at L4/L5 versus osteomyelitis/discitis/epidural abscess:  As per above  Neurosurgery feels more consistent with metastatic disease    Metastatic prostate cancer with T2 metastasis and pelvic metastasis, with intractable pelvic pain due to malignancy.  Continue fentanyl patch, continue oral narcotics for moderate pain, IV narcotics for breakthrough.    Monitor closely while receiving IV pain medications.    Appears to be tolerating currently.    Discussed options and risks of IV pain medication with patient and his family and they agree with this plan.  Radiation oncology is starting palliative radiation treatment for pain.  Monitor blood pressure carefully.    Severe lumbar spinal stenosis and impingement of cauda equina seen on MRI with corresponding lower extremity weakness:  Improved with steroids  Patient reports his strength is improving.    Continue steroids for now.  PT OT.    Generalized debility and advanced age:  PT OT    Obstructive sleep apnea intolerant of CPAP  Oxygen as needed    Essential hypertension  Blood pressure controlled currently on alpha blocker.  Dose decreased.    Case discussed with pharmacy.  Vancomycin stopped.  Continue pneumonia treatment and plan for possible discharge tomorrow pending improvement.  Alpha blocker adjusted.  Nuclear bone scan images reviewed with increased pelvic and lumbar spine uptake.  Awaiting final radiology read.        DVT Prophylaxis:  Lovenox    CODE STATUS:   Code Status and Medical Interventions:   Ordered at: 11/13/18 2211     Level Of Support Discussed With:    Patient     Code Status:    CPR     Medical Interventions (Level of Support Prior to Arrest):    Full       Disposition: I expect the patient to be discharged pending improvement.      Electronically signed by Bob Juarez MD, 11/15/18, 1:53 PM.

## 2018-11-16 VITALS
HEART RATE: 73 BPM | OXYGEN SATURATION: 94 % | TEMPERATURE: 97.9 F | DIASTOLIC BLOOD PRESSURE: 86 MMHG | RESPIRATION RATE: 18 BRPM | SYSTOLIC BLOOD PRESSURE: 126 MMHG | BODY MASS INDEX: 28.99 KG/M2 | WEIGHT: 174 LBS | HEIGHT: 65 IN

## 2018-11-16 LAB
BACTERIA FLD CULT: NORMAL
L PNEUMO1 AG UR QL IA: NEGATIVE
Lab: NORMAL
ORGANISM ID: NORMAL
S PNEUM AG SPEC QL LA: NEGATIVE
SPECIMEN SOURCE: NORMAL

## 2018-11-16 PROCEDURE — 77412 RADIATION TX DELIVERY LVL 3: CPT | Performed by: RADIOLOGY

## 2018-11-16 PROCEDURE — 25010000002 DEXAMETHASONE PER 1 MG: Performed by: NEUROLOGICAL SURGERY

## 2018-11-16 PROCEDURE — 99239 HOSP IP/OBS DSCHRG MGMT >30: CPT | Performed by: INTERNAL MEDICINE

## 2018-11-16 PROCEDURE — 77336 RADIATION PHYSICS CONSULT: CPT | Performed by: RADIOLOGY

## 2018-11-16 PROCEDURE — 25010000002 ENOXAPARIN PER 10 MG: Performed by: NURSE PRACTITIONER

## 2018-11-16 PROCEDURE — 97530 THERAPEUTIC ACTIVITIES: CPT

## 2018-11-16 PROCEDURE — 97116 GAIT TRAINING THERAPY: CPT

## 2018-11-16 RX ORDER — DEXAMETHASONE 4 MG/1
4 TABLET ORAL EVERY 8 HOURS
Qty: 60 TABLET | Refills: 0 | Status: SHIPPED | OUTPATIENT
Start: 2018-11-16 | End: 2018-12-13

## 2018-11-16 RX ORDER — CEFDINIR 300 MG/1
300 CAPSULE ORAL EVERY 12 HOURS
Qty: 10 CAPSULE | Refills: 0 | Status: SHIPPED | OUTPATIENT
Start: 2018-11-16 | End: 2018-11-21

## 2018-11-16 RX ORDER — HYDROCODONE BITARTRATE AND ACETAMINOPHEN 7.5; 325 MG/1; MG/1
1 TABLET ORAL EVERY 6 HOURS PRN
Qty: 30 TABLET | Refills: 0 | Status: SHIPPED | OUTPATIENT
Start: 2018-11-16 | End: 2018-12-29

## 2018-11-16 RX ORDER — TERAZOSIN 2 MG/1
2 CAPSULE ORAL NIGHTLY
Qty: 30 CAPSULE | Refills: 0 | Status: SHIPPED | OUTPATIENT
Start: 2018-11-16

## 2018-11-16 RX ORDER — GABAPENTIN 100 MG/1
100 CAPSULE ORAL NIGHTLY
Qty: 30 CAPSULE | Refills: 0 | Status: SHIPPED | OUTPATIENT
Start: 2018-11-16

## 2018-11-16 RX ADMIN — Medication 1 TABLET: at 08:35

## 2018-11-16 RX ADMIN — FUROSEMIDE 20 MG: 20 TABLET ORAL at 08:35

## 2018-11-16 RX ADMIN — ENOXAPARIN SODIUM 40 MG: 40 INJECTION SUBCUTANEOUS at 08:34

## 2018-11-16 RX ADMIN — DEXAMETHASONE SODIUM PHOSPHATE 4 MG: 4 INJECTION, SOLUTION INTRA-ARTICULAR; INTRALESIONAL; INTRAMUSCULAR; INTRAVENOUS; SOFT TISSUE at 08:34

## 2018-11-16 RX ADMIN — CHOLECALCIFEROL CAP 125 MCG (5000 UNIT) 5000 UNITS: 125 CAP at 08:34

## 2018-11-16 RX ADMIN — DEXAMETHASONE SODIUM PHOSPHATE 4 MG: 4 INJECTION, SOLUTION INTRA-ARTICULAR; INTRALESIONAL; INTRAMUSCULAR; INTRAVENOUS; SOFT TISSUE at 14:14

## 2018-11-16 RX ADMIN — PANTOPRAZOLE SODIUM 40 MG: 40 INJECTION, POWDER, FOR SOLUTION INTRAVENOUS at 06:15

## 2018-11-16 RX ADMIN — AZITHROMYCIN MONOHYDRATE 250 MG: 250 TABLET ORAL at 08:34

## 2018-11-16 RX ADMIN — DEXAMETHASONE SODIUM PHOSPHATE 4 MG: 4 INJECTION, SOLUTION INTRA-ARTICULAR; INTRALESIONAL; INTRAMUSCULAR; INTRAVENOUS; SOFT TISSUE at 02:00

## 2018-11-16 RX ADMIN — SODIUM CHLORIDE 100 ML/HR: 9 INJECTION, SOLUTION INTRAVENOUS at 06:15

## 2018-11-16 RX ADMIN — HYDROCODONE BITARTRATE AND ACETAMINOPHEN 1 TABLET: 5; 325 TABLET ORAL at 10:18

## 2018-11-16 NOTE — DISCHARGE INSTR - APPOINTMENTS
Your appointment with Dr. Mejia is Wednesday, November 21st at 12:15.     Your appointment with Dr. Mccormack is Tuesday, November 20th at 3:15.

## 2018-11-16 NOTE — DISCHARGE SUMMARY
"    [facility] Medicine Services  DISCHARGE SUMMARY    Patient Name: Harris Shane  : 9/15/1927  MRN: [id number]    Date of Admission: 2018  Date of Discharge:  2018    Primary Care Physician: Charli Mccormack MD    Consults     Date and Time Order Name Status Description    2018 0637 Inpatient Radiation Oncology Consult Completed     2018 0416 Inpatient Neurosurgery Consult          Hospital Course     Presenting Problem:   Community acquired pneumonia, unspecified laterality [J18.9]    Active Hospital Problems    Diagnosis Date Noted   • **Community acquired pneumonia [J18.9] 2018   • Lower extremity weakness [R29.898] 2018   • Essential hypertension [I10] 2018   • History of DVT (deep vein thrombosis) [Z86.718] 2018   • BPH (benign prostatic hyperplasia) [N40.0] 2018   • Prostate cancer (Mets to L4-L5) [C61] 2018   • WILLIAM (obstructive sleep apnea) remote, intolerant of CPAP [G47.33] 2018   • Blindness of right eye [H54.40] 2018      Resolved Hospital Problems   No resolved problems to display.      Chief Complaint:  Lower extremity weakness, falls     HPI: As written on the day of admission 2018  Harris Shane is a 89 y.o. male with a past medical history significant for BPH, prostate cancer with metastasis to L4-L5, DVT, essential hypertension, WILLIAM, hyperlipidemia, MI, GERD, and CAD presents to the ED with complaints of bilateral lower extremity weakness.  Patient states that about 10 days ago he was walking without any difficulty.  Patient states that over the past several days he has had multiple falls due to lower extremity weakness.  His most recent fall was this morning in which he states he was standing with his walker trying to put his sweater on when \"my legs gave out.\"  He states that he did not have any LOC but does admit to hitting his head.  He did not immediately have pain and was able to " stand afterwards.  He denies any numbness/tingling, saddle paraesthesia, chest pain, shortness of air, cough, unilateral weakness, changes in speech, abdominal pain, nausea, vomiting, fever or chills. He does endorse mid back pain as a result of his fall.  Patient will be admitted to [facility] under the care of the Hospitalist for further evaluation and treatment.         Hospital Course:  Harris Shane is a 89 y.o. male  with past medical history of metastatic prostate cancer who presents to the hospital with lower extremity weakness, falls, and worsening back/pelvic pain.  He was found on x-ray to have bilateral lung infiltrates concerning for pneumonia.  Additionally he was found to have MRI findings concerning for possible osteomyelitis, discitis, and epidural abscess L4/L5 and bony pelvic metastases with T2 metastasis.  He was seen by neurosurgery who felt L4 findings could be consistent with metastatic disease.  His blood culture on 11/14 returned positive for gram-positive cocci which ultimately grew coag-negative Staphylococcus concerning for most likely contaminant.  Patient was continued on azithromycin and ceftriaxone and will be discharged on Omnicef for possible pneumonia.  Neurosurgery evaluated for lumbar spine findings with spinal stenosis/impingement of cauda equina.  He recommended steroid treatment.  Patient has been on dexamethasone IV and will transition to oral dexamethasone which will need to be tapered by his primary care physician and oncologist.  I discussed this with patient who agrees to discuss dose adjustments at follow-up.  Patient also required IV pain medication for treatment of his severe bone pain from metastatic prostate cancer.  Plan to continue fentanyl patch and I have increased his Norco dose from 5 mg to 7.5 mg at discharge.  I have also started low-dose gabapentin at night.  Patient needs to see his primary care next week to have pain medications further  "adjusted.        Assessment & Plan:     Diagnosis list:  Community acquired pneumonia with bilateral parapneumonic effusions  Possible gram-positive bacteremia, now felt to be contaminants with coag-negative Staphylococcus  - Continue azithromycin and ceftriaxone.  vancomycin for bacteremia now stopped.  Discharge on Omnicef.  Currently doing better.     Possible metastatic findings at L4/L5 versus osteomyelitis/discitis/epidural abscess:  As per above neurosurgery felt it was consistent with metastasis.  Responding to steroids and physical therapy.  Plan for home health physical therapy.     Metastatic prostate cancer with T2 metastasis and pelvic metastasis, with intractable pelvic pain due to malignancy.  Continue fentanyl patch, continue oral narcotics for moderate pain,  Appears to be tolerating currently.    Radiation oncology provided palliative radiation for 3 doses.  Gabapentin started at night.     Severe lumbar spinal stenosis and impingement of cauda equina seen on MRI with corresponding lower extremity weakness:  Improved with steroids  Patient reports his strength is improving.    Continue steroids for now.  PT OT.     Generalized debility and advanced age:  PT OT     Obstructive sleep apnea intolerant of CPAP  Reported per patient     Essential hypertension  Blood pressure controlled currently on alpha blocker.  Dose decreased.       Today's progress note as written by Dr. Donte Dolan:.  \"I saw Mr. Shane this morning and discussed his oncologic care with his daughter.  He has completed 2 fractions of 6Gy to his lumbar spine, and is scheduled to complete his 3rd treatment later today.  He states his pain is stable and has not significantly changed.  I anticipate that we would see his pain improve later this weekend based on the timing of when he started his treatments anyways.  I spoke with his primary Oncologist today, Dr. Maco Mejia at the [facility].  Mr. Shane PSA has increased " "from 10 to 24, and his bone scan shows a picture of more widespread bony disease compared to previously, in addition to the progression in his lumbar spine.  He has been receiving Eligard injections, but this will need to be changed as it no longer appears to be working.  Dr. Mejia's plan will be to reevaluate Mr. Shane after discharge for either another systemic therapy such as Xtandi, or he could be a candidate for a radiation option of Xofigo.  At this point, Mr. Shane should be appropriate for discharge from an oncology point of view once he completes radiation to his spine later today.  I will work to coordinate follow-up with Dr. Mejia and can help decide on a next step systemic option as an outpatient.\"            Discharge Follow Up Recommendations for labs/diagnostics:  Follow up with primary care and oncologist for tapering of steroids.    Day of Discharge     HPI:   Patient says he continues to have back pain.  He notes that his strength is getting better and his walking is getting better.  His daughter is at the bedside and she is spreading support and care.  He plans to go home with his daughter.  He says he is not interested in rehabilitation at discharge.  He is uncertain if the gabapentin helped his pain but wishes to continue.  Patient states he feels comfortable today.  He says he is uncertain how long he has left with his worsening prostate cancer.  He is looking forward to talking with his oncologist Dr. Mejia at follow-up to discuss any further treatment plans or suggestions.    Review of Systems  No current fevers or chills  No current shortness of breath or cough  No current nausea, vomiting, or diarrhea  No current chest pain or palpitations        Otherwise ROS is negative except as mentioned in the HPI.    Vital Signs:   Temp:  [97.5 °F (36.4 °C)-98.4 °F (36.9 °C)] 98.4 °F (36.9 °C)  Heart Rate:  [67-89] 71  Resp:  [16-18] 18  BP: (111-120)/(64-71) 120/64     Physical " Exam:  Constitutional: No acute distress, awake, alert  HENT: NCAT, mucous membranes moist  Respiratory: Decreased breath sounds at the bases otherwise clear to auscultation at the apex bilaterally, respiratory effort normal, nonlabored breathing  Cardiovascular: RRR, no murmurs, rubs, or gallops, palpable pedal pulses bilaterally  Gastrointestinal: Positive bowel sounds, soft, nontender, nondistended  Musculoskeletal: No bilateral ankle edema, elderly with normal musculature for age, BMI 28, lumbar spine tender  Psychiatric: Appropriate affect, cooperative, conversational and pleasant  Neurologic: No slurred speech or facial droop, follows commands well, not confused    Skin: No rashes, skin warm      Pertinent  and/or Most Recent Results     Results from last 7 days   Lab Units  11/15/18   0351  11/14/18   0341  11/13/18   1854   WBC 10*3/mm3  10.82*  7.03  7.70   HEMOGLOBIN g/dL  10.9*  10.9*  12.2*   HEMATOCRIT %  33.4*  33.4*  37.5*   PLATELETS 10*3/mm3  224  238  250   SODIUM mmol/L  135  135  136   POTASSIUM mmol/L  4.0  3.6  3.8   CHLORIDE mmol/L  107  102  101   CO2 mmol/L  22.0  27.0  28.0   BUN mg/dL  20  17  20   CREATININE mg/dL  0.79  0.73  0.94   GLUCOSE mg/dL  133*  82  109*   CALCIUM mg/dL  8.6*  8.1*  8.9     Results from last 7 days   Lab Units  11/13/18   1854   BILIRUBIN mg/dL  0.4   ALK PHOS U/L  346*   ALT (SGPT) U/L  21   AST (SGOT) U/L  38*   PROTIME Seconds  11.2   INR   1.07           Invalid input(s): TG, LDLCALC, LDLREALC      Brief Urine Lab Results  (Last result in the past 365 days)      Color   Clarity   Blood   Leuk Est   Nitrite   Protein   CREAT   Urine HCG        11/13/18 1911 Yellow Clear Negative Negative Negative Negative               Microbiology Results Abnormal     Procedure Component Value - Date/Time    Blood Culture - Blood, Arm, Right [[id number]]  (Abnormal) Collected:  11/13/18 2030    Lab Status:  Preliminary result Specimen:  Blood from Arm, Right Updated:  11/15/18  0755     Blood Culture Staphylococcus, coagulase negative     Isolated from Aerobic and Anaerobic Bottles     Gram Stain Anaerobic Bottle Gram positive cocci in groups      Aerobic Bottle Gram positive cocci in clusters    Blood Culture - Blood, Arm, Left [[id number]]  (Abnormal) Collected:  11/13/18 2045    Lab Status:  Preliminary result Specimen:  Blood from Arm, Left Updated:  11/15/18 0755     Blood Culture Staphylococcus, coagulase negative     Isolated from Aerobic and Anaerobic Bottles     Gram Stain Aerobic Bottle Gram positive cocci in clusters      Anaerobic Bottle Gram positive cocci in clusters    Blood Culture ID, PCR - Blood, Arm, Right [[id number]]  (Abnormal) Collected:  11/13/18 2030    Lab Status:  Final result Specimen:  Blood from Arm, Right Updated:  11/14/18 1431     BCID, PCR Staphylococcus spp, not aureus. Identification by BCID PCR.          Imaging Results (all)     Procedure Component Value Units Date/Time    NM Bone Scan Whole Body [[id number]] Collected:  11/15/18 2019     Updated:  11/15/18 2019    Narrative:       EXAMINATION: NM BONE SCAN WHOLE-BODY - 11/15/2018     INDICATION: J18.9-Pneumonia, unspecified organism; R29.898-Other  symptoms and signs involving the musculoskeletal system;  M86.9-Osteomyelitis, unspecified; M46.46-Discitis, unspecified, lumbar  region; G06.2-Extradural and subdural abscess, unspecified; Z74.09-Other  reduced mobility.     RADIOPHARMACEUTICAL:  27.3 mCi of technetium 99m MDP, IV.     COMPARISON: 9/25/2018 whole body bone scan     FINDINGS: Previous exam report indicates persistent abnormal uptake in  the lower lumbar spine corresponding to blastic metastatic disease on  outside CT. Minor degenerative changes elsewhere.     Today's study again shows strong uptake at the lumbosacral junction, but  now also throughout the adjacent L5 vertebral body, where only minimal  uptake was noted previously. This corresponds with extensive increased  signal in the L4  "vertebral body as well as L5 on the patient's recent  11/13/2018 MRI study. Low-level uptake in multiple right costovertebral  joints along the convexity of the patient's thoracic scoliosis is  similar to the prior study, but uniformly increased, as is the  appearance of all the bony structures on today's study. The kidneys are  not clearly visible on today's study, resembling a \"SuperScan\"  appearance of diffuse bony metastatic disease. There is some subtle  increased uptake in the inferior left SI joint region which corresponds  to metastatic disease on MRI.     Elsewhere, there are typical changes of DJD in the knees and ankles,  shoulders and SC joints.        Impression:       1. Persistent strong activity at L5, a new diffuse uptake at L4,  consistent with patient's recent MRI findings.  2. Small but increased foci of activity in the inferior left pelvis,  again corresponding to metastatic lesions.   3. Generalized increased uptake essentially throughout the bony  skeleton, almost complete lack of visible renal uptake on today's study  suggesting a developing \"SuperScan\" appearance of widespread and diffuse  bony metastatic disease.     DICTATED:   11/15/2018  EDITED/ls :   11/15/2018        MRI Pelvis Without Contrast [[id number]] Collected:  11/14/18 0950     Updated:  11/14/18 2158    Narrative:       EXAMINATION: MRI PELVIS WO CONTRAST- 11/14/2018     INDICATION: questionable pelvic fx s/p falls; J18.9-Pneumonia,  unspecified organism; R29.898-Other symptoms and signs involving the  musculoskeletal system; M86.9-Osteomyelitis, unspecified;  M46.46-Discitis, unspecified, lumbar region; G06.2-Extradural and  subdural abscess, unspecified; Z74.09-Other reduced mobility         TECHNIQUE: Patient terminated the exam after axial T1 and T2 fat sat and  coronal T2 fat sat series due to discomfort.     COMPARISON: Pelvis plain films 01/13/2018. Nuclear medicine bone scan  09/25/2018     FINDINGS: Yesterday's MRI " report indicates findings suspicious for L4-5  osteomyelitis, discitis and epidural abscess with spinal stenosis.  Heterogeneous marrow pattern noted, suspicious for metastatic disease.     Inflammatory changes are again noted of the lower lumbar spine and  surrounding soft tissues. There is multifocal marrow space disease  throughout the pelvic bones, with numerous small T1 hypointense T2  hyperintense lesions, the largest in the posterior left ilium where a  couple of lesions 10 to 15 mm in diameter are noted. No fracture is  appreciated in the pelvic bones or proximal femurs on the limited images  obtained. There is again heterogeneous irregular enlargement of the  prostate as seen on CT scan of 06/12/2018. No definite intrapelvic  inflammatory soft tissue changes are seen. Bladder is moderately  distended.       Impression:       1. Limited exam, terminated early by the patient due to discomfort. Note  is again made of lower lumbar inflammation.   2. Enlarged heterogeneous prostate as on 06/12/2018 CT scan.  3. Multiple small metastatic lesions throughout the pelvis, the largest  10 to 15 mm in the posterior left ilium. No visible fracture.     D:  11/14/2018  E:  11/14/2018         This report was finalized on 11/14/2018 9:56 PM by DR. Joshua Rosen MD.       MRI Lumbar Spine Without Contrast [[id number]] Collected:  11/13/18 1945     Updated:  11/13/18 2323    Addenda:        THIS REPORT CONTAINS FINDINGS THAT MAY BE CRITICAL TO PATIENT CARE. The   findings were verbally communicated via telephone conference with PASHA RUELAS   at 11/13/2018 11:22 PM EST. The findings were acknowledged and understood.    THIS DOCUMENT HAS BEEN ELECTRONICALLY SIGNED BY PHUONG STODDARD MD  Signed:  11/13/18 2323 by Phuong Stoddard MD    Narrative:       EXAM:    MR Lumbar Spine Without Contrast.     EXAM DATE/TIME:    11/13/2018 7:45 PM     CLINICAL HISTORY:    89 years old, male; Pneumonia, unspecified organism; Other symptoms and  signs   involving the musculoskeletal system; Pain; Low back pain; Patient HX:   Generalized weakness that is worsening over the past 2 days which is making it   difficult to walk, he says his wife was pushing him in a walker with the chair   on it and went over a bump and he fell off landing on his back no HX of surgery   to low back; Additional info: Le weakness     TECHNIQUE:    Multiplanar magnetic resonance images of the lumbar spine without contrast.     COMPARISON:    CT LUMBAR SPINE WO CONTRAST 6/6/2018 11:45 AM     FINDINGS:     Loss of normal curvature of the spine with degenerative spondylolisthesis.   Conus ends normally at the level of L1-L2 with severe narrowing of the spinal   canal and impingement of the cauda equina at L4-L5.     Markedly heterogeneous fatty marrow replacement with numerous small less than   1 cm foci of T1 and T2 prolongation within the vertebral bodies.     Extensive marrow edema in L4 and L5 vertebral bodies without compression   deformity suspicious for osteomyelitis and intervening discitis. Also noted is   marrow edema along the posterior margin of S1. Suboptimally visualized   epidural, pre-and paravertebral soft tissue/fluid resulting in moderately   severe spinal canal stenosis and compression of the cauda equina.     At L5-S1 broad-based posterior disc herniation and osteophytes with moderate   to severe foraminal stenosis and bilateral L5 neural impingement. Mild to   moderate spinal stenosis. Hypertrophic facet arthrosis with facetal/perifacetal   edema.     At L4-L5 probably discitis and osteomyelitis, questionable epidural abscess   number severe spinal stenosis and impingement of the cauda equina. Severe   bilateral foraminal narrowing and L4 neural impingement.     At L3-L4 posterior disc herniation and osteophyte without significant spinal   stenosis or neural foramen narrowing.     At L2-L3 no focal disc herniation, no significant spinal stenosis or neural    foramina narrowing.     At L1-L2 posterior disc herniation and osteophyte without significant spinal   stenosis or neural foramen narrowing.     At T12-L1 no focal disc herniation, no significant spinal stenosis or neural   foramina narrowing.     Chronic degenerative changes in the sacroiliac joints. Pre-and paravertebral   soft tissues are grossly normal. Visualized aorta is unremarkable. Nonspecific   bilateral perinephric fat stranding and indeterminate renal cortical cystic   nodule(s).       Impression:       1. Findings suspicious for L4-L5 OSTEOMYELITIS, DISCITIS and probable EPIDURAL   ABSCESS WITH SEVERE SPINAL STENOSIS AND IMPINGEMENT OF THE CAUDA EQUINA.   2. Mottled heterogenous fatty replacement of the marrow diffusely in the spine   with numerous small nodular T2 abnormalities suspicious for metastatic disease.   3. Moderately advanced chronic degenerative changes in lumbar spine.     THIS DOCUMENT HAS BEEN ELECTRONICALLY SIGNED BY BEKA SOUSA MD    MRI Brain Without Contrast [[id number]] Collected:  11/13/18 1945     Updated:  11/13/18 2306    Narrative:       EXAM:    MR Head Without Contrast     EXAM DATE/TIME:    11/13/2018 7:45 PM     CLINICAL HISTORY:    89 years old, male; Pneumonia, unspecified organism; Other symptoms and signs   involving the musculoskeletal system; Signs and symptoms; Weakness, extremity;   Bilateral; Patient HX: Generalized weakness that is worsening over the past 2   days which is making it difficult to walk, he says his wife was pushing him in   a walker with the chair on it and went over a bump and he fell off landing on   his back no HX of surgery to head; Additional info: Le weakness     TECHNIQUE:    MR of the head without contrast.     COMPARISON:    CT HEAD WO CONTRAST 11/13/2018 7:47 PM     FINDINGS:     A few small foci of abnormal T2 prolongation within the periventricular and   subcortical white matter of the supratentorial brain without associated    restricted diffusion.     Remainder of the brain parenchyma demonstrates normal signal intensity   without an intra- or extra-axial mass or abnormality. No mass effect or midline   shift.     Midline brain structures including the corpus callosum, pituitary gland,   pineal region, and craniovertebral junction are within normal. Ventricles and   sulci are mildly dilated without evidence of hydrocephalus. Intracranial   vessels demonstrate a normal flow-void.       Impression:       1. Mild chronic microvascular ischemic disease and generalized age appropriate   atrophy.   2. No evidence of acute/subacute hemorrhagic or ischemic infarct and no   hydrocephalus.     NOTE: Suboptimal study due to extensive motion artifact.    THIS DOCUMENT HAS BEEN ELECTRONICALLY SIGNED BY BEKA SOUSA MD    CT Head Without Contrast [[id number]] Collected:  11/13/18 1835     Updated:  11/13/18 1958    Narrative:       EXAM:    CT Head Without Intravenous Contrast     EXAM DATE/TIME:    11/13/2018 6:35 PM     CLINICAL HISTORY:    89 years old, male; Injury or trauma; Fall; Initial encounter; Concussion /   head injury; Consciousness not specified; Additional info: Fall, hit head     TECHNIQUE:    Axial computed tomography images of the head/brain without intravenous   contrast.    All CT scans at this facility use at least one of these dose optimization   techniques: automated exposure control; mA and/or kV adjustment per patient   size (includes targeted exams where dose is matched to clinical indication); or   iterative reconstruction.     COMPARISON:    No relevant prior studies available.     FINDINGS:     Bilateral periventricular lucencies without intraparenchymal hemorrhage and   no obvious acute ischemic stroke. No intra-or extra-axial fluid collection, no   supra-or infratentorial mass, no mass effect or midline shift.     Mildly dilated ventricles, sulci and basal cisterns without evidence of   hydrocephalus. Skull bones are  normal. Mild mucoperiosteal thickening in the   paranasal sinuses. No mastoid effusion.       Impression:       1. Mild chronic microvascular disease and generalized atrophy without acute   intracranial abnormality.   2. No evidence of acute hemorrhage, mass lesion or obvious acute ischemic   infarction.     THIS DOCUMENT HAS BEEN ELECTRONICALLY SIGNED BY BEKA SOUSA MD    XR Chest 1 View [[id number]] Collected:  11/13/18 1819     Updated:  11/13/18 1943    Narrative:       EXAM:    XR Chest, 1 View     EXAM DATE/TIME:    11/13/2018 6:19 PM     CLINICAL HISTORY:    89 years old, male; Signs and symptoms; Other: Weakness, unable to walk;   Additional info: Weak/dizzy/ams triage protocol     TECHNIQUE:    XR of the chest, 1 view.     COMPARISON:    No relevant prior studies available.     FINDINGS:     Prominent lung markings/peribronchial thickening with small bilateral pleural   effusion with atelectasis and consolidation in the lung bases. Cardiomegaly   with normal lung vascularity. Calcification and unfolding of the aortic arch.     Chronic LEFT clavicle fracture with marked deformity at the fracture site.   Mild deformity of the RIGHT chest wall suspicious for old fractures. Osteopenia   with associated chronic degenerative changes in the visualized spine and   shoulder joints. A large retrocardiac hiatal hernia.       Impression:       1. Chronic cardiopulmonary changes with small bilateral pleural effusion with   atelectasis and consolidation in the lung bases.   2. A large retrocardiac hiatal hernia.     THIS DOCUMENT HAS BEEN ELECTRONICALLY SIGNED BY BEKA SOUSA MD    XR Pelvis 1 or 2 View [[id number]] Collected:  11/13/18 1835     Updated:  11/13/18 1942    Narrative:       EXAM:    XR Pelvis, 1 or 2 Views     EXAM DATE/TIME:    11/13/2018 6:35 PM     CLINICAL HISTORY:    89 years old, male; Pain; Pelvic pain; Additional info: Fall, le weakness     TECHNIQUE:    XR pelvis, 1 or 2 views     COMPARISON:    CT  ABDOMEN PELVIS WO CONTRAST 6/12/2018 11:45 PM     FINDINGS:     Marked diffuse osteopenia, advanced chronic degenerative changes in the lower   lumbar spine. Chronic degenerative changes in the sacroiliac joints and hip   joints.     Questionable nondisplaced fractures of the pubic rami without obvious   displaced fracture around the hip joints. Remainder of the visualized bones and   joints are unremarkable. No discrete lytic or blastic lesion.       Impression:       Chronic degenerative changes with questionable pubic rami fractures. Recommend   followup with MRI if persistent/worsening symptoms.     THIS DOCUMENT HAS BEEN ELECTRONICALLY SIGNED BY BEKA SOUSA MD                   Order Current Status    Legionella Antigen, Urine - Urine, Urine, Clean Catch In process    S. Pneumo Ag Urine or CSF - Urine, Urine, Clean Catch In process    Blood Culture - Blood, Arm, Left Preliminary result    Blood Culture - Blood, Arm, Right Preliminary result        Discharge Details        Discharge Medications      New Medications      Instructions Start Date   cefdinir 300 MG capsule  Commonly known as:  OMNICEF   300 mg, Oral, Every 12 Hours      dexamethasone 4 MG tablet  Commonly known as:  DECADRON   4 mg, Oral, Every 8 Hours, Continue this dose until follow-up and discuss tapering with primary care and oncology.      gabapentin 100 MG capsule  Commonly known as:  NEURONTIN   100 mg, Oral, Nightly      HYDROcodone-acetaminophen 7.5-325 MG per tablet  Commonly known as:  NORCO  Replaces:  NORCO 5-325 MG per tablet   1 tablet, Oral, Every 6 Hours PRN         Changes to Medications      Instructions Start Date   terazosin 2 MG capsule  Commonly known as:  HYTRIN  What changed:    · medication strength  · how much to take   2 mg, Oral, Nightly         Continue These Medications      Instructions Start Date   aspirin 81 MG EC tablet   81 mg, Oral, Daily      CENTRUM SILVER 50+MEN tablet   1 tablet, Oral, Daily      fentaNYL 50  MCG/HR patch  Commonly known as:  DURAGESIC   1 patch, Transdermal, Every 72 Hours      furosemide 20 MG tablet  Commonly known as:  LASIX   20 mg, Oral, Daily      ibuprofen 200 MG tablet  Commonly known as:  ADVIL,MOTRIN   200 mg, Oral, Every 6 Hours PRN      sennosides-docusate sodium 8.6-50 MG tablet  Commonly known as:  SENOKOT-S   1 tablet, Oral, Daily      vitamin D3 5000 units capsule capsule   5,000 Units, Oral, Daily         Stop These Medications    ELIGARD 45 MG injection  Generic drug:  leuprolide     NORCO 5-325 MG per tablet  Generic drug:  HYDROcodone-acetaminophen  Replaced by:  HYDROcodone-acetaminophen 7.5-325 MG per tablet              Discharge Disposition:  Home-Health Care Svc    Discharge Diet:  Diet Instructions     Diet: Regular      Discharge Diet:  Regular    Resume usual diet          Discharge Activity:   Activity Instructions     Activity as Tolerated              Code Status/Level of Support:  Code Status and Medical Interventions:   Ordered at: 11/13/18 4180     Level Of Support Discussed With:    Patient     Code Status:    CPR     Medical Interventions (Level of Support Prior to Arrest):    Full       Future Appointments   Date Time Provider Department Center   11/16/2018  5:00 PM [facility]   12/13/2018  2:30 PM Donte Dolan MD [facility] None       Additional Instructions for the Follow-ups that You Need to Schedule     Ambulatory Referral to Home Health   As directed      Face to Face Visit Date:  11/16/2018    Follow-up Provider for Plan of Care?:  I treated the patient in an acute care facility and will not continue treatment after discharge.    Follow-up Provider:  JANEEN ANGELA [[id number]]    Reason/Clinical Findings:  current patient of [facility], hospitalization 11/13-11/16    Describe mobility limitations that make leaving home difficult:  current patient of [facility], hospitalization 11/13-11/16     Nursing/Therapeutic Services Requested:  Skilled Nursing Physical Therapy Occupational Therapy    Skilled nursing orders:  Cardiopulmonary assessments Neurovascular assessments    PT orders:  Home safety assessment Strengthening    Occupational orders:  Energy conservation Activities of daily living Home safety assessment Strengthening    Frequency:  Other (2-3 x week)         Discharge Follow-up with PCP   As directed       Currently Documented PCP:    Charli Mccormack MD    PCP Phone Number:    [phone]     Follow Up Details:  Follow-up in 3-4 days.  For general follow-up and to address pain control further         Discharge Follow-up with Specified Provider: Dr. Mejia within 1 week for follow-up of prostate cancer.  Patient's daughter prefers to make this appointment.; 1 Week   As directed      To:  Dr. Mejia within 1 week for follow-up of prostate cancer.  Patient's daughter prefers to make this appointment.    Follow Up:  1 Week               Time Spent on Discharge:  40 minutes, with significant counseling regarding patient's prognosis and radiology findings with he and his daughter today.    Electronically signed by Bob Juarez MD, 11/16/18, 2:25 PM.    Addendum: Due to patient's diffuse metastatic disease if he continues to decline he could be a good candidate for home hospice.  Patient wishes to discuss with his outpatient oncologist options for further treatment currently but understands his overall prognosis.

## 2018-11-16 NOTE — PROGRESS NOTES
Continued Stay Note  [facility]     Patient Name: Harris Shane  MRN: [id number]  Today's Date: 11/16/2018    Admit Date: 11/13/2018    Discharge Plan     Row Name 11/16/18 5458       Plan    Plan  home with home health    Patient/Family in Agreement with Plan  yes    Plan Comments  Plans to discharge home today, 11/15. Home health orders obtained and referral called to [facility] at [phone]. Faxed orders to them at [phone]. If patient does not discharge home on Friday, CM will need to call HH on day of discharge and notify them and fax d/c summary to HH agency on day of discharge. Daughter will transport. No other needs noted at this time.     Final Discharge Disposition Code  06 - home with home health care        Discharge Codes    No documentation.       Expected Discharge Date and Time     Expected Discharge Date Expected Discharge Time    Nov 16, 2018             Tish Cruz RN

## 2018-11-16 NOTE — PROGRESS NOTES
"Adult Nutrition  Assessment/PES    Patient Name:  Harris Shane  YOB: 1927  MRN: [id number]  Admit Date:  11/13/2018    Assessment Date:  11/16/2018    Comments:      Reason for Assessment     Row Name 11/16/18 0932          Reason for Assessment    Reason For Assessment  identified at risk by screening criteria     Diagnosis  -- CAP, LE WEAKNESS, FALLS, WORSENING BACK & PELVIC PAIN, SEVERE LUNBAR STENOSIS, GENERALIZED DEBILITY. PMH: PROSTATE CA WITH METS, BLIND R YEY, WILLIAM/INTOLERANT CPAP, HTN, DVT, BPH, SPESIS, ELEVATED LFTs, LLL INFITLTRATES, GALLSTONES, MI     Identified At Risk by Screening Criteria  MST SCORE 2+         Nutrition/Diet History     Row Name 11/16/18 0937          Nutrition/Diet History    Typical Food/Fluid Intake  DAUGHTER REPORTS PT'S APPETITE & INTAKE OF FOOD HAS BEEN DOWN RECENTLY BUT IS MUCH IMPROVED. SHE REPORTS PT IS CLEANING HIS PLATE WHILE HERE. SHE REPORTS MAY HAVE LOST ABOUT 15# OVER THE PAST COUPLE OF MONTHS. SHE PROVIDED PT'S FOOD DISLIKES & REPORTS NO INTOLERANCES. PT REQUESTS BOOST PLUS TWICE A DAY AS HE DRINKS THEM @ HOME.         Anthropometrics     Row Name 11/16/18 0939 11/16/18 0500       Anthropometrics    Height  165.1 cm (65\")  --    Current Weight Method  measured STANDING  --    Weight  78.9 kg (174 lb)  78.9 kg (174 lb)       Admit Weight    Admit Weight Method  measured STANDING  --    Admit Weight  79 kg (174 lb 3.2 oz)  --       Ideal Body Weight (IBW)    Ideal Body Weight (IBW) (kg)  62.51  --    % Ideal Body Weight  126.26  --       Usual Body Weight (UBW)    Usual Body Weight  -- BASED ON [facility] MD OFFICE RECORDS, PT'S WEIGHT 6/2018 190#, 7/2018 178.5# & 9/2018 181#. ALL WEIGHTS MAY BE SKEWED BY FLUID (PT ON & HAS BEEN ON FUROSEMIDE RX)   --       Body Mass Index (BMI)    BMI (kg/m2)  29.02  --                                --        Labs/Tests/Procedures/Meds     Row Name 11/16/18 0901          Labs/Procedures/Meds    Lab Results Reviewed  " reviewed        Medications    Pertinent Medications Reviewed  reviewed     Pertinent Medications Comments  FUROSEMIDE/STEROIDS         Physical Findings     Row Name 11/16/18 0946          Physical Findings    Gastrointestinal  other (see comments) WDL PER NURSING DOCUMENTATION     Skin  other (see comments) WDL PER NURSING DOCUMENTATION                 Nutrition Prescription Ordered     Row Name 11/16/18 0946          Nutrition Prescription PO    Current PO Diet  Regular     Common Modifiers  Cardiac         Evaluation of Received Nutrient/Fluid Intake     Row Name 11/16/18 0946 11/16/18 0939       Calculation Measurements    Height         PO Evaluation    Number of Days PO Intake Evaluated  Insufficient Data  --    Number of Meals  2  --    % PO Intake  88  --                    Problem/Interventions:  Problem 1     Row Name 11/16/18 0946          Nutrition Diagnoses Problem 1    Problem 1  Unintended Weight Loss     Etiology (related to)  Other (comment) CLINICAL CONDITION & POSSIBLE FLUID SHIFTS     Signs/Symptoms (evidenced by)  Report/Observation;Other (comment) PER [facility] MD OFFICE RECORDS WT 6/2018 190#, 7/2018 178.5# & 9/2018 181#     Reported/Observed By  Family DAUGHTER REPORTS ABOUT 15# WEIGHT LOSS OVER THE PAST COUPLE OF MONTHS                 Intervention Goal     Row Name 11/16/18 0949          Intervention Goal    General  Nutrition support treatment     PO  Establish PO         Nutrition Intervention     Row Name 11/16/18 0949          Nutrition Intervention    RD/Tech Action  Advise alternate selection;Advise available snack;Interview for preference;Menu adjusted;Encourage intake;Recommend/ordered;Follow Tx progress;Care plan reviewd     Recommended/Ordered  Supplement         Nutrition Prescription     Row Name 11/16/18 0949          Nutrition Prescription PO    PO Prescription  Begin/change supplement     Supplement  Boost Plus     Supplement Frequency  2 times a day     New PO Prescription  Ordered?  Yes         Education/Evaluation     Row Name 11/16/18 0949          Monitor/Evaluation    Monitor  Per protocol;I&O;PO intake;Supplement intake;Pertinent labs;Weight;Skin status;Symptoms           Electronically signed by:  Katrina Tamez RD  11/16/18 9:49 AM

## 2018-11-16 NOTE — PROGRESS NOTES
Continued Stay Note  [facility]     Patient Name: Harris Shane  MRN: [id number]  Today's Date: 11/16/2018    Admit Date: 11/13/2018    Discharge Plan     Row Name 11/16/18 1439       Plan    Plan  home with [facility]    Patient/Family in Agreement with Plan  yes    Final Discharge Disposition Code  06 - home with home health care    Final Note  Discharge summary faxed to [facility].  arranged for SN, PT and OT. No other needs noted at this time. Orders for discharge are in for today, 11/16.    Row Name 11/16/18 1318       Plan    Plan  home with home health    Patient/Family in Agreement with Plan  yes    Plan Comments  Plans to discharge home today, 11/15. Home health orders obtained and referral called to [facility] at [phone]. Faxed orders to them at [phone]. If patient does not discharge home on Friday, CM will need to call  on day of discharge and notify them and fax d/c summary to  agency on day of discharge. Daughter will transport. No other needs noted at this time.     Final Discharge Disposition Code  06 - home with home health care        Discharge Codes    No documentation.       Expected Discharge Date and Time     Expected Discharge Date Expected Discharge Time    Nov 16, 2018             Tish Cruz RN

## 2018-11-16 NOTE — DISCHARGE PLACEMENT REQUEST
"Helena Shane (89 y.o. Male)     Date of Birth Social Security Number Address Home Phone MRN    09/15/1927  [address] [place] [address] [phone] [id number]    [place] Marital Status          [employer]        Admission Date Admission Type Admitting Provider Attending Provider Department, Room/Bed    11/13/18 Emergency Bob Juarez MD Furlow, Stephen Matthew, MD [address], [address]/1    Discharge Date Discharge Disposition Discharge Destination         Home-[facility]              Attending Provider:  Bob Juarez MD    Allergies:  Contrast Dye    Isolation:  None   Infection:  None   Code Status:  CPR    Ht:  165.1 cm (65\")   Wt:  78.9 kg (174 lb)    Admission Cmt:  None   Principal Problem:  Community acquired pneumonia [J18.9]                 Active Insurance as of 11/13/2018     Primary Coverage     Payor Plan Insurance Group Employer/Plan Group    MEDICARE MEDICARE A & B      Payor Plan Address Payor Plan Phone Number Payor Plan Fax Number Effective Dates    [address] [phone]  9/1/1992 - None Entered    [facility] [id number]       Subscriber Name Subscriber Birth Date Member ID       HELENA SHANE 9/15/1927 [id number]           Secondary Coverage     Payor Plan Insurance Group Employer/Plan Group    [place] [id number]     Payor Plan Address Payor Plan Phone Number Payor Plan Fax Number Effective Dates    [address]   1/1/1989 - None Entered    [facility] [id number]       Subscriber Name Subscriber Birth Date Member ID       HELENA SHANE 9/15/1927 [id number]                 Emergency Contacts      (Rel.) Home Phone Work Phone Mobile Phone    Loida Shane (Daughter) -- -- [phone]    Danyell Shane (Daughter) -- -- [phone]               Discharge Summary      Bob Juarez MD at 11/16/2018  2:25 PM              [facility]  DISCHARGE " "SUMMARY    Patient Name: Harris Shane  : 9/15/1927  MRN: [id number]    Date of Admission: 2018  Date of Discharge:  2018    Primary Care Physician: Charli Mccormack MD    Consults     Date and Time Order Name Status Description    2018 0637 Inpatient Radiation Oncology Consult Completed     2018 0416 Inpatient Neurosurgery Consult          Hospital Course     Presenting Problem:   Community acquired pneumonia, unspecified laterality [J18.9]    Active Hospital Problems    Diagnosis Date Noted   • **Community acquired pneumonia [J18.9] 2018   • Lower extremity weakness [R29.898] 2018   • Essential hypertension [I10] 2018   • History of DVT (deep vein thrombosis) [Z86.718] 2018   • BPH (benign prostatic hyperplasia) [N40.0] 2018   • Prostate cancer (Mets to L4-L5) [C61] 2018   • WILLIAM (obstructive sleep apnea) remote, intolerant of CPAP [G47.33] 2018   • Blindness of right eye [H54.40] 2018      Resolved Hospital Problems   No resolved problems to display.      Chief Complaint:  Lower extremity weakness, falls     HPI: As written on the day of admission 2018  Harris Shane is a 89 y.o. male with a past medical history significant for BPH, prostate cancer with metastasis to L4-L5, DVT, essential hypertension, WILLIAM, hyperlipidemia, MI, GERD, and CAD presents to the ED with complaints of bilateral lower extremity weakness.  Patient states that about 10 days ago he was walking without any difficulty.  Patient states that over the past several days he has had multiple falls due to lower extremity weakness.  His most recent fall was this morning in which he states he was standing with his walker trying to put his sweater on when \"my legs gave out.\"  He states that he did not have any LOC but does admit to hitting his head.  He did not immediately have pain and was able to stand afterwards.  He denies any numbness/tingling, saddle " paraesthesia, chest pain, shortness of air, cough, unilateral weakness, changes in speech, abdominal pain, nausea, vomiting, fever or chills. He does endorse mid back pain as a result of his fall.  Patient will be admitted to [facility] under the care of the Hospitalist for further evaluation and treatment.         Hospital Course:  Harris Shane is a 89 y.o. male  with past medical history of metastatic prostate cancer who presents to the hospital with lower extremity weakness, falls, and worsening back/pelvic pain.  He was found on x-ray to have bilateral lung infiltrates concerning for pneumonia.  Additionally he was found to have MRI findings concerning for possible osteomyelitis, discitis, and epidural abscess L4/L5 and bony pelvic metastases with T2 metastasis.  He was seen by neurosurgery who felt L4 findings could be consistent with metastatic disease.  His blood culture on 11/14 returned positive for gram-positive cocci which ultimately grew coag-negative Staphylococcus concerning for most likely contaminant.  Patient was continued on azithromycin and ceftriaxone and will be discharged on Omnicef for possible pneumonia.  Neurosurgery evaluated for lumbar spine findings with spinal stenosis/impingement of cauda equina.  He recommended steroid treatment.  Patient has been on dexamethasone IV and will transition to oral dexamethasone which will need to be tapered by his primary care physician and oncologist.  I discussed this with patient who agrees to discuss dose adjustments at follow-up.  Patient also required IV pain medication for treatment of his severe bone pain from metastatic prostate cancer.  Plan to continue fentanyl patch and I have increased his Norco dose from 5 mg to 7.5 mg at discharge.  I have also started low-dose gabapentin at night.  Patient needs to see his primary care next week to have pain medications further adjusted.        Assessment & Plan:     Diagnosis list:  Community acquired  "pneumonia with bilateral parapneumonic effusions  Possible gram-positive bacteremia, now felt to be contaminants with coag-negative Staphylococcus  - Continue azithromycin and ceftriaxone.  vancomycin for bacteremia now stopped.  Discharge on Omnicef.  Currently doing better.     Possible metastatic findings at L4/L5 versus osteomyelitis/discitis/epidural abscess:  As per above neurosurgery felt it was consistent with metastasis.  Responding to steroids and physical therapy.  Plan for home health physical therapy.     Metastatic prostate cancer with T2 metastasis and pelvic metastasis, with intractable pelvic pain due to malignancy.  Continue fentanyl patch, continue oral narcotics for moderate pain,  Appears to be tolerating currently.    Radiation oncology provided palliative radiation for 3 doses.  Gabapentin started at night.     Severe lumbar spinal stenosis and impingement of cauda equina seen on MRI with corresponding lower extremity weakness:  Improved with steroids  Patient reports his strength is improving.    Continue steroids for now.  PT OT.     Generalized debility and advanced age:  PT OT     Obstructive sleep apnea intolerant of CPAP  Reported per patient     Essential hypertension  Blood pressure controlled currently on alpha blocker.  Dose decreased.       Today's progress note as written by Dr. Donte Dolan:.  \"I saw Mr. Shane this morning and discussed his oncologic care with his daughter.  He has completed 2 fractions of 6Gy to his lumbar spine, and is scheduled to complete his 3rd treatment later today.  He states his pain is stable and has not significantly changed.  I anticipate that we would see his pain improve later this weekend based on the timing of when he started his treatments anyways.  I spoke with his primary Oncologist today, Dr. Maco Mejia at the [facility].  Mr. Shane PSA has increased from 10 to 24, and his bone scan shows a picture of more widespread bony " "disease compared to previously, in addition to the progression in his lumbar spine.  He has been receiving Eligard injections, but this will need to be changed as it no longer appears to be working.  Dr. Mejia's plan will be to reevaluate Mr. Shane after discharge for either another systemic therapy such as Xtandi, or he could be a candidate for a radiation option of Xofigo.  At this point, Mr. Shane should be appropriate for discharge from an oncology point of view once he completes radiation to his spine later today.  I will work to coordinate follow-up with Dr. Mejia and can help decide on a next step systemic option as an outpatient.\"            Discharge Follow Up Recommendations for labs/diagnostics:  Follow up with primary care and oncologist for tapering of steroids.    Day of Discharge     HPI:   Patient says he continues to have back pain.  He notes that his strength is getting better and his walking is getting better.  His daughter is at the bedside and she is spreading support and care.  He plans to go home with his daughter.  He says he is not interested in rehabilitation at discharge.  He is uncertain if the gabapentin helped his pain but wishes to continue.  Patient states he feels comfortable today.  He says he is uncertain how long he has left with his worsening prostate cancer.  He is looking forward to talking with his oncologist Dr. Mejia at follow-up to discuss any further treatment plans or suggestions.    Review of Systems  No current fevers or chills  No current shortness of breath or cough  No current nausea, vomiting, or diarrhea  No current chest pain or palpitations        Otherwise ROS is negative except as mentioned in the HPI.    Vital Signs:   Temp:  [97.5 °F (36.4 °C)-98.4 °F (36.9 °C)] 98.4 °F (36.9 °C)  Heart Rate:  [67-89] 71  Resp:  [16-18] 18  BP: (111-120)/(64-71) 120/64     Physical Exam:  Constitutional: No acute distress, awake, alert  HENT: NCAT, mucous membranes " moist  Respiratory: Decreased breath sounds at the bases otherwise clear to auscultation at the apex bilaterally, respiratory effort normal, nonlabored breathing  Cardiovascular: RRR, no murmurs, rubs, or gallops, palpable pedal pulses bilaterally  Gastrointestinal: Positive bowel sounds, soft, nontender, nondistended  Musculoskeletal: No bilateral ankle edema, elderly with normal musculature for age, BMI 28, lumbar spine tender  Psychiatric: Appropriate affect, cooperative, conversational and pleasant  Neurologic: No slurred speech or facial droop, follows commands well, not confused    Skin: No rashes, skin warm      Pertinent  and/or Most Recent Results     Results from last 7 days   Lab Units  11/15/18   0351  11/14/18   0341  11/13/18   1854   WBC 10*3/mm3  10.82*  7.03  7.70   HEMOGLOBIN g/dL  10.9*  10.9*  12.2*   HEMATOCRIT %  33.4*  33.4*  37.5*   PLATELETS 10*3/mm3  224  238  250   SODIUM mmol/L  135  135  136   POTASSIUM mmol/L  4.0  3.6  3.8   CHLORIDE mmol/L  107  102  101   CO2 mmol/L  22.0  27.0  28.0   BUN mg/dL  20  17  20   CREATININE mg/dL  0.79  0.73  0.94   GLUCOSE mg/dL  133*  82  109*   CALCIUM mg/dL  8.6*  8.1*  8.9     Results from last 7 days   Lab Units  11/13/18   1854   BILIRUBIN mg/dL  0.4   ALK PHOS U/L  346*   ALT (SGPT) U/L  21   AST (SGOT) U/L  38*   PROTIME Seconds  11.2   INR   1.07           Invalid input(s): TG, LDLCALC, LDLREALC      Brief Urine Lab Results  (Last result in the past 365 days)      Color   Clarity   Blood   Leuk Est   Nitrite   Protein   CREAT   Urine HCG        11/13/18 1911 Yellow Clear Negative Negative Negative Negative               Microbiology Results Abnormal     Procedure Component Value - Date/Time    Blood Culture - Blood, Arm, Right [[id number]]  (Abnormal) Collected:  11/13/18 2030    Lab Status:  Preliminary result Specimen:  Blood from Arm, Right Updated:  11/15/18 0758     Blood Culture Staphylococcus, coagulase negative     Isolated from Aerobic  and Anaerobic Bottles     Gram Stain Anaerobic Bottle Gram positive cocci in groups      Aerobic Bottle Gram positive cocci in clusters    Blood Culture - Blood, Arm, Left [[id number]]  (Abnormal) Collected:  11/13/18 2045    Lab Status:  Preliminary result Specimen:  Blood from Arm, Left Updated:  11/15/18 0755     Blood Culture Staphylococcus, coagulase negative     Isolated from Aerobic and Anaerobic Bottles     Gram Stain Aerobic Bottle Gram positive cocci in clusters      Anaerobic Bottle Gram positive cocci in clusters    Blood Culture ID, PCR - Blood, Arm, Right [[id number]]  (Abnormal) Collected:  11/13/18 2030    Lab Status:  Final result Specimen:  Blood from Arm, Right Updated:  11/14/18 1431     BCID, PCR Staphylococcus spp, not aureus. Identification by BCID PCR.          Imaging Results (all)     Procedure Component Value Units Date/Time    NM Bone Scan Whole Body [[id number]] Collected:  11/15/18 2019     Updated:  11/15/18 2019    Narrative:       EXAMINATION: NM BONE SCAN WHOLE-BODY - 11/15/2018     INDICATION: J18.9-Pneumonia, unspecified organism; R29.898-Other  symptoms and signs involving the musculoskeletal system;  M86.9-Osteomyelitis, unspecified; M46.46-Discitis, unspecified, lumbar  region; G06.2-Extradural and subdural abscess, unspecified; Z74.09-Other  reduced mobility.     RADIOPHARMACEUTICAL:  27.3 mCi of technetium 99m MDP, IV.     COMPARISON: 9/25/2018 whole body bone scan     FINDINGS: Previous exam report indicates persistent abnormal uptake in  the lower lumbar spine corresponding to blastic metastatic disease on  outside CT. Minor degenerative changes elsewhere.     Today's study again shows strong uptake at the lumbosacral junction, but  now also throughout the adjacent L5 vertebral body, where only minimal  uptake was noted previously. This corresponds with extensive increased  signal in the L4 vertebral body as well as L5 on the patient's recent  11/13/2018 MRI study. Low-level  "uptake in multiple right costovertebral  joints along the convexity of the patient's thoracic scoliosis is  similar to the prior study, but uniformly increased, as is the  appearance of all the bony structures on today's study. The kidneys are  not clearly visible on today's study, resembling a \"SuperScan\"  appearance of diffuse bony metastatic disease. There is some subtle  increased uptake in the inferior left SI joint region which corresponds  to metastatic disease on MRI.     Elsewhere, there are typical changes of DJD in the knees and ankles,  shoulders and SC joints.        Impression:       1. Persistent strong activity at L5, a new diffuse uptake at L4,  consistent with patient's recent MRI findings.  2. Small but increased foci of activity in the inferior left pelvis,  again corresponding to metastatic lesions.   3. Generalized increased uptake essentially throughout the bony  skeleton, almost complete lack of visible renal uptake on today's study  suggesting a developing \"SuperScan\" appearance of widespread and diffuse  bony metastatic disease.     DICTATED:   11/15/2018  EDITED/ls :   11/15/2018        MRI Pelvis Without Contrast [[id number]] Collected:  11/14/18 0950     Updated:  11/14/18 2158    Narrative:       EXAMINATION: MRI PELVIS WO CONTRAST- 11/14/2018     INDICATION: questionable pelvic fx s/p falls; J18.9-Pneumonia,  unspecified organism; R29.898-Other symptoms and signs involving the  musculoskeletal system; M86.9-Osteomyelitis, unspecified;  M46.46-Discitis, unspecified, lumbar region; G06.2-Extradural and  subdural abscess, unspecified; Z74.09-Other reduced mobility         TECHNIQUE: Patient terminated the exam after axial T1 and T2 fat sat and  coronal T2 fat sat series due to discomfort.     COMPARISON: Pelvis plain films 01/13/2018. Nuclear medicine bone scan  09/25/2018     FINDINGS: Yesterday's MRI report indicates findings suspicious for L4-5  osteomyelitis, discitis and epidural " abscess with spinal stenosis.  Heterogeneous marrow pattern noted, suspicious for metastatic disease.     Inflammatory changes are again noted of the lower lumbar spine and  surrounding soft tissues. There is multifocal marrow space disease  throughout the pelvic bones, with numerous small T1 hypointense T2  hyperintense lesions, the largest in the posterior left ilium where a  couple of lesions 10 to 15 mm in diameter are noted. No fracture is  appreciated in the pelvic bones or proximal femurs on the limited images  obtained. There is again heterogeneous irregular enlargement of the  prostate as seen on CT scan of 06/12/2018. No definite intrapelvic  inflammatory soft tissue changes are seen. Bladder is moderately  distended.       Impression:       1. Limited exam, terminated early by the patient due to discomfort. Note  is again made of lower lumbar inflammation.   2. Enlarged heterogeneous prostate as on 06/12/2018 CT scan.  3. Multiple small metastatic lesions throughout the pelvis, the largest  10 to 15 mm in the posterior left ilium. No visible fracture.     D:  11/14/2018  E:  11/14/2018         This report was finalized on 11/14/2018 9:56 PM by DR. Joshua Rosen MD.       MRI Lumbar Spine Without Contrast [[id number]] Collected:  11/13/18 1945     Updated:  11/13/18 2323    Addenda:        THIS REPORT CONTAINS FINDINGS THAT MAY BE CRITICAL TO PATIENT CARE. The   findings were verbally communicated via telephone conference with PASHA RUELAS   at 11/13/2018 11:22 PM EST. The findings were acknowledged and understood.    THIS DOCUMENT HAS BEEN ELECTRONICALLY SIGNED BY PHUONG STODDARD MD  Signed:  11/13/18 2323 by Phuong Stoddard MD    Narrative:       EXAM:    MR Lumbar Spine Without Contrast.     EXAM DATE/TIME:    11/13/2018 7:45 PM     CLINICAL HISTORY:    89 years old, male; Pneumonia, unspecified organism; Other symptoms and signs   involving the musculoskeletal system; Pain; Low back pain; Patient HX:    Generalized weakness that is worsening over the past 2 days which is making it   difficult to walk, he says his wife was pushing him in a walker with the chair   on it and went over a bump and he fell off landing on his back no HX of surgery   to low back; Additional info: Le weakness     TECHNIQUE:    Multiplanar magnetic resonance images of the lumbar spine without contrast.     COMPARISON:    CT LUMBAR SPINE WO CONTRAST 6/6/2018 11:45 AM     FINDINGS:     Loss of normal curvature of the spine with degenerative spondylolisthesis.   Conus ends normally at the level of L1-L2 with severe narrowing of the spinal   canal and impingement of the cauda equina at L4-L5.     Markedly heterogeneous fatty marrow replacement with numerous small less than   1 cm foci of T1 and T2 prolongation within the vertebral bodies.     Extensive marrow edema in L4 and L5 vertebral bodies without compression   deformity suspicious for osteomyelitis and intervening discitis. Also noted is   marrow edema along the posterior margin of S1. Suboptimally visualized   epidural, pre-and paravertebral soft tissue/fluid resulting in moderately   severe spinal canal stenosis and compression of the cauda equina.     At L5-S1 broad-based posterior disc herniation and osteophytes with moderate   to severe foraminal stenosis and bilateral L5 neural impingement. Mild to   moderate spinal stenosis. Hypertrophic facet arthrosis with facetal/perifacetal   edema.     At L4-L5 probably discitis and osteomyelitis, questionable epidural abscess   number severe spinal stenosis and impingement of the cauda equina. Severe   bilateral foraminal narrowing and L4 neural impingement.     At L3-L4 posterior disc herniation and osteophyte without significant spinal   stenosis or neural foramen narrowing.     At L2-L3 no focal disc herniation, no significant spinal stenosis or neural   foramina narrowing.     At L1-L2 posterior disc herniation and osteophyte without  significant spinal   stenosis or neural foramen narrowing.     At T12-L1 no focal disc herniation, no significant spinal stenosis or neural   foramina narrowing.     Chronic degenerative changes in the sacroiliac joints. Pre-and paravertebral   soft tissues are grossly normal. Visualized aorta is unremarkable. Nonspecific   bilateral perinephric fat stranding and indeterminate renal cortical cystic   nodule(s).       Impression:       1. Findings suspicious for L4-L5 OSTEOMYELITIS, DISCITIS and probable EPIDURAL   ABSCESS WITH SEVERE SPINAL STENOSIS AND IMPINGEMENT OF THE CAUDA EQUINA.   2. Mottled heterogenous fatty replacement of the marrow diffusely in the spine   with numerous small nodular T2 abnormalities suspicious for metastatic disease.   3. Moderately advanced chronic degenerative changes in lumbar spine.     THIS DOCUMENT HAS BEEN ELECTRONICALLY SIGNED BY BEKA SOUSA MD    MRI Brain Without Contrast [[id number]] Collected:  11/13/18 1945     Updated:  11/13/18 2306    Narrative:       EXAM:    MR Head Without Contrast     EXAM DATE/TIME:    11/13/2018 7:45 PM     CLINICAL HISTORY:    89 years old, male; Pneumonia, unspecified organism; Other symptoms and signs   involving the musculoskeletal system; Signs and symptoms; Weakness, extremity;   Bilateral; Patient HX: Generalized weakness that is worsening over the past 2   days which is making it difficult to walk, he says his wife was pushing him in   a walker with the chair on it and went over a bump and he fell off landing on   his back no HX of surgery to head; Additional info: Le weakness     TECHNIQUE:    MR of the head without contrast.     COMPARISON:    CT HEAD WO CONTRAST 11/13/2018 7:47 PM     FINDINGS:     A few small foci of abnormal T2 prolongation within the periventricular and   subcortical white matter of the supratentorial brain without associated   restricted diffusion.     Remainder of the brain parenchyma demonstrates normal signal  intensity   without an intra- or extra-axial mass or abnormality. No mass effect or midline   shift.     Midline brain structures including the corpus callosum, pituitary gland,   pineal region, and craniovertebral junction are within normal. Ventricles and   sulci are mildly dilated without evidence of hydrocephalus. Intracranial   vessels demonstrate a normal flow-void.       Impression:       1. Mild chronic microvascular ischemic disease and generalized age appropriate   atrophy.   2. No evidence of acute/subacute hemorrhagic or ischemic infarct and no   hydrocephalus.     NOTE: Suboptimal study due to extensive motion artifact.    THIS DOCUMENT HAS BEEN ELECTRONICALLY SIGNED BY BEKA SOUSA MD    CT Head Without Contrast [[id number]] Collected:  11/13/18 1835     Updated:  11/13/18 1958    Narrative:       EXAM:    CT Head Without Intravenous Contrast     EXAM DATE/TIME:    11/13/2018 6:35 PM     CLINICAL HISTORY:    89 years old, male; Injury or trauma; Fall; Initial encounter; Concussion /   head injury; Consciousness not specified; Additional info: Fall, hit head     TECHNIQUE:    Axial computed tomography images of the head/brain without intravenous   contrast.    All CT scans at this facility use at least one of these dose optimization   techniques: automated exposure control; mA and/or kV adjustment per patient   size (includes targeted exams where dose is matched to clinical indication); or   iterative reconstruction.     COMPARISON:    No relevant prior studies available.     FINDINGS:     Bilateral periventricular lucencies without intraparenchymal hemorrhage and   no obvious acute ischemic stroke. No intra-or extra-axial fluid collection, no   supra-or infratentorial mass, no mass effect or midline shift.     Mildly dilated ventricles, sulci and basal cisterns without evidence of   hydrocephalus. Skull bones are normal. Mild mucoperiosteal thickening in the   paranasal sinuses. No mastoid effusion.        Impression:       1. Mild chronic microvascular disease and generalized atrophy without acute   intracranial abnormality.   2. No evidence of acute hemorrhage, mass lesion or obvious acute ischemic   infarction.     THIS DOCUMENT HAS BEEN ELECTRONICALLY SIGNED BY BEKA SOUSA MD    XR Chest 1 View [[id number]] Collected:  11/13/18 1819     Updated:  11/13/18 1943    Narrative:       EXAM:    XR Chest, 1 View     EXAM DATE/TIME:    11/13/2018 6:19 PM     CLINICAL HISTORY:    89 years old, male; Signs and symptoms; Other: Weakness, unable to walk;   Additional info: Weak/dizzy/ams triage protocol     TECHNIQUE:    XR of the chest, 1 view.     COMPARISON:    No relevant prior studies available.     FINDINGS:     Prominent lung markings/peribronchial thickening with small bilateral pleural   effusion with atelectasis and consolidation in the lung bases. Cardiomegaly   with normal lung vascularity. Calcification and unfolding of the aortic arch.     Chronic LEFT clavicle fracture with marked deformity at the fracture site.   Mild deformity of the RIGHT chest wall suspicious for old fractures. Osteopenia   with associated chronic degenerative changes in the visualized spine and   shoulder joints. A large retrocardiac hiatal hernia.       Impression:       1. Chronic cardiopulmonary changes with small bilateral pleural effusion with   atelectasis and consolidation in the lung bases.   2. A large retrocardiac hiatal hernia.     THIS DOCUMENT HAS BEEN ELECTRONICALLY SIGNED BY BEKA SOUSA MD    XR Pelvis 1 or 2 View [[id number]] Collected:  11/13/18 1835     Updated:  11/13/18 1942    Narrative:       EXAM:    XR Pelvis, 1 or 2 Views     EXAM DATE/TIME:    11/13/2018 6:35 PM     CLINICAL HISTORY:    89 years old, male; Pain; Pelvic pain; Additional info: Fall, le weakness     TECHNIQUE:    XR pelvis, 1 or 2 views     COMPARISON:    CT ABDOMEN PELVIS WO CONTRAST 6/12/2018 11:45 PM     FINDINGS:     Marked diffuse osteopenia,  advanced chronic degenerative changes in the lower   lumbar spine. Chronic degenerative changes in the sacroiliac joints and hip   joints.     Questionable nondisplaced fractures of the pubic rami without obvious   displaced fracture around the hip joints. Remainder of the visualized bones and   joints are unremarkable. No discrete lytic or blastic lesion.       Impression:       Chronic degenerative changes with questionable pubic rami fractures. Recommend   followup with MRI if persistent/worsening symptoms.     THIS DOCUMENT HAS BEEN ELECTRONICALLY SIGNED BY BEKA SOUSA MD                   Order Current Status    Legionella Antigen, Urine - Urine, Urine, Clean Catch In process    S. Pneumo Ag Urine or CSF - Urine, Urine, Clean Catch In process    Blood Culture - Blood, Arm, Left Preliminary result    Blood Culture - Blood, Arm, Right Preliminary result        Discharge Details        Discharge Medications      New Medications      Instructions Start Date   cefdinir 300 MG capsule  Commonly known as:  OMNICEF   300 mg, Oral, Every 12 Hours      dexamethasone 4 MG tablet  Commonly known as:  DECADRON   4 mg, Oral, Every 8 Hours, Continue this dose until follow-up and discuss tapering with primary care and oncology.      gabapentin 100 MG capsule  Commonly known as:  NEURONTIN   100 mg, Oral, Nightly      HYDROcodone-acetaminophen 7.5-325 MG per tablet  Commonly known as:  NORCO  Replaces:  NORCO 5-325 MG per tablet   1 tablet, Oral, Every 6 Hours PRN         Changes to Medications      Instructions Start Date   terazosin 2 MG capsule  Commonly known as:  HYTRIN  What changed:    · medication strength  · how much to take   2 mg, Oral, Nightly         Continue These Medications      Instructions Start Date   aspirin 81 MG EC tablet   81 mg, Oral, Daily      CENTRUM SILVER 50+MEN tablet   1 tablet, Oral, Daily      fentaNYL 50 MCG/HR patch  Commonly known as:  DURAGESIC   1 patch, Transdermal, Every 72 Hours       furosemide 20 MG tablet  Commonly known as:  LASIX   20 mg, Oral, Daily      ibuprofen 200 MG tablet  Commonly known as:  ADVIL,MOTRIN   200 mg, Oral, Every 6 Hours PRN      sennosides-docusate sodium 8.6-50 MG tablet  Commonly known as:  SENOKOT-S   1 tablet, Oral, Daily      vitamin D3 5000 units capsule capsule   5,000 Units, Oral, Daily         Stop These Medications    ELIGARD 45 MG injection  Generic drug:  leuprolide     NORCO 5-325 MG per tablet  Generic drug:  HYDROcodone-acetaminophen  Replaced by:  HYDROcodone-acetaminophen 7.5-325 MG per tablet              Discharge Disposition:  Home-Health Care Svc    Discharge Diet:  Diet Instructions     Diet: Regular      Discharge Diet:  Regular    Resume usual diet          Discharge Activity:   Activity Instructions     Activity as Tolerated              Code Status/Level of Support:  Code Status and Medical Interventions:   Ordered at: 11/13/18 2211     Level Of Support Discussed With:    Patient     Code Status:    CPR     Medical Interventions (Level of Support Prior to Arrest):    Full       Future Appointments   Date Time Provider Department Center   11/16/2018  5:00 PM [facility]   12/13/2018  2:30 PM Donte Dolan MD [facility] None       Additional Instructions for the Follow-ups that You Need to Schedule     Ambulatory Referral to Home Health   As directed      Face to Face Visit Date:  11/16/2018    Follow-up Provider for Plan of Care?:  I treated the patient in an acute care facility and will not continue treatment after discharge.    Follow-up Provider:  JANEEN ANGELA [[id number]]    Reason/Clinical Findings:  current patient of [facility], hospitalization 11/13-11/16    Describe mobility limitations that make leaving home difficult:  current patient of [facility], hospitalization 11/13-11/16    Nursing/Therapeutic Services Requested:  Skilled Nursing Physical Therapy Occupational Therapy     Skilled nursing orders:  Cardiopulmonary assessments Neurovascular assessments    PT orders:  Home safety assessment Strengthening    Occupational orders:  Energy conservation Activities of daily living Home safety assessment Strengthening    Frequency:  Other (2-3 x week)         Discharge Follow-up with PCP   As directed       Currently Documented PCP:    Charli Mccormack MD    PCP Phone Number:    [phone]     Follow Up Details:  Follow-up in 3-4 days.  For general follow-up and to address pain control further         Discharge Follow-up with Specified Provider: Dr. Mejia within 1 week for follow-up of prostate cancer.  Patient's daughter prefers to make this appointment.; 1 Week   As directed      To:  Dr. Mejia within 1 week for follow-up of prostate cancer.  Patient's daughter prefers to make this appointment.    Follow Up:  1 Week               Time Spent on Discharge:  40 minutes, with significant counseling regarding patient's prognosis and radiology findings with he and his daughter today.    Electronically signed by Bob Juarez MD, 11/16/18, 2:25 PM.        Electronically signed by Bob Juarez MD at 11/16/2018  2:37 PM

## 2018-11-16 NOTE — PLAN OF CARE
Problem: Patient Care Overview  Goal: Plan of Care Review  Outcome: Ongoing (interventions implemented as appropriate)   11/16/18 1342   Coping/Psychosocial   Plan of Care Reviewed With patient   OTHER   Outcome Summary Able to amb 120 ft w/ R Wx & CGA, w/ sev. stand.rests d/t SOB,fat.; noted dec. HGB (10.9) & impulsive tendencies (abandons R wx premat., & needs cont. cueing to inc. step length (Tband reminder on AD); Should do well w/ HHPT f/u & asst. of wife/dtr   Plan of Care Review   Progress improving

## 2018-11-16 NOTE — PLAN OF CARE
Problem: Patient Care Overview  Goal: Plan of Care Review  Outcome: Ongoing (interventions implemented as appropriate)   11/16/18 0551   Coping/Psychosocial   Plan of Care Reviewed With patient   Plan of Care Review   Progress no change

## 2018-11-16 NOTE — DISCHARGE PLACEMENT REQUEST
"Plans to discharge home today, 11/16  Will fax discharge summary and med list once completed.     Thank You,  Milka Cruz, RN    [phone]    Helena Shane (89 y.o. Male)     Date of Birth Social Security Number Address Home Phone MRN    09/15/1927  [address] [facility] [address] [phone] [id number]    M[employer] Marital Status          [employer]        Admission Date Admission Type Admitting Provider Attending Provider Department, Room/Bed    11/13/18 Emergency Bob Juarez MD Furlow, Stephen Matthew, MD [address], S[address]/1    Discharge Date Discharge Disposition Discharge Destination                       Attending Provider:  Bob Juarez MD    Allergies:  Contrast Dye    Isolation:  None   Infection:  None   Code Status:  CPR    Ht:  165.1 cm (65\")   Wt:  78.9 kg (174 lb)    Admission Cmt:  None   Principal Problem:  Community acquired pneumonia [J18.9]                 Active Insurance as of 11/13/2018     Primary Coverage     Payor Plan Insurance Group Employer/Plan Group    MEDICARE MEDICARE A & B      Payor Plan Address Payor Plan Phone Number Payor Plan Fax Number Effective Dates    [address] [phone]  9/1/1992 - None Entered    [facility] [id number]       Subscriber Name Subscriber Birth Date Member ID       HELENA SHANE 9/15/1927 [id number]           Secondary Coverage     Payor Plan Insurance Group Employer/Plan Group    [place] [id number]     Payor Plan Address Payor Plan Phone Number Payor Plan Fax Number Effective Dates    [address]   1/1/1989 - None Entered    [facility] [address]       Subscriber Name Subscriber Birth Date Member ID       HELENA SHANE 9/15/1927 [id number]                 Emergency Contacts      (Rel.) Home Phone Work Phone Mobile Phone    Loida Shane (Daughter) -- -- [phone]    Danyell Shane (Daughter) -- -- [phone]    "         Insurance Information                MEDICARE/MEDICARE A & B Phone: [phone]    Subscriber: Harris Shane Subscriber#: [id number]    Group#:  Precert#:         CLOVER Garena/ANTHLUCRETIA IVY Phone:     Subscriber: Harris Shane Subscriber#: [id number]    Group#: [id number] Precert#:             [address]  [address] [facility] [address]  Phone:  [phone]  Fax:   Date: 2018      Ambulatory Referral to Home Health     Patient:  Harris Shane MRN:  [id number]   [address] [facility] [address] :  9/15/1927  SSN:    Phone: [phone] Sex:  M      INSURANCE PAYOR PLAN GROUP # SUBSCRIBER ID   Primary:  Secondary:    MEDICARE  CLOVER Garena [id number]  [id number]    [id number]  [id number]      Referring Provider Information:  ANDRES PARIS Phone: [phone] Fax:       Referral Information:   # Visits:  1 Referral Type: Home Health [42]   Urgency:  Routine Referral Reason: Patient Preference   Start Date: 2018 End Date:  To be determined by Insurer   Diagnosis: Community acquired pneumonia, unspecified laterality (J18.9 [ICD-10-CM] 486 [ICD-9-CM])  Weakness of both lower extremities (R29.898 [ICD-10-CM] 729.89 [ICD-9-CM])  Impaired mobility and ADLs (Z74.09 [ICD-10-CM] 799.89 [ICD-9-CM])  Impaired functional mobility, balance, gait, and endurance (Z74.09 [ICD-10-CM] V49.89 [ICD-9-CM])      Refer to Dept:   Refer to Provider:   Refer to Facility:  [facility]     Face to Face Visit Date: 2018  Follow-up Provider for Plan of Care? I treated the patient in an acute care facility and will not continue treatment after discharge.  Follow-up Provider: JANEEN ANGELA [[id number]]  Reason/Clinical Findings: current patient of [facility], hospitalization -  Describe mobility limitations that make leaving home difficult: current patient of [facility], hospitalization  -  Nursing/Therapeutic Services Requested: Skilled Nursing  Nursing/Therapeutic Services Requested: Physical Therapy  Nursing/Therapeutic Services Requested: Occupational Therapy  Skilled nursing orders: Cardiopulmonary assessments  Skilled nursing orders: Neurovascular assessments  PT orders: Home safety assessment  PT orders: Strengthening  Occupational orders: Energy conservation  Occupational orders: Activities of daily living  Occupational orders: Home safety assessment  Occupational orders: Strengthening  Frequency: Other (2-3 x week)     This document serves as a request of services and does not constitute Insurance authorization or approval of services.  To determine eligibility, please contact the members Insurance carrier to verify and review coverage.     If you have medical questions regarding this request for services. Please contact [address] at [phone] between the hours of 8:00am - 5:00pm (Mon-Fri).       Verbal Order Mode: Telephone with readback   Authorizing Provider: Bob Juarez MD  Authorizing Provider's NPI: [id number]     Order Entered By: Tish Cruz RN 2018  1:13 PM     Electronically signed by:  Bob Juarez MD 2018  1:15 PM                   History & Physical      Lyubov Garcia DO at 2018 10:32 PM          [facility]  HISTORY AND PHYSICAL    Patient Name: Harris Shane  : 9/15/1927  MRN: [id number]  Primary Care Physician: Charli Mccormack MD    Subjective   Subjective     Chief Complaint:  Lower extremity weakness, falls    HPI:  Harris Shane is a 89 y.o. male with a past medical history significant for BPH, prostate cancer with metastasis to L4-L5, DVT, essential hypertension, WILLIAM, hyperlipidemia, MI, GERD, and CAD presents to the ED with complaints of bilateral lower extremity weakness.  Patient states that about 10 days ago he was walking without  "any difficulty.  Patient states that over the past several days he has had multiple falls due to lower extremity weakness.  His most recent fall was this morning in which he states he was standing with his walker trying to put his sweater on when \"my legs gave out.\"  He states that he did not have any LOC but does admit to hitting his head.  He did not immediately have pain and was able to stand afterwards.  He denies any numbness/tingling, saddle paraesthesia, chest pain, shortness of air, cough, unilateral weakness, changes in speech, abdominal pain, nausea, vomiting, fever or chills. He does endorse mid back pain as a result of his fall.  Patient will be admitted to [facility] under the care of the Hospitalist for further evaluation and treatment.     Review of Systems   Constitutional: Positive for activity change and fatigue. Negative for appetite change, chills, diaphoresis, fever and unexpected weight change.   HENT: Negative.    Eyes: Negative for photophobia and visual disturbance.   Respiratory: Negative for cough and shortness of breath.    Cardiovascular: Negative for chest pain, palpitations and leg swelling.   Gastrointestinal: Negative for abdominal distention, abdominal pain, constipation, diarrhea, nausea and vomiting.   Genitourinary: Negative.    Musculoskeletal: Positive for back pain and gait problem. Negative for neck pain and neck stiffness.   Skin: Negative.    Neurological: Positive for weakness. Negative for dizziness, tremors, syncope, facial asymmetry, speech difficulty, light-headedness, numbness and headaches.   Psychiatric/Behavioral: Negative.         Otherwise 10-system ROS reviewed and is negative except as mentioned in the HPI.    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     Social History:  reports that he has quit smoking. His smoking use included pipe. he has never used smokeless tobacco. He reports that he does not drink alcohol or use drugs.    Medications:    (Not in a hospital admission)    Allergies   Allergen Reactions   • Contrast Dye Rash and Other (See Comments)     RASH AND SYNCOPE       Objective   Objective     Vital Signs:   Temp:  [98.2 °F (36.8 °C)] 98.2 °F (36.8 °C)  Heart Rate:  [87-96] 90  Resp:  [16] 16  BP: (133-153)/(69-97) 138/81        Physical Exam   Constitutional: He is oriented to person, place, and time. He appears well-developed and well-nourished. No distress.   HENT:   Head: Normocephalic and atraumatic.   Eyes: Conjunctivae and EOM are normal. Right eye exhibits no discharge. Left eye exhibits no discharge. No scleral icterus.   Right eye blindness    Neck: Normal range of motion. Neck supple. No JVD present. No tracheal deviation present. No thyromegaly present.   Cardiovascular: Normal rate, regular rhythm, normal heart sounds and intact distal pulses. Exam reveals no gallop and no friction rub.   No murmur heard.  Pulmonary/Chest: Effort normal and breath sounds normal. No stridor. No respiratory distress. He has no wheezes. He has no rales. He exhibits no tenderness.    Abdominal: Soft. Bowel sounds are normal. He exhibits no distension and no mass. There is no tenderness. There is no rebound and no guarding. No hernia.   Musculoskeletal: Normal range of motion. He exhibits no edema, tenderness or deformity.   Lymphadenopathy:     He has no cervical adenopathy.   Neurological: He is alert and oriented to person, place, and time.    5 out of 5 strength in all fours, neurovascularly intact distally in all fours with bounding distal pulses and normal sensation, no saddle parasthesia, no ataxia, no dysmetria, clear and fluent speech, awake, alert, and oriented ×3, no droop, no drift, normoreflexic    Skin: Skin is warm and dry. He is not diaphoretic.   Psychiatric: He has a normal mood and affect. His behavior is normal. Judgment and thought content normal.   Nursing note and vitals reviewed.       Results Reviewed:  I have personally reviewed current lab, radiology, and data and agree.    Results from last 7 days   Lab Units  11/13/18   1854   WBC 10*3/mm3  7.70   HEMOGLOBIN g/dL  12.2*   HEMATOCRIT %  37.5*   PLATELETS 10*3/mm3  250   INR   1.07     Results from last 7 days   Lab Units  11/13/18   1854   SODIUM mmol/L  136   POTASSIUM mmol/L  3.8   CHLORIDE mmol/L  101   CO2 mmol/L  28.0   BUN mg/dL  20   CREATININE mg/dL  0.94   GLUCOSE mg/dL  109*   CALCIUM mg/dL  8.9   ALT (SGPT) U/L  21   AST (SGOT) U/L  38*     No results found for: BNP  No results found for: PHART, PCO2  Imaging Results (last 24 hours)     Procedure Component Value Units Date/Time    CT Head Without Contrast [[id number]] Collected:  11/13/18 1835     Updated:  11/13/18 1958    Narrative:       EXAM:    CT Head Without Intravenous Contrast     EXAM DATE/TIME:    11/13/2018 6:35 PM     CLINICAL HISTORY:    89 years old, male; Injury or trauma; Fall; Initial encounter; Concussion /   head injury; Consciousness not specified; Additional info: Fall, hit head     TECHNIQUE:    Axial computed tomography images of the  head/brain without intravenous   contrast.    All CT scans at this facility use at least one of these dose optimization   techniques: automated exposure control; mA and/or kV adjustment per patient   size (includes targeted exams where dose is matched to clinical indication); or   iterative reconstruction.     COMPARISON:    No relevant prior studies available.     FINDINGS:     Bilateral periventricular lucencies without intraparenchymal hemorrhage and   no obvious acute ischemic stroke. No intra-or extra-axial fluid collection, no   supra-or infratentorial mass, no mass effect or midline shift.     Mildly dilated ventricles, sulci and basal cisterns without evidence of   hydrocephalus. Skull bones are normal. Mild mucoperiosteal thickening in the   paranasal sinuses. No mastoid effusion.       Impression:       1. Mild chronic microvascular disease and generalized atrophy without acute   intracranial abnormality.   2. No evidence of acute hemorrhage, mass lesion or obvious acute ischemic   infarction.     THIS DOCUMENT HAS BEEN ELECTRONICALLY SIGNED BY BEKA SOUSA MD    XR Chest 1 View [[id number]] Collected:  11/13/18 1819     Updated:  11/13/18 1943    Narrative:       EXAM:    XR Chest, 1 View     EXAM DATE/TIME:    11/13/2018 6:19 PM     CLINICAL HISTORY:    89 years old, male; Signs and symptoms; Other: Weakness, unable to walk;   Additional info: Weak/dizzy/ams triage protocol     TECHNIQUE:    XR of the chest, 1 view.     COMPARISON:    No relevant prior studies available.     FINDINGS:     Prominent lung markings/peribronchial thickening with small bilateral pleural   effusion with atelectasis and consolidation in the lung bases. Cardiomegaly   with normal lung vascularity. Calcification and unfolding of the aortic arch.     Chronic LEFT clavicle fracture with marked deformity at the fracture site.   Mild deformity of the RIGHT chest wall suspicious for old fractures. Osteopenia   with associated chronic  degenerative changes in the visualized spine and   shoulder joints. A large retrocardiac hiatal hernia.       Impression:       1. Chronic cardiopulmonary changes with small bilateral pleural effusion with   atelectasis and consolidation in the lung bases.   2. A large retrocardiac hiatal hernia.     THIS DOCUMENT HAS BEEN ELECTRONICALLY SIGNED BY BEKA SOUSA MD    XR Pelvis 1 or 2 View [[id number]] Collected:  11/13/18 1835     Updated:  11/13/18 1942    Narrative:       EXAM:    XR Pelvis, 1 or 2 Views     EXAM DATE/TIME:    11/13/2018 6:35 PM     CLINICAL HISTORY:    89 years old, male; Pain; Pelvic pain; Additional info: Fall, le weakness     TECHNIQUE:    XR pelvis, 1 or 2 views     COMPARISON:    CT ABDOMEN PELVIS WO CONTRAST 6/12/2018 11:45 PM     FINDINGS:     Marked diffuse osteopenia, advanced chronic degenerative changes in the lower   lumbar spine. Chronic degenerative changes in the sacroiliac joints and hip   joints.     Questionable nondisplaced fractures of the pubic rami without obvious   displaced fracture around the hip joints. Remainder of the visualized bones and   joints are unremarkable. No discrete lytic or blastic lesion.       Impression:       Chronic degenerative changes with questionable pubic rami fractures. Recommend   followup with MRI if persistent/worsening symptoms.     THIS DOCUMENT HAS BEEN ELECTRONICALLY SIGNED BY BEKA SOUSA MD        Results for orders placed during the hospital encounter of 06/12/18   Adult Transthoracic Echo Complete W/ Cont if Necessary Per Protocol    Narrative · Left ventricular systolic function is normal. Estimated EF = 55%.  · Left ventricular diastolic dysfunction (grade I) consistent with   impaired relaxation.          Assessment/Plan   Assessment / Plan     Active Hospital Problems    Diagnosis   • **Community acquired pneumonia   • Lower extremity weakness   • Essential hypertension   • History of DVT (deep vein thrombosis)   • BPH (benign  prostatic hyperplasia)   • Prostate cancer (Mets to L4-L5)   • WILLIAM (obstructive sleep apnea) remote, intolerant of CPAP   • Blindness of right eye         Assessment & Plan:  89 year old male presents to the ED with progressive weakness of bilateral lower extremities with multiple falls.     1) Lower extremity weakness  -etiology not completely clear  -patient does have known prostate cancer with mets to L4 L5  -xray of lumbar spine shows ? Pubic ramus fracture, MRI pending  -CT of head negative, MRI of brain pending, MRI of pelvis: patient is unable to tolerate due to pain, have given 0.5 mg of dilaudid but still unable to lay flat to tolerate rest of MRI of pelvis, will re-attempt in am.   -fall precautions  -up with assistance only   -prn pain  Medication    2) Community Acquired pneumonia  -CXR as noted above with consolidation in the lung bases  -will continue azithromycin and rocephin for now  -blood cultures x2 pending  -check sputum culture  -check urine antigens  -scheduled duo-nebs  -keep o2 sat >90%    3) Multiple falls  -secondary to #1  -CT of head unremarkable  -MRI pending  -fall precautions  -up with assistance only    4) Prostate cancer with mets to L4-L5  -follows with Dr. Mejia at [facility]  -currently states that he is on Eligard every six months and is due for an injection tomorrow.   -MRI of lumbar spine pending    5) History of DVT    6) Essential hypertension  -continue home meds    7) WILLIAM  -cpap at HS    DVT prophylaxis:teds/scds, lovenox     CODE STATUS:  Full code   Code Status and Medical Interventions:   Ordered at: 11/13/18 2751     Level Of Support Discussed With:    Patient     Code Status:    CPR     Medical Interventions (Level of Support Prior to Arrest):    Full       Admission Status:  INPATIENT status due to the need for care which can only be reasonably provided in an hospital setting such as aggressive/expedited ancillary services and/or consultation services, the  necessity for IV medications, close physician monitoring and/or the possible need for procedures.  In such, I feel patient’s risk for adverse outcomes and need for care warrant INPATIENT evaluation and predict the patient’s care encounter to likely last beyond 2 midnights.    CHELITA Moore   11/13/18   10:32 PM        Brief Attending Admission Attestation     I have seen and examined the patient, performing an independent face-to-face diagnostic evaluation with plan of care reviewed and developed with the advanced practice clinician (APC).      Brief Summary Statement/HPI:   Harris Shnae is a 89 y.o. male with hx of prostate cancer with mets to the lumbar spine. Presents with severe low back pain and increased weakness in lower ext. Pt daughter at bedside reports a progressive weakness since June. Pt was dx with metastasis to l-spine and has been on Leuprolide. NEXT DOSE DUE Thursday. During recent admission for sepsis pt required radiation therapy b/c his pain became so severe. He is currently on fentanyl patches and in the past 2 days has had to take norco up to 2x a day for breakthrough pain. Pt is ambulatory with a walker at baseline and is not currently able to ambulate. Pt was seen by Dr Donnelly after arriving to the floor from the ED and he reviewed films and reported that the findings on MRI of the Lumbar spine were more consistent with metastasis than infectious. Pt was also noted to have early CAP, treated with Zithromax and Rocephin. We will continue hose for now and admit pt to telemetry for pain control. Dr Donnelly has started Dexamethasone for symptom relief .      Attending Physical Exam:  Vitals: reviewed  Constitutional: No acute distress, awake, alert, nontoxic, pleasant   Respiratory: Clear to auscultation bilaterally, good effort, nonlabored respirations   Cardiovascular: RRR, no murmur  Gastrointestinal: normal bowel sounds, soft, nontender, nondistended  Musculoskeletal: MS equal in  lower ext bilaterally, sensation intact, tender across Lumbar spine   Psychiatric: Appropriate affect, good insight and judgement, cooperative  Skin: No rashes    Brief Assessment/Plan :  See above for further detailed assessment and plan developed with APC which I have reviewed and/or edited.      Electronically signed by Lyubov Garcia DO, 11/14/18, 4:03 AM.           Electronically signed by Lyubov Garcia DO at 11/14/2018  4:20 AM       Discharge Summary     No notes of this type exist for this encounter.

## 2018-11-16 NOTE — PROGRESS NOTES
I saw Mr. Shane this morning and discussed his oncologic care with his daughter.  He has completed 2 fractions of 6Gy to his lumbar spine, and is scheduled to complete his 3rd treatment later today.  He states his pain is stable and has not significantly changed.  I anticipate that we would see his pain improve later this weekend based on the timing of when he started his treatments anyways.    I spoke with his primary Oncologist today, Dr. Maco Mejia at the [facility].  Mr. Shane PSA has increased from 10 to 24, and his bone scan shows a picture of more widespread bony disease compared to previously, in addition to the progression in his lumbar spine.  He has been receiving Eligard injections, but this will need to be changed as it no longer appears to be working.  Dr. Mejia's plan will be to reevaluate Mr. Shane after discharge for either another systemic therapy such as Xtandi, or he could be a candidate for a radiation option of Xofigo.  At this point, Mr. Shane should be appropriate for discharge from an oncology point of view once he completes radiation to his spine later today.  I will work to coordinate follow-up with Dr. Mejia and can help decide on a next step systemic option as an outpatient.

## 2018-11-16 NOTE — THERAPY TREATMENT NOTE
Acute Care - Physical Therapy Treatment Note  [facility]     Patient Name: Harris Shane  : 9/15/1927  MRN: [id number]  Today's Date: 2018  Onset of Illness/Injury or Date of Surgery: 18  Date of Referral to PT: 18  Referring Physician: CHELITA Aguilar    Admit Date: 2018    Visit Dx:    ICD-10-CM ICD-9-CM   1. Community acquired pneumonia, unspecified laterality J18.9 486   2. Weakness of both lower extremities R29.898 729.89   3. Osteomyelitis of other site, unspecified type (CMS/HCC) M86.9 730.28   4. Discitis of lumbar region M46.46 722.93   5. Epidural abscess G06.2 324.9   6. Impaired mobility and ADLs Z74.09 799.89   7. Impaired functional mobility, balance, gait, and endurance Z74.09 V49.89     Patient Active Problem List   Diagnosis   • Sepsis (CMS/HCC)   • Elevated liver enzymes   • Prostate cancer (Mets to L4-L5)   • Pulmonary infiltrate, LLLobe   • Cholelithiases of GB neck per CT A/P   • Acute renal insufficiency, CRE 1.43, unknown baseline   • Blindness of right eye   • WILLIAM (obstructive sleep apnea) remote, intolerant of CPAP   • R/O Cholecystitis   • NSTEMI (non-ST elevated myocardial infarction) (R/O Type 2)   • Bacteremia due to Escherichia coli   • Community acquired pneumonia   • Lower extremity weakness   • Essential hypertension   • History of DVT (deep vein thrombosis)   • BPH (benign prostatic hyperplasia)       Therapy Treatment    Rehabilitation Treatment Summary     Row Name 18 1253             Treatment Time/Intention    Discipline  physical therapist  -DM      Document Type  therapy note (daily note)  -DM      Subjective Information  complains of;weakness;pain weakness in knees;had Rad Rx thisAM&amb.w/PCT;Req.BSC/BM  -DM      Mode of Treatment  individual therapy;physical therapy  -DM      Patient/Family Observations  UIC;Tele;RA;IV;chair exit alarm disarmed by nsg;c/o R forearm sore from 11 needlestick to start IV  -DM      Care Plan Review  care  plan/treatment goals reviewed;patient/other agree to care plan  -DM      Therapy Frequency (PT Clinical Impression)  daily  -DM      Patient Effort  good  -DM      Existing Precautions/Restrictions  fall;oxygen therapy device and L/min Rad rx.(metProst.CA); BLIND [place];lumb.discitis  -DM      Recorded by [DM] Mar Koenig, PT 11/16/18 1342      Row Name 11/16/18 1253             Vital Signs    Pre Systolic BP Rehab  120  -DM      Pre Treatment Diastolic BP  64  -DM      Post Systolic BP Rehab  125  -DM      Post Treatment Diastolic BP  74  -DM      Pretreatment Heart Rate (beats/min)  73  -DM      Intratreatment Heart Rate (beats/min)  96  -DM      Posttreatment Heart Rate (beats/min)  83  -DM      Pre SpO2 (%)  94  -DM      O2 Delivery Pre Treatment  room air  -DM      Pre Patient Position  Sitting  -DM      Intra Patient Position  Standing  -DM      Post Patient Position  Sitting  -DM      Recorded by [DM] Mar Koenig, PT 11/16/18 1342      Row Name 11/16/18 1253             Cognitive Assessment/Intervention    Additional Documentation  Cognitive Assessment/Intervention (Group)  -DM      Recorded by [DM] Mar Koenig, PT 11/16/18 1342      Row Name 11/16/18 1253             Cognitive Assessment/Intervention- PT/OT    Affect/Mental Status (Cognitive)  WFL  -DM      Orientation Status (Cognition)  oriented x 4  -DM      Follows Commands (Cognition)  follows one step commands;over 90% accuracy  -DM      Cognitive Function (Cognitive)  safety deficit  -DM      Safety Deficit (Cognitive)  mild deficit;impulsivity;safety precautions follow-through/compliance  -DM      Personal Safety Interventions  fall prevention program maintained;gait belt;nonskid shoes/slippers when out of bed  -DM      Recorded by [DM] Mar Koenig, PT 11/16/18 1342      Row Name 11/16/18 1253             Safety Issues, Functional Mobility    Impairments Affecting Function (Mobility)  balance;endurance/activity  tolerance;pain;postural/trunk control;strength  -DM      Recorded by [DM] Mar Koenig, PT 11/16/18 1342      Row Name 11/16/18 1253             Bed Mobility Assessment/Treatment    Comment (Bed Mobility)  UIC  -DM      Recorded by [DM] Mar Koenig, PT 11/16/18 1342      Row Name 11/16/18 1253             Transfer Assessment/Treatment    Transfer Assessment/Treatment  sit-stand transfer;stand-sit transfer  -DM      Comment (Transfers)  CUES for HP,stepping back securely/reaching for armrests p/t sitting  -DM      Recorded by [DM] Mar Koenig, PT 11/16/18 1342      Row Name 11/16/18 1253             Sit-Stand Transfer    Sit-Stand [place] (Transfers)  verbal cues;contact guard 2 trials (Recliner,EOB  -DM      Assistive Device (Sit-Stand Transfers)  walker, front-wheeled  -DM      Recorded by [DM] Mar Koenig, PT 11/16/18 1342      Row Name 11/16/18 1253             Stand-Sit Transfer    Stand-Sit [place] (Transfers)  verbal cues;contact guard To EOB,then BSC  -DM      Assistive Device (Stand-Sit Transfers)  walker, front-wheeled  -DM      Recorded by [DM] Mar Koenig, PT 11/16/18 1342      Row Name 11/16/18 1253             Toilet Transfer    Type (Toilet Transfer)  sit-stand;stand-sit  -DM      [place] Level (Toilet Transfer)  verbal cues;minimum assist (75% patient effort) cues to back up fully/reach for armrests/controlled descent  -DM      Assistive Device (Toilet Transfer)  commode, bedside without drop arms;walker, front-wheeled impulsively abandonedRwx p/t Sidestepping toBSC  -DM      Recorded by [DM] Mar Koenig, PT 11/16/18 1342      Row Name 11/16/18 1253             Gait/Stairs Assessment/Training    06859 - Gait Training Minutes   10  -DM      Gait/Stairs Assessment/Training  gait/ambulation independence;gait/ambulation assistive device;distance ambulated;gait pattern;gait deviations  -DM      [place] Level (Gait)  verbal cues;contact guard  -DM       Assistive Device (Gait)  walker, front-wheeled  -DM      Distance in Feet (Gait)  120 4 stand.rests;2 min.sit rest EOB;Sidestep toBSC  -DM      Pattern (Gait)  step-to  -DM      Deviations/Abnormal Patterns (Gait)  base of support, narrow;veronique decreased;stride length decreased dec step length;steps outside Rwx on turns  -DM      Bilateral Gait Deviations  forward flexed posture;heel strike decreased  -DM      Comment (Gait/Stairs)  CUES to ext.trunk/step into AD/to yellow Tband  reminder  -DM      Recorded by [DM] Mar Koenig, PT 11/16/18 1342      Row Name 11/16/18 1253             Motor Skills Assessment/Interventions    Additional Documentation  Balance (Group);Balance Interventions (Group);Therapeutic Exercise (Group);Therapeutic Exercise Interventions (Group)  -DM      Recorded by [DM] Mar Koenig, PT 11/16/18 1342      Row Name 11/16/18 1253             Therapeutic Exercise    90258 - PT Therapeutic Activity Minutes  29  -DM      Recorded by [DM] Mar Koenig, PT 11/16/18 1342      Row Name 11/16/18 1253             Therapeutic Exercise    Upper Extremity Range of Motion (Therapeutic Exercise)  shoulder flexion/extension, bilateral;shoulder abduction/adduction, bilateral;elbow flexion/extension, bilateral  -DM      Lower Extremity (Therapeutic Exercise)  gluteal sets;hamstring sets, bilateral;heel slides, bilateral;LAQ (long arc quad), bilateral;marching while seated;quad sets, bilateral  -DM      Lower Extremity Range of Motion (Therapeutic Exercise)  hip abduction/adduction, bilateral;hip internal/external rotation, bilateral;ankle dorsiflexion/plantar flexion, bilateral  -DM      Exercise Type (Therapeutic Exercise)  AAROM (active assistive range of motion);AROM (active range of motion);isometric contraction, static  -DM      Position (Therapeutic Exercise)  seated;supine  -DM      Sets/Reps (Therapeutic Exercise)  1/10  -DM      Comment (Therapeutic Exercise)  RET. s/p BSC/BM/hygiene to  issue HEP/instruct  -DM      Recorded by [DM] Mar Koenig, PT 11/16/18 1342      Row Name 11/16/18 1253             Balance    Balance  static sitting balance;static standing balance  -DM      Recorded by [DM] Mar Koenig, PT 11/16/18 1342      Row Name 11/16/18 1253             Static Sitting Balance    Level of [place] (Unsupported Sitting, Static Balance)  independent  -DM      Sitting Position (Unsupported Sitting, Static Balance)  sitting on edge of bed  -DM      Time Able to Maintain Position (Unsupported Sitting, Static Balance)  1 to 2 minutes  -DM      Recorded by [DM] Mar Koenig, PT 11/16/18 1342      Row Name 11/16/18 1253             Dynamic Sitting Balance    Level of [place], Reaches Outside Midline (Sitting, Dynamic Balance)  standby assist  -DM      Sitting Position, Reaches Outside Midline (Sitting, Dynamic Balance)  sitting on edge of bed  -DM      Comment, Reaches Outside Midline (Sitting, Dynamic Balance)  recip scooting (EOB,onto BSC)  -DM      Recorded by [DM] Mar Koenig, PT 11/16/18 1342      Row Name 11/16/18 1253             Static Standing Balance    Level of [place] (Supported Standing, Static Balance)  standby assist  -DM      Time Able to Maintain Position (Supported Standing, Static Balance)  30 to 45 seconds  -DM      Assistive Device Utilized (Supported Standing, Static Balance)  rolling walker  -DM      Comment (Supported Standing, Static Balance)  PLB;trunk ext  -DM      Recorded by [DM] Mar Koenig, PT 11/16/18 1342      Row Name 11/16/18 1253             Dynamic Standing Balance    Level of [place], Reaches Outside Midline (Standing, Dynamic Balance)  contact guard assist  -DM      Time Able to Maintain Position, Reaches Outside Midline (Standing, Dynamic Balance)  45 to 60 seconds  -DM      Comment, Reaches Outside Midline (Standing, Dynamic Balance)  R wx  -DM      Recorded by [DM] Mar Koenig, PT 11/16/18 1342      Row Name  11/16/18 1253             Positioning and Restraints    Pre-Treatment Position  sitting in chair/recliner  -DM      Post Treatment Position  bed  -DM      In Bed  notified nsg;supine;call light within reach;encouraged to call for assist  -DM      Recorded by [DM] Mar Koenig, PT 11/16/18 1342      Row Name 11/16/18 1253             Pain Assessment    Additional Documentation  Pain Scale: FACES Pre/Post-Treatment (Group)  -DM      Recorded by [ROBERTA] Mar Koenig, PT 11/16/18 1342      Row Name 11/16/18 1253             Pain Scale: Numbers Pre/Post-Treatment    Pain Location  back also R forearm s/p 11 needle sticks  -DM      Pain Intervention(s)  Repositioned;Rest  -DM      Recorded by [DM] Mar Koenig, PT 11/16/18 1342      Row Name 11/16/18 1253             Pain Scale: FACES Pre/Post-Treatment    Pain: FACES Scale, Pretreatment  2-->hurts little bit  -DM      Pain: FACES Scale, Post-Treatment  4-->hurts little more  -DM      Recorded by [DM] Mar Koenig, PT 11/16/18 1342      Row Name 11/16/18 1253             Plan of Care Review    Plan of Care Reviewed With  patient  -DM      Recorded by [DM] Mar Koenig, PT 11/16/18 1342      Row Name 11/16/18 1253             Outcome Summary/Treatment Plan (PT)    Anticipated Discharge Disposition (PT)  home with home health  -DM      Recorded by [ROBERTA] Mar Koenig, PT 11/16/18 1342        User Key  (r) = Recorded By, (t) = Taken By, (c) = Cosigned By    Initials Name Effective Dates Discipline    DM Mar Koenig, PT 06/19/15 -  PT                   Physical Therapy Education     Title: PT OT SLP Therapies (Active)     Topic: Physical Therapy (Active)     Point: Mobility training (Active)     Learning Progress Summary           Patient Eager, E,D,H, NR by DM at 11/16/2018  1:42 PM    Acceptance, E,D, NR by BD at 11/14/2018  1:15 PM   Family Acceptance, E,D, NR by BD at 11/14/2018  1:15 PM                   Point: Home exercise program (Active)      Learning Progress Summary           Patient Eager, E,D,H, NR by DM at 11/16/2018  1:42 PM    Acceptance, E,D, NR by BD at 11/14/2018  1:15 PM   Family Acceptance, E,D, NR by BD at 11/14/2018  1:15 PM                   Point: Body mechanics (Active)     Learning Progress Summary           Patient Eager, E,D,H, NR by DM at 11/16/2018  1:42 PM    Acceptance, E,D, NR by BD at 11/14/2018  1:15 PM   Family Acceptance, E,D, NR by BD at 11/14/2018  1:15 PM                   Point: Precautions (Active)     Learning Progress Summary           Patient Eager, E,D,H, NR by DM at 11/16/2018  1:42 PM    Acceptance, E,D, NR by BD at 11/14/2018  1:15 PM   Family Acceptance, E,D, NR by BD at 11/14/2018  1:15 PM                               User Key     Initials Effective Dates Name Provider Type Discipline    DM 06/19/15 -  Mar Koenig, PT Physical Therapist PT    BD 06/08/18 -  Luisa Gallardo, PT Physical Therapist PT                PT Recommendation and Plan  Anticipated Discharge Disposition (PT): home with home health  Therapy Frequency (PT Clinical Impression): daily  Outcome Summary/Treatment Plan (PT)  Anticipated Discharge Disposition (PT): home with home health  Plan of Care Reviewed With: patient  Progress: improving  Outcome Summary: Able to amb 120 ft w/ R Wx & CGA, w/ sev. stand.rests d/t SOB,fat.; noted dec. HGB (10.9) & impulsive tendencies (abandons R wx  premat., & needs cont. cueing to inc. step length (Tband reminder on AD); Should do well w/ HHPT f/u & asst. of wife/dtr  Outcome Measures     Row Name 11/16/18 1253 11/14/18 0800          How much help from another person do you currently need...    Turning from your back to your side while in flat bed without using bedrails?  4  -DM  --     Moving from lying on back to sitting on the side of a flat bed without bedrails?  3  -DM  --     Moving to and from a bed to a chair (including a wheelchair)?  3  -DM  --     Standing up from a chair using your arms  (e.g., wheelchair, bedside chair)?  3  -DM  --     Climbing 3-5 steps with a railing?  3  -DM  --     To walk in hospital room?  3  -DM  --     AM-PAC 6 Clicks Score  19  -DM  --        How much help from another is currently needed...    Putting on and taking off regular lower body clothing?  --  2  -KF     Bathing (including washing, rinsing, and drying)  --  2  -KF     Toileting (which includes using toilet bed pan or urinal)  --  3  -KF     Putting on and taking off regular upper body clothing  --  3  -KF     Taking care of personal grooming (such as brushing teeth)  --  3  -KF     Eating meals  --  3  -KF     Score  --  16  -KF        Functional Assessment    Outcome Measure Options  AM-PAC 6 Clicks Basic Mobility (PT)  -DM  AM-PAC 6 Clicks Daily Activity (OT)  -KF       User Key  (r) = Recorded By, (t) = Taken By, (c) = Cosigned By    Initials Name Provider Type    Mar Santos, PT Physical Therapist    KF Jillian Bradford, OT Occupational Therapist         Time Calculation:   PT Charges     Row Name 11/16/18 1347 11/16/18 1253          Time Calculation    Start Time  1253  -DM  --     PT Received On  11/16/18  -DM  --     PT Goal Re-Cert Due Date  11/24/18  -DM  --        Time Calculation- PT    Total Timed Code Minutes- PT  39 minute(s)  -DM  --        Timed Charges    34870 - Gait Training Minutes   --  10  -DM     08676 - PT Therapeutic Activity Minutes  --  29  -DM       User Key  (r) = Recorded By, (t) = Taken By, (c) = Cosigned By    Initials Name Provider Type    Mar Santos, PT Physical Therapist        Therapy Suggested Charges     Code   Minutes Charges    21222 (CPT®) Hc Pt Neuromusc Re Education Ea 15 Min      68687 (CPT®) Hc Pt Ther Proc Ea 15 Min      68331 (CPT®) Hc Gait Training Ea 15 Min 10 1    91165 (CPT®) Hc Pt Therapeutic Act Ea 15 Min 29 2    52288 (CPT®) Hc Pt Manual Therapy Ea 15 Min      64698 (CPT®) Hc Pt Iontophoresis Ea 15 Min      10036 (CPT®) Hc Pt Elec Stim  Ea-Per 15 Min      74979 (CPT®) Hc Pt Ultrasound Ea 15 Min      22883 (CPT®) Hc Pt Self Care/Mgmt/Train Ea 15 Min      90156 (CPT®) Hc Pt Prosthetic (S) Train Initial Encounter, Each 15 Min      10947 (CPT®) Hc Pt Orthotic(S)/Prosthetic(S) Encounter, Each 15 Min      12458 (CPT®) Hc Orthotic(S) Mgmt/Train Initial Encounter, Each 15min      Total  39 3        Therapy Charges for Today     Code Description Service Date Service Provider Modifiers Qty    30017006725 HC GAIT TRAINING EA 15 MIN 11/16/2018 Mar Koenig, PT GP 1    92427621869 HC PT THERAPEUTIC ACT EA 15 MIN 11/16/2018 Mar Koenig, PT GP 2          PT G-Codes  Outcome Measure Options: AM-PAC 6 Clicks Basic Mobility (PT)  AM-PAC 6 Clicks Score: 19  Score: 16    Mar Koenig, PT  11/16/2018

## 2018-11-17 ENCOUNTER — READMISSION MANAGEMENT (OUTPATIENT)
Dept: CALL CENTER | Facility: HOSPITAL | Age: 83
End: 2018-11-17

## 2018-11-17 LAB
BACTERIA SPEC AEROBE CULT: ABNORMAL
BACTERIA SPEC AEROBE CULT: ABNORMAL
GRAM STN SPEC: ABNORMAL
ISOLATED FROM: ABNORMAL
ISOLATED FROM: ABNORMAL

## 2018-11-17 NOTE — OUTREACH NOTE
Prep Survey      Responses   Facility patient discharged from?  [place]   Is patient eligible?  Yes   Discharge diagnosis  Community acquired pneumonia    Does the patient have one of the following disease processes/diagnoses(primary or secondary)?  COPD/Pneumonia   Does the patient have Home health ordered?  Yes   What is the Home health agency?   [facility]   Comments regarding appointments  Needs one week appt scheduled.    Prep survey completed?  Yes          Lauren Carbajal RN

## 2018-11-19 ENCOUNTER — READMISSION MANAGEMENT (OUTPATIENT)
Dept: CALL CENTER | Facility: HOSPITAL | Age: 83
End: 2018-11-19

## 2018-11-19 NOTE — OUTREACH NOTE
COPD/PN Week 1 Survey      Responses   Facility patient discharged from?  [place]   Does the patient have one of the following disease processes/diagnoses(primary or secondary)?  COPD/Pneumonia   Is there a successful TCM telephone encounter documented?  No   Was the primary reason for admission:  Pneumonia   Week 1 attempt successful?  Yes   Call start time  1115   Rescheduled  Rescheduled-pt requested   Call end time  1119   Discharge diagnosis  Community acquired pneumonia           Koby Henderson RN

## 2018-11-20 ENCOUNTER — READMISSION MANAGEMENT (OUTPATIENT)
Dept: CALL CENTER | Facility: HOSPITAL | Age: 83
End: 2018-11-20

## 2018-11-20 NOTE — OUTREACH NOTE
COPD/PN Week 1 Survey      Responses   Facility patient discharged from?  [place]   Does the patient have one of the following disease processes/diagnoses(primary or secondary)?  COPD/Pneumonia   Is there a successful TCM telephone encounter documented?  No   Was the primary reason for admission:  Pneumonia   Week 1 attempt successful?  Yes   Call start time  0903   Call end time  0911   Discharge diagnosis  Community acquired pneumonia    Is patient permission given to speak with other caregiver?  Yes   Person spoke with today (if not patient) and relationship  Loida/daughter   Meds reviewed with patient/caregiver?  Yes   Is the patient having any side effects they believe may be caused by any medication additions or changes?  No   Does the patient have all medications ordered at discharge?  Yes   Is the patient taking all medications as directed (includes completed medication regime)?  Yes   Does the patient have an appointment with their PCP or pulmonologist within 7 days of discharge?  Yes   Comments regarding PCP  Has appt with Dr Mccormack today 11/20/2018   Has the patient kept scheduled appointments due by today?  Yes   What is the Home health agency?   [facility]   Has home health visited the patient within 72 hours of discharge?  Yes   Psychosocial issues?  No   Did the patient receive a copy of their discharge instructions?  Yes   Nursing interventions  Reviewed instructions with patient   What is the patient's perception of their health status since discharge?  Improving   Nursing Interventions  Nurse provided patient education   Are the patient's immunizations up to date?   Yes   Nursing interventions  Advised patient to discuss with provider at next visit   Is the patient/caregiver able to teach back the hierarchy of who to call/visit for symptoms/problems? PCP, Specialist, Home health nurse, Urgent Care, ED, 911  Yes   Additional teach back comments  Daughter reports patient is doing well. NO SOB,  chest pain or fever.    Is the patient/caregiver able to teach back signs and symptoms of worsening condition:  Fever/chills, Shortness of breath, Chest pain   Is the patient/caregiver able to teach back importance of completing antibiotic course of treatment?  Yes   Week 1 call completed?  Yes          Koby Henderson RN

## 2018-11-26 NOTE — THERAPY DISCHARGE NOTE
DATE OF COMPLETION: 11/16/2018  DIAGNOSIS: Metastatic Prostate Cancer    REFERRING: Maco Mejia MD        Harris Tom completed radiation therapy today.      BACKGROUND: Harris Shane is a 89-year-old gentleman with metastatic prostate adenocarcinoma with progressive disease at the L4 and L5 vertebral levels.  He was admitted to the hospital with lower extremity weakness and increasing pain.  He is previously received 8 Gray in a single fraction to his low lumbar spine, and he underwent a short course of reirradiation as detailed below:    Treatment Summary     Dates of Therapy: 11/14/2018 - 11/16/2018  Treatment Site: L3-S2  Dose: 18 Gy in 3 fractions of 6 Gy each  Technique: A 3-field technique was performed using a combination of 10X and 15x photons to treat the lumbar spine and sacrum.    Treatment Course and Tolerance: Mr. Shane remained as an inpatient during his radiation treatments.  On the final day of treatment, he was having a slight degree of pain improvement.  He otherwise did not develop any treatment-related toxicity.  The plan at the end of his hospital admission was to coordinate for him to be seen again by Dr. Mejia at [facility] to determine the next step in his systemic therapy.    The initial follow up visit will be in 1 month.    Harris Shane knows to call if any problems or concerns develop in the meantime.     Electronically signed by: Donte Dolan MD                    Cc: Charli Mccormack MD

## 2018-11-28 ENCOUNTER — READMISSION MANAGEMENT (OUTPATIENT)
Dept: CALL CENTER | Facility: HOSPITAL | Age: 83
End: 2018-11-28

## 2018-11-28 NOTE — OUTREACH NOTE
COPD/PN Week 2 Survey      Responses   Facility patient discharged from?  [place]   Does the patient have one of the following disease processes/diagnoses(primary or secondary)?  COPD/Pneumonia   Was the primary reason for admission:  Pneumonia   Week 2 attempt successful?  Yes   Call start time  1005   Call end time  1008   Discharge diagnosis  Community acquired pneumonia    Is patient permission given to speak with other caregiver?  Yes   List who call center can speak with  daughters   Person spoke with today (if not patient) and relationship  Loida/daughter   Meds reviewed with patient/caregiver?  Yes   Is the patient having any side effects they believe may be caused by any medication additions or changes?  No   Does the patient have all medications ordered at discharge?  Yes   Is the patient taking all medications as directed (includes completed medication regime)?  Yes   Does the patient have an appointment with their PCP or pulmonologist within 7 days of discharge?  Yes   Comments regarding PCP  Has seen Dr Mccormack already and Dr Mejia   Has the patient kept scheduled appointments due by today?  Yes   What is the Home health agency?   [facility]   Has home health visited the patient within 72 hours of discharge?  Yes   Psychosocial issues?  No   Did the patient receive a copy of their discharge instructions?  Yes   Nursing interventions  Reviewed instructions with patient   What is the patient's perception of their health status since discharge?  Same   Nursing Interventions  Nurse provided patient education   Are the patient's immunizations up to date?   Yes   Nursing interventions  Advised patient to discuss with provider at next visit   Is the patient/caregiver able to teach back the hierarchy of who to call/visit for symptoms/problems? PCP, Specialist, Home health nurse, Urgent Care, ED, 911  Yes   Additional teach back comments  Daughter reports patient is doing as well as can be expected. No fever,  chest pain .    Is the patient/caregiver able to teach back signs and symptoms of worsening condition:  Fever/chills, Shortness of breath, Chest pain   Is the patient/caregiver able to teach back importance of completing antibiotic course of treatment?  Yes   Week 2 call completed?  Yes          Koby Henderson RN

## 2018-12-05 ENCOUNTER — READMISSION MANAGEMENT (OUTPATIENT)
Dept: CALL CENTER | Facility: HOSPITAL | Age: 83
End: 2018-12-05

## 2018-12-05 NOTE — OUTREACH NOTE
COPD/PN Week 3 Survey      Responses   Facility patient discharged from?  [place]   Does the patient have one of the following disease processes/diagnoses(primary or secondary)?  COPD/Pneumonia   Was the primary reason for admission:  Pneumonia   Week 3 attempt successful?  Yes   Call start time  1740   Call end time  1741   Person spoke with today (if not patient) and relationship  Loida/daughter   Meds reviewed with patient/caregiver?  Yes   Is the patient taking all medications as directed (includes completed medication regime)?  Yes   Has the patient kept scheduled appointments due by today?  Yes   What is the patient's perception of their health status since discharge?  Improving   Is the patient able to teach back COPD zones?  Yes   Week 3 call completed?  Yes   Revoked  No further contact(revokes)-requires comment   Graduated/Revoked comments  All goals met.          Stacia Mena, RN

## 2018-12-13 ENCOUNTER — HOSPITAL ENCOUNTER (OUTPATIENT)
Dept: RADIATION ONCOLOGY | Facility: HOSPITAL | Age: 83
Setting detail: RADIATION/ONCOLOGY SERIES
Discharge: HOME | End: 2018-12-13

## 2018-12-13 ENCOUNTER — OFFICE VISIT (OUTPATIENT)
Dept: RADIATION ONCOLOGY | Facility: HOSPITAL | Age: 83
End: 2018-12-13

## 2018-12-13 VITALS
SYSTOLIC BLOOD PRESSURE: 134 MMHG | DIASTOLIC BLOOD PRESSURE: 81 MMHG | HEART RATE: 90 BPM | RESPIRATION RATE: 18 BRPM | WEIGHT: 156.8 LBS | OXYGEN SATURATION: 96 % | TEMPERATURE: 95.9 F | BODY MASS INDEX: 26.09 KG/M2

## 2018-12-13 DIAGNOSIS — C61 PROSTATE CANCER (HCC): ICD-10-CM

## 2018-12-13 PROCEDURE — G0463 HOSPITAL OUTPT CLINIC VISIT: HCPCS | Performed by: RADIOLOGY

## 2018-12-13 RX ORDER — PREDNISOLONE ACETATE 10 MG/ML
SUSPENSION/ DROPS OPHTHALMIC
Refills: 2 | COMMUNITY
Start: 2018-10-22 | End: 2018-12-29

## 2018-12-13 RX ORDER — FENTANYL 37.5 UG/H
PATCH, EXTENDED RELEASE TRANSDERMAL
Refills: 0 | COMMUNITY
Start: 2018-11-16 | End: 2018-12-29

## 2018-12-13 RX ORDER — ABIRATERONE ACETATE 500 MG/1
TABLET, FILM COATED ORAL
COMMUNITY
Start: 2018-12-10 | End: 2018-12-29

## 2018-12-13 RX ORDER — PREDNISONE 1 MG/1
5 TABLET ORAL EVERY 12 HOURS SCHEDULED
COMMUNITY
Start: 2018-12-10

## 2018-12-13 RX ORDER — TERAZOSIN 2 MG/1
1 CAPSULE ORAL DAILY
COMMUNITY
Start: 2018-12-11 | End: 2018-12-13 | Stop reason: SDUPTHER

## 2018-12-13 RX ORDER — HYDROCODONE BITARTRATE AND ACETAMINOPHEN 5; 325 MG/1; MG/1
TABLET ORAL
COMMUNITY
Start: 2018-11-21 | End: 2018-12-13 | Stop reason: SDUPTHER

## 2018-12-13 RX ORDER — CEFDINIR 300 MG/1
CAPSULE ORAL EVERY 12 HOURS
COMMUNITY
Start: 2018-12-10 | End: 2018-12-29

## 2018-12-13 RX ORDER — GABAPENTIN 100 MG/1
CAPSULE ORAL EVERY 8 HOURS SCHEDULED
COMMUNITY
Start: 2018-11-20 | End: 2018-12-13 | Stop reason: SDUPTHER

## 2018-12-13 RX ORDER — FUROSEMIDE 40 MG/1
20 TABLET ORAL DAILY
COMMUNITY
Start: 2018-11-21 | End: 2019-03-05 | Stop reason: SDUPTHER

## 2018-12-13 NOTE — PROGRESS NOTES
FOLLOW UP NOTE    PATIENT:                                                      Harris Shane  MEDICAL RECORD #:                        [id number]  :                                                          9/15/1927  COMPLETION DATE:    2018  DIAGNOSIS:     Cancer Staging  Prostate cancer (Mets to L4-L5)  Staging form: Prostate, AJCC 8th Edition  - Clinical stage from 10/27/2015: Stage IVB (cT2c, cN0, cM1, PSA: 7.8, Grade Group: 4) - Signed by Donte Dolan MD on 2018    BRIEF HISTORY:  Mr. Shane returns to clinic today for a short interval follow-up after completing a short course of reirradiation to the L3-S2 vertebral levels on 2018 for palliation of pain secondary to his metastatic prostate cancer.  He was admitted at the time secondary to sepsis any urinary tract infection.  This has improved and at the present time he remains on Omnicef prescribed by his primary care physician.  He was recently seen by Dr. Maco Mejia of the [facility] who started him on Zytiga.  He denies having any pain today.  Aside from fatigue and deconditioning, he denies any new symptoms.  He has been receiving home physical therapy once weekly, but he denies doing any of the exercises on his own.    MEDICATIONS: Medication reconciliation for the patient was reviewed and confirmed in the electronic medical record.    Review of Systems   Eyes: Positive for eye problems (blind right eye).   Musculoskeletal: Positive for gait problem (wheelchair).   Neurological: Positive for dizziness (upon standing) and gait problem (wheelchair).   Hematological: Bruises/bleeds easily.   Psychiatric/Behavioral: The patient is nervous/anxious.    All other systems reviewed and are negative.      KPS 80%    Physical Exam   Constitutional: He is oriented to person, place, and time. He appears well-developed and well-nourished. No distress.   HENT:   Head: Normocephalic and atraumatic.   Mouth/Throat:  Oropharynx is clear and moist.   Exotropia, blind in one eye   Eyes: Conjunctivae and EOM are normal. Pupils are equal, round, and reactive to light.   Neck: Normal range of motion. Neck supple.   Cardiovascular: Normal rate and regular rhythm. Exam reveals no friction rub.   No murmur heard.  2/6 systolic murmur   Pulmonary/Chest: Effort normal and breath sounds normal. He has no wheezes.   Abdominal: Soft. Bowel sounds are normal. He exhibits no distension and no mass. There is no tenderness.   Musculoskeletal: Normal range of motion. He exhibits no edema.   He denies any tenderness to palpation within the back, ribs, bilateral hips, or extremities   Lymphadenopathy:     He has no cervical adenopathy.   Neurological: He is alert and oriented to person, place, and time.   Skin: Skin is warm and dry.   Psychiatric: He has a normal mood and affect. His behavior is normal. Judgment and thought content normal.   Nursing note and vitals reviewed.      VITAL SIGNS:   Vitals:    12/13/18 1446   BP: 134/81   Pulse: 90   Resp: 18   Temp: 95.9 °F (35.5 °C)   TempSrc: Temporal   SpO2: 96%   Weight: 71.1 kg (156 lb 12.8 oz)   PainSc: 0-No pain       The following portions of the patient's history were reviewed and updated as appropriate: allergies, current medications, past family history, past medical history, past social history, past surgical history and problem list.         Harris was seen today for prostate cancer.    Diagnoses and all orders for this visit:    Prostate cancer (CMS/[facility])    IMPRESSION:  Mr. 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  He appears to her responded well to a second course of palliative radiation therapy to the lumbar spine.  He continues to have generalized weakness which is related to deconditioning, his age, and his recent hospital stays and infections.  He is receiving physical therapy and I reiterated to him that he will have the most  benefit if he does the exercises on the days that he is not with the physical therapist.  As mentioned, he has already started Zytiga with Dr. Mejia.  I discussed with him and his daughter continuing all of his oncologic care with Dr. Mejia and he can see me on an as needed basis.    RECOMMENDATIONS:      Return if symptoms worsen or fail to improve.    Donte Dolan MD    Errors in dictation may reflect use of voice recognition software and not all errors in transcription may have been detected prior to signing.

## 2018-12-29 ENCOUNTER — APPOINTMENT (OUTPATIENT)
Dept: GENERAL RADIOLOGY | Facility: HOSPITAL | Age: 83
End: 2018-12-29

## 2018-12-29 ENCOUNTER — HOSPITAL ENCOUNTER (OUTPATIENT)
Facility: HOSPITAL | Age: 83
Setting detail: OBSERVATION
Discharge: HOME | End: 2018-12-30
Attending: EMERGENCY MEDICINE | Admitting: FAMILY MEDICINE

## 2018-12-29 ENCOUNTER — APPOINTMENT (OUTPATIENT)
Dept: CT IMAGING | Facility: HOSPITAL | Age: 83
End: 2018-12-29

## 2018-12-29 DIAGNOSIS — Z78.9 IMPAIRED MOBILITY AND ADLS: ICD-10-CM

## 2018-12-29 DIAGNOSIS — Z74.09 IMPAIRED MOBILITY AND ADLS: ICD-10-CM

## 2018-12-29 DIAGNOSIS — R29.898 WEAKNESS OF BOTH LOWER EXTREMITIES: Primary | ICD-10-CM

## 2018-12-29 DIAGNOSIS — R26.2 IMPAIRED AMBULATION: ICD-10-CM

## 2018-12-29 DIAGNOSIS — C61 PROSTATE CANCER METASTATIC TO BONE (HCC): ICD-10-CM

## 2018-12-29 DIAGNOSIS — C79.51 PROSTATE CANCER METASTATIC TO BONE (HCC): ICD-10-CM

## 2018-12-29 LAB
ALBUMIN SERPL-MCNC: 3.83 G/DL (ref 3.2–4.8)
ALBUMIN/GLOB SERPL: 1.9 G/DL (ref 1.5–2.5)
ALP SERPL-CCNC: 679 U/L (ref 25–100)
ALT SERPL W P-5'-P-CCNC: 21 U/L (ref 7–40)
ANION GAP SERPL CALCULATED.3IONS-SCNC: 8 MMOL/L (ref 3–11)
AST SERPL-CCNC: 34 U/L (ref 0–33)
BACTERIA UR QL AUTO: ABNORMAL /HPF
BASOPHILS # BLD AUTO: 0.01 10*3/MM3 (ref 0–0.2)
BASOPHILS NFR BLD AUTO: 0.1 % (ref 0–1)
BILIRUB SERPL-MCNC: 1.4 MG/DL (ref 0.3–1.2)
BILIRUB UR QL STRIP: NEGATIVE
BUN BLD-MCNC: 18 MG/DL (ref 9–23)
BUN/CREAT SERPL: 21.2 (ref 7–25)
CALCIUM SPEC-SCNC: 8.8 MG/DL (ref 8.7–10.4)
CHLORIDE SERPL-SCNC: 99 MMOL/L (ref 99–109)
CK SERPL-CCNC: 42 U/L (ref 26–174)
CLARITY UR: ABNORMAL
CO2 SERPL-SCNC: 29 MMOL/L (ref 20–31)
COD CRY URNS QL: ABNORMAL /HPF
COLOR UR: YELLOW
CREAT BLD-MCNC: 0.85 MG/DL (ref 0.6–1.3)
DEPRECATED RDW RBC AUTO: 59.7 FL (ref 37–54)
EOSINOPHIL # BLD AUTO: 0 10*3/MM3 (ref 0–0.3)
EOSINOPHIL NFR BLD AUTO: 0 % (ref 0–3)
ERYTHROCYTE [DISTWIDTH] IN BLOOD BY AUTOMATED COUNT: 17.9 % (ref 11.3–14.5)
GFR SERPL CREATININE-BSD FRML MDRD: 84 ML/MIN/1.73
GLOBULIN UR ELPH-MCNC: 2 GM/DL
GLUCOSE BLD-MCNC: 94 MG/DL (ref 70–100)
GLUCOSE UR STRIP-MCNC: NEGATIVE MG/DL
HCT VFR BLD AUTO: 40.4 % (ref 38.9–50.9)
HGB BLD-MCNC: 13.3 G/DL (ref 13.1–17.5)
HGB UR QL STRIP.AUTO: ABNORMAL
HOLD SPECIMEN: NORMAL
HOLD SPECIMEN: NORMAL
HYALINE CASTS UR QL AUTO: ABNORMAL /LPF
IMM GRANULOCYTES # BLD AUTO: 0.09 10*3/MM3 (ref 0–0.03)
IMM GRANULOCYTES NFR BLD AUTO: 0.9 % (ref 0–0.6)
KETONES UR QL STRIP: NEGATIVE
LEUKOCYTE ESTERASE UR QL STRIP.AUTO: NEGATIVE
LYMPHOCYTES # BLD AUTO: 0.73 10*3/MM3 (ref 0.6–4.8)
LYMPHOCYTES NFR BLD AUTO: 7.5 % (ref 24–44)
MAGNESIUM SERPL-MCNC: 2 MG/DL (ref 1.3–2.7)
MCH RBC QN AUTO: 29.8 PG (ref 27–31)
MCHC RBC AUTO-ENTMCNC: 32.9 G/DL (ref 32–36)
MCV RBC AUTO: 90.6 FL (ref 80–99)
MONOCYTES # BLD AUTO: 1.97 10*3/MM3 (ref 0–1)
MONOCYTES NFR BLD AUTO: 20.2 % (ref 0–12)
NEUTROPHILS # BLD AUTO: 6.96 10*3/MM3 (ref 1.5–8.3)
NEUTROPHILS NFR BLD AUTO: 71.3 % (ref 41–71)
NITRITE UR QL STRIP: NEGATIVE
PH UR STRIP.AUTO: 6 [PH] (ref 5–8)
PLAT MORPH BLD: NORMAL
PLATELET # BLD AUTO: 181 10*3/MM3 (ref 150–450)
PMV BLD AUTO: 10.9 FL (ref 6–12)
POTASSIUM BLD-SCNC: 3.4 MMOL/L (ref 3.5–5.5)
PROT SERPL-MCNC: 5.8 G/DL (ref 5.7–8.2)
PROT UR QL STRIP: ABNORMAL
RBC # BLD AUTO: 4.46 10*6/MM3 (ref 4.2–5.76)
RBC # UR: ABNORMAL /HPF
RBC MORPH BLD: NORMAL
REF LAB TEST METHOD: ABNORMAL
SODIUM BLD-SCNC: 136 MMOL/L (ref 132–146)
SP GR UR STRIP: 1.02 (ref 1–1.03)
SQUAMOUS #/AREA URNS HPF: ABNORMAL /HPF
TSH SERPL DL<=0.05 MIU/L-ACNC: 1.36 MIU/ML (ref 0.35–5.35)
UROBILINOGEN UR QL STRIP: ABNORMAL
WBC MORPH BLD: NORMAL
WBC NRBC COR # BLD: 9.76 10*3/MM3 (ref 3.5–10.8)
WBC UR QL AUTO: ABNORMAL /HPF
WHOLE BLOOD HOLD SPECIMEN: NORMAL
WHOLE BLOOD HOLD SPECIMEN: NORMAL

## 2018-12-29 PROCEDURE — 74176 CT ABD & PELVIS W/O CONTRAST: CPT

## 2018-12-29 PROCEDURE — G0378 HOSPITAL OBSERVATION PER HR: HCPCS

## 2018-12-29 PROCEDURE — 99285 EMERGENCY DEPT VISIT HI MDM: CPT

## 2018-12-29 PROCEDURE — 96361 HYDRATE IV INFUSION ADD-ON: CPT

## 2018-12-29 PROCEDURE — 99219 PR INITIAL OBSERVATION CARE/DAY 50 MINUTES: CPT | Performed by: FAMILY MEDICINE

## 2018-12-29 PROCEDURE — 25010000002 DEXAMETHASONE PER 1 MG: Performed by: EMERGENCY MEDICINE

## 2018-12-29 PROCEDURE — 93005 ELECTROCARDIOGRAM TRACING: CPT | Performed by: EMERGENCY MEDICINE

## 2018-12-29 PROCEDURE — 63710000001 PREDNISONE PER 5 MG: Performed by: FAMILY MEDICINE

## 2018-12-29 PROCEDURE — 85025 COMPLETE CBC W/AUTO DIFF WBC: CPT | Performed by: EMERGENCY MEDICINE

## 2018-12-29 PROCEDURE — 80053 COMPREHEN METABOLIC PANEL: CPT | Performed by: EMERGENCY MEDICINE

## 2018-12-29 PROCEDURE — 83735 ASSAY OF MAGNESIUM: CPT | Performed by: EMERGENCY MEDICINE

## 2018-12-29 PROCEDURE — 71045 X-RAY EXAM CHEST 1 VIEW: CPT

## 2018-12-29 PROCEDURE — 84443 ASSAY THYROID STIM HORMONE: CPT | Performed by: EMERGENCY MEDICINE

## 2018-12-29 PROCEDURE — 82550 ASSAY OF CK (CPK): CPT | Performed by: EMERGENCY MEDICINE

## 2018-12-29 PROCEDURE — 85007 BL SMEAR W/DIFF WBC COUNT: CPT | Performed by: EMERGENCY MEDICINE

## 2018-12-29 PROCEDURE — 96374 THER/PROPH/DIAG INJ IV PUSH: CPT

## 2018-12-29 PROCEDURE — 81001 URINALYSIS AUTO W/SCOPE: CPT | Performed by: EMERGENCY MEDICINE

## 2018-12-29 RX ORDER — POTASSIUM CITRATE 10 MEQ/1
10 TABLET, EXTENDED RELEASE ORAL 2 TIMES DAILY
Status: DISCONTINUED | OUTPATIENT
Start: 2018-12-29 | End: 2018-12-30 | Stop reason: HOSPADM

## 2018-12-29 RX ORDER — ACETAMINOPHEN 325 MG/1
650 TABLET ORAL AS NEEDED
COMMUNITY

## 2018-12-29 RX ORDER — SODIUM CHLORIDE 9 MG/ML
125 INJECTION, SOLUTION INTRAVENOUS CONTINUOUS
Status: DISCONTINUED | OUTPATIENT
Start: 2018-12-29 | End: 2018-12-30 | Stop reason: HOSPADM

## 2018-12-29 RX ORDER — SODIUM CHLORIDE 9 MG/ML
125 INJECTION, SOLUTION INTRAVENOUS CONTINUOUS
Status: DISCONTINUED | OUTPATIENT
Start: 2018-12-29 | End: 2018-12-30

## 2018-12-29 RX ORDER — HYDROCODONE BITARTRATE AND ACETAMINOPHEN 5; 325 MG/1; MG/1
1 TABLET ORAL EVERY 8 HOURS PRN
Status: DISCONTINUED | OUTPATIENT
Start: 2018-12-29 | End: 2018-12-30 | Stop reason: HOSPADM

## 2018-12-29 RX ORDER — ACETAMINOPHEN 325 MG/1
650 TABLET ORAL AS NEEDED
Status: DISCONTINUED | OUTPATIENT
Start: 2018-12-29 | End: 2018-12-30 | Stop reason: HOSPADM

## 2018-12-29 RX ORDER — POTASSIUM CITRATE 10 MEQ/1
10 TABLET, EXTENDED RELEASE ORAL 2 TIMES DAILY
COMMUNITY

## 2018-12-29 RX ORDER — HYDROCODONE BITARTRATE AND ACETAMINOPHEN 5; 325 MG/1; MG/1
1 TABLET ORAL EVERY 8 HOURS PRN
COMMUNITY

## 2018-12-29 RX ORDER — FENTANYL 37.5 UG/H
1 PATCH, EXTENDED RELEASE TRANSDERMAL
COMMUNITY
End: 2019-01-21 | Stop reason: SDUPTHER

## 2018-12-29 RX ORDER — TERAZOSIN 2 MG/1
2 CAPSULE ORAL NIGHTLY
Status: DISCONTINUED | OUTPATIENT
Start: 2018-12-29 | End: 2018-12-30 | Stop reason: HOSPADM

## 2018-12-29 RX ORDER — FENTANYL CITRATE 50 UG/ML
25 INJECTION, SOLUTION INTRAMUSCULAR; INTRAVENOUS
Status: DISCONTINUED | OUTPATIENT
Start: 2018-12-29 | End: 2018-12-30 | Stop reason: HOSPADM

## 2018-12-29 RX ORDER — PREDNISONE 1 MG/1
5 TABLET ORAL EVERY 12 HOURS SCHEDULED
Status: DISCONTINUED | OUTPATIENT
Start: 2018-12-29 | End: 2018-12-30

## 2018-12-29 RX ORDER — SENNA AND DOCUSATE SODIUM 50; 8.6 MG/1; MG/1
1 TABLET, FILM COATED ORAL 2 TIMES DAILY
Status: DISCONTINUED | OUTPATIENT
Start: 2018-12-29 | End: 2018-12-30 | Stop reason: HOSPADM

## 2018-12-29 RX ORDER — PREDNISOLONE ACETATE 10 MG/ML
1 SUSPENSION/ DROPS OPHTHALMIC
COMMUNITY

## 2018-12-29 RX ORDER — DEXAMETHASONE SODIUM PHOSPHATE 4 MG/ML
8 INJECTION, SOLUTION INTRA-ARTICULAR; INTRALESIONAL; INTRAMUSCULAR; INTRAVENOUS; SOFT TISSUE ONCE
Status: COMPLETED | OUTPATIENT
Start: 2018-12-29 | End: 2018-12-29

## 2018-12-29 RX ORDER — FUROSEMIDE 20 MG/1
20 TABLET ORAL DAILY
Status: DISCONTINUED | OUTPATIENT
Start: 2018-12-30 | End: 2018-12-30 | Stop reason: HOSPADM

## 2018-12-29 RX ORDER — SODIUM CHLORIDE 0.9 % (FLUSH) 0.9 %
10 SYRINGE (ML) INJECTION AS NEEDED
Status: DISCONTINUED | OUTPATIENT
Start: 2018-12-29 | End: 2018-12-30 | Stop reason: HOSPADM

## 2018-12-29 RX ORDER — DEXAMETHASONE 4 MG/1
4 TABLET ORAL
Status: DISCONTINUED | OUTPATIENT
Start: 2018-12-30 | End: 2018-12-30 | Stop reason: HOSPADM

## 2018-12-29 RX ORDER — ASPIRIN 81 MG/1
81 TABLET ORAL NIGHTLY
Status: DISCONTINUED | OUTPATIENT
Start: 2018-12-29 | End: 2018-12-30 | Stop reason: HOSPADM

## 2018-12-29 RX ORDER — FENTANYL 12 UG/H
1 PATCH TRANSDERMAL
Status: DISCONTINUED | OUTPATIENT
Start: 2018-12-29 | End: 2018-12-30 | Stop reason: HOSPADM

## 2018-12-29 RX ORDER — GABAPENTIN 100 MG/1
100 CAPSULE ORAL NIGHTLY
Status: DISCONTINUED | OUTPATIENT
Start: 2018-12-29 | End: 2018-12-30 | Stop reason: HOSPADM

## 2018-12-29 RX ORDER — ABIRATERONE 500 MG/1
1000 TABLET ORAL NIGHTLY
COMMUNITY

## 2018-12-29 RX ADMIN — ASPIRIN 81 MG: 81 TABLET, COATED ORAL at 20:21

## 2018-12-29 RX ADMIN — GABAPENTIN 100 MG: 100 CAPSULE ORAL at 20:14

## 2018-12-29 RX ADMIN — DEXAMETHASONE SODIUM PHOSPHATE 8 MG: 4 INJECTION, SOLUTION INTRA-ARTICULAR; INTRALESIONAL; INTRAMUSCULAR; INTRAVENOUS; SOFT TISSUE at 14:51

## 2018-12-29 RX ADMIN — TERAZOSIN HYDROCHLORIDE 2 MG: 2 CAPSULE ORAL at 20:13

## 2018-12-29 RX ADMIN — POTASSIUM CITRATE 10 MEQ: 10 TABLET ORAL at 20:13

## 2018-12-29 RX ADMIN — SODIUM CHLORIDE 500 ML: 9 INJECTION, SOLUTION INTRAVENOUS at 09:23

## 2018-12-29 RX ADMIN — DOCUSATE SODIUM AND SENNOSIDES 1 TABLET: 8.6; 5 TABLET, FILM COATED ORAL at 20:14

## 2018-12-29 RX ADMIN — PREDNISONE 5 MG: 5 TABLET ORAL at 20:14

## 2018-12-29 RX ADMIN — SODIUM CHLORIDE 125 ML/HR: 9 INJECTION, SOLUTION INTRAVENOUS at 09:40

## 2018-12-30 VITALS
BODY MASS INDEX: 25.16 KG/M2 | HEIGHT: 66 IN | SYSTOLIC BLOOD PRESSURE: 138 MMHG | OXYGEN SATURATION: 93 % | RESPIRATION RATE: 18 BRPM | TEMPERATURE: 98 F | DIASTOLIC BLOOD PRESSURE: 79 MMHG | HEART RATE: 68 BPM | WEIGHT: 156.53 LBS

## 2018-12-30 PROCEDURE — G8988 SELF CARE GOAL STATUS: HCPCS

## 2018-12-30 PROCEDURE — 97166 OT EVAL MOD COMPLEX 45 MIN: CPT

## 2018-12-30 PROCEDURE — G0378 HOSPITAL OBSERVATION PER HR: HCPCS

## 2018-12-30 PROCEDURE — 63710000001 DEXAMETHASONE PER 0.25 MG: Performed by: FAMILY MEDICINE

## 2018-12-30 PROCEDURE — G8989 SELF CARE D/C STATUS: HCPCS

## 2018-12-30 PROCEDURE — 99217 PR OBSERVATION CARE DISCHARGE MANAGEMENT: CPT | Performed by: INTERNAL MEDICINE

## 2018-12-30 PROCEDURE — G8987 SELF CARE CURRENT STATUS: HCPCS

## 2018-12-30 RX ORDER — DEXAMETHASONE 4 MG/1
4 TABLET ORAL
Qty: 30 TABLET | Refills: 0 | Status: CANCELLED | OUTPATIENT
Start: 2018-12-31

## 2018-12-30 RX ADMIN — FUROSEMIDE 20 MG: 20 TABLET ORAL at 08:37

## 2018-12-30 RX ADMIN — DOCUSATE SODIUM AND SENNOSIDES 1 TABLET: 8.6; 5 TABLET, FILM COATED ORAL at 08:37

## 2018-12-30 RX ADMIN — CHOLECALCIFEROL CAP 125 MCG (5000 UNIT) 5000 UNITS: 125 CAP at 08:37

## 2018-12-30 RX ADMIN — DEXAMETHASONE 4 MG: 4 TABLET ORAL at 08:38

## 2018-12-30 RX ADMIN — POTASSIUM CITRATE 10 MEQ: 10 TABLET ORAL at 08:38

## 2018-12-31 ENCOUNTER — READMISSION MANAGEMENT (OUTPATIENT)
Dept: CALL CENTER | Facility: HOSPITAL | Age: 83
End: 2018-12-31

## 2018-12-31 NOTE — OUTREACH NOTE
Prep Survey      Responses   Facility patient discharged from?  [place]   Is patient eligible?  Yes   Discharge diagnosis  Prostrate Cancer   Does the patient have one of the following disease processes/diagnoses(primary or secondary)?  Other   Does the patient have Home health ordered?  No   Is there a DME ordered?  No   General alerts for this patient  Pt. and Family to meet with Hospice   Prep survey completed?  Yes          Lakshmi Hunt RN

## 2019-01-02 ENCOUNTER — READMISSION MANAGEMENT (OUTPATIENT)
Dept: CALL CENTER | Facility: HOSPITAL | Age: 84
End: 2019-01-02

## 2019-01-02 NOTE — OUTREACH NOTE
Medical Week 1 Survey      Responses   Facility patient discharged from?  [place]   Does the patient have one of the following disease processes/diagnoses(primary or secondary)?  Other   Is there a successful TCM telephone encounter documented?  No   Week 1 attempt successful?  Yes   Call start time  0909   Revoke  Decline to participate   Call end time  0911          Sendy Hopper, RN

## 2019-01-18 ENCOUNTER — TELEPHONE (OUTPATIENT)
Dept: INTERNAL MEDICINE | Facility: CLINIC | Age: 84
End: 2019-01-18

## 2019-01-21 RX ORDER — FENTANYL 37.5 UG/H
1 PATCH, EXTENDED RELEASE TRANSDERMAL
Qty: 10 PATCH | Refills: 0 | Status: SHIPPED | OUTPATIENT
Start: 2019-01-21 | End: 2019-02-20 | Stop reason: SDUPTHER

## 2019-01-21 NOTE — TELEPHONE ENCOUNTER
Last refill: 12/17/18 #10/0  Last office visit: 12/10/18 Next office visit: 2/12/19 Vicente:current UDS: on file

## 2019-02-11 PROBLEM — N18.30 CHRONIC KIDNEY DISEASE, STAGE 3 (HCC): Status: ACTIVE | Noted: 2019-02-11

## 2019-02-11 PROBLEM — R05.9 COUGH: Status: ACTIVE | Noted: 2019-02-11

## 2019-02-11 PROBLEM — K21.9 GERD (GASTROESOPHAGEAL REFLUX DISEASE): Status: ACTIVE | Noted: 2019-02-11

## 2019-02-11 PROBLEM — E66.9 OBESITY: Status: ACTIVE | Noted: 2019-02-11

## 2019-02-11 PROBLEM — R31.9 BLOOD IN URINE: Status: ACTIVE | Noted: 2019-02-11

## 2019-02-11 PROBLEM — N41.0 ACUTE PROSTATITIS: Status: ACTIVE | Noted: 2019-02-11

## 2019-02-11 PROBLEM — M15.9 DEGENERATIVE JOINT DISEASE INVOLVING MULTIPLE JOINTS: Status: ACTIVE | Noted: 2019-02-11

## 2019-02-11 PROBLEM — R60.9 EDEMA: Status: ACTIVE | Noted: 2019-02-11

## 2019-02-11 PROBLEM — C79.51 SECONDARY MALIGNANT NEOPLASM OF BONE (HCC): Status: ACTIVE | Noted: 2019-02-11

## 2019-02-11 PROBLEM — K59.00 CONSTIPATION: Status: ACTIVE | Noted: 2019-02-11

## 2019-02-11 PROBLEM — N39.0 URINARY TRACT INFECTIOUS DISEASE: Status: ACTIVE | Noted: 2019-02-11

## 2019-02-11 PROBLEM — G89.29 CHRONIC PAIN: Status: ACTIVE | Noted: 2019-02-11

## 2019-02-11 PROBLEM — K57.30 DIVERTICULAR DISEASE OF COLON: Status: ACTIVE | Noted: 2019-02-11

## 2019-02-11 PROBLEM — N30.00 ACUTE CYSTITIS: Status: ACTIVE | Noted: 2019-02-11

## 2019-02-11 PROBLEM — M54.9 BACKACHE: Status: ACTIVE | Noted: 2019-02-11

## 2019-02-11 PROBLEM — E78.2 MIXED HYPERLIPIDEMIA: Status: ACTIVE | Noted: 2019-02-11

## 2019-02-11 PROBLEM — R53.83 FATIGUE: Status: ACTIVE | Noted: 2019-02-11

## 2019-02-11 PROBLEM — E78.00 HYPERCHOLESTEROLEMIA: Status: ACTIVE | Noted: 2019-02-11

## 2019-02-11 PROBLEM — R94.8 ABNORMAL RADIONUCLIDE BONE SCAN: Status: ACTIVE | Noted: 2019-02-11

## 2019-02-12 ENCOUNTER — OFFICE VISIT (OUTPATIENT)
Dept: INTERNAL MEDICINE | Facility: CLINIC | Age: 84
End: 2019-02-12

## 2019-02-12 VITALS — HEART RATE: 78 BPM | SYSTOLIC BLOOD PRESSURE: 136 MMHG | DIASTOLIC BLOOD PRESSURE: 80 MMHG

## 2019-02-12 DIAGNOSIS — G89.3 CHRONIC PAIN DUE TO NEOPLASM: ICD-10-CM

## 2019-02-12 DIAGNOSIS — I10 ESSENTIAL HYPERTENSION: ICD-10-CM

## 2019-02-12 DIAGNOSIS — C79.51 SECONDARY MALIGNANT NEOPLASM OF BONE (HCC): ICD-10-CM

## 2019-02-12 DIAGNOSIS — C61 PROSTATE CANCER (HCC): ICD-10-CM

## 2019-02-12 DIAGNOSIS — E78.2 MIXED HYPERLIPIDEMIA: Primary | ICD-10-CM

## 2019-02-12 DIAGNOSIS — K21.9 GASTROESOPHAGEAL REFLUX DISEASE WITHOUT ESOPHAGITIS: ICD-10-CM

## 2019-02-12 DIAGNOSIS — N18.30 CHRONIC KIDNEY DISEASE, STAGE 3 (HCC): ICD-10-CM

## 2019-02-12 PROCEDURE — 99214 OFFICE O/P EST MOD 30 MIN: CPT | Performed by: INTERNAL MEDICINE

## 2019-02-12 RX ORDER — ACETAMINOPHEN,DIPHENHYDRAMINE HCL 500; 25 MG/1; MG/1
1 TABLET, FILM COATED ORAL NIGHTLY PRN
COMMUNITY

## 2019-02-12 NOTE — PATIENT INSTRUCTIONS
Discussed with patient feeling that chest wall pain is metastatic disease from his prostate cancer and that it would be acceptable to take the Norco 5 one half tablet twice a day and addition to this and now patch and to use CBD oil when necessary  See the patient back in 3 months or when necessary

## 2019-02-12 NOTE — PROGRESS NOTES
[facility]     Harris Shane  9/15/1927   [id number]      Patient Care Team:  Charli Mccormack MD as PCP - General (Internal Medicine)  Maco Mejia MD as Consulting Physician (Hematology and Oncology)  Milad Thompson MD as Consulting Physician (Urology)  Sukhwinder Howell MD as Referring Physician (Hospitalist)  Donte Dolan MD as Consulting Physician (Radiation Oncology)    Chief Complaint::   Chief Complaint   Patient presents with   • Chest Pain     middle of chest.  STarted last night. Some SOB and nausea   • Foot Swelling     unable to wear shoes. Seems to be worse             HPI  Patient is a 89-year-old male with a history of prostate cancer with bony metastasis and planning of chest wall pain discomfort he has taken aspirin Tylenol he is on fentanyl patch he states the pain is about a 5 out of 10 seems to be present most the time the last 2-3 days.    Chronic Conditions:  Chronic conditions of coronary disease hypertension prostate cancer BPH are addressed and unchanged he's sleeping reasonably well he is not very active    Patient Active Problem List   Diagnosis   • Sepsis (CMS/HCC)   • Elevated liver enzymes   • Prostate cancer (CMS/HCC)   • Pulmonary infiltrate, LLLobe   • Cholelithiases of GB neck per CT A/P   • Acute renal insufficiency, CRE 1.43, unknown baseline   • Blindness of right eye   • WILLIAM (obstructive sleep apnea) remote, intolerant of CPAP   • R/O Cholecystitis   • NSTEMI (non-ST elevated myocardial infarction) (R/O Type 2)   • Bacteremia due to Escherichia coli   • Community acquired pneumonia   • Lower extremity weakness   • Essential hypertension   • History of DVT (deep vein thrombosis)   • BPH (benign prostatic hyperplasia)   • Abnormal radionuclide bone scan   • Acute cystitis   • Acute prostatitis   • Backache   • Blood in urine   • BMI 33.0-33.9,adult   • Chronic kidney disease, stage 3 (CMS/HCC)   • Chronic pain   • Constipation   • Cough    • Degenerative joint disease involving multiple joints   • Diverticular disease of colon   • Edema   • Fatigue   • GERD (gastroesophageal reflux disease)   • Hypercholesterolemia   • Mixed hyperlipidemia   • Obesity   • Secondary malignant neoplasm of bone (CMS/HCC)   • Urinary tract infectious disease        Past Medical History:   Diagnosis Date   • Anemia    • Arthritis    • Benign prostatic hyperplasia    • Blind     right eye   • Blindness of right eye 6/13/2018   • BPH (benign prostatic hyperplasia)    • Cancer (CMS/HCC)     PROSTATE   • Coronary artery disease    • Diverticulosis 2002   • DVT (deep venous thrombosis) (CMS/HCC) 1990's   • GERD (gastroesophageal reflux disease)    • Hyperlipidemia    • Hypertension    • Metastatic cancer to spine, L4-L5 6/13/2018   • Myocardial infarction (CMS/HCC) 1970's   • WILLIAM (obstructive sleep apnea) 06/13/2018    no cpap   • Osteoarthritis    • Pneumonia 11/13/2018   • Prostate cancer (CMS/HCC)    • UTI (urinary tract infection)        Past Surgical History:   Procedure Laterality Date   • CATARACT EXTRACTION Bilateral    • COLONOSCOPY      3 years ago   • EYE SURGERY      RIGHT, age 10    • ORIF CLAVICLE FRACTURE Left 09/08/2011   • OTHER SURGICAL HISTORY  11/16/2018    [facility]    • TONSILLECTOMY  2010       Family History   Problem Relation Age of Onset   • Breast cancer Mother    • Prostate cancer Father    • Coronary artery disease Father    • No Known Problems Sister    • Stroke Brother    • Coronary artery disease Brother    • Colon cancer Other    • Prostate cancer Other        Social History     Socioeconomic History   • Marital status:      Spouse name: Not on file   • Number of children: 3   • Years of education: Not on file   • Highest education level: Not on file   Social Needs   • Financial resource strain: Not on file   • Food insecurity - worry: Not on file   • Food insecurity - inability: Not on file   • Transportation needs - medical: Not on file   •  Transportation needs - non-medical: Not on file   Occupational History   • Not on file   Tobacco Use   • Smoking status: Former Smoker     Types: Pipe   • Smokeless tobacco: Never Used   • Tobacco comment: QUIT IN THE 70'S   Substance and Sexual Activity   • Alcohol use: No   • Drug use: No   • Sexual activity: Defer   Other Topics Concern   • Not on file   Social History Narrative    Patient is  and lives with his spouse and one of his daughters.       Allergies   Allergen Reactions   • Contrast Dye Rash and Other (See Comments)     RASH AND SYNCOPE       Review of Systems    HEENT: Right eye vision loss denies headache or dizzy syncope or concussion  NECK:  Denies dysphagia stiffness  CHEST: Denies cough or wheeze complains of chest wall pain anterior  CARDIAC: Denies chest pain radiation palpitations  ABD: Denies nausea vomiting  : Has mild stress incontinence  NEURO:  Denies syncope or concussion neuropathy  PSYCH:  Has mild cognitive memory loss  EXTREM: Complains of arthritic soreness denies edema        Vital Signs  Vitals:    02/12/19 1344   BP: 136/80   Pulse: 78   PainSc:   8         Current Outpatient Medications:   •  abiraterone (ZYTIGA) 500 MG chemo tablet, Take 1,000 mg by mouth Every Night., Disp: , Rfl:   •  acetaminophen (TYLENOL) 325 MG tablet, Take 650 mg by mouth As Needed for Mild Pain ., Disp: , Rfl:   •  aspirin 81 MG EC tablet, Take 81 mg by mouth Every Night., Disp: , Rfl:   •  Cholecalciferol (VITAMIN D3) 5000 units capsule capsule, Take 5,000 Units by mouth Daily., Disp: , Rfl:   •  diphenhydrAMINE-acetaminophen (TYLENOL PM)  MG tablet per tablet, Take 1 tablet by mouth At Night As Needed for Sleep., Disp: , Rfl:   •  FentaNYL 37.5 MCG/HR patch 72 hour, Place 1 patch on the skin as directed by provider Every 72 (Seventy-Two) Hours., Disp: 10 patch, Rfl: 0  •  furosemide (LASIX) 40 MG tablet, Take 20 mg by mouth Daily. 1/2 tab qam, Disp: , Rfl:   •  gabapentin (NEURONTIN)  100 MG capsule, Take 1 capsule by mouth Every Night., Disp: 30 capsule, Rfl: 0  •  Multiple Vitamins-Minerals (CENTRUM SILVER 50+MEN) tablet, Take 1 tablet by mouth Daily., Disp: , Rfl:   •  potassium citrate (UROCIT-K) 10 MEQ (1080 MG) CR tablet, Take 10 mEq by mouth 2 (Two) Times a Day., Disp: , Rfl:   •  prednisoLONE acetate (PRED FORTE) 1 % ophthalmic suspension, Administer 1 drop to the right eye every night at bedtime., Disp: , Rfl:   •  predniSONE (DELTASONE) 5 MG tablet, Take 5 mg by mouth Every 12 (Twelve) Hours., Disp: , Rfl:   •  sennosides-docusate sodium (SENOKOT-S) 8.6-50 MG tablet, Take 2 tablets by mouth 2 (Two) Times a Day., Disp: , Rfl:   •  terazosin (HYTRIN) 2 MG capsule, Take 1 capsule by mouth Every Night., Disp: 30 capsule, Rfl: 0  •  HYDROcodone-acetaminophen (NORCO) 5-325 MG per tablet, Take 1 tablet by mouth Every 8 (Eight) Hours As Needed for Moderate Pain ., Disp: , Rfl:     Physical Exam:  HEENT: Left eye normal no facial asymmetry  NECK:  No mass or bruit  CHEST: Clear mild anterior chest wall soreness  CARDIAC: Regular without gallop or rub  ABD: Soft positive bowel sounds  : Deferred  NEURO:  Mild memory change no lateralizing CNS findings no neuropathy  PSYCH:  No evidence of significant anxiety depression  EXTREM: Arthritic changes no edema  Skin: Clear     Results Review:    No lab  Procedures    Medication Review: Medications reviewed and noted    Patient wellness counseling  Exercise: Patient is active encouraged exercise walking with assistance  Diet: Encouraged healthy cardiac diet  Smoking: Nonsmoker  Alcohol: Nonalcohol  Screening: Energy screening discussed    Assessment/Plan:  Chest wall pain likely bony metastasis from prostate cancer currently on therapy of pain medication and chemotherapy no change  #2 chronic pain secondary to metastatic bone disease  #3 degenerative arthritis disease    Patient should take that hydrocodone 5 one half tablet twice a day when necessary  pain this is in addition to his fentanyl patch of 30 7/2 every 72 hours and Tylenol when necessary.  See the patient back in 3 months or when necessary discussed with daughter use of CBD oil which is okay  Patient Instructions   Discussed with patient feeling that chest wall pain is metastatic disease from his prostate cancer and that it would be acceptable to take the Norco 5 one half tablet twice a day and addition to this and now patch and to use CBD oil when necessary  See the patient back in 3 months or when necessary       Plan of care reviewed with patient at the conclusion of today's visit. Education was provided regarding diagnosis, management, and any prescribed or recommended OTC medications.Patient verbalizes understanding of and agreement with management plan.         Charli Mccormack MD

## 2019-02-20 RX ORDER — FENTANYL 37.5 UG/H
1 PATCH, EXTENDED RELEASE TRANSDERMAL
Qty: 10 PATCH | Refills: 0 | Status: SHIPPED | OUTPATIENT
Start: 2019-02-20 | End: 2019-02-21 | Stop reason: SDUPTHER

## 2019-02-20 NOTE — TELEPHONE ENCOUNTER
Last seen- 02/12/19      Next appointment- 5/30/19    Last approved- 1/21/19    [facility]icente- pending

## 2019-02-22 RX ORDER — ACETAMINOPHEN,DIPHENHYDRAMINE HCL 500; 25 MG/1; MG/1
1 TABLET, FILM COATED ORAL NIGHTLY PRN
Qty: 14 TABLET | Status: CANCELLED | OUTPATIENT
Start: 2019-02-22

## 2019-02-22 RX ORDER — FENTANYL 37.5 UG/H
1 PATCH, EXTENDED RELEASE TRANSDERMAL
Qty: 10 PATCH | Refills: 0 | Status: SHIPPED | OUTPATIENT
Start: 2019-02-22

## 2019-02-22 NOTE — TELEPHONE ENCOUNTER
Last seen- 02/12/19  Next appointment- 05/30/19    Last approved- 12/17/1918    Vicente- in progress

## 2019-03-05 ENCOUNTER — APPOINTMENT (OUTPATIENT)
Dept: GENERAL RADIOLOGY | Facility: HOSPITAL | Age: 84
End: 2019-03-05

## 2019-03-05 ENCOUNTER — HOSPITAL ENCOUNTER (EMERGENCY)
Facility: HOSPITAL | Age: 84
Discharge: HOME | End: 2019-03-05
Attending: EMERGENCY MEDICINE | Admitting: EMERGENCY MEDICINE

## 2019-03-05 ENCOUNTER — TELEPHONE (OUTPATIENT)
Dept: INTERNAL MEDICINE | Facility: CLINIC | Age: 84
End: 2019-03-05

## 2019-03-05 VITALS
HEIGHT: 65 IN | OXYGEN SATURATION: 92 % | TEMPERATURE: 97.9 F | HEART RATE: 90 BPM | DIASTOLIC BLOOD PRESSURE: 90 MMHG | BODY MASS INDEX: 25.99 KG/M2 | WEIGHT: 156 LBS | SYSTOLIC BLOOD PRESSURE: 130 MMHG | RESPIRATION RATE: 18 BRPM

## 2019-03-05 DIAGNOSIS — C79.51 PROSTATE CANCER METASTATIC TO BONE (HCC): ICD-10-CM

## 2019-03-05 DIAGNOSIS — R60.9 PERIPHERAL EDEMA: ICD-10-CM

## 2019-03-05 DIAGNOSIS — R53.1 GENERALIZED WEAKNESS: Primary | ICD-10-CM

## 2019-03-05 DIAGNOSIS — C61 PROSTATE CANCER METASTATIC TO BONE (HCC): ICD-10-CM

## 2019-03-05 LAB
ALBUMIN SERPL-MCNC: 3.6 G/DL (ref 3.2–4.8)
ALBUMIN/GLOB SERPL: 1.6 G/DL (ref 1.5–2.5)
ALP SERPL-CCNC: 559 U/L (ref 25–100)
ALT SERPL W P-5'-P-CCNC: 22 U/L (ref 7–40)
ANION GAP SERPL CALCULATED.3IONS-SCNC: 4 MMOL/L (ref 3–11)
AST SERPL-CCNC: 39 U/L (ref 0–33)
BACTERIA UR QL AUTO: ABNORMAL /HPF
BASOPHILS # BLD AUTO: 0.01 10*3/MM3 (ref 0–0.2)
BASOPHILS NFR BLD AUTO: 0.1 % (ref 0–1)
BILIRUB SERPL-MCNC: 0.4 MG/DL (ref 0.3–1.2)
BILIRUB UR QL STRIP: NEGATIVE
BUN BLD-MCNC: 26 MG/DL (ref 9–23)
BUN/CREAT SERPL: 22 (ref 7–25)
CALCIUM SPEC-SCNC: 9 MG/DL (ref 8.7–10.4)
CHLORIDE SERPL-SCNC: 98 MMOL/L (ref 99–109)
CLARITY UR: CLEAR
CO2 SERPL-SCNC: 32 MMOL/L (ref 20–31)
COLOR UR: YELLOW
CREAT BLD-MCNC: 1.18 MG/DL (ref 0.6–1.3)
DEPRECATED RDW RBC AUTO: 56.8 FL (ref 37–54)
EOSINOPHIL # BLD AUTO: 0 10*3/MM3 (ref 0–0.3)
EOSINOPHIL NFR BLD AUTO: 0 % (ref 0–3)
ERYTHROCYTE [DISTWIDTH] IN BLOOD BY AUTOMATED COUNT: 16.3 % (ref 11.3–14.5)
GFR SERPL CREATININE-BSD FRML MDRD: 58 ML/MIN/1.73
GLOBULIN UR ELPH-MCNC: 2.2 GM/DL
GLUCOSE BLD-MCNC: 113 MG/DL (ref 70–100)
GLUCOSE UR STRIP-MCNC: NEGATIVE MG/DL
HCT VFR BLD AUTO: 35.8 % (ref 38.9–50.9)
HGB BLD-MCNC: 11.5 G/DL (ref 13.1–17.5)
HGB UR QL STRIP.AUTO: ABNORMAL
HOLD SPECIMEN: NORMAL
HOLD SPECIMEN: NORMAL
HYALINE CASTS UR QL AUTO: ABNORMAL /LPF
IMM GRANULOCYTES # BLD AUTO: 0.09 10*3/MM3 (ref 0–0.05)
IMM GRANULOCYTES NFR BLD AUTO: 1 % (ref 0–0.6)
KETONES UR QL STRIP: NEGATIVE
LEUKOCYTE ESTERASE UR QL STRIP.AUTO: NEGATIVE
LYMPHOCYTES # BLD AUTO: 1.13 10*3/MM3 (ref 0.6–4.8)
LYMPHOCYTES NFR BLD AUTO: 12 % (ref 24–44)
MAGNESIUM SERPL-MCNC: 2.5 MG/DL (ref 1.3–2.7)
MCH RBC QN AUTO: 30.4 PG (ref 27–31)
MCHC RBC AUTO-ENTMCNC: 32.1 G/DL (ref 32–36)
MCV RBC AUTO: 94.7 FL (ref 80–99)
MONOCYTES # BLD AUTO: 1.14 10*3/MM3 (ref 0–1)
MONOCYTES NFR BLD AUTO: 12.1 % (ref 0–12)
NEUTROPHILS # BLD AUTO: 7.14 10*3/MM3 (ref 1.5–8.3)
NEUTROPHILS NFR BLD AUTO: 75.8 % (ref 41–71)
NITRITE UR QL STRIP: NEGATIVE
PH UR STRIP.AUTO: 6 [PH] (ref 5–8)
PLATELET # BLD AUTO: 226 10*3/MM3 (ref 150–450)
PMV BLD AUTO: 9.5 FL (ref 6–12)
POTASSIUM BLD-SCNC: 4.3 MMOL/L (ref 3.5–5.5)
PROT SERPL-MCNC: 5.8 G/DL (ref 5.7–8.2)
PROT UR QL STRIP: NEGATIVE
RBC # BLD AUTO: 3.78 10*6/MM3 (ref 4.2–5.76)
RBC # UR: ABNORMAL /HPF
REF LAB TEST METHOD: ABNORMAL
SODIUM BLD-SCNC: 134 MMOL/L (ref 132–146)
SP GR UR STRIP: 1.02 (ref 1–1.03)
SQUAMOUS #/AREA URNS HPF: ABNORMAL /HPF
TROPONIN I SERPL-MCNC: 0.03 NG/ML (ref 0–0.07)
UROBILINOGEN UR QL STRIP: ABNORMAL
WBC NRBC COR # BLD: 9.42 10*3/MM3 (ref 3.5–10.8)
WBC UR QL AUTO: ABNORMAL /HPF
WHOLE BLOOD HOLD SPECIMEN: NORMAL
WHOLE BLOOD HOLD SPECIMEN: NORMAL

## 2019-03-05 PROCEDURE — 71045 X-RAY EXAM CHEST 1 VIEW: CPT

## 2019-03-05 PROCEDURE — 80053 COMPREHEN METABOLIC PANEL: CPT | Performed by: EMERGENCY MEDICINE

## 2019-03-05 PROCEDURE — 81001 URINALYSIS AUTO W/SCOPE: CPT | Performed by: EMERGENCY MEDICINE

## 2019-03-05 PROCEDURE — 84484 ASSAY OF TROPONIN QUANT: CPT

## 2019-03-05 PROCEDURE — 99285 EMERGENCY DEPT VISIT HI MDM: CPT

## 2019-03-05 PROCEDURE — 93005 ELECTROCARDIOGRAM TRACING: CPT | Performed by: EMERGENCY MEDICINE

## 2019-03-05 PROCEDURE — 85025 COMPLETE CBC W/AUTO DIFF WBC: CPT | Performed by: EMERGENCY MEDICINE

## 2019-03-05 PROCEDURE — 83735 ASSAY OF MAGNESIUM: CPT | Performed by: EMERGENCY MEDICINE

## 2019-03-05 RX ORDER — FUROSEMIDE 40 MG/1
40 TABLET ORAL DAILY
Qty: 30 TABLET | Refills: 0 | Status: SHIPPED | OUTPATIENT
Start: 2019-03-05

## 2019-03-05 RX ORDER — FUROSEMIDE 40 MG/1
40 TABLET ORAL DAILY
Qty: 30 TABLET | Refills: 0
Start: 2019-03-05 | End: 2019-03-05 | Stop reason: SDUPTHER

## 2019-03-05 RX ORDER — SODIUM CHLORIDE 0.9 % (FLUSH) 0.9 %
10 SYRINGE (ML) INJECTION AS NEEDED
Status: DISCONTINUED | OUTPATIENT
Start: 2019-03-05 | End: 2019-03-05 | Stop reason: HOSPADM

## 2019-03-05 NOTE — TELEPHONE ENCOUNTER
"See below.   nurse calls to report that patient is definitely \"different\" than his usual state.  2+ pitting edema, abd bloated, \"jerking movements\".  She recommended ER evaluation.  I advised send patient to [facility].   "

## 2019-03-05 NOTE — TELEPHONE ENCOUNTER
"Daughter calls.  States patient is \"muddled\" in his thoughts, weak, and bloated.  He had been several days without a BM but had a BM yesterday after taking mag citrate. Also with feet, ankle, calf edema. Daughter has called home health and nurse is on her way to assess.  I asked daughter to have HH nurse call me when she is there.   "

## 2019-03-05 NOTE — ED PROVIDER NOTES
Subjective   Mr. Harris Shane is a 89 y.o. male who presents to the ED with c/o generalized weakness. His daughter reports that Mr. Shane had a good day yesterday and was normal when he got up to go to the restroom about 0200 and 0500 today but when she went to wake him up around 0830 he was very weak and barely able to sit up. She then had to call her brother to get him out of bed but after that he seemed back to baseline and was slow moving but able to take a shower. However, he then began falling asleep at breakfast and was unable to transfer from his wheelchair to the toilet. He has also had some leg swelling, abdominal distention, and fatigue but he denies a fever, cough, sore throat, runny nose, difficulty urinating, confusion, chest pain, and shortness of breath. He has a history of prostate cancer, MI, DVT, HTN, HLD, and CAD. No other acute complaints at this time.        History provided by:  Patient  Weakness - Generalized   Severity:  Moderate  Onset quality:  Gradual  Duration:  8 hours  Timing:  Constant  Progression:  Unchanged  Chronicity:  New  Associated symptoms: no chest pain, no cough, no fever and no shortness of breath        Review of Systems   Constitutional: Positive for fatigue. Negative for fever.   HENT: Negative for rhinorrhea and sore throat.    Respiratory: Negative for cough and shortness of breath.    Cardiovascular: Positive for leg swelling. Negative for chest pain.   Gastrointestinal: Positive for abdominal distention.   Genitourinary: Negative for difficulty urinating.   Neurological: Positive for weakness.   Psychiatric/Behavioral: Negative for confusion.   All other systems reviewed and are negative.      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   • Myocardial infarction (CMS/Formerly [facility]) 1970's   • WILLIAM (obstructive sleep apnea) 06/13/2018    no cpap   • Osteoarthritis    • Pneumonia 11/13/2018   • Prostate cancer (CMS/Formerly [facility])    • UTI (urinary tract infection)        Allergies   Allergen Reactions   • Contrast Dye Rash and Other (See Comments)     RASH AND SYNCOPE       Past Surgical History:   Procedure Laterality Date   • CATARACT EXTRACTION Bilateral    • COLONOSCOPY      3 years ago   • EYE SURGERY      RIGHT, age 10    • ORIF CLAVICLE FRACTURE Left 09/08/2011   • OTHER SURGICAL HISTORY  11/16/2018    [facility]    • TONSILLECTOMY  2010       Family History   Problem Relation Age of Onset   • Breast cancer Mother    • Prostate cancer Father    • Coronary artery disease Father    • No Known Problems Sister    • Stroke Brother    • Coronary artery disease Brother    • Colon cancer Other    • Prostate cancer Other        Social History     Socioeconomic History   • Marital status:      Spouse name: Not on file   • Number of children: 3   • Years of education: Not on file   • Highest education level: Not on file   Tobacco Use   • Smoking status: Former Smoker     Types: Pipe   • Smokeless tobacco: Never Used   • Tobacco comment: QUIT IN THE 70'S   Substance and Sexual Activity   • Alcohol use: No   • Drug use: No   • Sexual activity: Defer   Social History Narrative    Patient is  and lives with his spouse and one of his daughters.         Objective   Physical Exam   Constitutional: He is oriented to person, place, and time. He appears well-developed and well-nourished. No distress.   Patient is a pleasant older man who is lying quietly and is obviously weak.   HENT:   Head: Normocephalic and atraumatic.   Nose: Nose normal.   Mouth/Throat: Oropharynx is clear and  moist.   Eyes: Conjunctivae are normal. No scleral icterus.   Patient has strabismus with lateral gaze of the right eye.   Neck: Normal range of motion. Neck supple.   Cardiovascular: Normal rate, regular rhythm and normal heart sounds.   No murmur heard.  Patient has a normal heart rate with occasional ectopy.    Pulmonary/Chest: Effort normal and breath sounds normal. No respiratory distress.   Abdominal: Soft. There is no tenderness.   Musculoskeletal: Normal range of motion. He exhibits edema.   Patient has 3+ edema to his bilateral lower extremities.    Lymphadenopathy:     He has no cervical adenopathy.   Neurological: He is alert and oriented to person, place, and time.   Skin: Skin is dry. He is not diaphoretic.   Bilateral legs are cool to the touch.    Psychiatric: He has a normal mood and affect. His behavior is normal.   Nursing note and vitals reviewed.      Procedures         ED Course  ED Course as of Mar 06 0204   Tue Mar 05, 2019   1943 Dr. Myrick is at bedside re-evaluating the patient, updating him and his daughter on lab/imaging results, and updating them on plan for admission.    [AT]      ED Course User Index  [AT] Angelica Merrill       Recent Results (from the past 24 hour(s))   POC Troponin, Rapid    Collection Time: 03/05/19  4:05 PM   Result Value Ref Range    Troponin I 0.03 0.00 - 0.07 ng/mL   Comprehensive Metabolic Panel    Collection Time: 03/05/19  4:11 PM   Result Value Ref Range    Glucose 113 (H) 70 - 100 mg/dL    BUN 26 (H) 9 - 23 mg/dL    Creatinine 1.18 0.60 - 1.30 mg/dL    Sodium 134 132 - 146 mmol/L    Potassium 4.3 3.5 - 5.5 mmol/L    Chloride 98 (L) 99 - 109 mmol/L    CO2 32.0 (H) 20.0 - 31.0 mmol/L    Calcium 9.0 8.7 - 10.4 mg/dL    Total Protein 5.8 5.7 - 8.2 g/dL    Albumin 3.60 3.20 - 4.80 g/dL    ALT (SGPT) 22 7 - 40 U/L    AST (SGOT) 39 (H) 0 - 33 U/L    Alkaline Phosphatase 559 (H) 25 - 100 U/L    Total Bilirubin 0.4 0.3 - 1.2 mg/dL    eGFR Non  Amer 58 (L) >60  mL/min/1.73    Globulin 2.2 gm/dL    A/G Ratio 1.6 1.5 - 2.5 g/dL    BUN/Creatinine Ratio 22.0 7.0 - 25.0    Anion Gap 4.0 3.0 - 11.0 mmol/L   Magnesium    Collection Time: 03/05/19  4:11 PM   Result Value Ref Range    Magnesium 2.5 1.3 - 2.7 mg/dL   Light Blue Top    Collection Time: 03/05/19  4:11 PM   Result Value Ref Range    Extra Tube hold for add-on    Green Top (Gel)    Collection Time: 03/05/19  4:11 PM   Result Value Ref Range    Extra Tube Hold for add-ons.    Lavender Top    Collection Time: 03/05/19  4:11 PM   Result Value Ref Range    Extra Tube hold for add-on    Gold Top - SST    Collection Time: 03/05/19  4:11 PM   Result Value Ref Range    Extra Tube Hold for add-ons.    CBC Auto Differential    Collection Time: 03/05/19  4:11 PM   Result Value Ref Range    WBC 9.42 3.50 - 10.80 10*3/mm3    RBC 3.78 (L) 4.20 - 5.76 10*6/mm3    Hemoglobin 11.5 (L) 13.1 - 17.5 g/dL    Hematocrit 35.8 (L) 38.9 - 50.9 %    MCV 94.7 80.0 - 99.0 fL    MCH 30.4 27.0 - 31.0 pg    MCHC 32.1 32.0 - 36.0 g/dL    RDW 16.3 (H) 11.3 - 14.5 %    RDW-SD 56.8 (H) 37.0 - 54.0 fl    MPV 9.5 6.0 - 12.0 fL    Platelets 226 150 - 450 10*3/mm3    Neutrophil % 75.8 (H) 41.0 - 71.0 %    Lymphocyte % 12.0 (L) 24.0 - 44.0 %    Monocyte % 12.1 (H) 0.0 - 12.0 %    Eosinophil % 0.0 0.0 - 3.0 %    Basophil % 0.1 0.0 - 1.0 %    Immature Grans % 1.0 (H) 0.0 - 0.6 %    Neutrophils, Absolute 7.14 1.50 - 8.30 10*3/mm3    Lymphocytes, Absolute 1.13 0.60 - 4.80 10*3/mm3    Monocytes, Absolute 1.14 (H) 0.00 - 1.00 10*3/mm3    Eosinophils, Absolute 0.00 0.00 - 0.30 10*3/mm3    Basophils, Absolute 0.01 0.00 - 0.20 10*3/mm3    Immature Grans, Absolute 0.09 (H) 0.00 - 0.05 10*3/mm3   Urinalysis With Microscopic If Indicated (No Culture) - Urine, Clean Catch    Collection Time: 03/05/19  6:05 PM   Result Value Ref Range    Color, UA Yellow Yellow, Straw    Appearance, UA Clear Clear    pH, UA 6.0 5.0 - 8.0    Specific Gravity, UA 1.022 1.001 - 1.030     Glucose, UA Negative Negative    Ketones, UA Negative Negative    Bilirubin, UA Negative Negative    Blood, UA Trace (A) Negative    Protein, UA Negative Negative    Leuk Esterase, UA Negative Negative    Nitrite, UA Negative Negative    Urobilinogen, UA 0.2 E.U./dL 0.2 - 1.0 E.U./dL   Urinalysis, Microscopic Only - Urine, Clean Catch    Collection Time: 03/05/19  6:05 PM   Result Value Ref Range    RBC, UA 7-12 (A) None Seen, 0-2 /HPF    WBC, UA 3-5 (A) None Seen, 0-2 /HPF    Bacteria, UA None Seen None Seen, Trace /HPF    Squamous Epithelial Cells, UA 3-6 (A) None Seen, 0-2 /HPF    Hyaline Casts, UA 0-6 0 - 6 /LPF    Methodology Automated Microscopy      Note: In addition to lab results from this visit, the labs listed above may include labs taken at another facility or during a different encounter within the last 24 hours. Please correlate lab times with ED admission and discharge times for further clarification of the services performed during this visit.    XR Chest 1 View   Preliminary Result   Cardiac silhouette enlarged with increased prominence noted   from prior comparison 12/29/2018 along with a minimal increase in   opacifications blunting the left lateral costophrenic sulcus of a   probable trace small left effusion and/or atelectasis however no overt   edema noted of the pulmonary vascularity   .            Vitals:    03/05/19 1900 03/05/19 1930 03/05/19 2000 03/05/19 2030   BP: 117/85 133/95 141/89 130/90   BP Location:       Patient Position:       Pulse: 90 93 91 90   Resp: 18 18     Temp:       TempSrc:       SpO2: (!) 88% 91% 95% 92%   Weight:       Height:         Medications - No data to display  ECG/EMG Results (last 24 hours)     Procedure Component Value Units Date/Time    ECG 12 Lead [[id number]] Collected:  03/05/19 1603     Updated:  03/05/19 1606    Narrative:       Test Reason : Weak/Dizzy/AMS protocol  Blood Pressure : **/** mmHG  Vent. Rate : 101 BPM     Atrial Rate : 101 BPM     P-R  Int : 086 ms          QRS Dur : 076 ms      QT Int : 324 ms       P-R-T Axes : 000 015 020 degrees     QTc Int : 420 ms    Sinus tachycardia with short TN with premature atrial complexes  Otherwise normal ECG  When compared with ECG of 29-DEC-2018 08:34,  premature ventricular complexes are no longer present  premature atrial complexes are now present  TN interval has decreased  Confirmed by KIERAN MYRICK MD ([id number]) on 3/5/2019 4:06:34 PM    Referred By:  EDMD           Confirmed By:KIERAN MYRICK MD        ECG 12 Lead   Final Result   Test Reason : Weak/Dizzy/AMS protocol   Blood Pressure : **/** mmHG   Vent. Rate : 101 BPM     Atrial Rate : 101 BPM      P-R Int : 086 ms          QRS Dur : 076 ms       QT Int : 324 ms       P-R-T Axes : 000 015 020 degrees      QTc Int : 420 ms      Sinus tachycardia with short TN with premature atrial complexes   Otherwise normal ECG   When compared with ECG of 29-DEC-2018 08:34,   premature ventricular complexes are no longer present   premature atrial complexes are now present   TN interval has decreased   Confirmed by KIERAN MYRICK MD ([id number]) on 3/5/2019 4:06:34 PM      Referred By:  EDMD           Confirmed By:KIERAN MYRICK MD                        [facility]    Final diagnoses:   Generalized weakness   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  Information recorded by the scribe was done at my direction and has been verified and validated by me.     Angelica Merrill  03/05/19 1630       Angelica Merrill  03/05/19 1953       Kieran Myrick MD  03/06/19 0205

## 2019-03-06 ENCOUNTER — TELEPHONE (OUTPATIENT)
Dept: INTERNAL MEDICINE | Facility: CLINIC | Age: 84
End: 2019-03-06

## 2019-03-06 NOTE — TELEPHONE ENCOUNTER
DU WITH HOSPICE CALLED WANTING A VERBAL CERT ORDER. AND WILL DR ANGELA BE THE ATTENDING  CALL BACK NUMBER [phone]

## 2019-03-06 NOTE — DISCHARGE INSTRUCTIONS
Consider contacting hospice if you decided not to pursue further prostate care.     Double the furosemide dose to 40 mg daily.  If swelling continues, go to 80 mg daily.    He will need to have recheck of his kidney function and electrolytes in one week due to the increased furosemide.    Return to the ED if you develop any acute or worsening symptoms.

## 2019-03-18 ENCOUNTER — TELEPHONE (OUTPATIENT)
Dept: INTERNAL MEDICINE | Facility: CLINIC | Age: 84
End: 2019-03-18

## 2019-03-18 NOTE — TELEPHONE ENCOUNTER
I called Hospice nurse back.  Needs RX for Fentanyl Patch - will [facility] fill or should Hospice fill? [place] helps but loses effectiveness after 6 hours. They  want to change to Q6 hours instead of Q12 hr.    Small specks of blood in urine alone with tiny clots - is this related to prostate problems, or what are  Your thoughts?   BMP drawn today. Would  want to review this?    I spoke with Dr. Mccormack will [facility]osp[facility] on the phone.  He wants Hospice to manage pain so they will write for the fentanyl and [place]. Dr. Mccormack believes the blood in the urine is related to the prostate but OK to obtain UA with culture if indicated.  Dr. Mccormack wants to review the BMP from today.  I will call Hospice RN for any changes.      All this information is given to the Hospice nurse at the time of my call with her.

## 2019-03-18 NOTE — TELEPHONE ENCOUNTER
[facility] NURSE RENETTA CALLED AND WANTS TO SPEAK WITH ONE OF DR ANGELA NURSE'S ABOUT THE PT.   RENETTA- # [phone]

## 2019-03-21 ENCOUNTER — TELEPHONE (OUTPATIENT)
Dept: INTERNAL MEDICINE | Facility: CLINIC | Age: 84
End: 2019-03-21

## 2019-03-21 NOTE — TELEPHONE ENCOUNTER
Yes I reviewed the urine analysis from March 20 with blood trace leukocyte esterase occasional white cells and no bacteria suggest no change in therapy.

## 2019-03-21 NOTE — TELEPHONE ENCOUNTER
THEY HAD COLLECTED AN URINE ON PATIENT LAST NIGHT AND WANTED TO KNOW IF YOU GOT THE RESULTS AND WHAT ORDERS YOU MAY HAVE.

## 2019-04-11 ENCOUNTER — DOCUMENTATION (OUTPATIENT)
Dept: RADIATION ONCOLOGY | Facility: HOSPITAL | Age: 84
End: 2019-04-11

## 2019-04-11 NOTE — PROGRESS NOTES
RADIATION ONCOLOGY PROGRESS NOTE  04/11/19    spoke with Gal[facility] nurse- she stated pt was having troubles urinating and she didn't know if it was his prostate or an enlarging mass on his spine. I asked if the pt had scans recently.Galina was not aware of any recent scans. I explained that per Dr. Dolan the pt would need scans to identify if there was something radiation could help with. Galina notified to contact our office if hospice doctor determines he needs to be seen for treatment.

## 2019-04-11 NOTE — PROGRESS NOTES
RADIATION ONCOLOGY PROGRESS NOTE  04/11/19    Hospice left  stating pt has growing mass on spine and wondering about palliative radiation treatments.I called back and left  asking for a call back for more information and inquired if pt has had recent imaging as the pt has had radiation previously. Await a reply.

## 2019-04-12 ENCOUNTER — TELEPHONE (OUTPATIENT)
Dept: INTERNAL MEDICINE | Facility: CLINIC | Age: 84
End: 2019-04-12

## 2019-04-12 NOTE — TELEPHONE ENCOUNTER
Message noted from daughter, I think is unfortunate but it sounds as if the patient needs to be on the increased dose of hydrocodone as suggested by hospice, I would not do anything about the hematuria, except try to keep Mr. Cat reasonably comfortable.

## 2019-04-12 NOTE — TELEPHONE ENCOUNTER
DAUGHTER ALSO CALLED WANTING KATHRYN TO CALL HER BACK ABOUT THE SAME THING AND ALSO SHE HAS SOME GENERAL QUESTIONS TO ASK HER               CALLED STATING THAT PT HAS HAD BLOOD IN HIS URINE BEFORE BUT  EVERY TIME PT HAS VOIDED TODAY  IT HAS BEEN BRIGHT RED AND WANTED TO KNOW IF YOU MIGHT WANT TO DO A URINE TEST ON PT    ALSO HE HAS A LOT OF INCREASED PAIN AND CONSTIPATION THINKS SOMETHING MAY BE BLOCKING HIS ANAL CAVITY SHE STATED SHE DID AN EXAM YESTERDAY AND FELT LIKE MAYBE A MASS WAS IN THERE THE STOOL WAS DOWN AND SOFT BUT SHE COULDN'T GET ANYTHING OUT   DIDN'T KNOW IF MAYBE IT IS THE PROSTATE CANCER PROGRESSING     PLEASE ADVISE

## 2019-04-12 NOTE — TELEPHONE ENCOUNTER
Daughter requesting JENISE REED per VERONIKA Mojica at Hospice notified.  They will collect it this weekend.

## 2019-04-12 NOTE — TELEPHONE ENCOUNTER
Daughter requested I call.  I called her.  Pt has gross hematuria that started this am although she is unsure if any further hematuria this afternoon  Having worsening back pain.  Daughter states he passed small blood clots earlier this week.  He is wearing the Fentanyl patch and taking hydrocodone po but doesn't seem to be helping with current pain.   I called the hospice nurse.  She believes this is progression of his prostate cancer.  Hospice MD increased the norco to 2 po Q 8 hrs but daughter is hesitant to give this does.  They're holding the gabapentin because of sleepiness. Hospice has also discussed patient with Dr. Dolan (rad/onc) and Dr. Mejia, oncologist.

## 2019-05-09 ENCOUNTER — TELEPHONE (OUTPATIENT)
Dept: INTERNAL MEDICINE | Facility: CLINIC | Age: 84
End: 2019-05-09

## 2019-05-09 NOTE — TELEPHONE ENCOUNTER
Daughter Danyell states Hospice also gave him mepilex to apply.  Wanted to know what to do with this.   Per Verbal Order Dr. Mccormack.  Apply the Bactroban bid and then can apply the mepilex to area

## 2019-05-09 NOTE — TELEPHONE ENCOUNTER
Patient's daughter called and stated her father has a non healing bed sore on his buttocks for about a month.    She has been putting neosporin on it without much success.  There is not much drainage at all but it is moist. Is there an antibiotic that can be used?

## 2019-05-13 ENCOUNTER — TELEPHONE (OUTPATIENT)
Dept: INTERNAL MEDICINE | Facility: CLINIC | Age: 84
End: 2019-05-13

## 2019-05-13 NOTE — TELEPHONE ENCOUNTER
Ofe with Hospice notified to obtain UA with C&S if indicated. Per [facility], also obtain CBC.  Ofe will do this today.

## 2019-05-13 NOTE — TELEPHONE ENCOUNTER
Nurse stated that patient is having hematuria, blood clots and back pain.  Patient started on the Bactrim yesterday.    The family says he is doing a little better today, but they are insistent on doing something regarding his symptoms even though he just started the antibiotic.    The nurse said she tried to explain that this is a symptom of his condition but they wanted Dr. Mccormack called again.  Do you want another urine gotten?  Please advise.

## 2019-05-13 NOTE — TELEPHONE ENCOUNTER
FYI:  Hospice nurse calls back.  She saw patient today.  Patient is incontinent of urine so she's not sure the family will be able to collect UA.  Nurse states patient is voiding large amounts with gross hematuria.  His Depends is saturated.  Nurse also obtained BMP to check kidney function.  Nurse also reports that patient has 3+ pitting edema left leg, especially from the knee down with weeping skin in the calf.  [facility] notified.  Awaiting lab results.

## 2019-05-14 ENCOUNTER — TELEPHONE (OUTPATIENT)
Dept: INTERNAL MEDICINE | Facility: CLINIC | Age: 84
End: 2019-05-14

## 2019-05-14 NOTE — TELEPHONE ENCOUNTER
"FYI:  Lab results discussed with Galina, Hospice RN.  Verbal order Dr. Mccormack - add iron pill once daily.  Galina will order this.  No other changes.  Note Hospice does recommend Mike placement but patient refuses at this time.  This would be very beneficial to the patient with regards to skin care because he is incontinent and also has breakdown on his buttocks.  He is also \"dead weight\" per Galina, and has bruises under his arms because of the lifting and tugging on him.  Because he is difficult to lift, the daughter has to wait for assistance in standing him to change his Depends so he is in a wet Depends all day.  I told Galina to let family know that Dr. Mccormack would agree with anchoring a Mike.    "

## 2019-05-16 ENCOUNTER — TELEPHONE (OUTPATIENT)
Dept: INTERNAL MEDICINE | Facility: CLINIC | Age: 84
End: 2019-05-16

## 2019-05-16 NOTE — TELEPHONE ENCOUNTER
SHANNON CALLED TO INFORM YOU THAT PT PASSED AWAY LAST NIGHT   INFORMATION WILL BE GIVEN TO TRISTIN

## 2023-07-11 NOTE — THERAPY TREATMENT NOTE
"Acute Care - Physical Therapy Treatment Note  [facility]     Patient Name: Harris Shane  : 9/15/1927  MRN: [id number]  Today's Date: 2018  Onset of Illness/Injury or Date of Surgery: 18  Date of Referral to PT: 18  Referring Physician: Luiz,     Admit Date: 2018    Visit Dx:    ICD-10-CM ICD-9-CM   1. Sepsis, due to unspecified organism A41.9 038.9     995.91   2. Elevated troponin R74.8 790.6   3. Elevated liver enzymes R74.8 790.5   4. Hyperbilirubinemia E80.6 782.4   5. History of prostate cancer Z85.46 V10.46   6. Metastatic cancer to spine C79.51 198.5   7. Pneumonia of left lower lobe due to infectious organism J18.1 486   8. Biliary calculus of other site with obstruction K80.81 [id number]   9. Impaired functional mobility, balance, gait, and endurance Z74.09 V49.89   10. Impaired mobility and ADLs Z74.09 799.89     Patient Active Problem List   Diagnosis   • Sepsis   • Elevated liver enzymes   • Prostate cancer (Mets to L4-L5)   • Pulmonary infiltrate, LLLobe   • Cholelithiases of GB neck per CT A/P   • Acute renal insufficiency, CRE 1.43, unknown baseline   • Blindness of right eye   • WILLIAM (obstructive sleep apnea) remote, intolerant of CPAP   • R/O Cholecystitis   • NSTEMI (non-ST elevated myocardial infarction) (R/O Type 2)   • Bacteremia due to Escherichia coli       Therapy Treatment          Rehabilitation Treatment Summary     Row Name 18 1630 18 1410          Treatment Time/Intention    Discipline physical therapist  -DM occupational therapist  -     Document Type therapy note (daily note)  -DM therapy note (daily note)  -HK     Subjective Information complains of;weakness;pain   MDincr.painMed(oxycodone+FentanylPatch);dtr:\"slurring words\"  -DM complains of;weakness;fatigue  -HK     Mode of Treatment individual therapy;physical therapy  -DM individual therapy;occupational therapy  -HK     Patient/Family Observations UIC;CONT. incont.perPCT;dry flow btwn " LE's;tele;RA;iv hep locked;EXIT alarm  -DM Pt received upright in chair.   -HK     Care Plan Review care plan/treatment goals reviewed;patient/other agree to care plan  -DM care plan/treatment goals reviewed;risks/benefits reviewed;patient/other agree to care plan  -HK     Care Plan Review, Other Participant(s) daughter  -DM  --     Therapy Frequency (PT Clinical Impression) daily  -DM  --     Patient Effort fair  -DM adequate  -HK     Comment dtr statesRad.oncol.will now do 1 RadRx(higherDose),vs 5 rxs., to shrink spinal tumor  -DM  --     Existing Precautions/Restrictions fall;other (see comments)   prost.CA w/mets toSpine;incont.urine;LE's buckle(? d/t Tumor  -DM fall  -HK     Recorded by [DM] Mar Koenig, PT 06/18/18 1744 [HK] Holli Oneal, OT 06/18/18 1551     Row Name 06/18/18 1630 06/18/18 1410          Vital Signs    Pre Systolic BP Rehab 158  -DM --   RN cleared for tx  -HK     Pre Treatment Diastolic BP 92  -DM  --     Intra Systolic BP Rehab 138  -DM  --     Intra Treatment Diastolic BP 74  -DM  --     Post Systolic BP Rehab 158  -DM  --     Post Treatment Diastolic BP 99  -DM  --     Pretreatment Heart Rate (beats/min) 74  -DM  --     Intratreatment Heart Rate (beats/min) 81  -DM  --     Posttreatment Heart Rate (beats/min) 75  -DM  --     Pre SpO2 (%) 97  -DM  --     O2 Delivery Pre Treatment room air  -DM  --     Intra SpO2 (%) 92  -DM  --     O2 Delivery Intra Treatment room air  -DM  --     Post SpO2 (%) 97  -DM  --     O2 Delivery Post Treatment room air  -DM  --     Pre Patient Position Sitting  -DM Sitting  -HK     Intra Patient Position Standing  -DM Standing  -HK     Post Patient Position Sitting  -DM Sitting  -HK     Recorded by [DM] Mar Koenig, PT 06/18/18 1744 [HK] Holli Oneal, OT 06/18/18 1551     Row Name 06/18/18 1630 06/18/18 1410          Cognitive Assessment/Intervention    Additional Documentation Cognitive Assessment/Intervention (Group)  -DM Cognitive  Assessment/Intervention (Group)  -HK     Recorded by [DM] Mar Koenig, PT 06/18/18 1744 [HK] Holli Oneal, OT 06/18/18 1551     Row Name 06/18/18 1630 06/18/18 1410          Cognitive Assessment/Intervention- PT/OT    Affect/Mental Status (Cognitive) anxious  -DM WFL  -HK     Orientation Status (Cognition) oriented to;person;place;time  -DM oriented x 4  -HK     Follows Commands (Cognition) follows one step commands;75-90% accuracy  -DM follows one step commands;50-74% accuracy  -HK     Cognitive Function (Cognitive) safety deficit  -DM safety deficit  -HK     Safety Deficit (Cognitive) mild deficit;awareness of need for assistance;impulsivity  -DM safety precautions follow-through/compliance;safety precautions awareness;ability to follow commands;awareness of need for assistance;insight into deficits/self awareness  -HK     Personal Safety Interventions fall prevention program maintained;gait belt;nonskid shoes/slippers when out of bed  -DM fall prevention program maintained;gait belt;nonskid shoes/slippers when out of bed  -HK     Recorded by [DM] Mar Koenig, PT 06/18/18 1744 [HK] Holli Oneal, OT 06/18/18 1551     Row Name 06/18/18 1630             Safety Issues, Functional Mobility    Impairments Affecting Function (Mobility) balance;coordination;endurance/activity tolerance;pain;postural/trunk control;shortness of breath;strength  -DM      Recorded by [DM] Mar Koenig, PT 06/18/18 1744      Row Name 06/18/18 1630 06/18/18 1410          Bed Mobility Assessment/Treatment    Comment (Bed Mobility) UIC  -DM pt received and left UIC  -HK     Recorded by [DM] Mar Koenig, PT 06/18/18 1744 [HK] Holli Oneal, OT 06/18/18 1551     Row Name 06/18/18 1410             Functional Mobility    Functional Mobility- Ind. Level not appropriate to assess  -HK      Recorded by [HK] Holli Oneal, OT 06/18/18 1551      Row Name 06/18/18 1630 06/18/18 1410          Transfer Assessment/Treatment    Transfer  Assessment/Treatment sit-stand transfer;stand-sit transfer;toilet transfer  -DM toilet transfer;squat pivot transfer  -HK     Comment (Transfers) VC for HP  -DM verbal cues for hand placement and sequencing. Pt unable to fully extend legs to reach full standing position.   -HK     Sit-Stand [place] (Transfers) maximum assist (25% patient effort);2 person assist  -DM  --     Stand-Sit [place] (Transfers) maximum assist (25% patient effort);2 person assist  -DM  --     [place] Level (Toilet Transfer) maximum assist (25% patient effort);2 person assist  -DM dependent (less than 25% patient effort);2 person assist;verbal cues  -HK     Assistive Device (Toilet Transfer) commode, bedside without drop arms;other (see comments)   gt belt  -DM commode, bedside without drop arms  -HK     Recorded by [DM] Mar Koenig, PT 06/18/18 1744 [HK] Holli Oneal, OT 06/18/18 1551     Row Name 06/18/18 1630             Sit-Stand Transfer    Assistive Device (Sit-Stand Transfers) other (see comments)   GT BELT;UE supp  -DM      Recorded by [DM] Mar Koenig, PT 06/18/18 1744      Row Name 06/18/18 1630             Stand-Sit Transfer    Assistive Device (Stand-Sit Transfers) other (see comments)   gt belt; UE supp  -DM      Recorded by [DM] Mar Koenig, PT 06/18/18 1744      Row Name 06/18/18 1410             Squat Pivot Transfer    Assistive Device (Squat Pivot Transfers) other (see comments)  -HK      Squat Pivot [place] (Transfers) dependent (less than 25% patient effort);2 person assist;verbal cues  -HK      Recorded by [HK] Holli Oneal, OT 06/18/18 1551      Row Name 06/18/18 1630 06/18/18 1410          Toilet Transfer    Type (Toilet Transfer) stand pivot/stand step  -DM squat pivot  -HK     Recorded by [DM] Mar Koenig, PT 06/18/18 1744 [HK] Holli Oneal, OT 06/18/18 1551     Row Name 06/18/18 1630             Gait/Stairs Assessment/Training    47979 - Gait Training Minutes  4  -DM       Gait/Stairs Assessment/Training gait/ambulation independence;gait/ambulation assistive device;distance ambulated;gait pattern;gait deviations  -DM      [place] Level (Gait) maximum assist (25% patient effort);2 person assist  -DM      Assistive Device (Gait) other (see comments)   gt belt  -DM      Distance in Feet (Gait) 4   sidestep 2 ft x 2  -DM      Pattern (Gait) step-to  -DM      Deviations/Abnormal Patterns (Gait) ataxic;base of support, narrow;veronique decreased;stride length decreased  -DM      Bilateral Gait Deviations forward flexed posture;heel strike decreased;knee buckling, bilateral  -DM      Comment (Gait/Stairs) unable to take 3rd walk d/t exhaustion s/p toileting 20 min  -DM      Recorded by [DM] Mar Koenig, PT 06/18/18 1744      Row Name 06/18/18 1410             ADL Assessment/Intervention    44881 - OT Self Care/Mgmt Minutes 35  -HK      BADL Assessment/Intervention grooming;toileting  -HK      Recorded by [HK] Holli Oneal, OT 06/18/18 1551      Row Name 06/18/18 1410             Grooming Assessment/Training    [place] Level (Grooming) hair care, combing/brushing;oral care regimen;verbal cues;set up  -HK      Grooming Position unsupported sitting  -HK      Comment (Grooming) frequent cues to sit upright to engage core muscles. pt continually attempting to lean back.   -HK      Recorded by [HK] Holli Oneal, OT 06/18/18 1551      Row Name 06/18/18 1410             Toileting Assessment/Training    [place] Level (Toileting) toileting skills;maximum assist (25% patient effort);two person assist required;verbal cues  -HK      Assistive Devices (Toileting) commode, bedside without drop arms  -HK      Toileting Position unsupported sitting  -HK      Comment (Toileting) Pt requires maxAx2 for lyle care and standing for pericare.   -HK      Recorded by [HK] Holli Oneal, OT 06/18/18 1551      Row Name 06/18/18 1410             BADL Safety/Performance    Impairments, BADL  Safety/Performance balance;strength;motor control;endurance/activity tolerance  -HK      Recorded by [HK] Holli Oneal, OT 06/18/18 1551      Row Name 06/18/18 1630             Motor Skills Assessment/Interventions    Additional Documentation Balance (Group);Balance Interventions (Group);Therapeutic Exercise (Group);Therapeutic Exercise Interventions (Group)  -DM      Recorded by [DM] Mar Koenig, PT 06/18/18 1744      Row Name 06/18/18 1630 06/18/18 1410          Therapeutic Exercise    66428 - PT Therapeutic Exercise Minutes 42  -DM  --     17465 - OT Therapeutic Activity Minutes  -- 10  -HK     Recorded by [DM] Mar Koenig, PT 06/18/18 1744 [HK] Holli Oneal, OT 06/18/18 1551     Row Name 06/18/18 1630             Lower Extremity Seated Therapeutic Exercise    Performed, Seated Lower Extremity (Therapeutic Exercise) hip flexion/extension;hip abduction/adduction;hip external/internal rotation;knee flexion/extension;ankle dorsiflexion/plantarflexion;LAQ (long arc quad), knee extension;SAQ (short arc quad), knee extension   pillow squeezes,H.slides,nahun, abdom sets  -DM      Exercise Type, Seated Lower Extremity (Therapeutic Exercise) AAROM (active assistive range of motion);AROM (active range of motion);isometric contraction, static  -DM      Sets/Reps Detail, Seated Lower Extremity (Therapeutic Exercise) 1/10   20 reps QS  -DM      Comment, Seated Lower Extremity (Therapeutic Exercise) Rests btwn sets d/t wheezing  -DM      Recorded by [DM] Mar Koenig, PT 06/18/18 1744      Row Name 06/18/18 1630             Therapeutic Exercise    Upper Extremity Range of Motion (Therapeutic Exercise) shoulder flexion/extension, bilateral;shoulder abduction/adduction, bilateral;shoulder horizontal abduction/adduction, bilateral;shoulder internal/external rotation, bilateral;elbow flexion/extension, bilateral;other (see comments)   scapular protract/retract,sh.shrugs & 1/2 circles  -DM      Exercise Type (Therapeutic  Exercise) AAROM (active assistive range of motion);AROM (active range of motion)  -DM      Position (Therapeutic Exercise) seated  -DM      Sets/Reps (Therapeutic Exercise) 1/10  -DM      Comment (Therapeutic Exercise) again wheezing;freq rests;PLB exer  -DM      Recorded by [DM] Mar Koenig, PT 06/18/18 1744      Row Name 06/18/18 1630 06/18/18 1410          Balance    Balance static sitting balance;static standing balance  -DM static sitting balance  -HK     Recorded by [DM] Mar Koenig, PT 06/18/18 1744 [HK] Holli Oneal, OT 06/18/18 1551     Row Name 06/18/18 1630 06/18/18 1410          Static Sitting Balance    Level of [place] (Unsupported Sitting, Static Balance) contact guard assist  -DM contact guard assist  -HK     Sitting Position (Unsupported Sitting, Static Balance) sitting in chair   front of chair  -DM sitting in chair  -HK     Time Able to Maintain Position (Unsupported Sitting, Static Balance) 45 to 60 seconds  -DM  --     Comment (Unsupported Sitting, Static Balance) trunk ext, PLB  -DM frequent cues to maintain sitting balance  -HK     Recorded by [DM] Mar Koenig, PT 06/18/18 1744 [HK] Holli Oneal, OT 06/18/18 1551     Row Name 06/18/18 1630 06/18/18 1410          Static Standing Balance    Level of [place] (Supported Standing, Static Balance) maximal assist, 25 to 49% patient effort  -DM dependent  -HK     Time Able to Maintain Position (Supported Standing, Static Balance) 15 to 30 seconds  -DM less than 15 seconds  -HK     Assistive Device Utilized (Supported Standing, Static Balance) other (see comments)   GT BELT;UE supp;3trials(hygiene,dryflow pad placed btwn LE's  -DM  --     Comment (Supported Standing, Static Balance) PCT & PT assist'g;KNEES buckling;wheezing  -DM  --     Recorded by [DM] Mar Koenig, PT 06/18/18 1744 [HK] Holli Oneal, OT 06/18/18 1551     Row Name 06/18/18 1630 06/18/18 1410          Positioning and Restraints    Pre-Treatment Position sitting  in chair/recliner  -DM sitting in chair/recliner  -HK     Post Treatment Position chair   changed tele.lorenzo. & finger 02 sensor  -DM chair  -HK     In Chair notified nsg;reclined;call light within reach;encouraged to call for assist;exit alarm on;with family/caregiver;waffle cushion;legs elevated  -DM notified nsg;sitting;call light within reach;encouraged to call for assist;with nsg  -HK     Recorded by [DM] Mar Koenig, PT 06/18/18 1744 [HK] Holli Oneal, OT 06/18/18 1551     Row Name 06/18/18 1630 06/18/18 1410          Pain Assessment    Additional Documentation Pain Scale: FACES Pre/Post-Treatment (Group)  -DM Pain Scale: Numbers Pre/Post-Treatment (Group)  -HK     Recorded by [DM] Mar Koenig, PT 06/18/18 1744 [HK] Holli Oneal, OT 06/18/18 1551     Row Name 06/18/18 1630 06/18/18 1410          Pain Scale: Numbers Pre/Post-Treatment    Pain Scale: Numbers, Pretreatment --  -DM 2/10  -HK     Pain Scale: Numbers, Post-Treatment --  -DM 2/10  -HK     Pain Location back   & LLE  -DM  --     Pre/Post Treatment Pain Comment --   premed (oxycodone;fentanyl patch)  -DM  --     Recorded by [DM] Mar Koenig, PT 06/18/18 1744 [HK] Holli Oneal, OT 06/18/18 1551     Row Name 06/18/18 1630             Pain Scale: FACES Pre/Post-Treatment    Pain: FACES Scale, Pretreatment 2-->hurts little bit  -DM      Pain: FACES Scale, Post-Treatment 4-->hurts little more  -DM      Recorded by [DM] Mar Koenig, PT 06/18/18 1744      Row Name 06/18/18 1410             Plan of Care Review    Plan of Care Reviewed With patient  -HK      Recorded by [HK] Holli Oneal, OT 06/18/18 1551      Row Name 06/18/18 1410             Outcome Summary/Treatment Plan (OT)    Daily Summary of Progress (OT) progress toward functional goals is gradual  -HK      Recorded by [HK] Holli Oneal, OT 06/18/18 1551      Row Name 06/18/18 1639             Outcome Summary/Treatment Plan (PT)    Daily Summary of Progress (PT) progress towards  functional goals is fair  -DM      Anticipated Discharge Disposition (PT) skilled nursing facility  -DM      Recorded by [DM] Mar Koenig, PT 06/18/18 1744        User Key  (r) = Recorded By, (t) = Taken By, (c) = Cosigned By    Initials Name Effective Dates Discipline    DM Mar Koenig, PT 06/19/15 -  PT    HK Holli Oneal, OT 03/07/18 -  OT                     Physical Therapy Education     Title: PT OT SLP Therapies (Active)     Topic: Physical Therapy (Active)     Point: Mobility training (Active)    Learning Progress Summary     Learner Status Readiness Method Response Comment Documented by    Patient Active Eager E,D NR  DM 06/18/18 1745     Active Acceptance E,D NR   06/15/18 1101     Active Acceptance E,D NR   06/14/18 1016     Active Acceptance E NR  KR 06/13/18 1200    Family Active Eager E,D NR  DM 06/18/18 1745     Active Acceptance E NR  KR 06/13/18 1200          Point: Home exercise program (Active)    Learning Progress Summary     Learner Status Readiness Method Response Comment Documented by    Patient Active Eager E,D NR  DM 06/18/18 1745     Active Acceptance E,D NR  CM 06/15/18 1101     Active Acceptance E,D NR  CM 06/14/18 1016    Family Active Eager E,D NR   06/18/18 1745          Point: Body mechanics (Active)    Learning Progress Summary     Learner Status Readiness Method Response Comment Documented by    Patient Active Eager E,D NR  DM 06/18/18 1745     Active Acceptance E,D NR  CM 06/15/18 1101     Active Acceptance E,D NR   06/14/18 1016     Active Acceptance E NR  KR 06/13/18 1200    Family Active Eager E,D NR  DM 06/18/18 1745     Active Acceptance E NR  KR 06/13/18 1200          Point: Precautions (Active)    Learning Progress Summary     Learner Status Readiness Method Response Comment Documented by    Patient Active Eager E,D NR  DM 06/18/18 1745     Active Acceptance E,D NR  CM 06/15/18 1101     Active Acceptance E,D NR  CM 06/14/18 1016     Active Acceptance E NR  KR  06/13/18 1200    Family Active Eager E,D NR  DM 06/18/18 1745     Active Acceptance E NR  KR 06/13/18 1200                      User Key     Initials Effective Dates Name Provider Type Discipline     06/19/15 -  Mar Koenig, PT Physical Therapist PT    KR 04/03/18 -  Leanna Molina, PT Physical Therapist PT    CM 06/07/18 -  Barbara Martinez, PT Student PT Student PT                    PT Recommendation and Plan  Anticipated Discharge Disposition (PT): skilled nursing facility  Therapy Frequency (PT Clinical Impression): daily  Outcome Summary/Treatment Plan (PT)  Daily Summary of Progress (PT): progress towards functional goals is fair  Anticipated Discharge Disposition (PT): skilled nursing facility  Plan of Care Reviewed With: patient, daughter  Progress: improving  Outcome Summary: Able to perform STS x 3 w/max 2A,w/o AD (Req.close guarding d/t knees buckling, back pain), commode transf, & ther  exer x 10 w/freq rests d/t signif wheezing, elev BP & INCR. back pain(despite incr. pain meds & fentanyl patch);noted decr HGB (12.4); Will now have Rad Rx to shrink spine tumor           Outcome Measures     Row Name 06/18/18 1630 06/18/18 1410          How much help from another person do you currently need...    Turning from your back to your side while in flat bed without using bedrails? 2  -DM  --     Moving from lying on back to sitting on the side of a flat bed without bedrails? 2  -DM  --     Moving to and from a bed to a chair (including a wheelchair)? 2  -DM  --     Standing up from a chair using your arms (e.g., wheelchair, bedside chair)? 2  -DM  --     Climbing 3-5 steps with a railing? 1  -DM  --     To walk in hospital room? 2  -DM  --     AM-PAC 6 Clicks Score 11  -DM  --        How much help from another is currently needed...    Putting on and taking off regular lower body clothing?  -- 2  -HK     Bathing (including washing, rinsing, and drying)  -- 2  -HK     Toileting (which includes using  toilet bed pan or urinal)  -- 2  -HK     Putting on and taking off regular upper body clothing  -- 2  -HK     Taking care of personal grooming (such as brushing teeth)  -- 3  -HK     Eating meals  -- 3  -HK     Score  -- 14  -HK        Functional Assessment    Outcome Measure Options AM-PAC 6 Clicks Basic Mobility (PT)  -DM AM-PAC 6 Clicks Daily Activity (OT)  -HK       User Key  (r) = Recorded By, (t) = Taken By, (c) = Cosigned By    Initials Name Provider Type    DM Mar Koenig, PT Physical Therapist    HK Holli Oneal, OT Occupational Therapist           Time Calculation:         PT Charges     Row Name 06/18/18 1749 06/18/18 1630          Time Calculation    Start Time 1630  -DM  --     PT Received On 06/18/18  -DM  --     PT Goal Re-Cert Due Date 06/23/18  -DM  --        Time Calculation- PT    Total Timed Code Minutes- PT 46 minute(s)  -DM  --        Timed Charges    59747 - PT Therapeutic Exercise Minutes  -- 42  -DM     30480 - Gait Training Minutes   -- 4  -DM       User Key  (r) = Recorded By, (t) = Taken By, (c) = Cosigned By    Initials Name Provider Type    DM Mar Koenig, PT Physical Therapist        Therapy Suggested Charges     Code   Minutes Charges    43446 (CPT®) Hc Pt Neuromusc Re Education Ea 15 Min      22540 (CPT®) Hc Pt Ther Proc Ea 15 Min 42 3    90198 (CPT®) Hc Gait Training Ea 15 Min 4     50544 (CPT®) Hc Pt Therapeutic Act Ea 15 Min      80482 (CPT®) Hc Pt Manual Therapy Ea 15 Min      46571 (CPT®) Hc Pt Iontophoresis Ea 15 Min      43127 (CPT®) Hc Pt Elec Stim Ea-Per 15 Min      58826 (CPT®) Hc Pt Ultrasound Ea 15 Min      10805 (CPT®) Hc Pt Self Care/Mgmt/Train Ea 15 Min      Total  46 3        Therapy Charges for Today     Code Description Service Date Service Provider Modifiers Qty    18134794242 HC PT THER PROC EA 15 MIN 6/18/2018 Mar Koenig, PT GP 3          PT G-Codes  Outcome Measure Options: AM-PAC 6 Clicks Basic Mobility (PT)    Mar Koenig,  PT  6/18/2018        No